# Patient Record
Sex: MALE | Race: WHITE | NOT HISPANIC OR LATINO | Employment: FULL TIME | URBAN - METROPOLITAN AREA
[De-identification: names, ages, dates, MRNs, and addresses within clinical notes are randomized per-mention and may not be internally consistent; named-entity substitution may affect disease eponyms.]

---

## 2018-04-14 ENCOUNTER — TRANSCRIBE ORDERS (OUTPATIENT)
Dept: ADMINISTRATIVE | Facility: HOSPITAL | Age: 58
End: 2018-04-14

## 2018-04-14 DIAGNOSIS — D75.89 BONE MARROW HYPERPLASIA: Primary | ICD-10-CM

## 2018-04-23 ENCOUNTER — HOSPITAL ENCOUNTER (OUTPATIENT)
Dept: RADIOLOGY | Facility: HOSPITAL | Age: 58
Discharge: HOME | End: 2018-04-23
Attending: INTERNAL MEDICINE
Payer: COMMERCIAL

## 2018-04-23 DIAGNOSIS — D75.89 BONE MARROW HYPERPLASIA: ICD-10-CM

## 2018-04-23 PROCEDURE — 76705 ECHO EXAM OF ABDOMEN: CPT

## 2018-05-07 ENCOUNTER — HOSPITAL ENCOUNTER (OUTPATIENT)
Dept: RADIOLOGY | Facility: HOSPITAL | Age: 58
Discharge: HOME | End: 2018-05-07
Attending: INTERNAL MEDICINE
Payer: COMMERCIAL

## 2018-05-07 ENCOUNTER — TRANSCRIBE ORDERS (OUTPATIENT)
Dept: ADMINISTRATIVE | Facility: HOSPITAL | Age: 58
End: 2018-05-07

## 2018-05-07 DIAGNOSIS — M25.561 RIGHT KNEE PAIN, UNSPECIFIED CHRONICITY: Primary | ICD-10-CM

## 2018-05-07 DIAGNOSIS — M25.561 RIGHT KNEE PAIN, UNSPECIFIED CHRONICITY: ICD-10-CM

## 2018-05-07 PROCEDURE — 73564 X-RAY EXAM KNEE 4 OR MORE: CPT

## 2018-05-18 ENCOUNTER — TRANSCRIBE ORDERS (OUTPATIENT)
Dept: ADMINISTRATIVE | Facility: HOSPITAL | Age: 58
End: 2018-05-18

## 2018-05-18 DIAGNOSIS — M25.561 RIGHT KNEE PAIN, UNSPECIFIED CHRONICITY: Primary | ICD-10-CM

## 2018-06-06 VITALS
BODY MASS INDEX: 22.65 KG/M2 | SYSTOLIC BLOOD PRESSURE: 117 MMHG | DIASTOLIC BLOOD PRESSURE: 74 MMHG | HEART RATE: 82 BPM | WEIGHT: 161.8 LBS | HEIGHT: 71 IN

## 2018-06-06 DIAGNOSIS — S83.241A ACUTE MEDIAL MENISCUS TEAR OF RIGHT KNEE, INITIAL ENCOUNTER: Primary | ICD-10-CM

## 2018-06-06 DIAGNOSIS — S83.241A OTHER TEAR OF MEDIAL MENISCUS, CURRENT INJURY, RIGHT KNEE, INITIAL ENCOUNTER: Primary | ICD-10-CM

## 2018-06-06 PROCEDURE — 99244 OFF/OP CNSLTJ NEW/EST MOD 40: CPT | Performed by: ORTHOPAEDIC SURGERY

## 2018-06-06 RX ORDER — LEVOTHYROXINE SODIUM 0.15 MG/1
TABLET ORAL EVERY MORNING
COMMUNITY
Start: 2018-04-09

## 2018-06-06 RX ORDER — HYDROXYCHLOROQUINE SULFATE 200 MG/1
200 TABLET, FILM COATED ORAL
COMMUNITY
Start: 2018-05-23 | End: 2021-04-02

## 2018-06-06 RX ORDER — HYDROXYUREA 500 MG/1
CAPSULE ORAL
COMMUNITY

## 2018-06-06 NOTE — PROGRESS NOTES
Assessment/Plan:  1  Other tear of medial meniscus, current injury, right knee, initial encounter       Scribe Attestation    I,:   Ilantonio Everton am acting as a scribe while in the presence of the attending physician :        I,:   Lisbet Alvarez MD personally performed the services described in this documentation    as scribed in my presence  :            Upon examination today he does demonstrate signs symptoms consistent with right knee medial meniscus tear  This is confirmed with MRI of his right knee  We discussed treatment options such as conservative measures physical therapy as well as surgical options of right knee arthroscopy with medial meniscectomy  David Bañuelos would like to consider surgical option  I discussed with Dirkrichie Sarmad risks involved including but not limited to as bleeding, blood clots, nerve injury, loss of range of motion, loss of strength, failure of surgery, and need for further surgery  David Bañuelos understands these risks and would like to move forward with the procedure  I did advise David Bañuelos to stop his rheumatoid medications 3 weeks before his surgery date, and 3 weeks after surgery  Additionally he is to follow up with his primary care physician for further consult for his high blood platelet count and receive clearance  Agapito's blood platelet count predisposes him to blood clots postsurgery  He will schedule his procedure with my office as per my schedule    Subjective:   Get Cano is a 59 y o  male who presents with right knee pain that began 10 months ago while he was doing yard work  David Bañuelos was referred to me today by Dr Renae Garcia  He states that he was in a crouched position and jump and twisted which resulted in an audible pop at the posterior medial aspect of his right knee  He was able to walk this off and was asymptomatic up until 4/14/2018  Where he states that he was dancing at his birthday party and again experienced an audible pop with associated sharp pain   He notes the pain as intermittent and mild to moderate pain about the posterior medial aspect of his right knee  He pain is exacerbated with prolonged weight-bearing and walking, as well as physical activity such as stairs and ladders  Pain is alleviated with rest and ice her  He denies any paresthesias at this time      Review of Systems   Constitutional: Negative  HENT: Negative  Eyes: Negative  Respiratory: Negative  Cardiovascular: Negative  Gastrointestinal: Negative  Endocrine: Negative  Genitourinary: Negative  Musculoskeletal: Negative  Skin: Negative  Allergic/Immunologic: Negative  Neurological: Negative  Hematological: Negative  Psychiatric/Behavioral: Negative  Past Medical History:   Diagnosis Date    Arthritis     Hypothyroid        Past Surgical History:   Procedure Laterality Date    ANKLE SURGERY      ELBOW SURGERY Bilateral     FOOT ARTHRODESIS, MODIFIED ARELLANO Left     SHOULDER SURGERY Left        Family History   Problem Relation Age of Onset    Cancer Mother        Social History     Occupational History    Not on file  Social History Main Topics    Smoking status: Former Smoker    Smokeless tobacco: Never Used    Alcohol use Yes      Comment: socially    Drug use: No    Sexual activity: Not on file         Current Outpatient Prescriptions:     hydroxychloroquine (PLAQUENIL) 200 mg tablet, , Disp: , Rfl:     hydroxyurea (HYDREA) 500 mg capsule, Take by mouth daily, Disp: , Rfl:     levothyroxine 150 mcg tablet, , Disp: , Rfl:     No Known Allergies    Objective:  Vitals:    06/06/18 0816   BP: 117/74   Pulse: 82       Right Knee Exam     Tenderness   Right knee tenderness location: Posterior medial joint line  Range of Motion   Extension: 0   Flexion: 150     Muscle Strength     The patient has normal right knee strength      Tests   Angelina:  Medial - negative Lateral - negative  Drawer:       Anterior - negative    Posterior - negative  Varus: negative  Valgus: negative    Other   Erythema: absent  Scars: absent  Sensation: normal  Pulse: present  Swelling: none  Other tests: no effusion present          Observations     Right Knee   Negative for effusion  Physical Exam   Constitutional: He is oriented to person, place, and time  He appears well-developed and well-nourished  HENT:   Head: Normocephalic and atraumatic  Eyes: Conjunctivae are normal    Neck: Normal range of motion  Cardiovascular: Normal rate and normal heart sounds  Pulmonary/Chest: Effort normal and breath sounds normal    Musculoskeletal:        Right knee: He exhibits no effusion  As noted in HPI   Neurological: He is alert and oriented to person, place, and time  Skin: Skin is warm and dry  Psychiatric: He has a normal mood and affect  His behavior is normal  Judgment and thought content normal        I have personally reviewed pertinent films in PACS and my interpretation is as follows:    MRI of the right knee demonstrates a medial meniscus tear of at the posterior horn with small subadjacent parameniscal cyst   Articular cartilage thinning noted at the anterior aspect of medial femoral condyle

## 2018-06-07 NOTE — TELEPHONE ENCOUNTER
Patient called, having sx on his knee 7/9/18  Needs to know estimate rtw date, and any restrictions when he goes back to work? He is a  ; loading reactors, packaging  He is on his feet 12 hours shift, stairs and ladders       Please fax information to patient PCP: Dr Sharif Gusman #[phone]

## 2018-06-07 NOTE — TELEPHONE ENCOUNTER
Caller: patient  Call back number: [phone]  Fax number: [phone]  Patient's doctor: Dr Андрей Lawson    Patient needs a note stating his estimated return to work date and if he will have any restrictions after surgery   Please advise

## 2018-06-29 ENCOUNTER — TRANSCRIBE ORDERS (OUTPATIENT)
Dept: ADMINISTRATIVE | Facility: HOSPITAL | Age: 58
End: 2018-06-29

## 2018-06-29 ENCOUNTER — OFFICE VISIT (OUTPATIENT)
Dept: LAB | Facility: HOSPITAL | Age: 58
End: 2018-06-29
Attending: ORTHOPAEDIC SURGERY
Payer: COMMERCIAL

## 2018-06-29 ENCOUNTER — APPOINTMENT (OUTPATIENT)
Dept: PREADMISSION TESTING | Facility: HOSPITAL | Age: 58
End: 2018-06-29
Payer: COMMERCIAL

## 2018-06-29 DIAGNOSIS — S83.241A OTHER TEAR OF MEDIAL MENISCUS, CURRENT INJURY, RIGHT KNEE, INITIAL ENCOUNTER: ICD-10-CM

## 2018-06-29 PROCEDURE — 93005 ELECTROCARDIOGRAM TRACING: CPT

## 2018-06-29 NOTE — PRE-PROCEDURE INSTRUCTIONS
Pre-Surgery Instructions:   Medication Instructions    hydroxychloroquine (PLAQUENIL) 200 mg tablet Patient was instructed by Physician and understands   Hydroxyurea (HYDREA PO) Patient was instructed by Physician and understands   hydroxyurea (HYDREA) 500 mg capsule Patient was instructed by Physician and understands   levothyroxine 150 mcg tablet Instructed patient per Anesthesia Guidelines  Pre op instructions given  Pt to take hydroxyurea  Levothyroxine the am of surgery   serg Alcantar [phone] Surgical Experience    The following information was developed to assist you to prepare for your operation  What do I need to do before coming to the hospital?   Arrange for a responsible person to drive you to and from the hospital    Arrange care for your children at home  Children are not allowed in the recovery areas of the hospital   Plan to wear clothing that is easy to put on and take off  If you are having shoulder surgery, wear a shirt that buttons or zippers in the front  Bathing  o Shower the evening before and the morning of your surgery with an antibacterial soap  Please refer to the Pre Op Showering Instructions for Surgery Patients Sheet   o Remove nail polish and all body piercing jewelry  o Do not shave any body part for at least 24 hours before surgery-this includes face, arms, legs and upper body  Food  o Nothing to eat or drink after midnight the night before your surgery   This includes candy and chewing gum  o Exception: If your surgery is after 12:00pm (noon), you may have clear liquids such as 7-Up®, ginger ale, apple or cranberry juice, Jell-O®, water, or clear broth until 8:00 am  o Do not drink milk or juice with pulp on the morning before surgery  o Do not drink alcohol 24 hours before surgery  Medicine  o Follow instructions you received from your surgeon about which medicines you may take on the day of surgery  o If instructed to take medicine on the morning of surgery, take pills with just a small sip of water  Call your prescribing doctor for specific infroamtion on what to do if you take insulin    What should I bring to the hospital?    Bring:  Shonda Drain or a walker, if you have them, for foot or knee surgery   A list of the daily medicines, vitamins, minerals, herbals and nutritional supplements you take  Include the dosages of medicines and the time you take them each day   Glasses, dentures or hearing aids   Minimal clothing; you will be wearing hospital sleepwear   Photo ID; required to verify your identity   If you have a Living Will or Power of , bring a copy of the documents   If you have an ostomy, bring an extra pouch and any supplies you use    Do not bring   Medicines or inhalers   Money, valuables or jewelry    What other information should I know about the day of surgery?  Notify your surgeons if you develop a cold, sore throat, cough, fever, rash or any other illness   Report to the Ambulatory Surgical/Same Day Surgery Unit   You will be instructed to stop at Registration only if you have not been pre-registered   Inform your  fi they do not stay that they will be asked by the staff to leave a phone number where they can be reached   Be available to be reached before surgery  In the event the operating room schedule changes, you may be asked to come in earlier or later than expected    *It is important to tell your doctor and others involved in your health care if you are taking or have been taking any non-prescription drugs, vitamins, minerals, herbals or other nutritional supplements   Any of these may interact with some food or medicines and cause a reaction

## 2018-07-03 ENCOUNTER — TELEPHONE (OUTPATIENT)
Dept: OBGYN CLINIC | Facility: CLINIC | Age: 58
End: 2018-07-03

## 2018-07-03 LAB
INR PPP: 1 (ref 0.8–1.2)
PROTHROMBIN TIME: 10.2 SEC (ref 9.1–12)

## 2018-07-05 NOTE — TELEPHONE ENCOUNTER
Patient called back and says he did have the blood work done in [address] ([facility])

## 2018-07-05 NOTE — TELEPHONE ENCOUNTER
Called [facility], no results yet  Called patient to verify he went this morning  Called Dr Alize Blackwood office, l/m advising the status

## 2018-07-06 ENCOUNTER — TELEPHONE (OUTPATIENT)
Dept: OBGYN CLINIC | Facility: HOSPITAL | Age: 58
End: 2018-07-06

## 2018-07-06 LAB
APTT PPP: 26 SEC (ref 24–33)
BASOPHILS # BLD AUTO: 0.1 X10E3/UL (ref 0–0.2)
BASOPHILS NFR BLD AUTO: 1 %
BUN SERPL-MCNC: 10 MG/DL (ref 8–27)
BUN/CREAT SERPL: 10 (ref 10–24)
CALCIUM SERPL-MCNC: 9.5 MG/DL (ref 8.6–10.2)
CHLORIDE SERPL-SCNC: 100 MMOL/L (ref 96–106)
CO2 SERPL-SCNC: 24 MMOL/L (ref 20–29)
CREAT SERPL-MCNC: 1.03 MG/DL (ref 0.76–1.27)
EOSINOPHIL # BLD AUTO: 0.3 X10E3/UL (ref 0–0.4)
EOSINOPHIL NFR BLD AUTO: 4 %
ERYTHROCYTE [DISTWIDTH] IN BLOOD BY AUTOMATED COUNT: 14.2 % (ref 12.3–15.4)
GLUCOSE SERPL-MCNC: 81 MG/DL (ref 65–99)
HCT VFR BLD AUTO: 46.9 % (ref 37.5–51)
HGB BLD-MCNC: 16.7 G/DL (ref 13–17.7)
IMM GRANULOCYTES # BLD: 0 X10E3/UL (ref 0–0.1)
IMM GRANULOCYTES NFR BLD: 1 %
LYMPHOCYTES # BLD AUTO: 1 X10E3/UL (ref 0.7–3.1)
LYMPHOCYTES NFR BLD AUTO: 16 %
MCH RBC QN AUTO: 35.6 PG (ref 26.6–33)
MCHC RBC AUTO-ENTMCNC: 35.6 G/DL (ref 31.5–35.7)
MCV RBC AUTO: 100 FL (ref 79–97)
MONOCYTES # BLD AUTO: 0.3 X10E3/UL (ref 0.1–0.9)
MONOCYTES NFR BLD AUTO: 5 %
NEUTROPHILS # BLD AUTO: 4.7 X10E3/UL (ref 1.4–7)
NEUTROPHILS NFR BLD AUTO: 73 %
PLATELET # BLD AUTO: 334 X10E3/UL (ref 150–379)
POTASSIUM SERPL-SCNC: 4.7 MMOL/L (ref 3.5–5.2)
RBC # BLD AUTO: 4.69 X10E6/UL (ref 4.14–5.8)
SL AMB EGFR AFRICAN AMERICAN: 91 ML/MIN/1.73
SL AMB EGFR NON AFRICAN AMERICAN: 79 ML/MIN/1.73
SODIUM SERPL-SCNC: 141 MMOL/L (ref 134–144)
WBC # BLD AUTO: 6.4 X10E3/UL (ref 3.4–10.8)

## 2018-07-08 ENCOUNTER — ANESTHESIA EVENT (OUTPATIENT)
Dept: PERIOP | Facility: HOSPITAL | Age: 58
End: 2018-07-08
Payer: COMMERCIAL

## 2018-07-09 ENCOUNTER — ANESTHESIA (OUTPATIENT)
Dept: PERIOP | Facility: HOSPITAL | Age: 58
End: 2018-07-09
Payer: COMMERCIAL

## 2018-07-09 ENCOUNTER — HOSPITAL ENCOUNTER (OUTPATIENT)
Facility: HOSPITAL | Age: 58
Setting detail: OUTPATIENT SURGERY
Discharge: HOME | End: 2018-07-09
Attending: ORTHOPAEDIC SURGERY | Admitting: ORTHOPAEDIC SURGERY
Payer: COMMERCIAL

## 2018-07-09 VITALS
TEMPERATURE: 98.9 F | OXYGEN SATURATION: 97 % | RESPIRATION RATE: 18 BRPM | SYSTOLIC BLOOD PRESSURE: 140 MMHG | BODY MASS INDEX: 22.32 KG/M2 | HEART RATE: 66 BPM | DIASTOLIC BLOOD PRESSURE: 82 MMHG | WEIGHT: 160 LBS

## 2018-07-09 PROCEDURE — 29881 ARTHRS KNE SRG MNISECTMY M/L: CPT | Performed by: PHYSICIAN ASSISTANT

## 2018-07-09 PROCEDURE — 29881 ARTHRS KNE SRG MNISECTMY M/L: CPT | Performed by: ORTHOPAEDIC SURGERY

## 2018-07-09 RX ORDER — LIDOCAINE HYDROCHLORIDE 10 MG/ML
INJECTION, SOLUTION INFILTRATION; PERINEURAL AS NEEDED
Status: DISCONTINUED | OUTPATIENT
Start: 2018-07-09 | End: 2018-07-09 | Stop reason: SURG

## 2018-07-09 RX ORDER — MIDAZOLAM HYDROCHLORIDE 1 MG/ML
INJECTION INTRAMUSCULAR; INTRAVENOUS AS NEEDED
Status: DISCONTINUED | OUTPATIENT
Start: 2018-07-09 | End: 2018-07-09 | Stop reason: SURG

## 2018-07-09 RX ORDER — FENTANYL CITRATE 50 UG/ML
INJECTION, SOLUTION INTRAMUSCULAR; INTRAVENOUS AS NEEDED
Status: DISCONTINUED | OUTPATIENT
Start: 2018-07-09 | End: 2018-07-09 | Stop reason: SURG

## 2018-07-09 RX ORDER — ONDANSETRON 2 MG/ML
INJECTION INTRAMUSCULAR; INTRAVENOUS AS NEEDED
Status: DISCONTINUED | OUTPATIENT
Start: 2018-07-09 | End: 2018-07-09 | Stop reason: SURG

## 2018-07-09 RX ORDER — FENTANYL CITRATE/PF 50 MCG/ML
25 SYRINGE (ML) INJECTION
Status: DISCONTINUED | OUTPATIENT
Start: 2018-07-09 | End: 2018-07-09 | Stop reason: HOSPADM

## 2018-07-09 RX ORDER — PROPOFOL 10 MG/ML
INJECTION, EMULSION INTRAVENOUS AS NEEDED
Status: DISCONTINUED | OUTPATIENT
Start: 2018-07-09 | End: 2018-07-09 | Stop reason: SURG

## 2018-07-09 RX ORDER — ONDANSETRON 2 MG/ML
4 INJECTION INTRAMUSCULAR; INTRAVENOUS ONCE AS NEEDED
Status: DISCONTINUED | OUTPATIENT
Start: 2018-07-09 | End: 2018-07-09 | Stop reason: HOSPADM

## 2018-07-09 RX ORDER — BUPIVACAINE HYDROCHLORIDE AND EPINEPHRINE 2.5; 5 MG/ML; UG/ML
INJECTION, SOLUTION INFILTRATION; PERINEURAL AS NEEDED
Status: DISCONTINUED | OUTPATIENT
Start: 2018-07-09 | End: 2018-07-09 | Stop reason: HOSPADM

## 2018-07-09 RX ORDER — SODIUM CHLORIDE 9 MG/ML
INJECTION, SOLUTION INTRAVENOUS CONTINUOUS PRN
Status: DISCONTINUED | OUTPATIENT
Start: 2018-07-09 | End: 2018-07-09 | Stop reason: SURG

## 2018-07-09 RX ADMIN — FENTANYL CITRATE 100 MCG: 50 INJECTION, SOLUTION INTRAMUSCULAR; INTRAVENOUS at 13:58

## 2018-07-09 RX ADMIN — PROPOFOL 200 MG: 10 INJECTION, EMULSION INTRAVENOUS at 14:00

## 2018-07-09 RX ADMIN — LIDOCAINE HYDROCHLORIDE 50 MG: 10 INJECTION, SOLUTION INFILTRATION; PERINEURAL at 14:00

## 2018-07-09 RX ADMIN — ONDANSETRON 4 MG: 2 INJECTION INTRAMUSCULAR; INTRAVENOUS at 14:20

## 2018-07-09 RX ADMIN — SODIUM CHLORIDE: 0.9 INJECTION, SOLUTION INTRAVENOUS at 13:50

## 2018-07-09 RX ADMIN — MIDAZOLAM HYDROCHLORIDE 2 MG: 1 INJECTION, SOLUTION INTRAMUSCULAR; INTRAVENOUS at 13:58

## 2018-07-09 RX ADMIN — DEXAMETHASONE SODIUM PHOSPHATE 4 MG: 10 INJECTION INTRAMUSCULAR; INTRAVENOUS at 14:15

## 2018-07-09 RX ADMIN — PROPOFOL 200 MG: 10 INJECTION, EMULSION INTRAVENOUS at 14:01

## 2018-07-09 RX ADMIN — CEFAZOLIN SODIUM 2000 MG: 2 SOLUTION INTRAVENOUS at 14:05

## 2018-07-09 NOTE — PERIOPERATIVE NURSING NOTE
Pt remains without complaint of pain  Discharge criteria met  Instructions given to patient and wife; all questions answered

## 2018-07-09 NOTE — ANESTHESIA PREPROCEDURE EVALUATION
Review of Systems/Medical History      No history of anesthetic complications     Cardiovascular  Exercise tolerance (METS): >4,  No angina , No ISLAS,    Pulmonary  Negative pulmonary ROS        GI/Hepatic            Endo/Other  History of thyroid disease , hypothyroidism,      GYN       Hematology   Musculoskeletal  Rheumatoid arthritis Severity: mild,   Arthritis     Neurology   Psychology           Physical Exam    Airway    Mallampati score: III  TM Distance: >3 FB  Neck ROM: full     Dental       Cardiovascular  Cardiovascular exam normal    Pulmonary  Pulmonary exam normal Breath sounds clear to auscultation,     Other Findings        Anesthesia Plan  ASA Score- 2     Anesthesia Type- general with ASA Monitors  Additional Monitors:   Airway Plan: LMA  Plan Factors- Patient instructed to abstain from smoking on day of procedure  Patient did not smoke on day of surgery  Induction- intravenous  Postoperative Plan- Plan for postoperative opioid use  Informed Consent- Anesthetic plan and risks discussed with patient  I personally reviewed this patient with the CRNA  Discussed and agreed on the Anesthesia Plan with the CRNA  Kevin Ruano

## 2018-07-09 NOTE — OP NOTE
OPERATIVE REPORT  PATIENT NAME: Renita Jang    :  [id number]  MRN: [id number]  Pt Location: [facility] OR ROOM 01    SURGERY DATE: 2018    Surgeon(s) and Role:     * Xiomara Archer MD - Primary     * Radha Broussard PA-C - Assisting necessary for the procedure for assistance with minimally invasive arthroscopic techniques for medial meniscectomy  Lizzy NEW student    Preop Diagnosis:  Other tear of medial meniscus, current injury, right knee, initial encounter [S83 241A]    Post-Op Diagnosis Codes:     * Other tear of medial meniscus, current injury, right knee, initial encounter [X97 304N]    Procedure:  Right knee arthroscopy medial meniscectomy    Specimen(s):  * No specimens in log *    Estimated Blood Loss:   Minimal    Drains:       Anesthesia Type:   General    Operative Indications: Other tear of medial meniscus, current injury, right knee, initial encounter Cristina Drew is a 20-year-old male who has been suffering long-term with right knee pain  MRI demonstrated a medial meniscus tear  He understood the risks and benefits of a right knee arthroscopy for medial meniscectomy and wished to go ahead  Risks are inclusive of but not limited to infection, stiffness, failure of the operation to provide anticipated results, worsening of symptoms, failure to regain full strength and ability, blood clots, need for further surgery  Operative Findings:  Right knee with a stable exam under anesthesia to varus and valgus stress as well as the Lachman's and posterior drawer  Intra-articular findings demonstrated grade 3 changes to the undersurface of the patella but no loose bodies in either gutter  The lateral compartment was pristine from reticular cartilage and meniscal standpoint and the ACL was intact  The medial compartment demonstrated a highly complex tear along the posterior half of the medial meniscus  This did also extends slightly into the anterior 3rd    There was also grade 3 change in an area of approximately 1 cm in length over the medial meniscus tear along the medial femoral condyle  We were able to perform the meniscectomy back to a stable rim of tissue  Complications:   None    Procedure and Technique:  Jennifer Ornelas was taken to the operating room and placed supine on the OR table  He was given preoperative IV antibiotics  General anesthesia was induced in the right lower extremity was taken through examination under anesthesia as described above  The right lower extremity was then prepped and draped in usual sterile fashion  A surgical time-out was taken  Local anesthetic was injected into both portals along the right knee and the anterolateral portal was then made with an 11 blade  Diagnostic arthroscopy was begun an anteromedial portal was made with 11 blade  We demonstrated grade 3 changes along the undersurface of the patella but no loose bodies in either gutter  The lateral compartment was pristine  The ACL was intact  The medial compartment demonstrated a highly complex tear along the posterior 2/3 of the meniscus  We did debride this back to a stable rim of tissue utilizing a series of biters and Luis F and also switched portals to get the more anterior aspect of the tear balance well with a biter and shaver  We also utilized the Wand for peripheral bleeding  We did lavaged the joint and removed the arthroscopic equipment  We closed the portals with 4-0 nylon suture  Local anesthetic was injected and dry, sterile dressings were applied  He tolerated procedure well and transferred to recovery room stable condition  He will follow up with me in 1 week for suture removal   He will be on aspirin for DVT prophylaxis  He will start physical therapy as soon as possible     I was present for the entire procedure and A qualified resident physician was not available    Patient Disposition:  PACU     SIGNATURE: Mikael Fabry, MD  DATE: July 9, 2018  TIME: 2:31 PM

## 2018-07-09 NOTE — H&P
Assessment/Plan:  Right knee medial meniscus tear    The patient would like to proceed with right knee arthroscopy with medial meniscectomy  We discussed the procedure and risks at length, including but not limited to, infection, bleeding, wound issues, nerve injury, blood clot, stiffness, failure of procedure, and need for additional surgery  We will see the patient back at the time of surgery  Subjective:   Emili Contreras is a 59 y o  male with right medial knee pain consistent with medial meniscus tear found on MRI  He has failed conservative treatment thus far and continues with knee pain  Review of Systems   Constitutional: Negative for chills, fever and unexpected weight change  HENT: Negative for hearing loss, nosebleeds and sore throat  Eyes: Negative for pain, redness and visual disturbance  Respiratory: Negative for cough, shortness of breath and wheezing  Cardiovascular: Negative for chest pain, palpitations and leg swelling  Gastrointestinal: Negative for abdominal pain, nausea and vomiting  Endocrine: Negative for polyphagia and polyuria  Genitourinary: Negative for dysuria and hematuria  Musculoskeletal:        See HPI   Skin: Negative for rash and wound  Neurological: Negative for dizziness, numbness and headaches  Psychiatric/Behavioral: Negative for decreased concentration and suicidal ideas  The patient is not nervous/anxious            Past Medical History:   Diagnosis Date    Arthritis     Graves disease 16    Hypothyroid     hypo    Platelet disorder ([address] )     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 Martinez Cage    Wears glasses        Past Surgical History:   Procedure Laterality Date    ANKLE SURGERY Right     ligament, chipped bones,dislocated    COLONOSCOPY      ELBOW SURGERY Right     tendon surgery    FOOT ARTHRODESIS, MODIFIED ARELLANO Left     FRACTURE SURGERY Left     elbow    HERNIA REPAIR Right     inguinal    ROTATOR CUFF REPAIR Left     WISDOM TOOTH EXTRACTION      x4       Family History   Problem Relation Age of Onset    Cancer Mother         lung-heavy smoker    Heart disease Father         leaky valve       Social History     Occupational History    Not on file  Social History Main Topics    Smoking status: Former Smoker     Packs/day: 1 00     Years: 20 00     Quit date: 2000    Smokeless tobacco: Never Used    Alcohol use Yes      Comment: occ beer with going out to dinner    Drug use: No    Sexual activity: Yes     Partners: Female         Current Facility-Administered Medications:     ceFAZolin (ANCEF) IVPB (premix) 2,000 mg, 2,000 mg, Intravenous, Once, Brisa Chavez PA-C    No Known Allergies    Objective:  Vitals:    07/09/18 1147   BP: 118/67   Pulse: 64   Resp: 18   Temp: 98 4 °F (36 9 °C)   SpO2: 98%       Right Knee Exam     Tenderness   The patient is experiencing tenderness in the medial joint line  Range of Motion   Extension: normal   Flexion: normal     Tests   Angelina:  Medial - positive Lateral - negative  Drawer:       Anterior - negative    Posterior - negative  Varus: negative  Valgus: negative    Other   Erythema: absent  Sensation: normal  Pulse: present            Physical Exam   Constitutional: He is oriented to person, place, and time  He appears well-developed  HENT:   Head: Normocephalic and atraumatic  Eyes: Conjunctivae are normal    Neck: Neck supple  Cardiovascular: Intact distal pulses  Pulmonary/Chest: Effort normal    Abdominal: Soft  Neurological: He is alert and oriented to person, place, and time  Skin: Skin is warm and dry  Psychiatric: He has a normal mood and affect  His behavior is normal    Vitals reviewed

## 2018-07-09 NOTE — DISCHARGE INSTRUCTIONS
INSTRUCTIONS FOLLOWING YOUR KNEE ARTHROSCOPY    FIRST FOLLOW-UP VISIT AFTER SURGERY      Call the office the day after your surgery & make an appointment for 1 week to see Dr Trav Goodson  At that appointment, your stitches will be removed  CANE OR CRUTCHES      You may put as much weight on your leg as tolerated when using the crutches/cane  When you feel that the crutches/cane is not necessary, you may discontinue its use  Use common sense, let pain be your guide for your activities  Climbing stairs, walking and sitting are all permitted as you tolerate them  EXERCISE PROGRAM      At your 1st follow-up visit you will be given a prescription for physical therapy if you have not already been given one  SHOWERING      Keep original bandage on for 48 hours after surgery & replace with band-aids  You should keep the band-aids on until you see Dr Trav Goodson  After 48 hours, it is okay to get your incision wet & you may shower  Do not take any baths or soak in a hot tub or pool until your stitches have been removed  INCISION SITE      You may notice a small amount of blood on your bandage, about the size of a quarter, this is normal and there is no need to worry  If you notice uncontrollable or excessive bleeding, oozing, or redness of the wound please call the office  SWELLING AND DISCOMFORT      You will experience some pain and discomfort after surgery  You will be given a prescription for pain medication  Take the medication as prescribed  If the discomfort is mild, you can take 1 or 2 Tylenol, every 4-6 hours as needed, instead of the prescribed pain medication  You will notice some swelling of your knee after surgery, this is normal      To help reduce the swelling, elevate your leg as much as possible the first two days  In addition, you may place ice on your knee to reduce swelling  Place a plastic bag filled with ice, wrapped in a thin towel, on your knee   Do not keep ice on for more than 15-20 minutes, repeat 4-5 TIMES A DAY, IF POSSIBLE  BLOOD CLOT PREVENTION    Unless otherwise instructed, take one 325 mg aspirin daily for the first 3 weeks following surgery  This is to help decrease the risk of blood clots  If at any time you have discomfort, swelling, or redness in the calf (behind the leg between the knee and the ankle)  or difficulty breathing or heaviness in the chest, please call the doctor immediately  TEMPERATURE      A temperature of less than 101 degrees is common for the first 1-2 days after surgery  If your temperature is elevated above 101 degrees, call the office  IF YOU HAVE ANY OTHER CONCERNS OR QUESTIONS AT ANY TIME, DO NOT HESITATE TO CALL THE OFFICE [phone]

## 2018-07-09 NOTE — PERIOPERATIVE NURSING NOTE
Received patient to \[facility]\"" from PACU, alert, VSS, tolerating po fluids  No complaint of pain  Ice and dressing on right knee, dry and intact  Pedal pulses strong  Capillary refill brisk

## 2018-07-09 NOTE — ANESTHESIA POSTPROCEDURE EVALUATION
Post-Op Assessment Note      CV Status:  Stable    Mental Status:  Alert and awake    Hydration Status:  Euvolemic    PONV Controlled:  Controlled    Airway Patency:  Patent    Post Op Vitals Reviewed: Yes          Staff: Anesthesiologist           /82 (07/09/18 1511)    Temp 98 9 °F (37 2 °C) (07/09/18 1511)    Pulse 66 (07/09/18 1511)   Resp 18 (07/09/18 1511)    SpO2 97 % (07/09/18 1511)

## 2018-07-16 ENCOUNTER — OFFICE VISIT (OUTPATIENT)
Dept: OBGYN CLINIC | Facility: CLINIC | Age: 58
End: 2018-07-16

## 2018-07-16 VITALS — BODY MASS INDEX: 21.67 KG/M2 | WEIGHT: 160 LBS | HEIGHT: 72 IN

## 2018-07-16 DIAGNOSIS — Z98.890 STATUS POST ARTHROSCOPIC PARTIAL MEDIAL MENISCECTOMY: Primary | ICD-10-CM

## 2018-07-16 DIAGNOSIS — Z98.890 STATUS POST ARTHROSCOPY OF KNEE: ICD-10-CM

## 2018-07-16 PROCEDURE — 99024 POSTOP FOLLOW-UP VISIT: CPT | Performed by: ORTHOPAEDIC SURGERY

## 2018-07-16 RX ORDER — SILDENAFIL 100 MG/1
100 TABLET, FILM COATED ORAL
Refills: 12 | COMMUNITY
Start: 2018-07-06

## 2018-07-16 RX ORDER — BIOTIN 1 MG
1000 TABLET ORAL
COMMUNITY

## 2018-07-16 NOTE — PROGRESS NOTES
Assessment/Plan:  1  Status post arthroscopic partial medial meniscectomy     2  Status post arthroscopy of knee         Scribe Attestation    I,:   Mary Kate Wiggins am acting as a scribe while in the presence of the attending physician :        I,:   Saad Breen MD personally performed the services described in this documentation    as scribed in my presence :          Jose Martin Cancino is making satisfactory progress 1 week status post right knee arthroscopy with medial meniscectomy  At today's visit, I reviewed the intraoperative images with Jose Martin Cancino  I believe the aching sensation he experiences at the posterior medial aspect of his knee will resolve as he continues with physical therapy and progresses with healing  I did explain that I observed some mild osteoarthritic degeneration of his right knee during his procedure  We discussed that Jose Martin Cancino should continue to refrain from taking his Plaquenil until he is a full 3 weeks removed from his procedure  He understood this and will comply  I would like Jose Martin Cancino to continue physical therapy for a total of 6 weeks  Should he be unhappy with his progress at that time, he may follow up as needed  Subjective:   Emili Contreras is a 59 y o  male who presents today for follow up evaluation 1 week status post right knee arthroscopy with medial meniscectomy  He reports that he is improving and feeling good  He does complain of a mild intermittent ache at the posterior medial portion of the knee that increases with activity and ambulation and decreases with rest  The pain does not radiate  He denies any mechanical symptoms  He denies any new injury or trauma  He denies any distal paresthesia  Review of Systems   Constitutional: Negative for chills, fever and unexpected weight change  HENT: Negative for hearing loss, nosebleeds and sore throat  Eyes: Negative for pain, redness and visual disturbance  Respiratory: Negative for cough, shortness of breath and wheezing  Cardiovascular: Negative for chest pain, palpitations and leg swelling  Gastrointestinal: Negative for abdominal pain, nausea and vomiting  Endocrine: Negative for polyphagia and polyuria  Genitourinary: Negative for dysuria and hematuria  Musculoskeletal: Positive for arthralgias and joint swelling  See HPI   Skin: Negative for rash and wound  Neurological: Negative for dizziness, numbness and headaches  Psychiatric/Behavioral: Negative for decreased concentration and suicidal ideas  The patient is not nervous/anxious  Past Medical History:   Diagnosis Date    Arthritis     Graves disease 18    Hypothyroid     hypo    Platelet disorder ([address] )     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 Salome Ulloa    Wears glasses        Past Surgical History:   Procedure Laterality Date    ANKLE SURGERY Right     ligament, chipped bones,dislocated    COLONOSCOPY      ELBOW SURGERY Right     tendon surgery    FOOT ARTHRODESIS, MODIFIED ARELLANO Left     FRACTURE SURGERY Left     elbow    HERNIA REPAIR Right     inguinal    [place] KNEE SCOPE,MED/LAT MENISECTOMY Right 7/9/2018    Procedure: MENISCECTOMY LATERAL /MEDIAL;  Surgeon: Philly Hobbs MD;  Location: [facility];  Service: Orthopedics    ROTATOR CUFF REPAIR Left     WISDOM TOOTH EXTRACTION      x4       Family History   Problem Relation Age of Onset    Cancer Mother         lung-heavy smoker    Heart disease Father         leaky valve       Social History     Occupational History    Not on file       Social History Main Topics    Smoking status: Former Smoker     Packs/day: 1 00     Years: 20 00     Quit date: 2000    Smokeless tobacco: Never Used    Alcohol use Yes      Comment: occ beer with going out to dinner    Drug use: No    Sexual activity: Yes     Partners: Female         Current Outpatient Prescriptions:     Cholecalciferol (VITAMIN D3) 1000 units CAPS, Take 1,000 Units by mouth, Disp: , Rfl:     hydroxychloroquine (PLAQUENIL) 200 mg tablet, 200 mg daily with breakfast  , Disp: , Rfl:     hydroxyurea (HYDREA) 500 mg capsule, Take by mouth On Tues-Thurs-Sat-Sun , Disp: , Rfl:     levothyroxine 150 mcg tablet, every morning  , Disp: , Rfl:     sildenafil (VIAGRA) 100 mg tablet, Take 100 mg by mouth As directed, Disp: , Rfl: 12    Hydroxyurea (HYDREA PO), Take 1,000 mg by mouth Mon-Wed-Fri, Disp: , Rfl:     No Known Allergies    Objective: There were no vitals filed for this visit  Right Knee Exam     Tenderness   Right knee tenderness location: Posterior medial (popliteus)    Other   Erythema: absent  Scars: present  Sensation: normal  Swelling: none            Physical Exam   Constitutional: He is oriented to person, place, and time  He appears well-developed  HENT:   Head: Normocephalic and atraumatic  Eyes: Conjunctivae are normal    Neck: Neck supple  Cardiovascular: Intact distal pulses  Pulmonary/Chest: Effort normal    Abdominal: Soft  Neurological: He is alert and oriented to person, place, and time  Skin: Skin is warm and dry  Psychiatric: He has a normal mood and affect  His behavior is normal    Vitals reviewed

## 2018-07-30 LAB
ATRIAL RATE: 55 BPM
P AXIS: 82 DEGREES
PR INTERVAL: 228 MS
QRS AXIS: -8 DEGREES
QRSD INTERVAL: 74 MS
QT INTERVAL: 374 MS
QTC INTERVAL: 357 MS
T WAVE AXIS: 68 DEGREES
VENTRICULAR RATE: 55 BPM

## 2018-07-30 PROCEDURE — 93010 ELECTROCARDIOGRAM REPORT: CPT | Performed by: INTERNAL MEDICINE

## 2021-03-11 ENCOUNTER — OFFICE VISIT (OUTPATIENT)
Dept: PODIATRY | Facility: CLINIC | Age: 61
End: 2021-03-11
Payer: COMMERCIAL

## 2021-03-11 VITALS — WEIGHT: 160 LBS | BODY MASS INDEX: 22.4 KG/M2 | HEIGHT: 71 IN | RESPIRATION RATE: 16 BRPM

## 2021-03-11 DIAGNOSIS — M79.672 PAIN IN BOTH FEET: ICD-10-CM

## 2021-03-11 DIAGNOSIS — M21.962 ACQUIRED DEFORMITY OF BOTH FEET: ICD-10-CM

## 2021-03-11 DIAGNOSIS — M20.5X2 ACQUIRED HALLUX LIMITUS OF LEFT FOOT: ICD-10-CM

## 2021-03-11 DIAGNOSIS — M79.671 PAIN IN BOTH FEET: ICD-10-CM

## 2021-03-11 DIAGNOSIS — M20.42 ACQUIRED HAMMERTOES OF BOTH FEET: ICD-10-CM

## 2021-03-11 DIAGNOSIS — M20.41 ACQUIRED HAMMERTOES OF BOTH FEET: ICD-10-CM

## 2021-03-11 DIAGNOSIS — Z86.2 HISTORY OF HIGH PLATELET COUNT: ICD-10-CM

## 2021-03-11 DIAGNOSIS — R23.0 CYANOSIS: Primary | ICD-10-CM

## 2021-03-11 DIAGNOSIS — M20.21 HALLUX RIGIDUS, RIGHT FOOT: ICD-10-CM

## 2021-03-11 DIAGNOSIS — M21.961 ACQUIRED DEFORMITY OF BOTH FEET: ICD-10-CM

## 2021-03-11 PROCEDURE — L3000 FT INSERT UCB BERKELEY SHELL: HCPCS | Performed by: PODIATRIST

## 2021-03-11 PROCEDURE — 99203 OFFICE O/P NEW LOW 30 MIN: CPT | Performed by: PODIATRIST

## 2021-03-11 RX ORDER — CAPSAICIN 0.025 %
1 CREAM (GRAM) TOPICAL 2 TIMES DAILY
Qty: 60 G | Refills: 0 | Status: ON HOLD | OUTPATIENT
Start: 2021-03-11 | End: 2021-05-14 | Stop reason: ALTCHOICE

## 2021-03-18 ENCOUNTER — OFFICE VISIT (OUTPATIENT)
Dept: PODIATRY | Facility: CLINIC | Age: 61
End: 2021-03-18
Payer: COMMERCIAL

## 2021-03-18 VITALS — BODY MASS INDEX: 22.4 KG/M2 | HEIGHT: 71 IN | RESPIRATION RATE: 16 BRPM | WEIGHT: 160 LBS

## 2021-03-18 DIAGNOSIS — M20.21 HALLUX RIGIDUS, RIGHT FOOT: ICD-10-CM

## 2021-03-18 DIAGNOSIS — M20.42 ACQUIRED HAMMERTOES OF BOTH FEET: ICD-10-CM

## 2021-03-18 DIAGNOSIS — Z86.2 HISTORY OF HIGH PLATELET COUNT: ICD-10-CM

## 2021-03-18 DIAGNOSIS — M21.962 ACQUIRED DEFORMITY OF BOTH FEET: ICD-10-CM

## 2021-03-18 DIAGNOSIS — M20.5X2 ACQUIRED HALLUX LIMITUS OF LEFT FOOT: ICD-10-CM

## 2021-03-18 DIAGNOSIS — M21.961 ACQUIRED DEFORMITY OF BOTH FEET: ICD-10-CM

## 2021-03-18 DIAGNOSIS — M79.671 PAIN IN BOTH FEET: ICD-10-CM

## 2021-03-18 DIAGNOSIS — M20.41 ACQUIRED HAMMERTOES OF BOTH FEET: ICD-10-CM

## 2021-03-18 DIAGNOSIS — M79.672 PAIN IN BOTH FEET: ICD-10-CM

## 2021-03-18 DIAGNOSIS — R23.0 CYANOSIS: Primary | ICD-10-CM

## 2021-03-18 PROCEDURE — 99213 OFFICE O/P EST LOW 20 MIN: CPT | Performed by: PODIATRIST

## 2021-03-22 NOTE — PROGRESS NOTES
Assessment/Plan:     patient evaluated, treatment options discussed with the patient, including conservative and surgical management   patient is not a candidate for steroid injection due to scheduled COVID vaccination   patient bilateral foot were digitally scanned in a simulated semi weight-bearing position to fabricate 1 pair of custom foot orthosis to accommodate for patient has hallux rigidus on the right, hallux limitus on the left, bilateral deformed 2nd digit, with Plastazote top cover, patient will use conservative management, and return to the office for possible surgical management   vascular studies ordered to rule out the cause of digit cyanotic changes, patient to follow-up with his mother hallux just for the management of thrombocytosis     Diagnoses and all orders for this visit:    Cyanosis  -     VAS lower limb arterial duplex, complete bilateral; Future    Acquired hammertoes of both feet  -     Cancel: X-ray foot right 3+ views; Future  -     capsaicin (ZOSTRIX) 0 025 % cream; Apply 1 application topically 2 (two) times a day  -     X-ray foot left 3+ views; Future  -     X-ray foot right 3+ views; Future    Acquired hallux limitus of left foot  -     Cancel: X-ray foot left 3+ views; Future  -     Cancel: X-ray foot right 3+ views; Future  -     capsaicin (ZOSTRIX) 0 025 % cream; Apply 1 application topically 2 (two) times a day  -     X-ray foot left 3+ views; Future  -     X-ray foot right 3+ views; Future    Hallux rigidus, right foot    Acquired deformity of both feet    Pain in both feet    History of high platelet count          Subjective:      Patient ID: Jerrell Romero is a 56 y o  male       78-year-old male past medical history significant of thrombocytosis,  Under taken hydroxyurea, patient has a long history of look trauma treated surgically, patient report bilateral foot surgery, in the past, patient presents to the office for pain to both feet, right more than left, patient states that he has  Deformed toes, that been treated surgically on the left in the past with moderate improvement, patient denies recent trauma to the foot, patient denies constitutional symptoms, patient states that the pain increases upon standing long hours during his work day      The following portions of the patient's history were reviewed and updated as appropriate: allergies, current medications, past family history, past medical history, past social history, past surgical history and problem list     Review of Systems   Constitutional: Negative  Respiratory: Negative  Cardiovascular: Negative  Musculoskeletal: Positive for arthralgias  Skin: Negative                  Historical Information   Past Medical History:   Diagnosis Date    Arthritis     Graves disease 18    Hypothyroid     hypo    Platelet disorder ([facility] [address]     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 Yisel Martinez    Wears glasses      Past Surgical History:   Procedure Laterality Date    ANKLE SURGERY Right     ligament, chipped bones,dislocated    COLONOSCOPY      ELBOW SURGERY Right     tendon surgery    FOOT ARTHRODESIS, MODIFIED ARELLANO Left     FRACTURE SURGERY Left     elbow    HERNIA REPAIR Right     inguinal    [place] KNEE SCOPE,MED/LAT MENISECTOMY Right 2018    Procedure: MENISCECTOMY LATERAL /MEDIAL;  Surgeon: Maxwell Burleson MD;  [facility]: [facility];  Service: Orthopedics    ROTATOR CUFF REPAIR Left     WISDOM TOOTH EXTRACTION      x4     Social History   Social History     Substance and Sexual Activity   Alcohol Use Yes    Comment: occ beer with going out to dinner     Social History     Substance and Sexual Activity   Drug Use No     Social History     Tobacco Use   Smoking Status Former Smoker    Packs/day: 1 00    Years: 20 00    Pack years: 20 00    Quit date:     Years since quittin 2   Smokeless Tobacco Never Used     Family History:   Family History   Problem Relation Age of Onset    Cancer Mother lung-heavy smoker    Heart disease Father         leaky valve       Meds/Allergies   all medications and allergies reviewed  No Known Allergies    Objective:      Resp 16   Ht 5' 11" (1 803 m)   Wt 72 6 kg (160 lb)   BMI 22 32 kg/m²          Physical Exam  Constitutional:       General: He is not in acute distress  Appearance: He is well-developed  He is not ill-appearing, toxic-appearing or diaphoretic  HENT:      Head: Normocephalic and atraumatic  Cardiovascular:      Pulses: Normal pulses  Dorsalis pedis pulses are 2+ on the right side and 2+ on the left side  Posterior tibial pulses are 2+ on the right side and 2+ on the left side  Comments: Palpable pedal pulse, CFT is less than 3 seconds, temperature gradient within normal limits, pedal hair present, no trophic skin changes  mild cyanotic changes noted to the distal tuft 02/04/2005 digit, possibly due to high platelet history,  Pulmonary:      Effort: No respiratory distress  Musculoskeletal: Normal range of motion  Comments:  Pain elicited upon attempt to range of motion of the right hallux, which is limited to about 2° dorsiflexion, hammertoe deformity 2-5, with complete subluxation of the distal phalanx on bilateral  2nd digit, with the distal phalanx shifted laterally, limited range of motion upon uploading to the left hallux with about 5° dorsiflexion   Feet:      Right foot:      Protective Sensation: 10 sites tested  10 sites sensed  Skin integrity: No ulcer, blister, skin breakdown or erythema  Left foot:      Protective Sensation: 10 sites tested  10 sites sensed  Skin integrity: No ulcer, blister, skin breakdown or erythema  Skin:     Capillary Refill: Capillary refill takes 2 to 3 seconds  Coloration: Skin is not pale  Neurological:      Mental Status: He is alert and oriented to person, place, and time        Comments:  muscle power 5/5, protective sensations intact, no focal motor deficit        Foot Exam    Right Foot/Ankle     Inspection and Palpation  Skin Exam: no blister, no maceration, no ulcer and no erythema     Neurovascular  Dorsalis pedis: 2+  Posterior tibial: 2+      Left Foot/Ankle      Inspection and Palpation  Skin Exam: no blister, no maceration, no ulcer and no erythema     Neurovascular  Dorsalis pedis: 2+  Posterior tibial: 2+

## 2021-03-24 NOTE — H&P (VIEW-ONLY)
Assessment/Plan:     patient evaluated, treatment options discussed with the patient, including conservative and surgical management  Patient opted for surgical management, will schedule Morley bunionectomy right foot, as a measure of releasing the joint 1st MPJ on the right to relief patient pain, patient refuse the management of hammertoe deformity at this time     vascular studies ordered to rule out the cause of digit cyanotic changes, patient to follow-up with his hematologist for the management of thrombocytosis     Diagnoses and all orders for this visit:    Cyanosis    Acquired hallux limitus of left foot    Acquired deformity of both feet    History of high platelet count    Acquired hammertoes of both feet    Hallux rigidus, right foot    Pain in both feet          Subjective:      Patient ID: Shaun Herring is a 56 y o  male  56-year-old male past medical history significant of thrombocytosis,  Under taken hydroxyurea, patient has a long history of look trauma treated surgically, patient report bilateral foot surgery, in the past, patient presents to the office for pain to both feet, right more than left, patient states that he has  Deformed toes, that been treated surgically on the left in the past with moderate improvement, patient denies recent trauma to the foot, patient denies constitutional symptoms, patient states that the pain increases upon standing long hours during his work day    3/18/21:  Follow-up on right hallux pain, patient has perform x-rays inquiring about his results      The following portions of the patient's history were reviewed and updated as appropriate: allergies, current medications, past family history, past medical history, past social history, past surgical history and problem list     Review of Systems   Constitutional: Negative  Respiratory: Negative  Cardiovascular: Negative  Musculoskeletal: Positive for arthralgias  Skin: Negative  Historical Information   Past Medical History:   Diagnosis Date    Arthritis     Graves disease 18    Hypothyroid     hypo    Platelet disorder ([address] )     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 Shania Leavitt    Wears glasses      Past Surgical History:   Procedure Laterality Date    ANKLE SURGERY Right     ligament, chipped bones,dislocated    COLONOSCOPY      ELBOW SURGERY Right     tendon surgery    FOOT ARTHRODESIS, MODIFIED ARELLANO Left     FRACTURE SURGERY Left     elbow    HERNIA REPAIR Right     inguinal    [place] KNEE SCOPE,MED/LAT MENISECTOMY Right 2018    Procedure: MENISCECTOMY LATERAL /MEDIAL;  Surgeon: Zackary Hutchinson MD;  Location: [facility];  Service: Orthopedics    ROTATOR CUFF REPAIR Left     WISDOM TOOTH EXTRACTION      x4     Social History   Social History     Substance and Sexual Activity   Alcohol Use Yes    Comment: occ beer with going out to dinner     Social History     Substance and Sexual Activity   Drug Use No     Social History     Tobacco Use   Smoking Status Former Smoker    Packs/day:     Years:     Pack years: 20     Quit date:     Years since quittin 2   Smokeless Tobacco Never Used     Family History:   Family History   Problem Relation Age of Onset    Cancer Mother         lung-heavy smoker    Heart disease Father         leaky valve       Meds/Allergies   all medications and allergies reviewed  No Known Allergies    Objective:      Resp 16   Ht 5' 11" (1 803 m)   Wt 72 6 kg (160 lb)   BMI 22 32 kg/m²          Physical Exam  Constitutional:       General: He is not in acute distress  Appearance: He is well-developed  He is not ill-appearing, toxic-appearing or diaphoretic  HENT:      Head: Normocephalic and atraumatic  Cardiovascular:      Pulses: Normal pulses  Dorsalis pedis pulses are 2+ on the right side and 2+ on the left side  Posterior tibial pulses are 2+ on the right side and 2+ on the left side  Comments: Palpable pedal pulse, CFT is less than 3 seconds, temperature gradient within normal limits, pedal hair present, no trophic skin changes  mild cyanotic changes noted to the distal tuft 02/04/2005 digit, possibly due to high platelet history,  Pulmonary:      Effort: No respiratory distress  Musculoskeletal: Normal range of motion  Comments:  Pain elicited upon attempt to range of motion of the right hallux, which is limited to about 2° dorsiflexion, hammertoe deformity 2-5, with complete subluxation of the distal phalanx on bilateral  2nd digit, with the distal phalanx shifted laterally, limited range of motion upon uploading to the left hallux with about 5° dorsiflexion   Feet:      Right foot:      Protective Sensation: 10 sites tested  10 sites sensed  Skin integrity: No ulcer, blister, skin breakdown or erythema  Left foot:      Protective Sensation: 10 sites tested  10 sites sensed  Skin integrity: No ulcer, blister, skin breakdown or erythema  Skin:     Capillary Refill: Capillary refill takes 2 to 3 seconds  Coloration: Skin is not pale  Neurological:      Mental Status: He is alert and oriented to person, place, and time  Comments:  muscle power 5/5, protective sensations intact, no focal motor deficit        Foot Exam    Right Foot/Ankle     Inspection and Palpation  Skin Exam: no blister, no maceration, no ulcer and no erythema     Neurovascular  Dorsalis pedis: 2+  Posterior tibial: 2+      Left Foot/Ankle      Inspection and Palpation  Skin Exam: no blister, no maceration, no ulcer and no erythema     Neurovascular  Dorsalis pedis: 2+  Posterior tibial: 2+

## 2021-03-24 NOTE — PROGRESS NOTES
Assessment/Plan:     patient evaluated, treatment options discussed with the patient, including conservative and surgical management  Patient opted for surgical management, will schedule Morley bunionectomy right foot, as a measure of releasing the joint 1st MPJ on the right to relief patient pain, patient refuse the management of hammertoe deformity at this time     vascular studies ordered to rule out the cause of digit cyanotic changes, patient to follow-up with his hematologist for the management of thrombocytosis     Diagnoses and all orders for this visit:    Cyanosis    Acquired hallux limitus of left foot    Acquired deformity of both feet    History of high platelet count    Acquired hammertoes of both feet    Hallux rigidus, right foot    Pain in both feet          Subjective:      Patient ID: Danita Apley is a 56 y o  male  41-year-old male past medical history significant of thrombocytosis,  Under taken hydroxyurea, patient has a long history of look trauma treated surgically, patient report bilateral foot surgery, in the past, patient presents to the office for pain to both feet, right more than left, patient states that he has  Deformed toes, that been treated surgically on the left in the past with moderate improvement, patient denies recent trauma to the foot, patient denies constitutional symptoms, patient states that the pain increases upon standing long hours during his work day    3/18/21:  Follow-up on right hallux pain, patient has perform x-rays inquiring about his results      The following portions of the patient's history were reviewed and updated as appropriate: allergies, current medications, past family history, past medical history, past social history, past surgical history and problem list     Review of Systems   Constitutional: Negative  Respiratory: Negative  Cardiovascular: Negative  Musculoskeletal: Positive for arthralgias  Skin: Negative  Historical Information   Past Medical History:   Diagnosis Date    Arthritis     Graves disease 18    Hypothyroid     hypo    Platelet disorder ([address] )     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 Mukesh Mancia    Wears glasses      Past Surgical History:   Procedure Laterality Date    ANKLE SURGERY Right     ligament, chipped bones,dislocated    COLONOSCOPY      ELBOW SURGERY Right     tendon surgery    FOOT ARTHRODESIS, MODIFIED ARELLANO Left     FRACTURE SURGERY Left     elbow    HERNIA REPAIR Right     inguinal    [place] KNEE SCOPE,MED/LAT MENISECTOMY Right 2018    Procedure: MENISCECTOMY LATERAL /MEDIAL;  Surgeon: Wili Shaikh MD;  Location: [facility];  Service: Orthopedics    ROTATOR CUFF REPAIR Left     WISDOM TOOTH EXTRACTION      x4     Social History   Social History     Substance and Sexual Activity   Alcohol Use Yes    Comment: occ beer with going out to dinner     Social History     Substance and Sexual Activity   Drug Use No     Social History     Tobacco Use   Smoking Status Former Smoker    Packs/day:     Years:     Pack years: 20     Quit date:     Years since quittin 2   Smokeless Tobacco Never Used     Family History:   Family History   Problem Relation Age of Onset    Cancer Mother         lung-heavy smoker    Heart disease Father         leaky valve       Meds/Allergies   all medications and allergies reviewed  No Known Allergies    Objective:      Resp 16   Ht 5' 11" (1 803 m)   Wt 72 6 kg (160 lb)   BMI 22 32 kg/m²          Physical Exam  Constitutional:       General: He is not in acute distress  Appearance: He is well-developed  He is not ill-appearing, toxic-appearing or diaphoretic  HENT:      Head: Normocephalic and atraumatic  Cardiovascular:      Pulses: Normal pulses  Dorsalis pedis pulses are 2+ on the right side and 2+ on the left side  Posterior tibial pulses are 2+ on the right side and 2+ on the left side  Comments: Palpable pedal pulse, CFT is less than 3 seconds, temperature gradient within normal limits, pedal hair present, no trophic skin changes  mild cyanotic changes noted to the distal tuft 02/04/2005 digit, possibly due to high platelet history,  Pulmonary:      Effort: No respiratory distress  Musculoskeletal: Normal range of motion  Comments:  Pain elicited upon attempt to range of motion of the right hallux, which is limited to about 2° dorsiflexion, hammertoe deformity 2-5, with complete subluxation of the distal phalanx on bilateral  2nd digit, with the distal phalanx shifted laterally, limited range of motion upon uploading to the left hallux with about 5° dorsiflexion   Feet:      Right foot:      Protective Sensation: 10 sites tested  10 sites sensed  Skin integrity: No ulcer, blister, skin breakdown or erythema  Left foot:      Protective Sensation: 10 sites tested  10 sites sensed  Skin integrity: No ulcer, blister, skin breakdown or erythema  Skin:     Capillary Refill: Capillary refill takes 2 to 3 seconds  Coloration: Skin is not pale  Neurological:      Mental Status: He is alert and oriented to person, place, and time  Comments:  muscle power 5/5, protective sensations intact, no focal motor deficit        Foot Exam    Right Foot/Ankle     Inspection and Palpation  Skin Exam: no blister, no maceration, no ulcer and no erythema     Neurovascular  Dorsalis pedis: 2+  Posterior tibial: 2+      Left Foot/Ankle      Inspection and Palpation  Skin Exam: no blister, no maceration, no ulcer and no erythema     Neurovascular  Dorsalis pedis: 2+  Posterior tibial: 2+

## 2021-03-30 ENCOUNTER — APPOINTMENT (OUTPATIENT)
Dept: LAB | Facility: HOSPITAL | Age: 61
End: 2021-03-30
Attending: PODIATRIST
Payer: COMMERCIAL

## 2021-03-30 ENCOUNTER — HOSPITAL ENCOUNTER (OUTPATIENT)
Dept: RADIOLOGY | Facility: HOSPITAL | Age: 61
Discharge: HOME | End: 2021-03-30
Attending: PODIATRIST
Payer: COMMERCIAL

## 2021-03-30 ENCOUNTER — TRANSCRIBE ORDERS (OUTPATIENT)
Dept: ADMINISTRATIVE | Facility: HOSPITAL | Age: 61
End: 2021-03-30

## 2021-03-30 ENCOUNTER — OFFICE VISIT (OUTPATIENT)
Dept: LAB | Facility: HOSPITAL | Age: 61
End: 2021-03-30
Attending: PODIATRIST
Payer: COMMERCIAL

## 2021-03-30 DIAGNOSIS — M20.21 HALLUX RIGIDUS OF RIGHT FOOT: ICD-10-CM

## 2021-03-30 LAB
ABO GROUP BLD: NORMAL
ALBUMIN SERPL BCP-MCNC: 3.5 G/DL (ref 3.5–5)
ALP SERPL-CCNC: 77 U/L (ref 46–116)
ALT SERPL W P-5'-P-CCNC: 23 U/L (ref 12–78)
ANION GAP SERPL CALCULATED.3IONS-SCNC: 7 MMOL/L (ref 4–13)
APTT PPP: 27 SECONDS (ref 23–37)
AST SERPL W P-5'-P-CCNC: 18 U/L (ref 5–45)
BASOPHILS # BLD AUTO: 0.05 THOUSANDS/ΜL (ref 0–0.1)
BASOPHILS NFR BLD AUTO: 1 % (ref 0–1)
BILIRUB SERPL-MCNC: 0.4 MG/DL (ref 0.2–1)
BLD GP AB SCN SERPL QL: NEGATIVE
BUN SERPL-MCNC: 13 MG/DL (ref 5–25)
CALCIUM SERPL-MCNC: 9.2 MG/DL (ref 8.3–10.1)
CHLORIDE SERPL-SCNC: 103 MMOL/L (ref 100–108)
CO2 SERPL-SCNC: 30 MMOL/L (ref 21–32)
CREAT SERPL-MCNC: 1.06 MG/DL (ref 0.6–1.3)
EOSINOPHIL # BLD AUTO: 0.2 THOUSAND/ΜL (ref 0–0.61)
EOSINOPHIL NFR BLD AUTO: 3 % (ref 0–6)
ERYTHROCYTE [DISTWIDTH] IN BLOOD BY AUTOMATED COUNT: 12.4 % (ref 11.6–15.1)
ERYTHROCYTE [SEDIMENTATION RATE] IN BLOOD: 3 MM/HOUR (ref 0–19)
EST. AVERAGE GLUCOSE BLD GHB EST-MCNC: 103 MG/DL
GFR SERPL CREATININE-BSD FRML MDRD: 75 ML/MIN/1.73SQ M
GLUCOSE P FAST SERPL-MCNC: 88 MG/DL (ref 65–99)
HBA1C MFR BLD: 5.2 %
HCT VFR BLD AUTO: 48.2 % (ref 36.5–49.3)
HGB BLD-MCNC: 15.9 G/DL (ref 12–17)
IMM GRANULOCYTES # BLD AUTO: 0.04 THOUSAND/UL (ref 0–0.2)
IMM GRANULOCYTES NFR BLD AUTO: 1 % (ref 0–2)
INR PPP: 0.92 (ref 0.84–1.19)
LYMPHOCYTES # BLD AUTO: 0.86 THOUSANDS/ΜL (ref 0.6–4.47)
LYMPHOCYTES NFR BLD AUTO: 14 % (ref 14–44)
MCH RBC QN AUTO: 35.3 PG (ref 26.8–34.3)
MCHC RBC AUTO-ENTMCNC: 33 G/DL (ref 31.4–37.4)
MCV RBC AUTO: 107 FL (ref 82–98)
MONOCYTES # BLD AUTO: 0.46 THOUSAND/ΜL (ref 0.17–1.22)
MONOCYTES NFR BLD AUTO: 8 % (ref 4–12)
NEUTROPHILS # BLD AUTO: 4.48 THOUSANDS/ΜL (ref 1.85–7.62)
NEUTS SEG NFR BLD AUTO: 73 % (ref 43–75)
NRBC BLD AUTO-RTO: 0 /100 WBCS
PLATELET # BLD AUTO: 359 THOUSANDS/UL (ref 149–390)
PMV BLD AUTO: 9 FL (ref 8.9–12.7)
POTASSIUM SERPL-SCNC: 3.9 MMOL/L (ref 3.5–5.3)
PROT SERPL-MCNC: 7.4 G/DL (ref 6.4–8.2)
PROTHROMBIN TIME: 12.3 SECONDS (ref 11.6–14.5)
RBC # BLD AUTO: 4.5 MILLION/UL (ref 3.88–5.62)
RH BLD: POSITIVE
SODIUM SERPL-SCNC: 140 MMOL/L (ref 136–145)
SPECIMEN EXPIRATION DATE: NORMAL
WBC # BLD AUTO: 6.09 THOUSAND/UL (ref 4.31–10.16)

## 2021-03-30 PROCEDURE — 93005 ELECTROCARDIOGRAM TRACING: CPT

## 2021-03-30 PROCEDURE — 80053 COMPREHEN METABOLIC PANEL: CPT

## 2021-03-30 PROCEDURE — 85730 THROMBOPLASTIN TIME PARTIAL: CPT

## 2021-03-30 PROCEDURE — 36415 COLL VENOUS BLD VENIPUNCTURE: CPT

## 2021-03-30 PROCEDURE — 71046 X-RAY EXAM CHEST 2 VIEWS: CPT

## 2021-03-30 PROCEDURE — 83036 HEMOGLOBIN GLYCOSYLATED A1C: CPT

## 2021-03-30 PROCEDURE — 85652 RBC SED RATE AUTOMATED: CPT

## 2021-03-30 PROCEDURE — 86900 BLOOD TYPING SEROLOGIC ABO: CPT

## 2021-03-30 PROCEDURE — 85025 COMPLETE CBC W/AUTO DIFF WBC: CPT

## 2021-03-30 PROCEDURE — 86850 RBC ANTIBODY SCREEN: CPT

## 2021-03-30 PROCEDURE — 86901 BLOOD TYPING SEROLOGIC RH(D): CPT

## 2021-03-30 PROCEDURE — 85610 PROTHROMBIN TIME: CPT

## 2021-04-01 LAB
ATRIAL RATE: 57 BPM
P AXIS: 53 DEGREES
PR INTERVAL: 228 MS
QRS AXIS: 0 DEGREES
QRSD INTERVAL: 82 MS
QT INTERVAL: 378 MS
QTC INTERVAL: 367 MS
T WAVE AXIS: 64 DEGREES
VENTRICULAR RATE: 57 BPM

## 2021-04-01 PROCEDURE — 93010 ELECTROCARDIOGRAM REPORT: CPT | Performed by: INTERNAL MEDICINE

## 2021-04-02 ENCOUNTER — OFFICE VISIT (OUTPATIENT)
Dept: PODIATRY | Facility: CLINIC | Age: 61
End: 2021-04-02
Payer: COMMERCIAL

## 2021-04-02 VITALS — RESPIRATION RATE: 17 BRPM | WEIGHT: 160 LBS | BODY MASS INDEX: 22.4 KG/M2 | HEIGHT: 71 IN

## 2021-04-02 DIAGNOSIS — M20.21 HALLUX RIGIDUS, RIGHT FOOT: Primary | ICD-10-CM

## 2021-04-02 PROCEDURE — 99213 OFFICE O/P EST LOW 20 MIN: CPT | Performed by: PODIATRIST

## 2021-04-02 NOTE — PRE-PROCEDURE INSTRUCTIONS
My Surgical Experience    The following information was developed to assist you to prepare for your operation  What do I need to do before coming to the hospital?   Arrange for a responsible person to drive you to and from the hospital    Arrange care for your children at home  Children are not allowed in the recovery areas of the hospital   Plan to wear clothing that is easy to put on and take off  If you are having shoulder surgery, wear a shirt that buttons or zippers in the front  Bathing  o Shower the evening before and the morning of your surgery with an antibacterial soap  Please refer to the Pre Op Showering Instructions for Surgery Patients Sheet   o Remove nail polish and all body piercing jewelry  o Do not shave any body part for at least 24 hours before surgery-this includes face, arms, legs and upper body  Food  o Nothing to eat or drink after midnight the night before your surgery  This includes candy and chewing gum  o Exception: If your surgery is after 12:00pm (noon), you may have clear liquids such as 7-Up®, ginger ale, apple or cranberry juice, Jell-O®, water, or clear broth until 8:00 am  o Do not drink milk or juice with pulp on the morning before surgery  o Do not drink alcohol 24 hours before surgery  Medicine  o Follow instructions you received from your surgeon about which medicines you may take on the day of surgery  o If instructed to take medicine on the morning of surgery, take pills with just a small sip of water  Call your prescribing doctor for specific infroamtion on what to do if you take insulin    What should I bring to the hospital?    Bring:  Rehan Mcdonald or a walker, if you have them, for foot or knee surgery   A list of the daily medicines, vitamins, minerals, herbals and nutritional supplements you take   Include the dosages of medicines and the time you take them each day   Glasses, dentures or hearing aids   Minimal clothing; you will be wearing hospital sleepwear   Photo ID; required to verify your identity   If you have a Living Will or Power of , bring a copy of the documents   If you have an ostomy, bring an extra pouch and any supplies you use    Do not bring   Medicines or inhalers   Money, valuables or jewelry    What other information should I know about the day of surgery?  Notify your surgeons if you develop a cold, sore throat, cough, fever, rash or any other illness   Report to the Ambulatory Surgical/Same Day Surgery Unit   You will be instructed to stop at Registration only if you have not been pre-registered   Inform your  fi they do not stay that they will be asked by the staff to leave a phone number where they can be reached   Be available to be reached before surgery  In the event the operating room schedule changes, you may be asked to come in earlier or later than expected    *It is important to tell your doctor and others involved in your health care if you are taking or have been taking any non-prescription drugs, vitamins, minerals, herbals or other nutritional supplements  Any of these may interact with some food or medicines and cause a reaction      Pre-Surgery Instructions:   Medication Instructions    Cholecalciferol (VITAMIN D3) 1000 units CAPS Instructed patient per Anesthesia Guidelines   Hydroxyurea (HYDREA PO) Instructed patient per Anesthesia Guidelines   hydroxyurea (HYDREA) 500 mg capsule Instructed patient per Anesthesia Guidelines   levothyroxine 150 mcg tablet Instructed patient per Anesthesia Guidelines   sildenafil (VIAGRA) 100 mg tablet Instructed patient per Anesthesia Guidelines  To take synthroid and hydrea a m  of surgery

## 2021-04-03 DIAGNOSIS — M20.21 HALLUX RIGIDUS OF RIGHT FOOT: ICD-10-CM

## 2021-04-03 PROCEDURE — 87635 SARS-COV-2 COVID-19 AMP PRB: CPT | Performed by: PODIATRIST

## 2021-04-04 LAB — SARS-COV-2 RNA RESP QL NAA+PROBE: NEGATIVE

## 2021-04-07 ENCOUNTER — ANESTHESIA EVENT (OUTPATIENT)
Dept: PERIOP | Facility: HOSPITAL | Age: 61
End: 2021-04-07
Payer: COMMERCIAL

## 2021-04-08 RX ORDER — AZITHROMYCIN 250 MG/1
TABLET, FILM COATED ORAL
Qty: 6 TABLET | Refills: 0 | Status: SHIPPED | OUTPATIENT
Start: 2021-04-08 | End: 2021-04-12

## 2021-04-08 RX ORDER — OXYCODONE HYDROCHLORIDE AND ACETAMINOPHEN 5; 325 MG/1; MG/1
1 TABLET ORAL EVERY 4 HOURS PRN
Qty: 24 TABLET | Refills: 0 | Status: SHIPPED | OUTPATIENT
Start: 2021-04-08 | End: 2021-04-12

## 2021-04-09 ENCOUNTER — HOSPITAL ENCOUNTER (OUTPATIENT)
Facility: HOSPITAL | Age: 61
Setting detail: OUTPATIENT SURGERY
Discharge: HOME | End: 2021-04-09
Attending: PODIATRIST | Admitting: PODIATRIST
Payer: COMMERCIAL

## 2021-04-09 ENCOUNTER — ANESTHESIA (OUTPATIENT)
Dept: PERIOP | Facility: HOSPITAL | Age: 61
End: 2021-04-09
Payer: COMMERCIAL

## 2021-04-09 ENCOUNTER — APPOINTMENT (OUTPATIENT)
Dept: RADIOLOGY | Facility: HOSPITAL | Age: 61
End: 2021-04-09
Payer: COMMERCIAL

## 2021-04-09 VITALS
OXYGEN SATURATION: 98 % | DIASTOLIC BLOOD PRESSURE: 70 MMHG | HEART RATE: 53 BPM | SYSTOLIC BLOOD PRESSURE: 115 MMHG | RESPIRATION RATE: 20 BRPM | TEMPERATURE: 96.4 F

## 2021-04-09 PROBLEM — E03.9 HYPOTHYROIDISM: Status: ACTIVE | Noted: 2021-04-09

## 2021-04-09 PROBLEM — E78.5 HYPERLIPIDEMIA: Status: ACTIVE | Noted: 2021-04-09

## 2021-04-09 LAB
ABO GROUP BLD: NORMAL
RH BLD: POSITIVE

## 2021-04-09 PROCEDURE — 73630 X-RAY EXAM OF FOOT: CPT

## 2021-04-09 PROCEDURE — 28292 COR HLX VLGS RSC PRX PHLX BS: CPT | Performed by: PODIATRIST

## 2021-04-09 RX ORDER — FENTANYL CITRATE 50 UG/ML
INJECTION, SOLUTION INTRAMUSCULAR; INTRAVENOUS AS NEEDED
Status: DISCONTINUED | OUTPATIENT
Start: 2021-04-09 | End: 2021-04-09

## 2021-04-09 RX ORDER — DEXAMETHASONE SODIUM PHOSPHATE 10 MG/ML
INJECTION, SOLUTION INTRAMUSCULAR; INTRAVENOUS AS NEEDED
Status: DISCONTINUED | OUTPATIENT
Start: 2021-04-09 | End: 2021-04-09 | Stop reason: HOSPADM

## 2021-04-09 RX ORDER — PROPOFOL 10 MG/ML
INJECTION, EMULSION INTRAVENOUS AS NEEDED
Status: DISCONTINUED | OUTPATIENT
Start: 2021-04-09 | End: 2021-04-09

## 2021-04-09 RX ORDER — CEFAZOLIN SODIUM 2 G/50ML
2000 SOLUTION INTRAVENOUS ONCE
Status: COMPLETED | OUTPATIENT
Start: 2021-04-09 | End: 2021-04-09

## 2021-04-09 RX ORDER — DEXAMETHASONE SODIUM PHOSPHATE 4 MG/ML
INJECTION, SOLUTION INTRA-ARTICULAR; INTRALESIONAL; INTRAMUSCULAR; INTRAVENOUS; SOFT TISSUE AS NEEDED
Status: DISCONTINUED | OUTPATIENT
Start: 2021-04-09 | End: 2021-04-09 | Stop reason: HOSPADM

## 2021-04-09 RX ORDER — LIDOCAINE HYDROCHLORIDE 10 MG/ML
INJECTION, SOLUTION EPIDURAL; INFILTRATION; INTRACAUDAL; PERINEURAL AS NEEDED
Status: DISCONTINUED | OUTPATIENT
Start: 2021-04-09 | End: 2021-04-09 | Stop reason: HOSPADM

## 2021-04-09 RX ORDER — PROPOFOL 10 MG/ML
INJECTION, EMULSION INTRAVENOUS CONTINUOUS PRN
Status: DISCONTINUED | OUTPATIENT
Start: 2021-04-09 | End: 2021-04-09

## 2021-04-09 RX ORDER — BUPIVACAINE HYDROCHLORIDE 5 MG/ML
INJECTION, SOLUTION EPIDURAL; INTRACAUDAL AS NEEDED
Status: DISCONTINUED | OUTPATIENT
Start: 2021-04-09 | End: 2021-04-09 | Stop reason: HOSPADM

## 2021-04-09 RX ORDER — SODIUM CHLORIDE, SODIUM LACTATE, POTASSIUM CHLORIDE, CALCIUM CHLORIDE 600; 310; 30; 20 MG/100ML; MG/100ML; MG/100ML; MG/100ML
100 INJECTION, SOLUTION INTRAVENOUS CONTINUOUS
Status: DISCONTINUED | OUTPATIENT
Start: 2021-04-09 | End: 2021-04-09 | Stop reason: HOSPADM

## 2021-04-09 RX ORDER — LIDOCAINE HYDROCHLORIDE 10 MG/ML
INJECTION, SOLUTION EPIDURAL; INFILTRATION; INTRACAUDAL; PERINEURAL AS NEEDED
Status: DISCONTINUED | OUTPATIENT
Start: 2021-04-09 | End: 2021-04-09

## 2021-04-09 RX ORDER — MAGNESIUM HYDROXIDE 1200 MG/15ML
LIQUID ORAL AS NEEDED
Status: DISCONTINUED | OUTPATIENT
Start: 2021-04-09 | End: 2021-04-09 | Stop reason: HOSPADM

## 2021-04-09 RX ORDER — MIDAZOLAM HYDROCHLORIDE 2 MG/2ML
INJECTION, SOLUTION INTRAMUSCULAR; INTRAVENOUS AS NEEDED
Status: DISCONTINUED | OUTPATIENT
Start: 2021-04-09 | End: 2021-04-09

## 2021-04-09 RX ADMIN — SODIUM CHLORIDE, SODIUM LACTATE, POTASSIUM CHLORIDE, AND CALCIUM CHLORIDE 100 ML/HR: .6; .31; .03; .02 INJECTION, SOLUTION INTRAVENOUS at 07:11

## 2021-04-09 RX ADMIN — PROPOFOL 120 MCG/KG/MIN: 10 INJECTION, EMULSION INTRAVENOUS at 08:21

## 2021-04-09 RX ADMIN — PROPOFOL 30 MG: 10 INJECTION, EMULSION INTRAVENOUS at 08:21

## 2021-04-09 RX ADMIN — MIDAZOLAM 2 MG: 1 INJECTION INTRAMUSCULAR; INTRAVENOUS at 08:16

## 2021-04-09 RX ADMIN — FENTANYL CITRATE 100 MCG: 50 INJECTION, SOLUTION INTRAMUSCULAR; INTRAVENOUS at 08:21

## 2021-04-09 RX ADMIN — LIDOCAINE HYDROCHLORIDE 50 MG: 10 INJECTION, SOLUTION EPIDURAL; INFILTRATION; INTRACAUDAL; PERINEURAL at 08:21

## 2021-04-09 RX ADMIN — CEFAZOLIN SODIUM 2000 MG: 2 SOLUTION INTRAVENOUS at 08:38

## 2021-04-09 RX ADMIN — SODIUM CHLORIDE, SODIUM LACTATE, POTASSIUM CHLORIDE, AND CALCIUM CHLORIDE: .6; .31; .03; .02 INJECTION, SOLUTION INTRAVENOUS at 08:53

## 2021-04-09 NOTE — PERIOPERATIVE NURSING NOTE
Right foot dressing dry, intact  Denies any pain, tolerating coffee  Complete discharge instructions reviewed, prescriptions sent to patients pharmacy by Dr Michelle Vernon yesterday as confirmed by Dr Michelle Vernon

## 2021-04-09 NOTE — PERIOPERATIVE NURSING NOTE
Patient received from pacu in 0/10 pain  Dressings were clean, dry, and intact  VSS  Neurovascular assessment WDL

## 2021-04-09 NOTE — DISCHARGE INSTRUCTIONS
Keep surgical dressing dry clean intact to follow-up visit at podiatry outpatient,  Partial weight-bearing right lower extremity with assistive cane or crutches  Call office or emergency room if any constitutional symptoms are noted including nausea vomiting fever chills excessive pain or excessive bleeding

## 2021-04-09 NOTE — ANESTHESIA POSTPROCEDURE EVALUATION
Post-Op Assessment Note    CV Status:  Stable  Pain Score: 0    Pain management: adequate     Mental Status:  Alert   Hydration Status:  Stable   PONV Controlled:  None   Airway Patency:  Patent   Two or more mitigation strategies used for obstructive sleep apnea   Post Op Vitals Reviewed: Yes      Staff: CRNA         No complications documented      BP   106/54   Temp 98   Pulse 75   Resp 16   SpO2 99

## 2021-04-09 NOTE — OP NOTE
OPERATIVE REPORT  PATIENT NAME: Burney Goltz    :  1960  MRN: [id number]  Pt Location: [facility] OR ROOM 04    SURGERY DATE: 2021    Surgeon(s) and Role:     * Izzy Brown DPM - Primary    Preop Diagnosis:  Hallux rigidus of right foot [M20 21]    Post-Op Diagnosis Codes:     * Hallux rigidus of right foot [M20 21]    Procedure(s) (LRB):  BUNIONECTOMY SMITH (Right)    Specimen(s):  * No specimens in log *    Estimated Blood Loss:   5 mL    Drains:  * No LDAs found *    Anesthesia Type:   IV Sedation with Anesthesia    Operative Indications:  Hallux rigidus of right foot [M20 21]  Arthritic changes to the 1st MPJ right    Operative Findings:  Arthritic changes to the 1st MPJ    Complications:   None    Procedure and Technique:  69-year-old male with chronic history of pain to the right 1st MPJ, past medical history significant of  thrombocytosis, patient brought in the operating room and placed on the operating table in the supine position, after sedation, 10 cc of one-to-one mixture of lidocaine 1% and Marcaine 0 25% were infiltrated around the 1st metatarsal base in Polanco infiltration fashion, ankle occult tourniquet was applied to the patient right ankle, the foot with then scrubbed prepped and draped in the usual aseptic manner, tourniquet was inflated, attention was directed to the right 1st MPJ for it is noted marked decrease in range of motion of the 1st MPJ with crepitus, a 5 cm linear incision made dorsal to the 1st MPJ carried down to the capsule is sharp and blunt dissection, all bleeder were cauterized or ligated, and linear incision made dorsal to the capsule, exposing the 1st MPJ, it is noted that the 1st MPJ was completely arthritic, degenerative changes noted to the cartilage, bone spurring noted medially dorsally and plantarly, and the base of the 1st proximal phalanx were subluxed laterally, at this time using sagittal sow, the base of the proximal phalanx were resected, and the medial dorsal bump of the 1st metatarsal head was resected, head was remodeled, sesamoid apparatus were freed plantarly, site flushed with copious amount of sterile saline, at this time capsule subcutaneous tissue was reapproximated using chromic, skin was reapproximated using nylon, toe splinted in a rectus position using Betadine splint, tourniquet was deflated, immediate hyperemia noted to the all digits, patient transferred to PACU with all vital signs stable, and vascular status intact to the feet, patient to be discharged home, with postop instructions discussed prior to surgery   I was present for the entire procedure    Patient Disposition:  PACU     SIGNATURE: sOiel Saenz DPM  DATE: April 9, 2021  TIME: 9:43 AM

## 2021-04-09 NOTE — INTERIM OP NOTE
[facility]  Postoperative Note  PATIENT NAME: Robinson Boo  :   MRN: [id number]  [facility] 04    Surgery Date: 2021    Preop Diagnosis:  Hallux rigidus of right foot [M20 21]    Post-Op Diagnosis Codes:     * Hallux rigidus of right foot [M20 21]    Procedure(s) (LRB):  BUNIONECTOMY SMITH (Right)    Surgeon(s) and Role:     Radha Hamlin DPM - Primary    Specimens:  * No specimens in log *    Estimated Blood Loss:   5 mL    Anesthesia Type:   IV Sedation with Anesthesia     Findings:     degenerative changes to the 1st MPJ right foot with obliterated joint space,  Complications:   None    SIGNATURE: Cooper Munson DPM   DATE: 2021   TIME: 9:38 AM

## 2021-04-09 NOTE — ANESTHESIA PREPROCEDURE EVALUATION
Procedure:  BUNIONECTOMY SMITH (Right Foot)    Relevant Problems   ANESTHESIA (within normal limits)      CARDIO   (+) Hyperlipidemia      ENDO   (+) Hypothyroidism      PULMONARY (within normal limits)        Physical Exam    Airway    Mallampati score: II  TM Distance: >3 FB  Neck ROM: full     Dental   No notable dental hx     Cardiovascular  Rhythm: regular, Rate: normal,     Pulmonary  Breath sounds clear to auscultation,     Other Findings        Anesthesia Plan  ASA Score- 2     Anesthesia Type- IV sedation with anesthesia with ASA Monitors  Additional Monitors:   Airway Plan:           Plan Factors-Exercise tolerance (METS): >4 METS  Chart reviewed  EKG reviewed  Existing labs reviewed  Patient summary reviewed  Patient is not a current smoker  Induction- intravenous  Postoperative Plan-     Informed Consent- Anesthetic plan and risks discussed with patient  I personally reviewed this patient with the CRNA  Discussed and agreed on the Anesthesia Plan with the TANIKA Mohan

## 2021-04-09 NOTE — INTERVAL H&P NOTE
H&P reviewed  After examining the patient I find no changes in the patients condition since the H&P had been written      Vitals:    04/09/21 0702   BP: 129/69   Pulse: 66   Resp: 18   Temp: (!) 96 4 °F (35 8 °C)   SpO2: 97%

## 2021-04-15 ENCOUNTER — OFFICE VISIT (OUTPATIENT)
Dept: PODIATRY | Facility: CLINIC | Age: 61
End: 2021-04-15

## 2021-04-15 VITALS
WEIGHT: 160 LBS | SYSTOLIC BLOOD PRESSURE: 115 MMHG | HEIGHT: 71 IN | DIASTOLIC BLOOD PRESSURE: 70 MMHG | BODY MASS INDEX: 22.4 KG/M2 | RESPIRATION RATE: 20 BRPM

## 2021-04-15 DIAGNOSIS — M20.21 HALLUX RIGIDUS, RIGHT FOOT: Primary | ICD-10-CM

## 2021-04-15 DIAGNOSIS — Z09 POSTOP CHECK: ICD-10-CM

## 2021-04-15 DIAGNOSIS — M79.671 RIGHT FOOT PAIN: ICD-10-CM

## 2021-04-15 PROCEDURE — 99024 POSTOP FOLLOW-UP VISIT: CPT | Performed by: PODIATRIST

## 2021-04-22 ENCOUNTER — OFFICE VISIT (OUTPATIENT)
Dept: PODIATRY | Facility: CLINIC | Age: 61
End: 2021-04-22
Payer: COMMERCIAL

## 2021-04-22 VITALS
SYSTOLIC BLOOD PRESSURE: 115 MMHG | DIASTOLIC BLOOD PRESSURE: 70 MMHG | HEIGHT: 71 IN | BODY MASS INDEX: 22.4 KG/M2 | WEIGHT: 160 LBS | RESPIRATION RATE: 17 BRPM

## 2021-04-22 DIAGNOSIS — M20.41 HAMMERTOE OF SECOND TOE OF RIGHT FOOT: ICD-10-CM

## 2021-04-22 DIAGNOSIS — M20.42 HAMMERTOE OF SECOND TOE OF LEFT FOOT: Primary | ICD-10-CM

## 2021-04-22 DIAGNOSIS — M79.672 PAIN IN BOTH FEET: ICD-10-CM

## 2021-04-22 DIAGNOSIS — M20.21 HALLUX RIGIDUS, RIGHT FOOT: ICD-10-CM

## 2021-04-22 DIAGNOSIS — M79.671 PAIN IN BOTH FEET: ICD-10-CM

## 2021-04-22 PROCEDURE — 99213 OFFICE O/P EST LOW 20 MIN: CPT | Performed by: PODIATRIST

## 2021-04-29 ENCOUNTER — OFFICE VISIT (OUTPATIENT)
Dept: PODIATRY | Facility: CLINIC | Age: 61
End: 2021-04-29
Payer: COMMERCIAL

## 2021-04-29 DIAGNOSIS — M20.41 HAMMERTOE OF SECOND TOE OF RIGHT FOOT: ICD-10-CM

## 2021-04-29 DIAGNOSIS — Z09 POSTOP CHECK: ICD-10-CM

## 2021-04-29 DIAGNOSIS — M20.42 HAMMERTOE OF SECOND TOE OF LEFT FOOT: Primary | ICD-10-CM

## 2021-04-29 DIAGNOSIS — M20.21 HALLUX RIGIDUS, RIGHT FOOT: ICD-10-CM

## 2021-04-29 PROCEDURE — 99212 OFFICE O/P EST SF 10 MIN: CPT | Performed by: PODIATRIST

## 2021-04-29 NOTE — PROGRESS NOTES
Assessment/Plan:     patient evaluated, treatment options discussed with the patient, including conservative and surgical management  Patient opted for surgical management, will schedule Morley bunionectomy right foot, as a measure of releasing the joint 1st MPJ on the right to relief patient pain, patient refuse the management of hammertoe deformity at this time      Discussed postop of course, patient to non-weight bear a surgical shoe for 2 weeks, antibiotics and Pain Management ordered, patient educated on keeping the dressing clean dry intact, all questions answered, risks discussed with the patient's     vascular studies ordered to rule out the cause of digit cyanotic changes, patient to follow-up with his hematologist for the management of thrombocytosis     Diagnoses and all orders for this visit:    Hallux rigidus, right foot  -     azithromycin (ZITHROMAX) 250 mg tablet; Take 2 tablets today then 1 tablet daily x 4 days  -     oxyCODONE-acetaminophen (PERCOCET) 5-325 mg per tablet; Take 1 tablet by mouth every 4 (four) hours as needed for moderate pain for up to 4 daysMax Daily Amount: 6 tablets          Subjective:      Patient ID: Tiffany Post is a 59 y o  male  Preop visit, patient is in the office discussed surgical management of right 1st toe joint, patient has performed his medical clearance, and x-rays, is inquiring about postop course  The following portions of the patient's history were reviewed and updated as appropriate: allergies, current medications, past family history, past medical history, past social history, past surgical history and problem list     Review of Systems   Constitutional: Negative  Respiratory: Negative  Cardiovascular: Negative  Musculoskeletal: Positive for arthralgias  Skin: Negative                  Historical Information   Past Medical History:   Diagnosis Date    Arthritis     Disease of thyroid gland     hypo    Graves disease 0    Hypothyroid     hypo    Platelet disorder (HCC)     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 Yocasta Oviedo    RA [facility]rheumatoid arthritis) ([facility] [address] )     Wears glasses      Past Surgical History:   Procedure Laterality Date    ANKLE SURGERY Right     ligament, chipped bones,dislocated    COLONOSCOPY      ELBOW SURGERY Right     tendon surgery    FOOT ARTHRODESIS, MODIFIED ARELLANO Left     FRACTURE SURGERY Left     elbow    HERNIA REPAIR Right     inguinal    ME CORRJ HALLUX VALGUS W/SESMDC W/RESCJ PROX PHAL Right 2021    Procedure: Dania Spann;  Surgeon: Angela Adams DPM;  Location: [facility];  Service: Podiatry    ME KNEE SCOPE,MED/LAT MENISECTOMY Right 2018    Procedure: Josh Medeiros;  Surgeon: Marissa Soares MD;  Location: [facility];  Service: Orthopedics    ROTATOR CUFF REPAIR Left     WISDOM TOOTH EXTRACTION      x4     Social History   Social History     Substance and Sexual Activity   Alcohol Use Yes    Comment: occ beer with going out to dinner     Social History     Substance and Sexual Activity   Drug Use No     Social History     Tobacco Use   Smoking Status Former Smoker    Packs/day:     Years: 20 00    Pack years: 20     Quit date:     Years since quittin 3   Smokeless Tobacco Never Used     Family History:   Family History   Problem Relation Age of Onset    Cancer Mother         lung-heavy smoker    Heart disease Father         leaky valve       Meds/Allergies   all medications and allergies reviewed  No Known Allergies    Objective:      Resp 17   Ht 5' 11" (1 803 m)   Wt 72 6 kg (160 lb)   BMI 22 32 kg/m²          Physical Exam  Constitutional:       General: He is not in acute distress  Appearance: He is well-developed  He is not ill-appearing, toxic-appearing or diaphoretic  HENT:      Head: Normocephalic and atraumatic  Cardiovascular:      Pulses: Normal pulses             Dorsalis pedis pulses are 2+ on the right side and 2+ on the left side         Posterior tibial pulses are 2+ on the right side and 2+ on the left side  Comments: Palpable pedal pulse, CFT is less than 3 seconds, temperature gradient within normal limits, pedal hair present, no trophic skin changes  mild cyanotic changes noted to the distal tuft 02/04/2005 digit, possibly due to high platelet history,  Pulmonary:      Effort: No respiratory distress  Musculoskeletal: Normal range of motion  Comments:  Pain elicited upon attempt to range of motion of the right hallux, which is limited to about 2° dorsiflexion, hammertoe deformity 2-5, with complete subluxation of the distal phalanx on bilateral  2nd digit, with the distal phalanx shifted laterally, limited range of motion upon uploading to the left hallux with about 5° dorsiflexion   Feet:      Right foot:      Protective Sensation: 10 sites tested  10 sites sensed  Skin integrity: No ulcer, blister, skin breakdown or erythema  Left foot:      Protective Sensation: 10 sites tested  10 sites sensed  Skin integrity: No ulcer, blister, skin breakdown or erythema  Skin:     Capillary Refill: Capillary refill takes 2 to 3 seconds  Coloration: Skin is not pale  Neurological:      Mental Status: He is alert and oriented to person, place, and time  Comments:  muscle power 5/5, protective sensations intact, no focal motor deficit        Foot Exam    Right Foot/Ankle     Inspection and Palpation  Skin Exam: no blister, no maceration, no ulcer and no erythema     Neurovascular  Dorsalis pedis: 2+  Posterior tibial: 2+      Left Foot/Ankle      Inspection and Palpation  Skin Exam: no blister, no maceration, no ulcer and no erythema     Neurovascular  Dorsalis pedis: 2+  Posterior tibial: 2+

## 2021-04-29 NOTE — PROGRESS NOTES
Assessment/Plan:     patient evaluated, dressing change performed, patient to keep dressing dry clean intact,  None weight-bearing to the right foot in surgical shoes, patient return next week for re-evaluation, possible sutures removal      discussed surgical option for correction of patient deformity, patient understand the possibility of vascular injury and amputation, patient will try for correction as a means to  alleviatethe pain   patient to be scheduled for D IP joint fusion bilateral 2nd digit   Diagnoses and all orders for this visit:    Hallux rigidus, right foot    Hammertoe of second toe of left foot    Hammertoe of second toe of right foot    Pain in both feet          Subjective:      Patient ID: Keisha Mosqueda is a 59 y o  male  Postop visit, patient report compliance with weight-bearing status, patient report compliance with dressing changes, dressings dry clean intact, patient report mild pain to the incision site no fever no constitutional symptoms no trauma to the foot     patient is adding another complaint of painful 2nd toe bilateral foot, patient has a digit  In a deformity position, patient was evaluated by  Other from podiatrist, who is recommending amputation of the  Digit as a mean of eliminating the deformity and pain, patient discussing possible surgical correction      The following portions of the patient's history were reviewed and updated as appropriate: allergies, current medications, past family history, past medical history, past social history, past surgical history and problem list     Review of Systems   Constitutional: Negative  Respiratory: Negative  Cardiovascular: Negative  Musculoskeletal: Positive for arthralgias  Skin: Negative                  Historical Information   Past Medical History:   Diagnosis Date    Arthritis     Disease of thyroid gland     hypo    Graves disease 1995    Hypothyroid     hypo    Platelet disorder (HCC)     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 Shania Leavitt    RA (rheumatoid arthritis) ([facility] [address] )     Wears glasses      Past Surgical History:   Procedure Laterality Date    ANKLE SURGERY Right     ligament, chipped bones,dislocated    COLONOSCOPY      ELBOW SURGERY Right     tendon surgery    FOOT ARTHRODESIS, MODIFIED ARELLANO Left     FRACTURE SURGERY Left     elbow    HERNIA REPAIR Right     inguinal    OK CORRJ HALLUX VALGUS W/SESMDC W/RESCJ PROX PHAL Right 2021    Procedure: Amey Sandifer;  Surgeon: Richie Alatorre DPM;  Location: [facility];  Service: Podiatry    OK KNEE SCOPE,MED/LAT MENISECTOMY Right 2018    Procedure: Kerney Nageotte;  Surgeon: Zackary Hutchinson MD;  Location: [facility];  Service: Orthopedics    ROTATOR CUFF REPAIR Left     WISDOM TOOTH EXTRACTION      x4     Social History   Social History     Substance and Sexual Activity   Alcohol Use Yes    Comment: occ beer with going out to dinner     Social History     Substance and Sexual Activity   Drug Use No     Social History     Tobacco Use   Smoking Status Former Smoker    Packs/day:     Years:     Pack years: 20     Quit date:     Years since quittin 3   Smokeless Tobacco Never Used     Family History:   Family History   Problem Relation Age of Onset    Cancer Mother         lung-heavy smoker    Heart disease Father         leaky valve       Meds/Allergies   all medications and allergies reviewed  No Known Allergies    Objective:      /70   Resp 17   Ht 5' 11" (1 803 m)   Wt 72 6 kg (160 lb)   BMI 22 32 kg/m²          Physical Exam  Constitutional:       General: He is not in acute distress  Appearance: He is well-developed  He is not ill-appearing, toxic-appearing or diaphoretic  HENT:      Head: Normocephalic and atraumatic  Cardiovascular:      Pulses: Normal pulses  Dorsalis pedis pulses are 2+ on the right side and 2+ on the left side          Posterior tibial pulses are 2+ on the right side and 2+ on the left side  Comments: Palpable pedal pulse, CFT is less than 3 seconds, temperature gradient within normal limits, pedal hair present, no trophic skin changes  mild cyanotic changes noted to the distal tuft 02/04/2005 digit, possibly due to high platelet history,  Pulmonary:      Effort: No respiratory distress  Musculoskeletal: Normal range of motion  Comments:   Improved range of motion of the 1st MPJ on the right, mild pain on palpation, much improved from preop pain     severe rigid hammertoe deformity to the DIP joint bilateral, with the distal phalanx completely subluxed laterally bilateral foot, bursa formation to the medial aspect of the  joint   Feet:      Right foot:      Protective Sensation: 10 sites tested  10 sites sensed  Skin integrity: No ulcer, blister, skin breakdown or erythema  Left foot:      Protective Sensation: 10 sites tested  10 sites sensed  Skin integrity: No ulcer, blister, skin breakdown or erythema  Skin:     Capillary Refill: Capillary refill takes 2 to 3 seconds  Coloration: Skin is not pale  Comments:  Surgical site clean, coapted, no clinical signs of infection, sutures intact, no erythema, mild edema   Neurological:      Mental Status: He is alert and oriented to person, place, and time  Comments:  muscle power 5/5, protective sensations intact, no focal motor deficit        Foot Exam    Right Foot/Ankle     Inspection and Palpation  Skin Exam: no blister, no maceration, no ulcer and no erythema     Neurovascular  Dorsalis pedis: 2+  Posterior tibial: 2+      Left Foot/Ankle      Inspection and Palpation  Skin Exam: no blister, no maceration, no ulcer and no erythema     Neurovascular  Dorsalis pedis: 2+  Posterior tibial: 2+

## 2021-04-29 NOTE — PROGRESS NOTES
Assessment/Plan:     patient evaluated, dressing change performed, patient to keep dressing dry clean intact,  None weight-bearing to the right foot in surgical shoes, patient return next week for re-evaluation, possible sutures removal      Diagnoses and all orders for this visit:    Hallux rigidus, right foot    Right foot pain    Postop check          Subjective:      Patient ID: Charo Morrow is a 59 y o  male  Postop visit, patient report compliance with weight-bearing status, patient report compliance with dressing changes, dressings dry clean intact, patient report mild pain to the incision site no fever no constitutional symptoms no trauma to the foot      The following portions of the patient's history were reviewed and updated as appropriate: allergies, current medications, past family history, past medical history, past social history, past surgical history and problem list     Review of Systems   Constitutional: Negative  Respiratory: Negative  Cardiovascular: Negative  Musculoskeletal: Positive for arthralgias  Skin: Negative                  Historical Information   Past Medical History:   Diagnosis Date    Arthritis     Disease of thyroid gland     hypo    Graves disease 1995    Hypothyroid     hypo    Platelet disorder (HCC)     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 Cornelio Dubon    [facility]oid [facility]     Wears glasses      Past Surgical History:   Procedure Laterality Date    ANKLE SURGERY Right     ligament, chipped bones,dislocated    COLONOSCOPY      ELBOW SURGERY Right     tendon surgery    FOOT ARTHRODESIS, MODIFIED ARELLANO Left     FRACTURE SURGERY Left     elbow    HERNIA REPAIR Right     inguinal    WI CORRJ HALLUX VALGUS W/SESMDC W/RESCJ PROX PHAL Right 4/9/2021    Procedure: Melia Rincon;  Surgeon: Clay Schaffer DPM;  Location: [address] [place] [address];  Service: Podiatry    WI KNEE SCOPE,MED/LAT MENISECTOMY Right 7/9/2018    Procedure: MENISCECTOMY LATERAL /MEDIAL; Surgeon: Pavel Hernandez MD;  Location: [facility];  Service: Orthopedics    ROTATOR CUFF REPAIR Left     WISDOM TOOTH EXTRACTION      x4     Social History   Social History     Substance and Sexual Activity   Alcohol Use Yes    Comment: occ beer with going out to dinner     Social History     Substance and Sexual Activity   Drug Use No     Social History     Tobacco Use   Smoking Status Former Smoker    Packs/day: 1 00    Years: 20 00    Pack years: 20 00    Quit date:     Years since quittin 3   Smokeless Tobacco Never Used     Family History:   Family History   Problem Relation Age of Onset    Cancer Mother         lung-heavy smoker    Heart disease Father         leaky valve       Meds/Allergies   all medications and allergies reviewed  No Known Allergies    Objective:      /70   Resp 20   Ht 5' 11" (1 803 m)   Wt 72 6 kg (160 lb)   BMI 22 32 kg/m²          Physical Exam  Constitutional:       General: He is not in acute distress  Appearance: He is well-developed  He is not ill-appearing, toxic-appearing or diaphoretic  HENT:      Head: Normocephalic and atraumatic  Cardiovascular:      Pulses: Normal pulses  Dorsalis pedis pulses are 2+ on the right side and 2+ on the left side  Posterior tibial pulses are 2+ on the right side and 2+ on the left side  Comments: Palpable pedal pulse, CFT is less than 3 seconds, temperature gradient within normal limits, pedal hair present, no trophic skin changes  mild cyanotic changes noted to the distal tuft 2005 digit, possibly due to high platelet history,  Pulmonary:      Effort: No respiratory distress  Musculoskeletal: Normal range of motion  Comments:   Improved range of motion of the 1st MPJ on the right, mild pain on palpation, much improved from preop pain   Feet:      Right foot:      Protective Sensation: 10 sites tested  10 sites sensed        Skin integrity: No ulcer, blister, skin breakdown or erythema  Left foot:      Protective Sensation: 10 sites tested  10 sites sensed  Skin integrity: No ulcer, blister, skin breakdown or erythema  Skin:     Capillary Refill: Capillary refill takes 2 to 3 seconds  Coloration: Skin is not pale  Comments:  Surgical site clean, coapted, no clinical signs of infection, sutures intact, no erythema, mild edema   Neurological:      Mental Status: He is alert and oriented to person, place, and time  Comments:  muscle power 5/5, protective sensations intact, no focal motor deficit        Foot Exam    Right Foot/Ankle     Inspection and Palpation  Skin Exam: no blister, no maceration, no ulcer and no erythema     Neurovascular  Dorsalis pedis: 2+  Posterior tibial: 2+      Left Foot/Ankle      Inspection and Palpation  Skin Exam: no blister, no maceration, no ulcer and no erythema     Neurovascular  Dorsalis pedis: 2+  Posterior tibial: 2+

## 2021-05-08 ENCOUNTER — HOSPITAL ENCOUNTER (OUTPATIENT)
Dept: RADIOLOGY | Facility: HOSPITAL | Age: 61
Discharge: HOME | End: 2021-05-08
Attending: INTERNAL MEDICINE
Payer: COMMERCIAL

## 2021-05-08 ENCOUNTER — TRANSCRIBE ORDERS (OUTPATIENT)
Dept: ADMINISTRATIVE | Facility: HOSPITAL | Age: 61
End: 2021-05-08

## 2021-05-08 DIAGNOSIS — M20.41 HAMMER TOE OF RIGHT FOOT: ICD-10-CM

## 2021-05-08 DIAGNOSIS — R93.89 ABNORMAL CHEST X-RAY: ICD-10-CM

## 2021-05-08 DIAGNOSIS — M20.42 HAMMER TOE OF LEFT FOOT: ICD-10-CM

## 2021-05-08 DIAGNOSIS — R93.89 ABNORMAL CHEST X-RAY: Primary | ICD-10-CM

## 2021-05-08 PROCEDURE — U0003 INFECTIOUS AGENT DETECTION BY NUCLEIC ACID (DNA OR RNA); SEVERE ACUTE RESPIRATORY SYNDROME CORONAVIRUS 2 (SARS-COV-2) (CORONAVIRUS DISEASE [COVID-19]), AMPLIFIED PROBE TECHNIQUE, MAKING USE OF HIGH THROUGHPUT TECHNOLOGIES AS DESCRIBED BY CMS-2020-01-R: HCPCS | Performed by: PODIATRIST

## 2021-05-08 PROCEDURE — U0005 INFEC AGEN DETEC AMPLI PROBE: HCPCS | Performed by: PODIATRIST

## 2021-05-08 PROCEDURE — 71045 X-RAY EXAM CHEST 1 VIEW: CPT

## 2021-05-09 LAB — SARS-COV-2 RNA RESP QL NAA+PROBE: NEGATIVE

## 2021-05-11 NOTE — PRE-PROCEDURE INSTRUCTIONS
My Surgical Experience    The following information was developed to assist you to prepare for your operation  What do I need to do before coming to the hospital?   Arrange for a responsible person to drive you to and from the hospital    Arrange care for your children at home  Children are not allowed in the recovery areas of the hospital   Plan to wear clothing that is easy to put on and take off  If you are having shoulder surgery, wear a shirt that buttons or zippers in the front  Bathing  o Shower the evening before and the morning of your surgery with an antibacterial soap  Please refer to the Pre Op Showering Instructions for Surgery Patients Sheet   o Remove nail polish and all body piercing jewelry  o Do not shave any body part for at least 24 hours before surgery-this includes face, arms, legs and upper body  Food  o Nothing to eat or drink after midnight the night before your surgery  This includes candy and chewing gum  o Exception: If your surgery is after 12:00pm (noon), you may have clear liquids such as 7-Up®, ginger ale, apple or cranberry juice, Jell-O®, water, or clear broth until 8:00 am  o Do not drink milk or juice with pulp on the morning before surgery  o Do not drink alcohol 24 hours before surgery  Medicine  o Follow instructions you received from your surgeon about which medicines you may take on the day of surgery  o If instructed to take medicine on the morning of surgery, take pills with just a small sip of water  Call your prescribing doctor for specific infroamtion on what to do if you take insulin    What should I bring to the hospital?    Bring:  Mitcheal Leana or a walker, if you have them, for foot or knee surgery   A list of the daily medicines, vitamins, minerals, herbals and nutritional supplements you take   Include the dosages of medicines and the time you take them each day   Glasses, dentures or hearing aids   Minimal clothing; you will be wearing hospital sleepwear   Photo ID; required to verify your identity   If you have a Living Will or Power of , bring a copy of the documents   If you have an ostomy, bring an extra pouch and any supplies you use    Do not bring   Medicines or inhalers   Money, valuables or jewelry    What other information should I know about the day of surgery?  Notify your surgeons if you develop a cold, sore throat, cough, fever, rash or any other illness   Report to the Ambulatory Surgical/Same Day Surgery Unit   You will be instructed to stop at Registration only if you have not been pre-registered   Inform your  fi they do not stay that they will be asked by the staff to leave a phone number where they can be reached   Be available to be reached before surgery  In the event the operating room schedule changes, you may be asked to come in earlier or later than expected    *It is important to tell your doctor and others involved in your health care if you are taking or have been taking any non-prescription drugs, vitamins, minerals, herbals or other nutritional supplements  Any of these may interact with some food or medicines and cause a reaction      Pre-Surgery Instructions:   Medication Instructions    Cholecalciferol (VITAMIN D3) 1000 units CAPS Instructed patient per Anesthesia Guidelines   Hydroxyurea (HYDREA PO) Instructed patient per Anesthesia Guidelines   hydroxyurea (HYDREA) 500 mg capsule Instructed patient per Anesthesia Guidelines   levothyroxine 150 mcg tablet Instructed patient per Anesthesia Guidelines   sildenafil (VIAGRA) 100 mg tablet Instructed patient per Anesthesia Guidelines  To take synthroid and hdrea a m  of surgery

## 2021-05-13 ENCOUNTER — ANESTHESIA EVENT (OUTPATIENT)
Dept: PERIOP | Facility: HOSPITAL | Age: 61
End: 2021-05-13
Payer: COMMERCIAL

## 2021-05-14 ENCOUNTER — ANESTHESIA (OUTPATIENT)
Dept: PERIOP | Facility: HOSPITAL | Age: 61
End: 2021-05-14
Payer: COMMERCIAL

## 2021-05-14 ENCOUNTER — APPOINTMENT (OUTPATIENT)
Dept: RADIOLOGY | Facility: HOSPITAL | Age: 61
End: 2021-05-14
Payer: COMMERCIAL

## 2021-05-14 ENCOUNTER — HOSPITAL ENCOUNTER (OUTPATIENT)
Facility: HOSPITAL | Age: 61
Setting detail: OUTPATIENT SURGERY
Discharge: HOME | End: 2021-05-14
Attending: PODIATRIST | Admitting: PODIATRIST
Payer: COMMERCIAL

## 2021-05-14 VITALS
TEMPERATURE: 97.1 F | HEIGHT: 71 IN | OXYGEN SATURATION: 97 % | HEART RATE: 60 BPM | DIASTOLIC BLOOD PRESSURE: 84 MMHG | WEIGHT: 157.8 LBS | BODY MASS INDEX: 22.09 KG/M2 | SYSTOLIC BLOOD PRESSURE: 138 MMHG | RESPIRATION RATE: 16 BRPM

## 2021-05-14 PROCEDURE — 28285 REPAIR OF HAMMERTOE: CPT | Performed by: PODIATRIST

## 2021-05-14 PROCEDURE — 73630 X-RAY EXAM OF FOOT: CPT

## 2021-05-14 PROCEDURE — C1713 ANCHOR/SCREW BN/BN,TIS/BN: HCPCS | Performed by: PODIATRIST

## 2021-05-14 DEVICE — K-WIRE TROCAR POINT BOTH ENDS .045                                    X 4: Type: IMPLANTABLE DEVICE | Status: FUNCTIONAL

## 2021-05-14 RX ORDER — SODIUM CHLORIDE, SODIUM LACTATE, POTASSIUM CHLORIDE, CALCIUM CHLORIDE 600; 310; 30; 20 MG/100ML; MG/100ML; MG/100ML; MG/100ML
125 INJECTION, SOLUTION INTRAVENOUS CONTINUOUS
Status: DISCONTINUED | OUTPATIENT
Start: 2021-05-14 | End: 2021-05-14

## 2021-05-14 RX ORDER — OXYCODONE HYDROCHLORIDE AND ACETAMINOPHEN 5; 325 MG/1; MG/1
1 TABLET ORAL EVERY 4 HOURS PRN
Qty: 18 TABLET | Refills: 0 | Status: SHIPPED | OUTPATIENT
Start: 2021-05-14 | End: 2021-05-17

## 2021-05-14 RX ORDER — BUPIVACAINE HYDROCHLORIDE 5 MG/ML
INJECTION, SOLUTION EPIDURAL; INTRACAUDAL AS NEEDED
Status: DISCONTINUED | OUTPATIENT
Start: 2021-05-14 | End: 2021-05-14 | Stop reason: HOSPADM

## 2021-05-14 RX ORDER — MIDAZOLAM HYDROCHLORIDE 2 MG/2ML
INJECTION, SOLUTION INTRAMUSCULAR; INTRAVENOUS AS NEEDED
Status: DISCONTINUED | OUTPATIENT
Start: 2021-05-14 | End: 2021-05-14

## 2021-05-14 RX ORDER — MAGNESIUM HYDROXIDE 1200 MG/15ML
LIQUID ORAL AS NEEDED
Status: DISCONTINUED | OUTPATIENT
Start: 2021-05-14 | End: 2021-05-14 | Stop reason: HOSPADM

## 2021-05-14 RX ORDER — ONDANSETRON 2 MG/ML
4 INJECTION INTRAMUSCULAR; INTRAVENOUS ONCE AS NEEDED
Status: DISCONTINUED | OUTPATIENT
Start: 2021-05-14 | End: 2021-05-14 | Stop reason: HOSPADM

## 2021-05-14 RX ORDER — CEFAZOLIN SODIUM 2 G/50ML
2000 SOLUTION INTRAVENOUS EVERY 8 HOURS
Status: DISCONTINUED | OUTPATIENT
Start: 2021-05-14 | End: 2021-05-14 | Stop reason: HOSPADM

## 2021-05-14 RX ORDER — FENTANYL CITRATE 50 UG/ML
INJECTION, SOLUTION INTRAMUSCULAR; INTRAVENOUS AS NEEDED
Status: DISCONTINUED | OUTPATIENT
Start: 2021-05-14 | End: 2021-05-14

## 2021-05-14 RX ORDER — PROPOFOL 10 MG/ML
INJECTION, EMULSION INTRAVENOUS CONTINUOUS PRN
Status: DISCONTINUED | OUTPATIENT
Start: 2021-05-14 | End: 2021-05-14

## 2021-05-14 RX ORDER — FENTANYL CITRATE/PF 50 MCG/ML
50 SYRINGE (ML) INJECTION
Status: DISCONTINUED | OUTPATIENT
Start: 2021-05-14 | End: 2021-05-14 | Stop reason: HOSPADM

## 2021-05-14 RX ORDER — DEXAMETHASONE SODIUM PHOSPHATE 4 MG/ML
INJECTION, SOLUTION INTRA-ARTICULAR; INTRALESIONAL; INTRAMUSCULAR; INTRAVENOUS; SOFT TISSUE AS NEEDED
Status: DISCONTINUED | OUTPATIENT
Start: 2021-05-14 | End: 2021-05-14 | Stop reason: HOSPADM

## 2021-05-14 RX ORDER — PROPOFOL 10 MG/ML
INJECTION, EMULSION INTRAVENOUS AS NEEDED
Status: DISCONTINUED | OUTPATIENT
Start: 2021-05-14 | End: 2021-05-14

## 2021-05-14 RX ORDER — AZITHROMYCIN 250 MG/1
TABLET, FILM COATED ORAL
Qty: 6 TABLET | Refills: 0 | Status: SHIPPED | OUTPATIENT
Start: 2021-05-14 | End: 2021-05-18

## 2021-05-14 RX ORDER — SODIUM CHLORIDE, SODIUM LACTATE, POTASSIUM CHLORIDE, CALCIUM CHLORIDE 600; 310; 30; 20 MG/100ML; MG/100ML; MG/100ML; MG/100ML
100 INJECTION, SOLUTION INTRAVENOUS CONTINUOUS
Status: DISCONTINUED | OUTPATIENT
Start: 2021-05-14 | End: 2021-05-14 | Stop reason: HOSPADM

## 2021-05-14 RX ADMIN — SODIUM CHLORIDE, SODIUM LACTATE, POTASSIUM CHLORIDE, AND CALCIUM CHLORIDE 125 ML/HR: .6; .31; .03; .02 INJECTION, SOLUTION INTRAVENOUS at 09:29

## 2021-05-14 RX ADMIN — PROPOFOL 100 MCG/KG/MIN: 10 INJECTION, EMULSION INTRAVENOUS at 10:34

## 2021-05-14 RX ADMIN — FENTANYL CITRATE 25 MCG: 50 INJECTION, SOLUTION INTRAMUSCULAR; INTRAVENOUS at 10:50

## 2021-05-14 RX ADMIN — FENTANYL CITRATE 25 MCG: 50 INJECTION, SOLUTION INTRAMUSCULAR; INTRAVENOUS at 10:38

## 2021-05-14 RX ADMIN — PROPOFOL 100 MG: 10 INJECTION, EMULSION INTRAVENOUS at 10:34

## 2021-05-14 RX ADMIN — FENTANYL CITRATE 50 MCG: 50 INJECTION, SOLUTION INTRAMUSCULAR; INTRAVENOUS at 10:34

## 2021-05-14 RX ADMIN — MIDAZOLAM 2 MG: 1 INJECTION INTRAMUSCULAR; INTRAVENOUS at 10:31

## 2021-05-14 RX ADMIN — CEFAZOLIN SODIUM 2000 MG: 2 SOLUTION INTRAVENOUS at 10:35

## 2021-05-14 RX ADMIN — PHENYLEPHRINE HYDROCHLORIDE 50 MCG: 10 INJECTION INTRAVENOUS at 11:19

## 2021-05-14 RX ADMIN — PHENYLEPHRINE HYDROCHLORIDE 50 MCG: 10 INJECTION INTRAVENOUS at 11:01

## 2021-05-14 NOTE — OP NOTE
OPERATIVE REPORT  PATIENT NAME: Charo Morrow    :    MRN: [id number]  Pt Location: [facility] OR ROOM 04    SURGERY DATE: 2021    Surgeon(s) and Role:     * Clay Schaffer DPM - Primary    Preop Diagnosis:  Hammer toe of left foot [M20 42]  Hammer toe of right foot [M20 41]   deformity of toes bilateral foot    Post-Op Diagnosis Codes:     * Hammer toe of left foot [M20 42]     * Hammer toe of right foot [M20 41]   deformity of joint of toes bilateral foot    Procedure(s) (LRB):  REPAIR HAMMERTOE / MALLET TOE / CLAW TOE 2ND toe bilateral (Bilateral)    Specimen(s):  * No specimens in log *    Estimated Blood Loss:   Minimal    Drains:  * No LDAs found *    Anesthesia Type:   IV Sedation with Anesthesia    Operative Indications:  Hammer toe of left foot [M20 42]  Hammer toe of right foot [M20 41]   deformity of toes joint bilateral foot    Operative Findings:   laterally sublux  DIP joint bilateral 2nd toe    Complications:   None    Procedure and Technique:   69-year-old male with chronic history of pain to the 2nd digit bilateral, with deformity noted with lateral subluxation at that D IP joint of the 2nd digit bilateral, with pain associated upon ambulation, presented to the OR, placed on the operating table in the supine position, after induction of sedation bilateral foot were scrubbed prepped and draped in the usual aseptic manner ankle tourniquet was inflated to the right foot, incision was made over the D IP joint of the right 2nd digit, will carried down to the joint using sharp and blunt dissection, bleeders were cauterized,  Extensor tendon were transected, and the joint was exposed,using sagittal so the head of the middle phalanx were was removed, at this time using half a K-wire the joint was splinted in a rectus position, tendon or reapproximated using   Chromic sutures, subcutaneous tissue using chromic, and skin reapproximated using nylon,  Attention was directed to the left foot, worst incision made over the PIP joint carried down to the joint using sharp and blunt dissection, bleeder work arthritis, tendo to transected exposing the joint, the head of the  Middle phalanx were resected, and removed,  The joint were splinted using a K-wire, site flushed, tendon were approximated using chromic, subcutaneous tissue approximated using chromic, skin approximated using nylon, both toes were dressed with Betadine soaked Adaptic, dry sterile dressing, and a light Ace, tourniquet was deflated, immediate hyperemia noted to all digits, patient transferred to PACU with all vital signs stable and vascular status intact to the feet   I was present for the entire procedure    Patient Disposition:  PACU     SIGNATURE: Haris Berrios DPM  DATE: May 14, 2021  TIME: 3:13 PM

## 2021-05-14 NOTE — PERIOPERATIVE NURSING NOTE
Patient received from PACU in 0/10 pain  Dressings were clean, dry, and intact  Neurovascular assessment WDL on the operative extremity  VSS  Will continue to monitor til discharge criteria is met

## 2021-05-14 NOTE — INTERIM OP NOTE
REPAIR HAMMERTOE / MALLET TOE / CLAW TOE 2ND toe bilateral  Postoperative Note  PATIENT NAME: Tyler Delarosa  : [birth year]  MRN: [id number]  [facility]    Surgery Date: 2021    Preop Diagnosis:  Hammer toe of left foot [M20 42]  Hammer toe of right foot [M20 41]    Post-Op Diagnosis Codes:     * Hammer toe of left foot [M20 42]     * Hammer toe of right foot [M20 41]    Procedure(s) (LRB):  REPAIR HAMMERTOE / MALLET TOE / CLAW TOE 2ND toe bilateral (Bilateral)    Surgeon(s) and Role:     Ladarius Murphy DPM - Primary    Specimens:  * No specimens in log *    Estimated Blood Loss:   Minimal    Anesthesia Type:   IV Sedation with Anesthesia     Findings:    Deformed 2nd PIP joint bilateral foot  Complications:   None    SIGNATURE: Alma Zaragoza DPM   DATE: May 14, 2021   TIME: 11:52 AM

## 2021-05-14 NOTE — PERIOPERATIVE NURSING NOTE
Pt d/c to home at this time w/ wife  Via wheelchair  Pt left with all belongings  Iv was D/C intact with dry sterile dressing  Encouraged to keep follow up appointments, Verbalized understanding  D/C instructions reviewed and explained  Verbalized understanding

## 2021-05-14 NOTE — ANESTHESIA PREPROCEDURE EVALUATION
Procedure:  REPAIR HAMMERTOE / MALLET TOE / CLAW TOE 2ND toe bilateral (Bilateral Foot)    Relevant Problems   ANESTHESIA (within normal limits)      CARDIO   (+) Hyperlipidemia      ENDO   (+) Hypothyroidism      PULMONARY (within normal limits)        Physical Exam    Airway    Mallampati score: II  TM Distance: >3 FB  Neck ROM: full     Dental   No notable dental hx     Cardiovascular  Rhythm: regular, Rate: normal,     Pulmonary  Breath sounds clear to auscultation,     Other Findings        Anesthesia Plan  ASA Score- 2     Anesthesia Type- IV sedation with anesthesia with ASA Monitors  Additional Monitors:   Airway Plan:           Plan Factors-Exercise tolerance (METS): >4 METS  Chart reviewed  EKG reviewed  Existing labs reviewed  Patient summary reviewed  Patient is not a current smoker  Induction- intravenous  Postoperative Plan-     Informed Consent- Anesthetic plan and risks discussed with patient  I personally reviewed this patient with the CRNA  Discussed and agreed on the Anesthesia Plan with the CRNA  Kemi Velez

## 2021-05-14 NOTE — ANESTHESIA POSTPROCEDURE EVALUATION
Post-Op Assessment Note    No complications documented      BP      Temp      Pulse     Resp      SpO2

## 2021-05-14 NOTE — ANESTHESIA POSTPROCEDURE EVALUATION
Post-Op Assessment Note    CV Status:  Stable  Pain Score: 0       Mental Status:  Alert and awake   Hydration Status:  Stable      Post Op Vitals Reviewed: Yes      Staff: Anesthesiologist, CRNA         No complications documented      BP      Temp      Pulse     Resp      SpO2

## 2021-05-17 ENCOUNTER — OFFICE VISIT (OUTPATIENT)
Dept: PODIATRY | Facility: CLINIC | Age: 61
End: 2021-05-17

## 2021-05-17 VITALS
HEIGHT: 71 IN | SYSTOLIC BLOOD PRESSURE: 138 MMHG | HEART RATE: 60 BPM | DIASTOLIC BLOOD PRESSURE: 84 MMHG | RESPIRATION RATE: 16 BRPM | BODY MASS INDEX: 21.98 KG/M2 | WEIGHT: 157 LBS

## 2021-05-17 DIAGNOSIS — M20.21 HALLUX RIGIDUS, RIGHT FOOT: ICD-10-CM

## 2021-05-17 DIAGNOSIS — M20.41 HAMMERTOE OF SECOND TOE OF RIGHT FOOT: ICD-10-CM

## 2021-05-17 DIAGNOSIS — M20.42 HAMMERTOE OF SECOND TOE OF LEFT FOOT: Primary | ICD-10-CM

## 2021-05-17 DIAGNOSIS — Z09 POSTOP CHECK: ICD-10-CM

## 2021-05-17 PROCEDURE — 99024 POSTOP FOLLOW-UP VISIT: CPT | Performed by: PODIATRIST

## 2021-05-19 NOTE — PROGRESS NOTES
Assessment/Plan:    Patient evaluated, sutures removed under sterile condition, patient to discontinue dressing changes,    Discussed surgical management of deformed 2nd digit bilateral, patient understand the chances of vascular injury, which would result in necrosis and amputation, patient is willing to take the risk as a measure of alleviating pain, patient denies constitutional symptoms, patient will obtain surgical clearance, all questions answered, patient understand the risks and benefits associated with arthroplasty bilateral 2nd digit  Diagnoses and all orders for this visit:    Hammertoe of second toe of left foot  -     azithromycin (ZITHROMAX) 250 mg tablet; Take 2 tablets today then 1 tablet daily x 4 days  -     oxyCODONE-acetaminophen (PERCOCET) 5-325 mg per tablet; Take 1 tablet by mouth every 4 (four) hours as needed for moderate pain for up to 3 daysMax Daily Amount: 6 tablets    Hammertoe of second toe of right foot    Postop check    Hallux rigidus, right foot          Subjective:      Patient ID: Juan M Tirado is a 59 y o  male  Preop visit, patient is at the office for discussion about eminence surgical management of bilateral deformed 2nd digit, patient experience pain to the digit, and interested in possibility of correction to alleviate the pain, patient also follow-up on surgical management of the hallux limitus on the right, patient report improvement in pain level at the site      The following portions of the patient's history were reviewed and updated as appropriate: allergies, current medications, past family history, past medical history, past social history, past surgical history and problem list     Review of Systems   Constitutional: Negative  Respiratory: Negative  Cardiovascular: Negative  Musculoskeletal: Positive for arthralgias  Skin: Negative                  Historical Information   Past Medical History:   Diagnosis Date    Arthritis     Disease of thyroid gland     hypo    Graves disease     Hypothyroid     hypo    Platelet disorder (HCC)     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 Pepito Lorenzo    RA [facility]he[facility]oid [facility]) ([facility] )     Wears glasses      Past Surgical History:   Procedure Laterality Date    ANKLE SURGERY Right     ligament, chipped bones,dislocated    COLONOSCOPY      ELBOW SURGERY Right     tendon surgery    FOOT ARTHRODESIS, MODIFIED ARELLANO Left     FRACTURE SURGERY Left     elbow    HERNIA REPAIR Right     inguinal    HI CORRJ HALLUX VALGUS W/SESMDC W/RESCJ PROX PHAL Right 2021    Procedure: Bimal Mulga;  Surgeon: Mari Kramer DPM;  Location: [facility];  Service: Podiatry    HI KNEE SCOPE,MED/LAT MENISECTOMY Right 2018    Procedure: Joann Pellet;  Surgeon: Abby Vila MD;  Location: [address] [place] [address];  Service: Orthopedics    HI REPAIR OF HAMMERTOE,ONE Bilateral 2021    Procedure: REPAIR HAMMERTOE / MALLET TOE / CLAW TOE 2ND toe bilateral;  Surgeon: Mari Kramer DPM;  Location: [facility];  Service: Podiatry    ROTATOR CUFF REPAIR Left     WISDOM TOOTH EXTRACTION      x4     Social History   Social History     Substance and Sexual Activity   Alcohol Use Yes    Comment: occ beer with going out to dinner     Social History     Substance and Sexual Activity   Drug Use No     Social History     Tobacco Use   Smoking Status Former Smoker    Packs/day: 1 00    Years: 20 00    Pack years: 20 00    Quit date:     Years since quittin 3   Smokeless Tobacco Never Used     Family History:   Family History   Problem Relation Age of Onset    Cancer Mother         lung-heavy smoker    Heart disease Father         leaky valve       Meds/Allergies   all medications and allergies reviewed  No Known Allergies    Objective: There were no vitals taken for this visit  Physical Exam  Constitutional:       General: He is not in acute distress  Appearance: He is well-developed   He is not ill-appearing, toxic-appearing or diaphoretic  HENT:      Head: Normocephalic and atraumatic  Cardiovascular:      Pulses: Normal pulses  Dorsalis pedis pulses are 2+ on the right side and 2+ on the left side  Posterior tibial pulses are 2+ on the right side and 2+ on the left side  Comments: Palpable pedal pulse, CFT is less than 3 seconds, temperature gradient within normal limits, pedal hair present, no trophic skin changes  mild cyanotic changes noted to the distal tuft 02/04/2005 digit, possibly due to high platelet history,  Pulmonary:      Effort: No respiratory distress  Musculoskeletal: Normal range of motion  Comments:   Improved range of motion of the 1st MPJ on the right, mild pain on palpation, much improved from preop pain     severe rigid hammertoe deformity to the DIP joint bilateral, with the distal phalanx completely subluxed laterally bilateral foot, bursa formation to the medial aspect of the  joint   Feet:      Right foot:      Protective Sensation: 10 sites tested  10 sites sensed  Skin integrity: No ulcer, blister, skin breakdown or erythema  Left foot:      Protective Sensation: 10 sites tested  10 sites sensed  Skin integrity: No ulcer, blister, skin breakdown or erythema  Skin:     Capillary Refill: Capillary refill takes 2 to 3 seconds  Coloration: Skin is not pale  Comments:  Surgical site clean, coapted, no clinical signs of infection, sutures intact, no erythema, mild edema   Neurological:      Mental Status: He is alert and oriented to person, place, and time  Comments:  muscle power 5/5, protective sensations intact, no focal motor deficit        Foot Exam    Right Foot/Ankle     Inspection and Palpation  Skin Exam: no blister, no maceration, no ulcer and no erythema     Neurovascular  Dorsalis pedis: 2+  Posterior tibial: 2+      Left Foot/Ankle      Inspection and Palpation  Skin Exam: no blister, no maceration, no ulcer and no erythema     Neurovascular  Dorsalis pedis: 2+  Posterior tibial: 2+

## 2021-05-19 NOTE — PROGRESS NOTES
Assessment/Plan:    Improved motion at the 1st MPJ on the left,  Distal phalanx in more corrected position bilateral 2nd digit  Pins are intact  Patient obtain x-rays prior next visit  Patient to maintain partial weight-bearing to the left heel and avoid trauma in surgical shoes  Keep dressing dry clean intact   Watch for signs of infection  Expected recovery time from 4-6 weeks  Diagnoses and all orders for this visit:    Hammertoe of second toe of left foot    Hammertoe of second toe of right foot    Hallux rigidus, right foot    Postop check          Subjective:      Patient ID: Titus Morris is a 59 y o  male  Postoperative, patient report compliance with antibiotic regimen, patient is accompanied by his family who noted that the patient has bumped his toe with the pin while ambulating in surgical shoe, patient currently denying pain, patient denies constitutional symptoms      The following portions of the patient's history were reviewed and updated as appropriate: allergies, current medications, past family history, past medical history, past social history, past surgical history and problem list     Review of Systems   Constitutional: Negative  Respiratory: Negative  Cardiovascular: Negative  Musculoskeletal: Positive for arthralgias  Skin: Negative                  Historical Information   Past Medical History:   Diagnosis Date    Arthritis     Disease of thyroid gland     hypo    Graves disease 1995    Hypothyroid     hypo    Platelet disorder (HCC)     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 Apoorva Robison    RA [facility]rheumatoid arthritis) ([facility] [address] )     Wears glasses      Past Surgical History:   Procedure Laterality Date    ANKLE SURGERY Right     ligament, chipped bones,dislocated    COLONOSCOPY      ELBOW SURGERY Right     tendon surgery    FOOT ARTHRODESIS, MODIFIED ARELLANO Left     FRACTURE SURGERY Left     elbow    HERNIA REPAIR Right     inguinal    [place] CORRJ HALLUX VALGUS W/SESMDC W/RESCJ PROX PHAL Right 2021    Procedure: Constance Mullins;  Surgeon: Janean Carrel, DPM;  Location: [facility];  Service: Podiatry    [place] KNEE SCOPE,MED/LAT MENISECTOMY Right 2018    Procedure: Celia Sanford;  Surgeon: Finesse Hu MD;  Location: [facility];  Service: Orthopedics    [place] REPAIR OF HAMMERTOE,ONE Bilateral 2021    Procedure: REPAIR HAMMERTOE / MALLET TOE / CLAW TOE 2ND toe bilateral;  Surgeon: Janean Carrel, DPM;  Location: [facility];  Service: [address]     WISDOM TOOTH EXTRACTION      x4     Social History   Social History     Substance and Sexual Activity   Alcohol Use Yes    Comment: occ beer with going out to dinner     Social History     Substance and Sexual Activity   Drug Use No     Social History     Tobacco Use   Smoking Status Former Smoker    Packs/day: 1 00    Years: 20 00    Pack years: 20 00    Quit date:     Years since quittin 3   Smokeless Tobacco Never Used     Family History:   Family History   Problem Relation Age of Onset    Cancer Mother         lung-heavy smoker    Heart disease Father         leaky valve       Meds/Allergies   all medications and allergies reviewed  No Known Allergies    Objective:      /84   Pulse 60   Resp 16   Ht 5' 11" (1 803 m)   Wt 71 2 kg (157 lb)   BMI 21 90 kg/m²          Physical Exam  Constitutional:       General: He is not in acute distress  Appearance: He is well-developed  He is not ill-appearing, toxic-appearing or diaphoretic  HENT:      Head: Normocephalic and atraumatic  Cardiovascular:      Pulses: Normal pulses  Dorsalis pedis pulses are 2+ on the right side and 2+ on the left side  Posterior tibial pulses are 2+ on the right side and 2+ on the left side  Comments: Palpable pedal pulse, CFT is less than 3 seconds, temperature gradient within normal limits, pedal hair present, no trophic skin changes       mild cyanotic changes noted to the distal tuft 02/04/2005 digit, possibly due to high platelet history,  Pulmonary:      Effort: No respiratory distress  Musculoskeletal: Normal range of motion  Comments:   Improved range of motion of the 1st MPJ on the right, mild pain on palpation, much improved from preop pain    Surgical changes at DIP joint bilateral 2nd toe, with bilateral toes in more corrected position   Feet:      Right foot:      Protective Sensation: 10 sites tested  10 sites sensed  Skin integrity: No ulcer, blister, skin breakdown or erythema  Left foot:      Protective Sensation: 10 sites tested  10 sites sensed  Skin integrity: No ulcer, blister, skin breakdown or erythema  Skin:     Capillary Refill: Capillary refill takes 2 to 3 seconds  Coloration: Skin is not pale  Comments:  Surgical site at the hallux limitus has healed, no erythema no edema a scar forming dorsally, no clinical signs of infection, marked improvement of range of motion at the 1st MPJ    Surgical site bilateral 2nd digit clean, edges coapted, sutures intact, scab noted dorsally at the surgical site, no cyanosis, no erythema, no edema, no drainage, clinical signs of infection, pain is intact, nondisplaced,    Pain on palpation theD IP joints bilateral   Neurological:      Mental Status: He is alert and oriented to person, place, and time  Comments:  muscle power 5/5, protective sensations intact, no focal motor deficit        Foot Exam    Right Foot/Ankle     Inspection and Palpation  Skin Exam: no blister, no maceration, no ulcer and no erythema     Neurovascular  Dorsalis pedis: 2+  Posterior tibial: 2+      Left Foot/Ankle      Inspection and Palpation  Skin Exam: no blister, no maceration, no ulcer and no erythema     Neurovascular  Dorsalis pedis: 2+  Posterior tibial: 2+

## 2021-05-27 ENCOUNTER — OFFICE VISIT (OUTPATIENT)
Dept: PODIATRY | Facility: CLINIC | Age: 61
End: 2021-05-27

## 2021-05-27 VITALS
HEIGHT: 71 IN | WEIGHT: 157 LBS | BODY MASS INDEX: 21.98 KG/M2 | HEART RATE: 60 BPM | SYSTOLIC BLOOD PRESSURE: 138 MMHG | RESPIRATION RATE: 16 BRPM | DIASTOLIC BLOOD PRESSURE: 84 MMHG

## 2021-05-27 DIAGNOSIS — M20.42 HAMMERTOE OF SECOND TOE OF LEFT FOOT: Primary | ICD-10-CM

## 2021-05-27 DIAGNOSIS — M20.41 HAMMERTOE OF SECOND TOE OF RIGHT FOOT: ICD-10-CM

## 2021-05-27 DIAGNOSIS — M20.21 HALLUX RIGIDUS, RIGHT FOOT: ICD-10-CM

## 2021-05-27 DIAGNOSIS — M79.671 RIGHT FOOT PAIN: ICD-10-CM

## 2021-05-27 DIAGNOSIS — M79.672 PAIN IN BOTH FEET: ICD-10-CM

## 2021-05-27 DIAGNOSIS — Z09 POSTOP CHECK: ICD-10-CM

## 2021-05-27 DIAGNOSIS — M79.671 PAIN IN BOTH FEET: ICD-10-CM

## 2021-05-27 PROCEDURE — 99024 POSTOP FOLLOW-UP VISIT: CPT | Performed by: PODIATRIST

## 2021-06-10 ENCOUNTER — OFFICE VISIT (OUTPATIENT)
Dept: PODIATRY | Facility: CLINIC | Age: 61
End: 2021-06-10
Payer: COMMERCIAL

## 2021-06-10 VITALS
SYSTOLIC BLOOD PRESSURE: 138 MMHG | BODY MASS INDEX: 21.98 KG/M2 | WEIGHT: 157 LBS | DIASTOLIC BLOOD PRESSURE: 84 MMHG | HEIGHT: 71 IN | HEART RATE: 60 BPM | RESPIRATION RATE: 16 BRPM

## 2021-06-10 DIAGNOSIS — M20.21 HALLUX RIGIDUS, RIGHT FOOT: ICD-10-CM

## 2021-06-10 DIAGNOSIS — T84.213A: ICD-10-CM

## 2021-06-10 DIAGNOSIS — Z09 POSTOP CHECK: Primary | ICD-10-CM

## 2021-06-10 DIAGNOSIS — M79.672 PAIN IN BOTH FEET: ICD-10-CM

## 2021-06-10 DIAGNOSIS — M20.41 HAMMERTOE OF SECOND TOE OF RIGHT FOOT: ICD-10-CM

## 2021-06-10 DIAGNOSIS — M20.42 HAMMERTOE OF SECOND TOE OF LEFT FOOT: ICD-10-CM

## 2021-06-10 DIAGNOSIS — M79.671 PAIN IN BOTH FEET: ICD-10-CM

## 2021-06-10 PROCEDURE — 20670 REMOVAL IMPLANT SUPERFICIAL: CPT | Performed by: PODIATRIST

## 2021-07-02 NOTE — PROGRESS NOTES
Assessment/Plan:    Improved motion at the 1st MPJ on the left,  Distal phalanx in more corrected position bilateral 2nd digit  Pins are intact, Applied a protective cast around the pins to prevent trauma  Patient to maintain partial weight-bearing to the left heel and avoid trauma in surgical shoes  Keep dressing dry clean intact   Watch for signs of infection   patient continue partial weight-bearing using forefoot offloading shoes  Expected recovery time from 4-6 weeks  Diagnoses and all orders for this visit:    Hammertoe of second toe of left foot    Postop check    Hammertoe of second toe of right foot    Hallux rigidus, right foot    Pain in both feet    Right foot pain          Subjective:      Patient ID: Anthony Poon is a 59 y o  male  Postoperative, patient report compliance with antibiotic regimen, patient is accompanied by his family who noted that the patient has bumped his toe with the pin while ambulating in surgical shoe, patient currently denying pain, patient denies constitutional symptoms      The following portions of the patient's history were reviewed and updated as appropriate: allergies, current medications, past family history, past medical history, past social history, past surgical history and problem list     Review of Systems   Constitutional: Negative  Respiratory: Negative  Cardiovascular: Negative  Musculoskeletal: Positive for arthralgias  Skin: Negative                  Historical Information   Past Medical History:   Diagnosis Date    Arthritis     Disease of thyroid gland     hypo    Graves disease 1995    Hypothyroid     hypo    Platelet disorder (HCC)     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 Timmy Najera    [facility]     Wears glasses      Past Surgical History:   Procedure Laterality Date    ANKLE SURGERY Right     ligament, chipped bones,dislocated    COLONOSCOPY      ELBOW SURGERY Right     tendon surgery    FOOT ARTHRODESIS, MODIFIED ARELLANO Left     FRACTURE SURGERY Left     elbow    HERNIA REPAIR Right     inguinal    [place] CORRJ HALLUX VALGUS W/SESMDC W/RESCJ PROX PHAL Right 2021    Procedure: Ya Junior;  Surgeon: Sherice Murray DPM;  Location: [address] [place] [address];  Service: Podiatry    [place] KNEE SCOPE,MED/LAT MENISECTOMY Right 2018    Procedure: Susana Gross;  Surgeon: Amanda Thomas MD;  Location: [address] [place] [address];  Service: Orthopedics    [place] REPAIR OF Natalya Shutters Bilateral 2021    Procedure: REPAIR HAMMERTOE / MALLET TOE / CLAW TOE 2ND toe bilateral;  Surgeon: Sherice Murray DPM;  Location: [address] [place] [address] Road;  Service: [address]     WISDOM TOOTH EXTRACTION      x4     Social History   Social History     Substance and Sexual Activity   Alcohol Use Yes    Comment: occ beer with going out to dinner     Social History     Substance and Sexual Activity   Drug Use No     Social History     Tobacco Use   Smoking Status Former Smoker    Packs/day:     Years:     Pack years:     Quit date:     Years since quittin 5   Smokeless Tobacco Never Used     Family History:   Family History   Problem Relation Age of Onset    Cancer Mother         lung-heavy smoker    Heart disease Father         leaky valve       Meds/Allergies   all medications and allergies reviewed  No Known Allergies    Objective:      /84   Pulse 60   Resp 16   Ht 5' 11" (1 803 m)   Wt 71 2 kg (157 lb)   BMI 21 90 kg/m²          Physical Exam  Constitutional:       General: He is not in acute distress  Appearance: He is well-developed  He is not ill-appearing, toxic-appearing or diaphoretic  HENT:      Head: Normocephalic and atraumatic  Cardiovascular:      Pulses: Normal pulses  Dorsalis pedis pulses are 2+ on the right side and 2+ on the left side  Posterior tibial pulses are 2+ on the right side and 2+ on the left side        Comments: Palpable pedal pulse, CFT is less than 3 seconds, temperature gradient within normal limits, pedal hair present, no trophic skin changes  mild cyanotic changes noted to the distal tuft 02/04/2005 digit, possibly due to high platelet history,  Pulmonary:      Effort: No respiratory distress  Musculoskeletal:         General: Normal range of motion  Comments:   Improved range of motion of the 1st MPJ on the right, mild pain on palpation, much improved from preop pain    Surgical changes at DIP joint bilateral 2nd toe, with bilateral toes in more corrected position   Feet:      Right foot:      Protective Sensation: 10 sites tested  10 sites sensed  Skin integrity: No ulcer, blister, skin breakdown or erythema  Left foot:      Protective Sensation: 10 sites tested  10 sites sensed  Skin integrity: No ulcer, blister, skin breakdown or erythema  Skin:     Capillary Refill: Capillary refill takes 2 to 3 seconds  Coloration: Skin is not pale  Comments:  Surgical site at the hallux limitus has healed, no erythema no edema a scar forming dorsally, no clinical signs of infection, marked improvement of range of motion at the 1st MPJ    Surgical site bilateral 2nd digit clean, edges coapted, sutures intact, scab noted dorsally at the surgical site, no cyanosis, no erythema, no edema, no drainage, clinical signs of infection, pain is intact, nondisplaced,    Pain on palpation theD IP joints bilateral   Neurological:      Mental Status: He is alert and oriented to person, place, and time  Comments:  muscle power 5/5, protective sensations intact, no focal motor deficit        Foot Exam    Right Foot/Ankle     Inspection and Palpation  Skin Exam: no blister, no maceration, no ulcer and no erythema     Neurovascular  Dorsalis pedis: 2+  Posterior tibial: 2+      Left Foot/Ankle      Inspection and Palpation  Skin Exam: no blister, no maceration, no ulcer and no erythema     Neurovascular  Dorsalis pedis: 2+  Posterior tibial: 2+

## 2021-07-02 NOTE — PROGRESS NOTES
Assessment/Plan:    Improved motion at the 1st MPJ on the left,  Distal phalanx in more corrected position bilateral 2nd digit  Pins  Removed bilateral 2nd digit, using sterile equipment,  Site dressed with topical antibiotic, Bactroban ordered for dressing changes  Patient to  Transition to supportive shoe gear   start physical therapy  Keep dressing dry clean intact   Watch for signs of infection   follow-up after physical therapy for evaluation prior to return to full weight-bearing status     Diagnoses and all orders for this visit:    Postop check  -     mupirocin (BACTROBAN) 2 % ointment; Apply topically daily    Hammertoe of second toe of left foot    Hallux rigidus, right foot    Hammertoe of second toe of right foot    Pain in both feet    Breakdown of int fix of bones of foot and toes, init ([facility])          Subjective:      Patient ID: Renita Jang is a 59 y o  male  Postoperative, patient report compliance with antibiotic regimen, patient is accompanied by his family who noted that the patient has bumped his toe with the pin while ambulating in surgical shoe, patient currently denying pain, patient denies constitutional symptoms, patient report pain on movement around the toes pin      The following portions of the patient's history were reviewed and updated as appropriate: allergies, current medications, past family history, past medical history, past social history, past surgical history and problem list     Review of Systems   Constitutional: Negative  Respiratory: Negative  Cardiovascular: Negative  Musculoskeletal: Positive for arthralgias  Skin: Negative                  Historical Information   Past Medical History:   Diagnosis Date    Arthritis     Disease of thyroid gland     hypo    Graves disease 1995    Hypothyroid     hypo    Platelet disorder ([facility])     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 Jaylin Cazares    RA (rheumatoid arthritis) ([facility] [address] )     Wears glasses      Past Surgical History: Procedure Laterality Date    ANKLE SURGERY Right     ligament, chipped bones,dislocated    COLONOSCOPY      ELBOW SURGERY Right     tendon surgery    FOOT ARTHRODESIS, MODIFIED ARELLANO Left     FRACTURE SURGERY Left     elbow    HERNIA REPAIR Right     inguinal    [place] CORRJ HALLUX VALGUS W/SESMDC W/RESCJ PROX PHAL Right 2021    Procedure: Carroll Moseley;  Surgeon: Calin Jett DPM;  [facility];  Service: Podiatry    [place] KNEE SCOPE,MED/LAT MENISECTOMY Right 2018    Procedure: Oscar Johnson;  Surgeon: Kirill Bernard MD;  Location: [address] [place] [address];  Service: Orthopedics    [place] REPAIR OF Tierra Irving Bilateral 2021    Procedure: REPAIR HAMMERTOE / MALLET TOE / CLAW TOE 2ND toe bilateral;  Surgeon: Calin Jett DPM;  Location: [address] [place] [address];  Service: Podiatry    ROTATOR CUFF REPAIR Left     WISDOM TOOTH EXTRACTION      x4     Social History   Social History     Substance and Sexual Activity   Alcohol Use Yes    Comment: occ beer with going out to dinner     Social History     Substance and Sexual Activity   Drug Use No     Social History     Tobacco Use   Smoking Status Former Smoker    Packs/day: 1 00    Years: 20 00    Pack years: 20 00    Quit date:     Years since quittin 5   Smokeless Tobacco Never Used     Family History:   Family History   Problem Relation Age of Onset    Cancer Mother         lung-heavy smoker    Heart disease Father         leaky valve       Meds/Allergies   all medications and allergies reviewed  No Known Allergies    Objective:      /84   Pulse 60   Resp 16   Ht 5' 11" (1 803 m)   Wt 71 2 kg (157 lb)   BMI 21 90 kg/m²          Physical Exam  Constitutional:       General: He is not in acute distress  Appearance: He is well-developed  He is not ill-appearing, toxic-appearing or diaphoretic  HENT:      Head: Normocephalic and atraumatic  Cardiovascular:      Pulses: Normal pulses             Dorsalis pedis pulses are 2+ on the right side and 2+ on the left side  Posterior tibial pulses are 2+ on the right side and 2+ on the left side  Comments: Palpable pedal pulse, CFT is less than 3 seconds, temperature gradient within normal limits, pedal hair present, no trophic skin changes  mild cyanotic changes noted to the distal tuft 02/04/2005 digit, possibly due to high platelet history,  Pulmonary:      Effort: No respiratory distress  Musculoskeletal:         General: Normal range of motion  Comments:   Improved range of motion of the 1st MPJ on the right, mild pain on palpation, much improved from preop pain    Surgical changes at DIP joint bilateral 2nd toe, with bilateral toes in more corrected position   Feet:      Right foot:      Protective Sensation: 10 sites tested  10 sites sensed  Skin integrity: No ulcer, blister, skin breakdown or erythema  Left foot:      Protective Sensation: 10 sites tested  10 sites sensed  Skin integrity: No ulcer, blister, skin breakdown or erythema  Skin:     Capillary Refill: Capillary refill takes 2 to 3 seconds  Coloration: Skin is not pale  Comments:  Surgical site at the hallux limitus has healed, no erythema no edema a scar forming dorsally, no clinical signs of infection, marked improvement of range of motion at the 1st MPJ    Surgical site bilateral 2nd digit clean, edges coapted, sutures intact, scab noted dorsally at the surgical site, no cyanosis, no erythema, no edema, no drainage, clinical signs of infection,  pistoning noted to the pins of the 2nd digit bilateral,    Pain on palpation theD IP joints bilateral   Neurological:      Mental Status: He is alert and oriented to person, place, and time  Comments:  muscle power 5/5, protective sensations intact, no focal motor deficit        Foot Exam    Right Foot/Ankle     Inspection and Palpation  Skin Exam: no blister, no maceration, no ulcer and no erythema Neurovascular  Dorsalis pedis: 2+  Posterior tibial: 2+      Left Foot/Ankle      Inspection and Palpation  Skin Exam: no blister, no maceration, no ulcer and no erythema     Neurovascular  Dorsalis pedis: 2+  Posterior tibial: 2+

## 2021-07-16 ENCOUNTER — OFFICE VISIT (OUTPATIENT)
Dept: PODIATRY | Facility: CLINIC | Age: 61
End: 2021-07-16

## 2021-07-16 VITALS
HEIGHT: 71 IN | BODY MASS INDEX: 21.98 KG/M2 | RESPIRATION RATE: 16 BRPM | DIASTOLIC BLOOD PRESSURE: 84 MMHG | WEIGHT: 157 LBS | HEART RATE: 60 BPM | SYSTOLIC BLOOD PRESSURE: 138 MMHG

## 2021-07-16 DIAGNOSIS — Z09 POSTOP CHECK: Primary | ICD-10-CM

## 2021-07-16 PROCEDURE — 99024 POSTOP FOLLOW-UP VISIT: CPT | Performed by: PODIATRIST

## 2021-07-26 ENCOUNTER — OFFICE VISIT (OUTPATIENT)
Dept: PODIATRY | Facility: CLINIC | Age: 61
End: 2021-07-26
Payer: COMMERCIAL

## 2021-07-26 VITALS
HEIGHT: 71 IN | WEIGHT: 157 LBS | DIASTOLIC BLOOD PRESSURE: 84 MMHG | SYSTOLIC BLOOD PRESSURE: 138 MMHG | BODY MASS INDEX: 21.98 KG/M2 | HEART RATE: 60 BPM | RESPIRATION RATE: 16 BRPM

## 2021-07-26 DIAGNOSIS — M77.51 CAPSULITIS OF METATARSOPHALANGEAL (MTP) JOINT OF RIGHT FOOT: Primary | ICD-10-CM

## 2021-07-26 DIAGNOSIS — M79.671 RIGHT FOOT PAIN: ICD-10-CM

## 2021-07-26 PROCEDURE — 20600 DRAIN/INJ JOINT/BURSA W/O US: CPT | Performed by: PODIATRIST

## 2021-07-30 NOTE — PROGRESS NOTES
Assessment/Plan:     capsulitis plantar 2nd MPJ, patient educated on proper shoe gear and the use of supportive orthotics, patient educated on RI CE therapy  Trigger point injection to the  right foot at the  2nd MPJ, injection site prepped using alcohol, 2 cc of equal amount of Marcaine 0 5% plain and Kenalog 10 injected using 27 gauge needle, patient tolerated procedure well with no immediate complications  patient continue physical therapy to increase range of motion at the 1st MPJ on the right and 2nd MPJ bilateral     Diagnoses and all orders for this visit:    Capsulitis of metatarsophalangeal (MTP) joint of right foot    Right foot pain          Subjective:      Patient ID: Judy Herrera is a 59 y o  male  Patient returned office for pain under the 2nd toe joint,  Patient states that he had increase in activity the past week where he had to ambulate and weightbear and the physical work for few hours, patient noted pain to the midfoot that the 2nd toe, patient state that he noted swelling, he denies trauma, denies constitutional symptoms, tries open lesion or drainage      The following portions of the patient's history were reviewed and updated as appropriate: allergies, current medications, past family history, past medical history, past social history, past surgical history and problem list     Review of Systems   Constitutional: Negative  Respiratory: Negative  Cardiovascular: Negative  Musculoskeletal: Positive for arthralgias  Skin: Negative                  Historical Information   Past Medical History:   Diagnosis Date    Arthritis     Disease of thyroid gland     hypo    Graves disease 1995    Hypothyroid     hypo    Platelet disorder (HCC)     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 Jackie Millan    RA (rheumatoid arthritis) ([facility] [address] )     Wears glasses      Past Surgical History:   Procedure Laterality Date    ANKLE SURGERY Right     ligament, chipped bones,dislocated    COLONOSCOPY      ELBOW SURGERY Right     tendon surgery    FOOT ARTHRODESIS, MODIFIED ARELLANO Left     FRACTURE SURGERY Left     elbow    HERNIA REPAIR Right     inguinal    [place] CORRJ HALLUX VALGUS W/SESMDC W/RESCJ PROX PHAL Right 2021    Procedure: Cindi Murphy;  Surgeon: Lemmie Goldberg, DPM;  Location: [address] [place] [address];  Service: Podiatry    [place] KNEE SCOPE,MED/LAT MENISECTOMY Right 2018    Procedure: Leanne Grady;  Surgeon: Jo Willams MD;  Location: [address] [place] [address];  Service: Orthopedics    [place] REPAIR OF Olman Marking Bilateral 2021    Procedure: REPAIR HAMMERTOE / MALLET TOE / CLAW TOE 2ND toe bilateral;  Surgeon: Lemmie Goldberg, DPM;  Location: [address] [place] [address];  Service: [address]     WISDOM TOOTH EXTRACTION      x4     Social History   Social History     Substance and Sexual Activity   Alcohol Use Yes    Comment: occ beer with going out to dinner     Social History     Substance and Sexual Activity   Drug Use No     Social History     Tobacco Use   Smoking Status Former Smoker    Packs/day:     Years:     Pack years: 20     Quit date:     Years since quittin 5   Smokeless Tobacco Never Used     Family History:   Family History   Problem Relation Age of Onset    Cancer Mother         lung-heavy smoker    Heart disease Father         leaky valve       Meds/Allergies   all medications and allergies reviewed  No Known Allergies    Objective:      /84   Pulse 60   Resp 16   Ht 5' 11" (1 803 m)   Wt 71 2 kg (157 lb)   BMI 21 90 kg/m²          Physical Exam  Constitutional:       General: He is not in acute distress  Appearance: He is well-developed  He is not ill-appearing, toxic-appearing or diaphoretic  HENT:      Head: Normocephalic and atraumatic  Cardiovascular:      Pulses: Normal pulses  Dorsalis pedis pulses are 2+ on the right side and 2+ on the left side          Posterior tibial pulses are 2+ on the right side and 2+ on the left side  Comments: Palpable pedal pulse, CFT is less than 3 seconds, temperature gradient within normal limits, pedal hair present, no trophic skin changes  mild cyanotic changes noted to the distal tuft 02/04/2005 digit, possibly due to high platelet history,  Pulmonary:      Effort: No respiratory distress  Musculoskeletal:         General: Normal range of motion  Comments:   Improved range of motion of the 1st MPJ on the right, mild pain on palpation, much improved from preop pain    Surgical changes at DIP joint bilateral 2nd toe, with bilateral toes in more corrected position     pain on palpation to the plantar aspect of the 2nd metatarsal head, mild localized edema, no erythema, no open lesion   Feet:      Right foot:      Protective Sensation: 10 sites tested  10 sites sensed  Skin integrity: No ulcer, blister, skin breakdown or erythema  Left foot:      Protective Sensation: 10 sites tested  10 sites sensed  Skin integrity: No ulcer, blister, skin breakdown or erythema  Skin:     Capillary Refill: Capillary refill takes 2 to 3 seconds  Coloration: Skin is not pale  Comments:  Surgical site at the hallux limitus has healed, no erythema no edema a scar forming dorsally, no clinical signs of infection, marked improvement of range of motion at the 1st MPJ    Surgical site bilateral 2nd digit clean, edges coapted,   Fully epithelized, no erythema no edema,  no open lesion      no Pain on palpation theD IP joints bilateral   Neurological:      Mental Status: He is alert and oriented to person, place, and time  Comments:  muscle power 5/5, protective sensations intact, no focal motor deficit        Foot Exam    Right Foot/Ankle     Inspection and Palpation  Skin Exam: no blister, no maceration, no ulcer and no erythema     Neurovascular  Dorsalis pedis: 2+  Posterior tibial: 2+      Left Foot/Ankle      Inspection and Palpation  Skin Exam: no blister, no maceration, no ulcer and no erythema     Neurovascular  Dorsalis pedis: 2+  Posterior tibial: 2+

## 2021-07-30 NOTE — PROGRESS NOTES
Assessment/Plan:      Pain improved, range of motion improved, deformity in a more corrected position, no clinical signs of infection, will order topical Iodosorb for the management of the superficial wound dorsal  Right 2nd toe,   patient educated on proper shoe gear and use of orthotics, patient to continue with physical therapy, patient return in 2 weeks for re-evaluation for possible return to work     There are no diagnoses linked to this encounter  Subjective:      Patient ID: Anthony Campbell is a 59 y o  male  Follow-up on bilateral 2nd toe hammertoe deformity correction and  Hallux limitus right foot, patient is currently a undergo physical therapy, patient report improvement in pain at motion to bilateral foot, patient is inquiring about small dehiscence to the top of his right 2nd toe      The following portions of the patient's history were reviewed and updated as appropriate: allergies, current medications, past family history, past medical history, past social history, past surgical history and problem list     Review of Systems   Constitutional: Negative  Respiratory: Negative  Cardiovascular: Negative  Musculoskeletal: Positive for arthralgias  Skin: Negative                  Historical Information   Past Medical History:   Diagnosis Date    Arthritis     Disease of thyroid gland     hypo    Graves disease 1995    Hypothyroid     hypo    Platelet disorder (HCC)     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 [facility]    RA (rheumatoid arthritis) ([facility] [address]     Wears glasses      Past Surgical History:   Procedure Laterality Date    ANKLE SURGERY Right     ligament, chipped bones,dislocated    COLONOSCOPY      ELBOW SURGERY Right     tendon surgery    FOOT ARTHRODESIS, MODIFIED ARELLANO Left     FRACTURE SURGERY Left     elbow    HERNIA REPAIR Right     inguinal    MA CORRJ HALLUX VALGUS W/SESMDC W/RESCJ PROX PHAL Right 4/9/2021    Procedure: Jasmine Davis;  Surgeon: Nicole Moser DPM; history,  Pulmonary:      Effort: No respiratory distress  Musculoskeletal:         General: Normal range of motion  Comments:   Improved range of motion of the 1st MPJ on the right, mild pain on palpation, much improved from preop pain    Surgical changes at DIP joint bilateral 2nd toe, with bilateral toes in more corrected position   Feet:      Right foot:      Protective Sensation: 10 sites tested  10 sites sensed  Skin integrity: No ulcer, blister, skin breakdown or erythema  Left foot:      Protective Sensation: 10 sites tested  10 sites sensed  Skin integrity: No ulcer, blister, skin breakdown or erythema  Skin:     Capillary Refill: Capillary refill takes 2 to 3 seconds  Coloration: Skin is not pale  Comments:  Surgical site at the hallux limitus has healed, no erythema no edema a scar forming dorsally, no clinical signs of infection, marked improvement of range of motion at the 1st MPJ    Surgical site bilateral 2nd digit clean, edges coapted, sutures intact,  Superficial dehiscence dorsal 2nd PIPJ on the right, area of concern about 0 1 x 0 1 x 0 1 cm no cyanosis, no erythema, no edema, no drainage, clinical signs of infection,  pistoning noted to the pins of the 2nd digit bilateral,    Pain on palpation theD IP joints bilateral   Neurological:      Mental Status: He is alert and oriented to person, place, and time  Comments:  muscle power 5/5, protective sensations intact, no focal motor deficit        Foot Exam    Right Foot/Ankle     Inspection and Palpation  Skin Exam: no blister, no maceration, no ulcer and no erythema     Neurovascular  Dorsalis pedis: 2+  Posterior tibial: 2+      Left Foot/Ankle      Inspection and Palpation  Skin Exam: no blister, no maceration, no ulcer and no erythema     Neurovascular  Dorsalis pedis: 2+  Posterior tibial: 2+

## 2021-09-03 ENCOUNTER — OFFICE VISIT (OUTPATIENT)
Dept: PODIATRY | Facility: CLINIC | Age: 61
End: 2021-09-03
Payer: COMMERCIAL

## 2021-09-03 VITALS
HEART RATE: 60 BPM | WEIGHT: 157 LBS | BODY MASS INDEX: 21.98 KG/M2 | RESPIRATION RATE: 17 BRPM | SYSTOLIC BLOOD PRESSURE: 138 MMHG | HEIGHT: 71 IN | DIASTOLIC BLOOD PRESSURE: 84 MMHG

## 2021-09-03 DIAGNOSIS — M79.672 PAIN IN BOTH FEET: Primary | ICD-10-CM

## 2021-09-03 DIAGNOSIS — M79.671 PAIN IN BOTH FEET: Primary | ICD-10-CM

## 2021-09-03 PROCEDURE — 99212 OFFICE O/P EST SF 10 MIN: CPT | Performed by: PODIATRIST

## 2022-05-02 NOTE — TELEPHONE ENCOUNTER
Arabella I am not sure what I need to do? Please advise  Discharge Summary   Admit Date: 4/25/2022   Discharge Date:  4/28/2022    Condition at dc stable  Spent over 40 minutes with patient and staff on [facility]   Final Dx: axis I: Schizoaffective disorder, bipolar type ([facility] [address]                              Autistic Spectrum DO  Axis 2: No diagnosis  Naylor 3: See Medical History    And Present on Admission:   Schizoaffective disorder, bipolar type ([address]   Autistic spectrum disorder   Suicidal ideation     Axis 4: Problems related to the social environment  Axis 5:  On Admission: 41-50 serious symptoms At Discharge: 61-70 mild symptoms   All conditions on Axis 1 and Axis 2 and active problems on Axis 3 were treated while patient was hospitalized. [facility] Problems    Diagnosis Date Noted    Schizoaffective disorder, bipolar type ([address] [F25.0] 04/26/2022     Priority: Medium    Autistic spectrum disorder [F84.0] 01/13/2016    Suicidal ideation [R45.851] 01/07/2011   )   Condition on DC  Mood and affect are stable and pt is not suicidal   VITALS:  /77   Pulse 75   Temp 97.8 °F (36.6 °C) (Infrared)   Resp 16   Ht 6' (1.829 m)   Wt 243 lb 8 oz (110.5 kg)   SpO2 98%   BMI 33.02 kg/m²   Brief Summary Present Illness   CC:  SI     HPI:   Patient seen in room on Adult Behavioral Unit. Patient is a 30 y.o. male who presents  To [facility], from [facility] due to feeling suicidal. He was at [facility] for 4 days and felt\"bullied \" there and wasn't feelingsafe. He state that someone scratched his arm. Feels more depressed and wanted to come to [facility] to stabilize mood. He had been on [place] on last admission 3/3/22. He was placed in [facility] for [address] as she had lost his medical card as well. Has been exhibiting irritability, hopelessness, and anxiety. Sleep less and appetite normal. Had a suicide attempt at age 11 by hanging. And other self harm behaviors over the years.  Mar Goncalves has difficulty managing his social interactions and can easily fixate on things.         Collateral for mother per RUSS note: 4/25/2022  The patients mother is concerned for the patient in relation to his depression, suicidal ideation, and his addiction to marijuana. Jaimee Horton reports that the patient tells her that he will kill himself if anything happened to his mother or father and that he does not want to go back to [facility] because he has increased thoughts to hurt himself and depression symptoms. Jaimee Horton reports that the patient is often tearful when speaking to her over the phone and is afraid that he will not be welcome back at the home. Jaimee Horton reports that the patients father was just brought home from [facility] related to being admitted for over 3 weeks with COVID. The patients father is currently on O2 and Jaimeebecky Horton does not feel safe with having the patient in the same home because the patient smokes and does not follow her rules related to making sure his cigarettes are out when he discards them outside--Frannie is afraid the patient will cause a fire and cause the O2 to explode. Jaimee Horton reports that the patient received a medical marijuana card a few months ago after being persuaded to get one by his PCP for the patients anxiety. Jaimee Horton reports that the patient has gained weight since starting the use of marijuana and spends all of his disability check by the 3rd day of the month on marijuana. Jaimee Horton reports she sent the patient with his sister to [facility] for a \"break\", but that the patient had depression and anxiety during the trip and returned on 4/19 these symptoms lead to the admission of the patient to [facility] on 4/20. Frannie reports that the patient felt threatened at [place] feels that the patient needs to be admitted at this time, and does not feel safe for him to return to her home at this time related to his depression and suicidal ideation.   Cortney Reyna  Patient stabilized on meds and milieu treatment.       Patient was discharged to home to continue recovery in the community. PE: (reviewed) and labs (see medical H&PE)  Labs:    Admission on 04/25/2022, Discharged on 04/28/2022   Component Date Value Ref Range Status    WBC 04/25/2022 8.3  4.0 - 11.0 K/uL Final    RBC 04/25/2022 4.68  4.20 - 5.90 M/uL Final    Hemoglobin 04/25/2022 14.5  13.5 - 17.5 g/dL Final    Hematocrit 04/25/2022 42.0  40.5 - 52.5 % Final    MCV 04/25/2022 89.8  80.0 - 100.0 fL Final    MCH 04/25/2022 30.9  26.0 - 34.0 pg Final    MCHC 04/25/2022 34.4  31.0 - 36.0 g/dL Final    RDW 04/25/2022 12.5  12.4 - 15.4 % Final    Platelets 21/30/5027 216  135 - 450 K/uL Final    MPV 04/25/2022 7.1  5.0 - 10.5 fL Final    Neutrophils % 04/25/2022 52.1  % Final    Lymphocytes % 04/25/2022 33.6  % Final    Monocytes % 04/25/2022 10.2  % Final    Eosinophils % 04/25/2022 3.3  % Final    Basophils % 04/25/2022 0.8  % Final    Neutrophils Absolute 04/25/2022 4.3  1.7 - 7.7 K/uL Final    Lymphocytes Absolute 04/25/2022 2.8  1.0 - 5.1 K/uL Final    Monocytes Absolute 04/25/2022 0.8  0.0 - 1.3 K/uL Final    Eosinophils Absolute 04/25/2022 0.3  0.0 - 0.6 K/uL Final    Basophils Absolute 04/25/2022 0.1  0.0 - 0.2 K/uL Final    Sodium 04/25/2022 139  136 - 145 mmol/L Final    Potassium reflex Magnesium 04/25/2022 4.1  3.5 - 5.1 mmol/L Final    Chloride 04/25/2022 101  99 - 110 mmol/L Final    CO2 04/25/2022 30  21 - 32 mmol/L Final    Anion Gap 04/25/2022 8  3 - 16 Final    Glucose 04/25/2022 97  70 - 99 mg/dL Final    BUN 04/25/2022 11  7 - 20 mg/dL Final    CREATININE 04/25/2022 0.9  0.9 - 1.3 mg/dL Final    GFR Non- 04/25/2022 >60  >60 Final    Comment: >60 mL/min/1.73m2 EGFR, calc. for ages 25 and older using the  MDRD formula (not corrected for weight), is valid for stable  renal function.  GFR  04/25/2022 >60  >60 Final    Comment: Chronic Kidney Disease: less than 60 ml/min/1.73 sq.m.           Kidney Failure: less than 15 ml/min/1.73 sq.m.  Results valid for patients 18 years and older.  Calcium 04/25/2022 9.5  8.3 - 10.6 mg/dL Final    Total Protein 04/25/2022 7.0  6.4 - 8.2 g/dL Final    Albumin 04/25/2022 4.4  3.4 - 5.0 g/dL Final    Albumin/Globulin Ratio 04/25/2022 1.7  1.1 - 2.2 Final    Total Bilirubin 04/25/2022 0.7  0.0 - 1.0 mg/dL Final    Alkaline Phosphatase 04/25/2022 67  40 - 129 U/L Final    ALT 04/25/2022 20  10 - 40 U/L Final    AST 04/25/2022 17  15 - 37 U/L Final    Ethanol Lvl 04/25/2022 None Detected  mg/dL Final    Comment:    None Detected  Conversion factor:  100 mg/dl = .100 g/dl  For Medical Purposes Only      Amphetamine Screen, Urine 04/25/2022 Neg  Negative <1000ng/mL Final    Barbiturate Screen, Ur 04/25/2022 Neg  Negative <200 ng/mL Final    Benzodiazepine Screen, Urine 04/25/2022 Neg  Negative <200 ng/mL Final    Cannabinoid Scrn, Ur 04/25/2022 POSITIVE* Negative <50 ng/mL Final    Cocaine Metabolite Screen, Urine 04/25/2022 Neg  Negative <300 ng/mL Final    Opiate Scrn, Ur 04/25/2022 Neg  Negative <300 ng/mL Final    Comment: \"Therapeutic levels of pain medication, especially oxycontin and synthetic  opioids, may not be detected by this Methodology. Pain management screen  panel  Drug panel-PM-Hi Res Ur, Interp (PAIN) should be considered for drug  monitoring \".  PCP Screen, Urine 04/25/2022 Neg  Negative <25 ng/mL Final    Methadone Screen, Urine 04/25/2022 Neg  Negative <300 ng/mL Final    Propoxyphene Scrn, Ur 04/25/2022 Neg  Negative <300 ng/mL Final    Oxycodone Urine 04/25/2022 Neg  Negative <100 ng/ml Final    pH, UA 04/25/2022 5.0   Final    Comment: Urine pH less than 5.0 or greater than 8.0 may indicate sample adulteration. Another sample should be collected if clinically  indicated.  Drug Screen Comment: 04/25/2022 see below   Final    Comment: This method is a screening test to detect only these drug  classes as part of a medical workup.   Confirmatory testing  by another method should be ordered if clinically indicated.  SARS-CoV-2 RNA, RT PCR 04/25/2022 NOT DETECTED  NOT DETECTED Final    Comment: Not Detected results do not preclude SARS-CoV-2 infection and  should not be used as the sole basis for patient management  decisions. Results must be combined with clinical observations,  patient history, and epidemiological information. Testing was performed using KRYSTIAN NANCY SARS-CoV-2 and Influenza A/B  nucleic acid assay. This test is a multiplex Real-Time Reverse  Transcriptase Polymerase Chain Reaction (RT-PCR)-based in vitro  diagnostic test intended for the qualitative detection of nucleic  acids from SARS-CoV-2, influenza A, and influenza B in nasopharyngeal  and nasal swab specimens for use under the FDAs Emergency Use  Authorization (EUA) only.     Patient Fact Sheet:  FindDrives.pl  Provider Fact Sheet: FindDrives.pl  EUA: FindDrives.pl  IFU: FindDrives.pl    Methodology:  RT-PCR      INFLUENZA A 04/25/2022 NOT DETECTED  NOT DETECTED Final    INFLUENZA B 04/25/2022 NOT DETECTED  NOT DETECTED Final    TSH 04/25/2022 0.97  0.27 - 4.20 uIU/mL Final    Cholesterol, Total 04/25/2022 216* 0 - 199 mg/dL Final    Triglycerides 04/25/2022 139  0 - 150 mg/dL Final    HDL 04/25/2022 56  40 - 60 mg/dL Final    LDL Calculated 04/25/2022 132* <100 mg/dL Final    VLDL Cholesterol Calculated 04/25/2022 28  Not Established mg/dL Final    Hemoglobin A1C 04/25/2022 4.9  See comment % Final    Comment: Comment:  Diagnosis of Diabetes: > or = 6.5%  Increased risk of diabetes (Prediabetes): 5.7-6.4%  Glycemic Control: Nonpregnant Adults: <7.0%                    Pregnant: <6.0%        eAG 04/25/2022 93.9  mg/dL Final        Mental Status Exam at Discharge:  Level of consciousness:  awake  Appearance:  well-appearing, in chair, good grooming and good hygiene well-developed, well-nourished  Behavior/Motor:  no abnormalities noted normal gait and station AIMS: 0  Attitude toward examiner:  cooperative, attentive and good eye contact  Speech:  spontaneous, normal rate, normal volume and well articulated  Mood:  dysthymic  Affect:  mood congruent Anxiety: mild  Hallucinations: Absent  Thought processes:  coherent Attention span, Concentration & Attention:  attention span and concentration were age appropriate  Thought content:   no evidence of delusions OCD: none    Insight: normal insight and judgment Cognition:  oriented to person, place, and time  Fund of Knowledge: average  IQ:average Memory: intact  Suicide:  No specific plan to harm self  Sleep: sleeps through the night  Appetite: ok   Reassess Medina Risk:   Pt has phone numbers to contact if suicidal thoughts recur and states pt will return to the hospital if suicidal feelings return.    Hospital Routine Meds:     Hospital PRN Meds:    Discharge Meds:    Discharge Medication List as of 4/28/2022 10:59 AM           Details   paliperidone palmitate ER (INVEGA SUSTENNA) 234 MG/1.5ML MADDI IM injection Inject 234 mg into the muscle once for 1 dose, Disp-1 each, R-0Normal      hydrOXYzine (ATARAX) 50 MG tablet Take 1 tablet by mouth 3 times daily as needed for Anxiety, Disp-30 tablet, R-0Normal               Disposition - Residence Home    Follow Up:  See Discharge Instructions

## 2022-05-03 ENCOUNTER — OFFICE VISIT (OUTPATIENT)
Dept: OBGYN CLINIC | Facility: CLINIC | Age: 62
End: 2022-05-03
Payer: COMMERCIAL

## 2022-05-03 VITALS
SYSTOLIC BLOOD PRESSURE: 134 MMHG | HEIGHT: 71 IN | WEIGHT: 155 LBS | DIASTOLIC BLOOD PRESSURE: 71 MMHG | HEART RATE: 64 BPM | BODY MASS INDEX: 21.7 KG/M2

## 2022-05-03 DIAGNOSIS — D17.1 LIPOMA OF TORSO: ICD-10-CM

## 2022-05-03 DIAGNOSIS — M67.432 GANGLION OF LEFT WRIST: Primary | ICD-10-CM

## 2022-05-03 PROCEDURE — 99203 OFFICE O/P NEW LOW 30 MIN: CPT | Performed by: ORTHOPAEDIC SURGERY

## 2022-05-03 NOTE — PROGRESS NOTES
[id number] Zoran Schofield MD  [address] [place] [address]  [phone]    HISTORY OF PRESENT ILLNESS:    Patient is a 58-year-old male who presents for initial evaluation of two cysts  Patient has a cyst on the left wrist and on the left side of the low back that have been present for the past 10 years  Patient states they have not changed in size or appearance  Patient wishes to have them removed due to the cosmetic appearance  Patient denies any pain associated with the cyst on the back  Patient mentions some pain in the cyst with certain wrist movements       ALLERGIES: No Known Allergies    MEDICATIONS:    Current Outpatient Medications:     Cholecalciferol (VITAMIN D3) 1000 units CAPS, Take 1,000 Units by mouth, Disp: , Rfl:     Hydroxyurea (HYDREA PO), Take 1,000 mg by mouth Mon-Wed-Fri, Disp: , Rfl:     hydroxyurea (HYDREA) 500 mg capsule, Take by mouth On Tues-Thurs-Sat-Sun , Disp: , Rfl:     levothyroxine 150 mcg tablet, every morning  , Disp: , Rfl:     sildenafil (VIAGRA) 100 mg tablet, Take 100 mg by mouth As directed, Disp: , Rfl: 12    mupirocin (BACTROBAN) 2 % ointment, Apply topically daily, Disp: 22 g, Rfl: 0     PAST MEDICAL HISTORY:   Past Medical History:   Diagnosis Date    Arthritis     Disease of thyroid gland     hypo    Graves disease 1995    Hypothyroid     hypo    Platelet disorder (HCC)     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 Mercedes Lui    RA (rheumatoid arthritis) ([facility] [address] [facility]     Wears glasses        PAST SURGICAL HISTORY:  Past Surgical History:   Procedure Laterality Date    ANKLE SURGERY Right     ligament, chipped bones,dislocated    COLONOSCOPY      ELBOW SURGERY Right     tendon surgery    FOOT ARTHRODESIS, MODIFIED ARELLANO Left     FRACTURE SURGERY Left     elbow    HERNIA REPAIR Right     inguinal    [place] CORRJ HALLUX VALGUS W/SESMDC W/RESCJ PROX PHAL Right 4/9/2021    Procedure: Indy Lambert;  Surgeon: Judy Herrera DPM;  Location: [facility];  Service: Podiatry    IA KNEE SCOPE,MED/LAT MENISECTOMY Right 2018    Procedure: Leena Mascorro;  Surgeon: Wili Shaikh MD;  Location: [address] [place] [address];  Service: Orthopedics    IA REPAIR OF HAMMERTOE,ONE Bilateral 2021    Procedure: REPAIR HAMMERTOE / MALLET TOE / CLAW TOE 2ND toe bilateral;  Surgeon: Lio Moreno DPM;  Location: [address] [place] [address];  Service: Podiatry    ROTATOR CUFF REPAIR Left     WISDOM TOOTH EXTRACTION      x4       SOCIAL HISTORY:  Social History     Tobacco Use    Smoking status: Former Smoker     Packs/day: 1 00     Years: 20 00     Pack years: 20 00     Quit date:      Years since quittin 3    Smokeless tobacco: Never Used   Substance Use Topics    Alcohol use: Yes     Comment: occ beer with going out to dinner    Drug use: No       ROS:  Review of Systems   Constitutional: Negative for chills, fatigue and fever  HENT: Negative for drooling, ear discharge, facial swelling, hearing loss, nosebleeds, rhinorrhea, sneezing and trouble swallowing  Eyes: Negative for pain, discharge, redness and itching  Respiratory: Negative for cough, choking, shortness of breath and wheezing  Cardiovascular: Negative for chest pain and palpitations  Gastrointestinal: Negative for abdominal pain, constipation and diarrhea  Endocrine: Negative for cold intolerance and heat intolerance  Genitourinary: Negative for dysuria and flank pain  Musculoskeletal:        See HPI and PE  Skin: Negative for rash  See HPI and PE  Neurological: Negative for dizziness, light-headedness and headaches  Psychiatric/Behavioral: Negative for agitation, self-injury and suicidal ideas  PHYSICAL EXAM:  Patient is a 77-year-old male sitting comfortably on exam table in no acute distress     ~2cm ganglion cyst present on the radial, volar aspect of left wrist  ~6cm freely, moveable mass located on the left side of thoracic back     RADIOGRAPHIC STUDIES:  None      ASSESSMENT:  Problem List Items Addressed This Visit     None      Visit Diagnoses     Ganglion of left wrist    -  Primary    Relevant Orders    Ambulatory Referral to Plastic Surgery    Lipoma of torso        Relevant Orders    Ambulatory Referral to Plastic Surgery            PLAN:  Patient is a 58-year-old male with a ganglion cyst of left wrist and lipoma on the back  Patient wishes to have surgical removal of cyst and lipoma  Patient was offered referral to plastic surgery for consultation and removal of cyst and lipoma  Referral placed   Discussed that patient may need to be sent to hand surgery for ganglion cyst removal       Scribe Attestation    I,:  Prashanth Huffman PA-C am acting as a scribe while in the presence of the attending physician :       I,:  Jayce Malik MD personally performed the services described in this documentation    as scribed in my presence :

## 2022-05-23 NOTE — DISCHARGE INSTRUCTIONS
Keep dressing dry clean intact until follow-up with outpatient podiatry,  Practice bilateral lower extremity elevation  Partial weight-bearing to the heel bilateral foot  Call office if excessive pain, or constitutional symptoms noted patient called to reschedule for another day of the week because she has 1 appointment on 11-28-22 already    Offered her 11-29-22 at 8 am with Nicolle Powell and she accepted

## 2022-08-03 ENCOUNTER — OFFICE VISIT (OUTPATIENT)
Dept: DERMATOLOGY | Facility: CLINIC | Age: 62
End: 2022-08-03
Payer: COMMERCIAL

## 2022-08-03 VITALS — TEMPERATURE: 97.9 F | BODY MASS INDEX: 21.56 KG/M2 | WEIGHT: 154 LBS | HEIGHT: 71 IN

## 2022-08-03 DIAGNOSIS — D17.1 LIPOMA OF TORSO: Primary | ICD-10-CM

## 2022-08-03 DIAGNOSIS — D48.9 NEOPLASM OF UNCERTAIN BEHAVIOR: ICD-10-CM

## 2022-08-03 PROCEDURE — 99202 OFFICE O/P NEW SF 15 MIN: CPT | Performed by: DERMATOLOGY

## 2022-08-03 NOTE — PROGRESS NOTES
[facility] Note     Patient Name: Layla Sender  Encounter Date: 08/03/2022     Have you been cared for by a [facility] Dermatologist in the last 3 years and, if so, which one? No    · Have you traveled outside of the [address] [place] [address] in the past 3 months or outside of the [facility] area in the last 2 weeks? No     May we call your Preferred Phone number to discuss your specific medical information? Yes     May we leave a detailed message that includes your specific medical information? Yes      Today's Chief Concerns:   Concern #1:  Lipoma    Concern #2:      Past Medical History:  Have you personally ever had or currently have any of the following? · Skin cancer (such as Melanoma, Basal Cell Carcinoma, Squamous Cell Carcinoma? (If Yes, please provide more detail)- No  · Eczema: No  · Psoriasis: No  · HIV/AIDS: No  · Hepatitis B or C: No  · Tuberculosis: No  · Systemic Immunosuppression such as Diabetes, Biologic or Immunotherapy, Chemotherapy, Organ Transplantation, Bone Marrow Transplantation (If YES, please provide more detail): No  · Radiation Treatment (If YES, please provide more detail): No  · Any other major medical conditions/concerns? (If Yes, which types)- No         Family History:  Have any of your "first degree relatives" (parent, brother, sister, or child) had any of the following       · Skin cancer such as Melanoma or Merkel Cell Carcinoma or Pancreatic Cancer? YES, father   · Eczema, Asthma, Hay Fever or Seasonal Allergies: No  · Psoriasis or Psoriatic Arthritis: No  · Do any other medical conditions seem to run in your family? If Yes, what condition and which relatives?   No    Current Medications:   (please update all dermatological medications before printing patient's AVS!)      Current Outpatient Medications:     Cholecalciferol (VITAMIN D3) 1000 units CAPS, Take 1,000 Units by mouth, Disp: , Rfl:     Hydroxyurea (HYDREA PO), Take 1,000 mg by mouth Mon-Wed-Fri, Disp: , Rfl:     hydroxyurea (HYDREA) 500 mg capsule, Take by mouth On Tues-Thurs-Sat-Sun , Disp: , Rfl:     levothyroxine 150 mcg tablet, every morning  , Disp: , Rfl:     sildenafil (VIAGRA) 100 mg tablet, Take 100 mg by mouth As directed, Disp: , Rfl: 12    mupirocin (BACTROBAN) 2 % ointment, Apply topically daily (Patient not taking: Reported on 8/3/2022), Disp: 22 g, Rfl: 0      Review of Systems:  Have you recently had or currently have any of the following? If YES, what are you doing for the problem? · Fever, chills or unintended weight loss: No  · Sudden loss or change in your vision: No  · Nausea, vomiting or blood in your stool: No  · Painful or swollen joints: No  · Wheezing or cough: No  · Changing mole or non-healing wound: No  · Nosebleeds: No  · Excessive sweating: No  · Easy or prolonged bleeding? No  · Over the last 2 weeks, how often have you been bothered by the following problems? · Taking little interest or pleasure in doing things: 1 - Not at All  · Feeling down, depressed, or hopeless: 2 - Several days  · Rapid heartbeat with epinephrine:  No    · FEMALES ONLY:    · Are you pregnant or planning to become pregnant? N/A  · Are you currently or planning to be nursing or breast feeding? N/A    · Any known allergies? · No Known Allergies      Physical Exam:     Was a chaperone (Derm Clinical Assistant) present throughout the entire Physical Exam? Yes     Did the Dermatology Team specifically  the patient on the importance of a Full Skin Exam to be sure that nothing is missed clinically?  NO}  o Did the patient ultimately request or accept a Full Skin Exam?  NO  o Did the patient specifically refuse to have the areas "under-the-bra" examined by the Dermatologist? Brenden sahni Did the patient specifically refuse to have the areas "under-the-underwear" examined by the Dermatologist? YES    CONSTITUTIONAL:   Vitals:    08/03/22 0920   Temp: 97 9 °F (36 6 °C)   Weight: 69 9 kg (154 lb)   Height: 5' 11" (1 803 m)           PSYCH: Normal mood and affect  EYES: Normal conjunctiva  ENT: Normal lips and oral mucosa  CARDIOVASCULAR: No edema  RESPIRATORY: Normal respirations  HEME/LYMPH/IMMUNO:  No regional lymphadenopathy except as noted below in "ASSESSMENT AND PLAN BY DIAGNOSIS"    SKIN:  FULL ORGAN SYSTEM EXAM   Hair, Scalp, Ears, Face    Neck, Cervical Chain Nodes    Right Arm/Hand/Fingers    Left Arm/Hand/Fingers    Chest/Breasts/Axillae    Abdomen, Umbilicus    Back/Spine Normal except as noted below in Assessment   Groin/Genitalia/Buttocks    Right Leg, Foot, Toes    Left Leg, Foot, Toes       LIPOMA    Physical Exam:   Anatomic Location Affected:  Left back    Morphological Description:  5 5 cm Soft subcutaneous nodule    Pertinent Positives:   Pertinent Negatives: Additional History of Present Condition:  Patient here for lipoma he would like removed,     Assessment and Plan:  Based on a thorough discussion of this condition and the management approach to it (including a comprehensive discussion of the known risks, side effects and potential benefits of treatment), the patient (family) agrees to implement the following specific plan:   Excision discussed   Patient will call his insurance to make sure excision is covered  o ICD 10 code D17 1, D48 5   o CPT code 56527, 92674    Lipoma  A lipoma is a non-cancerous tumor that is made up of fat cells  It slowly grows under the skin in the subcutaneous tissue  A person may have a single lipoma or may have many lipomas  They are very common  Lipomas can occur in people of all ages, however, they tend to develop in adulthood and are most noticeable during middle age  They affect both sexes equally, although solitary lipomas are more common in women whilst multiple lipomas occur more frequently in men  The cause of lipomas is unknown   It is possible there may be genetic involvement as many patients with lipomas come from a family with a history of these tumors  Sometimes an injury such as a blunt blow to part of the body may trigger growth of a lipoma  People are often unaware of lipomas until they have grown large enough to become visible and palpable  This growth occurs slowly over several years  Some features of lipomas include:   A dome-shaped or egg-shaped lump about 2-10 cm in diameter (some may grow even larger)    It feels soft and smooth and is easily moved under the skin with the fingers    Some have a rubbery or doughy consistency    They are most common on the shoulders, neck, trunk and arms, but they can occur anywhere on the body where fat tissue is present  Most lipomas are symptomless, but some are painful on applying pressure  Lipomas that are tender or painful are usually angiolipomas  This means the lipoma has an increased number of small blood vessels  Painful lipomas are also a feature of adiposis dolorosa or Dercum disease  Diagnosis of lipoma is usually made clinically by finding a soft lump under the skin  However, if there is any doubt, a deep skin biopsy can be performed which will show typical histopathological features of lipoma and its variants  Most lipomas require no treatment  Most lipomas eventually stop growing and remain indefinitely without causing any problems   Occasionally, lipomas that interfere with the movement of adjacent muscles may require surgical removal  Several methods are available:   Simple surgical excision    Squeeze technique (a small incision is made over the lipoma and the fatty tissue is squeezed through the hole)    Liposuction      Scribe Attestation    I,:  Viral Eubanks MA am acting as a scribe while in the presence of the attending physician :       I,:  Elliot Guzman MD personally performed the services described in this documentation    as scribed in my presence :

## 2022-08-03 NOTE — PATIENT INSTRUCTIONS
Assessment and Plan:  Based on a thorough discussion of this condition and the management approach to it (including a comprehensive discussion of the known risks, side effects and potential benefits of treatment), the patient (family) agrees to implement the following specific plan:  Excision discussed  Patient will call his insurance to make sure excision is covered  ICD 10 code D17 1, D48 5   CPT code 28470, 03800    Lipoma  A lipoma is a non-cancerous tumor that is made up of fat cells  It slowly grows under the skin in the subcutaneous tissue  A person may have a single lipoma or may have many lipomas  They are very common  Lipomas can occur in people of all ages, however, they tend to develop in adulthood and are most noticeable during middle age  They affect both sexes equally, although solitary lipomas are more common in women whilst multiple lipomas occur more frequently in men  The cause of lipomas is unknown  It is possible there may be genetic involvement as many patients with lipomas come from a family with a history of these tumors  Sometimes an injury such as a blunt blow to part of the body may trigger growth of a lipoma  People are often unaware of lipomas until they have grown large enough to become visible and palpable  This growth occurs slowly over several years  Some features of lipomas include:  A dome-shaped or egg-shaped lump about 2-10 cm in diameter (some may grow even larger)   It feels soft and smooth and is easily moved under the skin with the fingers   Some have a rubbery or doughy consistency   They are most common on the shoulders, neck, trunk and arms, but they can occur anywhere on the body where fat tissue is present  Most lipomas are symptomless, but some are painful on applying pressure  Lipomas that are tender or painful are usually angiolipomas  This means the lipoma has an increased number of small blood vessels   Painful lipomas are also a feature of adiposis dolorosa or Dercum disease  Diagnosis of lipoma is usually made clinically by finding a soft lump under the skin  However, if there is any doubt, a deep skin biopsy can be performed which will show typical histopathological features of lipoma and its variants  Most lipomas require no treatment  Most lipomas eventually stop growing and remain indefinitely without causing any problems   Occasionally, lipomas that interfere with the movement of adjacent muscles may require surgical removal  Several methods are available:  Simple surgical excision   Squeeze technique (a small incision is made over the lipoma and the fatty tissue is squeezed through the hole)   Liposuction

## 2022-08-08 ENCOUNTER — TELEPHONE (OUTPATIENT)
Dept: DERMATOLOGY | Facility: CLINIC | Age: 62
End: 2022-08-08

## 2022-08-08 NOTE — TELEPHONE ENCOUNTER
Patient left a message asking for Dr Nuha Farias to call him back to answer a few questions he had  Phone call to patient who was seen 8/3/2022 and was diagnosed with a Lipoma on his left back  Patient asking if he would need to have blood work done prior to surgery  Advised patient that no, we do not ordered blood work prior to an excision  Patient asked how badly the numbing medicine would hurt  Explained to patient that everyone has a different level of pain but generally it is tolerated pretty well by most patient's and that after the first one or two sticks you start to feel numb and the burning is not as intense  Patient asking about the 1 month recovery and what he can/ cannot due  I advised him that bending and lifting over 10 pounds  Patient is going to speak to his boss about the possibility of being able to do light duty for that recovery period  If so, patient will require some paperwork to be filled out to continue to get paid from his employer  Lastly, patient asking about being fasting prior to excision  Informed patient that we do not recommend or require fasting  We recommend having a light breakfast/ lunch depending on the time of the excision  Patient was satisfied with my responses and states he hopes to call back in the next 7-10 days to schedule excision  Patient states he will most likely be looking to schedule at the end of September  Informed patient that when he calls to schedule we will see what is available at that time

## 2022-09-07 ENCOUNTER — TELEPHONE (OUTPATIENT)
Dept: DERMATOLOGY | Facility: CLINIC | Age: 62
End: 2022-09-07

## 2022-09-07 NOTE — TELEPHONE ENCOUNTER
Pt called in regards of paper work sent from [facility] Reporting that needs to be filled out  Paper must be faxed back to [facility] when finished, please also call pt or lvm when it has been faxed and sent out, thankyou

## 2022-09-08 NOTE — TELEPHONE ENCOUNTER
Paperwork was faxed to [facility] office today  Forms scanned into chart  Please complete and fax back to [facility] [employer] @ [phone]  Pt requesting a call to advise when forms have been completed

## 2022-09-08 NOTE — TELEPHONE ENCOUNTER
Pt called to advise us that [employer] had faxed Matthew Sepulveda the paperwork earlier today  I advised per Vicki's note, it was rec'd and scanned and forwarded to Dr Carlita Quinteros to complete

## 2022-09-19 ENCOUNTER — OFFICE VISIT (OUTPATIENT)
Dept: FAMILY MEDICINE CLINIC | Facility: CLINIC | Age: 62
End: 2022-09-19
Payer: COMMERCIAL

## 2022-09-19 VITALS
OXYGEN SATURATION: 97 % | DIASTOLIC BLOOD PRESSURE: 76 MMHG | WEIGHT: 152 LBS | HEIGHT: 71 IN | SYSTOLIC BLOOD PRESSURE: 126 MMHG | BODY MASS INDEX: 21.28 KG/M2 | HEART RATE: 76 BPM

## 2022-09-19 DIAGNOSIS — E03.9 ACQUIRED HYPOTHYROIDISM: Primary | ICD-10-CM

## 2022-09-19 DIAGNOSIS — M05.79 RHEUMATOID ARTHRITIS INVOLVING MULTIPLE SITES WITH POSITIVE RHEUMATOID FACTOR (HCC): ICD-10-CM

## 2022-09-19 DIAGNOSIS — D75.839 THROMBOCYTOSIS: ICD-10-CM

## 2022-09-19 PROBLEM — D17.1 LIPOMA OF TORSO: Status: ACTIVE | Noted: 2022-09-19

## 2022-09-19 PROBLEM — M06.9 RHEUMATOID ARTHRITIS INVOLVING MULTIPLE SITES (HCC): Status: ACTIVE | Noted: 2022-09-19

## 2022-09-19 PROCEDURE — 99214 OFFICE O/P EST MOD 30 MIN: CPT | Performed by: INTERNAL MEDICINE

## 2022-09-19 NOTE — ASSESSMENT & PLAN NOTE
Patient with a lipoma on the back about 2 5 cm in diameter    Seen by the dermatologist and going to have the excision done on September 27th

## 2022-09-19 NOTE — ASSESSMENT & PLAN NOTE
Patient with a history of hypothyroidism currently taking levothyroxine 150 mcg  His lab levels came at 3 51 TSH free T4 is 1 5 normal   Will continue with the current dose of medication follow up with repeat diabetic later date

## 2022-09-19 NOTE — ASSESSMENT & PLAN NOTE
Patient with a history of thrombocytosis last level was 380  Currently taking hydroxyurea 1000 mg alternating with 500    He is being followed by the hematologist   Will continue with the same dose

## 2022-09-19 NOTE — PROGRESS NOTES
Office Visit Note  22     Cindi Wan 57 y o  male MRN: [id number]  : 1960    Assessment:     1  Acquired hypothyroidism  Assessment & Plan:  Patient with a history of hypothyroidism currently taking levothyroxine 150 mcg  His lab levels came at 3 51 TSH free T4 is 1 5 normal   Will continue with the current dose of medication follow up with repeat diabetic later date  2  Thrombocytosis  Assessment & Plan:  Patient with a history of thrombocytosis last level was 380  Currently taking hydroxyurea 1000 mg alternating with 500  He is being followed by the hematologist   Will continue with the same dose      3  Rheumatoid arthritis involving multiple sites with positive rheumatoid factor (HCC)  Assessment & Plan:  History of rheumatoid arthritis was on hydroxychloroquine in the past   He is asymptomatic not on any medication his C-reactive protein is at 1 6 normal and his rheumatoid factor is mildly elevated at 15 up to 14 is normal   Will continue to monitor for any symptoms  Discussion Summary and Plan: Today's care plan and medications were reviewed with patient in detail and all their questions answered to their satisfaction  Chief Complaint   Patient presents with    Follow-up      Subjective:  Patient has come in here for follow-up evaluation regarding hypothyroidism thrombocytosis and also has a history of rheumatoid arthritis continue not on any medication he has no symptoms of joint pains at present time tolerating levothyroxine very well along with hydroxyurea  The following portions of the patient's history were reviewed and updated as appropriate: allergies, current medications, past family history, past medical history, past social history, past surgical history and problem list     Review of Systems   Constitutional: Negative for chills and fever  HENT: Negative for ear pain and sore throat  Eyes: Negative for pain and visual disturbance     Respiratory: Negative for cough and shortness of breath  Cardiovascular: Negative for chest pain and palpitations  Gastrointestinal: Negative for abdominal pain and vomiting  Genitourinary: Negative for dysuria and hematuria  Musculoskeletal: Negative for arthralgias and back pain  Skin: Negative for color change and rash  Neurological: Negative for seizures and syncope           Historical Information   Patient Active Problem List   Diagnosis    Other tear of medial meniscus, current injury, right knee, initial encounter    Hyperlipidemia    Hypothyroidism    Hallux limitus, acquired, right    Hammertoe of second toe of right foot    Hammertoe of second toe of left foot    Thrombocytosis    Rheumatoid arthritis involving multiple sites [facility])     Past Medical History:   Diagnosis Date    Arthritis     Disease of thyroid gland     hypo    Graves disease 1995    Hypothyroid     hypo    Platelet disorder ([facility] [address] )     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 Fredy Pederson    RA (rheumatoid arthritis) ([facility] [address] )     Wears glasses      Past Surgical History:   Procedure Laterality Date    ANKLE SURGERY Right     ligament, chipped bones,dislocated    BUNIONECTOMY  03/2014    COLONOSCOPY      ELBOW SURGERY Right     tendon surgery    FOOT ARTHRODESIS, MODIFIED ARELLANO Left     FRACTURE SURGERY Left     elbow    HERNIA REPAIR Right     inguinal    MD CORRJ HALLUX VALGUS W/SESMDC W/RESCJ PROX PHAL Right 04/09/2021    Procedure: Constance Mullins;  Surgeon: Janean Carrel, DPM;  Location: [address] [place] [address];  Service: Podiatry    MD KNEE SCOPE,MED/LAT MENISECTOMY Right 07/09/2018    Procedure: Celia Sanford;  Surgeon: Finesse Hu MD;  Location: [address] [place] [address];  Service: Orthopedics    MD REPAIR OF Lissett Hamm Bilateral 05/14/2021    Procedure: REPAIR HAMMERTOE / MALLET TOE / CLAW TOE 2ND toe bilateral;  Surgeon: Janean Carrel, DPM;  Location: [address] [place] [address];  Service: Podiatry    ROTATOR CUFF REPAIR Left     WISDOM TOOTH EXTRACTION      x4    WRIST SURGERY       Social History     Substance and Sexual Activity   Alcohol Use Yes    Comment: occ beer with going out to dinner     Social History     Substance and Sexual Activity   Drug Use No     Social History     Tobacco Use   Smoking Status Former Smoker    Packs/day:  00    Years: 20     Pack years: 20 00    Quit date:     Years since quittin 7   Smokeless Tobacco Never Used     Family History   Problem Relation Age of Onset    Cancer Mother         lung-heavy smoker    Hypertension Father     Heart disease Father         leaky valve    Skin cancer Father      Health Maintenance Due   Topic    Hepatitis C Screening     Depression Screening     HIV Screening     Annual Physical     DTaP,Tdap,and Td Vaccines (1 - Tdap)    Colorectal Cancer Screening     COVID-19 Vaccine (2 - Pfizer series)    Influenza Vaccine (1)      Meds/Allergies       Current Outpatient Medications:     Cholecalciferol (VITAMIN D3) 1000 units CAPS, Take 1,000 Units by mouth, Disp: , Rfl:     Hydroxyurea (HYDREA PO), Take 1,000 mg by mouth Mon-Wed-Fri, Disp: , Rfl:     hydroxyurea (HYDREA) 500 mg capsule, Take by mouth On Tues-Thurs-Sat-Sun , Disp: , Rfl:     levothyroxine 150 mcg tablet, every morning  , Disp: , Rfl:     sildenafil (VIAGRA) 100 mg tablet, Take 100 mg by mouth As directed, Disp: , Rfl: 12    mupirocin (BACTROBAN) 2 % ointment, Apply topically daily (Patient not taking: Reported on 8/3/2022), Disp: 22 g, Rfl: 0      Objective:    Vitals:   Pulse 76   Ht 5' 11" (1 803 m)   Wt 68 9 kg (152 lb)   SpO2 97%   BMI 21 20 kg/m²   Body mass index is 21 2 kg/m²  Vitals:    22 1014   Weight: 68 9 kg (152 lb)       Physical Exam  Vitals and nursing note reviewed  Constitutional:       Appearance: Normal appearance  Cardiovascular:      Rate and Rhythm: Normal rate and regular rhythm  Heart sounds: Normal heart sounds     Pulmonary:      Effort: Pulmonary effort is normal       Breath sounds: Normal breath sounds  Abdominal:      General: Abdomen is flat  Palpations: Abdomen is soft  Musculoskeletal:      Right lower leg: No edema  Left lower leg: No edema  Skin:     Comments: Patient has a lipoma about 2 5 cm on the back   Neurological:      Mental Status: He is alert and oriented to person, place, and time  Psychiatric:         Mood and Affect: Mood normal          Lab Review   No visits with results within 2 Month(s) from this visit  Latest known visit with results is:   Orders Only on 05/08/2021   Component Date Value Ref Range Status    SARS-CoV-2 05/08/2021 Negative  Negative Final         Pringle Corby Coronel MD        "This note has been constructed using a voice recognition system  Therefore there may be syntax, spelling, and/or grammatical errors   Please call if you have any questions  "

## 2022-09-19 NOTE — ASSESSMENT & PLAN NOTE
History of rheumatoid arthritis was on hydroxychloroquine in the past   He is asymptomatic not on any medication his C-reactive protein is at 1 6 normal and his rheumatoid factor is mildly elevated at 15 up to 14 is normal   Will continue to monitor for any symptoms

## 2022-09-27 ENCOUNTER — PROCEDURE VISIT (OUTPATIENT)
Dept: DERMATOLOGY | Facility: CLINIC | Age: 62
End: 2022-09-27
Payer: COMMERCIAL

## 2022-09-27 VITALS — BODY MASS INDEX: 21.28 KG/M2 | WEIGHT: 152 LBS | HEIGHT: 71 IN

## 2022-09-27 DIAGNOSIS — D17.1 LIPOMA OF TORSO: Primary | ICD-10-CM

## 2022-09-27 PROCEDURE — 11406 EXC TR-EXT B9+MARG >4.0 CM: CPT | Performed by: DERMATOLOGY

## 2022-09-27 PROCEDURE — 12032 INTMD RPR S/A/T/EXT 2.6-7.5: CPT | Performed by: DERMATOLOGY

## 2022-09-27 PROCEDURE — 88304 TISSUE EXAM BY PATHOLOGIST: CPT | Performed by: STUDENT IN AN ORGANIZED HEALTH CARE EDUCATION/TRAINING PROGRAM

## 2022-09-27 NOTE — PROGRESS NOTES
PROCEDURE:  EXCISION WITH INTERMEDIATE LAYERED CLOSURE     Attending: Joaquin Robins   Assistant: Mauricio Crook    Pre-Op Diagnosis: lipoma   Post-Op Diagnosis: Same   Benign versus Malignant benign       Lesion Anatomic Location: left back   Pre-op size: 6 0 cm  Size of defect:  6 0 cm  Final repaired wound length:  5 5 cm    Written and verbal, witnessed informed consent was obtained  I discussed that excision is a method of removing lesions both benign and malignant lesions  A portion of normal skin is often taken to ensure completeness of removal   I reviewed that procedure will include numbing the area,  cutting around and under defect, undermining tissue, and closing the wound with sutures both inside and out  These sutures are usually removed in 7 to 14 days  Risks (bleeding, pain, infection, scarring, recurrence) and benefits discussed  It was discussed with patient that every effort is made to minimize scar, but scarring is influenced also by extrinsic factor such as location, age and genetics  Time Out: performed:  yes  Correct patient: yes  Correct site per Clinic Report: yes  Correct site per Patient Report: yes    LOCAL ANESTHESIA: 1% Lidocaine HCL    DESCRIPTION OF PROCEDURE: The patient was brought back into the procedure room, anesthetized locally, prepped and draped in the usual fashion  Using a #15 blade with a scalpel, the lesion was excised in elliptical fashion  The wound was  undermined in the  fascial plane  Hemostasis was achieved with light electrocoagulation  Purpose of undermining was to decrease wound tension and facilitate closure  The wound was closed with subcutaneous sutures as follows:    Deep suture:4-0 Vicryl      Epidermal edge closure was accomplished with superficial sutures as follows:    Superficial suture: 4-0 Ethilon  Superficial suture type: running interlocking     Estimated blood loss less than 3ml      The patient tolerated the procedure well without any complications  The wound was cleaned with sterile saline, dried off, surgical vaseline ointment was applied, and the wound was covered  A pressure dressing was applied for stabilization and light pressure  The patient was given detailed oral and written instructions on postoperative care as detailed in consent  The patient left in good medical condition  POSTOP DISCUSSION DISCUSSION AND INSTRUCTIONS FOR PATIENT      Rationale for Procedure  A skin excision allows the dermatologist to remove a lesion  The procedure involves a local numbing medication and removing the entire lesion  Typically, the lesion is being removed because it is atypical, traumatized, or for significant appearance reasons  The area will be open like a brush burn and allowed to heal    There will be no sutures  Tissue is sent to pathologist who will reconfirm diagnosis and verify completeness of lesion removal     Description of Procedure  We would like to perform a skin excision today  A local anesthetic, similar to the kind that a dentist uses when filling a cavity, will be injected with a very small needle into the skin area to be sampled  The injected skin and tissue underneath should go to sleep and become numbed so that no further pain should be felt  A scalpel will be used to cut around the lesion and tissue will be submitted to pathology for examination  Depending on the diagnosis the lesion will be excised with a certain amount of normal skin to help assure completeness of lesion removal   The physician will discuss in advance the anticipated size and extent of removal    Bleeding will occur, but it will stopped with direct pressure, sutures, and electrocautery  Surgical Vaseline-type ointment will also applied after the procedure to help create a barrier between the wound and the outside world  Risks and Potential Complications  The advantage of a skin excision is that it allows us to remove a problem lesion quickly  Although this usually permits the lesion to heal as soon as possible with the least scarring, there are some risks and potential complications that include but are not limited to the following:  - Some bleeding is normal at time of procedure and some bleeding on gauze is normal  the first few days after surgery  Profuse bleeding and bleeding with swelling and pain should be reported as detailed  below  - Infection is uncommon in skin surgery  Infection should be reported and is indicated by pain, redness, and discharge of purulent material   - Some dull to at time sharp pain could occur initially the day after surgery  Persistent pain or increasing pain days after surgery is not expected and should be reported  - Every effort is made to minimize scar, but location, size, and genetics do play a role in scar appearance  A surgical wound does not achieve its optimal appearance until 6 months  There are several treatments available if scarring would be problematic including scar creams, silicone pad, laser and scar revision   - Skin discoloration can occur especially in people of color  Its important to avoid sun on wound in first 6 months after surgery  Treatment is available if pigment is problematic   - Incomplete removal of the lesion or recurrence of lesion can occur and this would then require further treatment and more surgery   - Nerve Damage/Numbness/Loss of Function is very rare, but is most likely to occur if lesion is large or if it is in a high risk location  - Allergic Reaction to lidocaine is rare  More commonly,  epinephrine is used  with the lidocaine  Occasionally, epinephrine (aka adrenalin) may cause a brief  feeling of anxiety or jitteriness  - The person at the microscope  (pathologist) may provide additional information that was unexpected  This unexpected finding could provoke the need for additional treatment or evaluation  What You Will Need to Do After the Procedure  1   Keep the area clean and dry the first day  Try NOT to remove the bandage for the first day  2  Gently clean the area with soap and water and apply Vaseline ointment (this is over the counter and not a prescription) to the excision  site for up to 2 weeks  3  Apply a clean appropriately sized bandage to area  Gauze and paper tape are recommended for sensitive skin  4  Return for suture removal as instructed (generally 1 week for the face, 2 weeks for the body)  5  Take Acetaminophen (Tylenol) for discomfort, if no contraindications  Do NOT take Ibuprofen or aspirin unless specifically told to do so by your Dermatologist because these medications can make bleeding worse  6  Call our office immediately for signs of infection: fever, chills, increased redness, warmth, tenderness, discomfort/pain, or pus or foul smell coming from the wound  If bleeding is noticed, place a clean cloth over the area and apply firm pressure for thirty minutes  Check the wound ONLY after 30 minutes of direct pressure; do not cheat and sneak a peak, as that does not count  If bleeding persists after 30 minutes of legitimate direct pressure, then try one more round of direct pressure for an additional 10 minutes to the area  Should the bleeding become heavier or not stop after the second attempt, call [facility] directly at [phone] (SKIN) or, if after hours, go to your local Emergency Room/Emergency Department        Scribe Attestation    I,:  Mariana Paz MA am acting as a scribe while in the presence of the attending physician :       I,:  Drew Carvajal MD personally performed the services described in this documentation    as scribed in my presence :

## 2022-09-27 NOTE — PATIENT INSTRUCTIONS
POSTOP DISCUSSION DISCUSSION AND INSTRUCTIONS FOR PATIENT      Rationale for Procedure  A skin excision allows the dermatologist to remove a lesion  The procedure involves a local numbing medication and removing the entire lesion  Typically, the lesion is being removed because it is atypical, traumatized, or for significant appearance reasons  The area will be open like a brush burn and allowed to heal    There will be no sutures  Tissue is sent to pathologist who will reconfirm diagnosis and verify completeness of lesion removal     Description of Procedure  We would like to perform a skin excision today  A local anesthetic, similar to the kind that a dentist uses when filling a cavity, will be injected with a very small needle into the skin area to be sampled  The injected skin and tissue underneath should go to sleep and become numbed so that no further pain should be felt  A scalpel will be used to cut around the lesion and tissue will be submitted to pathology for examination  Depending on the diagnosis the lesion will be excised with a certain amount of normal skin to help assure completeness of lesion removal   The physician will discuss in advance the anticipated size and extent of removal    Bleeding will occur, but it will stopped with direct pressure, sutures, and electrocautery  Surgical Vaseline-type ointment will also applied after the procedure to help create a barrier between the wound and the outside world  Risks and Potential Complications  The advantage of a skin excision is that it allows us to remove a problem lesion quickly  Although this usually permits the lesion to heal as soon as possible with the least scarring, there are some risks and potential complications that include but are not limited to the following:  Some bleeding is normal at time of procedure and some bleeding on gauze is normal  the first few days after surgery    Profuse bleeding and bleeding with swelling and pain should be reported as detailed  below  Infection is uncommon in skin surgery  Infection should be reported and is indicated by pain, redness, and discharge of purulent material   Some dull to at time sharp pain could occur initially the day after surgery  Persistent pain or increasing pain days after surgery is not expected and should be reported  Every effort is made to minimize scar, but location, size, and genetics do play a role in scar appearance  A surgical wound does not achieve its optimal appearance until 6 months  There are several treatments available if scarring would be problematic including scar creams, silicone pad, laser and scar revision  Skin discoloration can occur especially in people of color  Its important to avoid sun on wound in first 6 months after surgery  Treatment is available if pigment is problematic  Incomplete removal of the lesion or recurrence of lesion can occur and this would then require further treatment and more surgery  Nerve Damage/Numbness/Loss of Function is very rare, but is most likely to occur if lesion is large or if it is in a high risk location  Allergic Reaction to lidocaine is rare  More commonly,  epinephrine is used  with the lidocaine  Occasionally, epinephrine (aka adrenalin) may cause a brief  feeling of anxiety or jitteriness  The person at the microscope  (pathologist) may provide additional information that was unexpected  This unexpected finding could provoke the need for additional treatment or evaluation  What You Will Need to Do After the Procedure  Keep the area clean and dry the first day  Try NOT to remove the bandage for the first day  Gently clean the area with soap and water and apply Vaseline ointment (this is over the counter and not a prescription) to the excision  site for up to 2 weeks  Apply a clean appropriately sized bandage to area  Gauze and paper tape are recommended for sensitive skin    Return for suture removal as instructed (generally 1 week for the face, 2 weeks for the body)  Take Acetaminophen (Tylenol) for discomfort, if no contraindications  Do NOT take Ibuprofen or aspirin unless specifically told to do so by your Dermatologist because these medications can make bleeding worse  Call our office immediately for signs of infection: fever, chills, increased redness, warmth, tenderness, discomfort/pain, or pus or foul smell coming from the wound  If bleeding is noticed, place a clean cloth over the area and apply firm pressure for thirty minutes  Check the wound ONLY after 30 minutes of direct pressure; do not cheat and sneak a peak, as that does not count  If bleeding persists after 30 minutes of legitimate direct pressure, then try one more round of direct pressure for an additional 10 minutes to the area  Should the bleeding become heavier or not stop after the second attempt, call [facility] directly at [phone] (SKIN) or, if after hours, go to your local Emergency Room/Emergency Department

## 2022-10-06 NOTE — RESULT ENCOUNTER NOTE
DERMATOPATHOLOGY RESULT NOTE    Results reviewed by ordering physician  Reviewed  [employer]        Instructions for Clinical Derm Team:   (remember to route Result Note to appropriate staff):    None    Result & Plan by Specimen:    Specimen A: benign  Plan: monitor and reassured, benign

## 2022-10-11 ENCOUNTER — OFFICE VISIT (OUTPATIENT)
Dept: DERMATOLOGY | Facility: CLINIC | Age: 62
End: 2022-10-11

## 2022-10-11 DIAGNOSIS — Z48.02 ENCOUNTER FOR REMOVAL OF SUTURES: Primary | ICD-10-CM

## 2022-10-11 PROCEDURE — RECHECK: Performed by: DERMATOLOGY

## 2022-10-11 NOTE — PROGRESS NOTES
Suture removal    Date/Time: 10/11/2022 8:07 AM  Performed by: Parish Rhoades  Authorized by: Tushar Osorio MD   Universal Protocol:  Consent: Verbal consent obtained  Written consent not obtained  Risks and benefits: risks, benefits and alternatives were discussed  Consent given by: patient  Time out: Immediately prior to procedure a "time out" was called to verify the correct patient, procedure, equipment, support staff and site/side marked as required  Timeout called at: 10/11/2022 8:08 AM   Patient understanding: patient states understanding of the procedure being performed  Patient consent: the patient's understanding of the procedure matches consent given  Procedure consent: procedure consent matches procedure scheduled  Relevant documents: relevant documents present and verified  Test results: test results not available  Site marked: the operative site was not marked  Radiology Images displayed and confirmed  If images not available, report reviewed: imaging studies not available  Patient identity confirmed: verbally with patient        Patient location:  Clinic  Location:     Laterality:  Left    Location:  Trunk    Trunk location:  Back  Procedure details: Tools used:  Suture removal kit and scalpel  Post-procedure details:     Patient tolerance of procedure:   Tolerated well, no immediate complications

## 2022-10-14 NOTE — PROGRESS NOTES
Suture removal    Date/Time: 10/11/2022 8:00 AM  Performed by: Celestina Yee MA  Authorized by: Fletcher Aguirre MD   Universal Protocol:  Consent: Verbal consent obtained  Written consent obtained  Risks and benefits: risks, benefits and alternatives were discussed  Time out: Immediately prior to procedure a "time out" was called to verify the correct patient, procedure, equipment, support staff and site/side marked as required  Timeout called at: 10/11/2022 8:10 AM   Patient understanding: patient states understanding of the procedure being performed  Patient consent: the patient's understanding of the procedure matches consent given  Procedure consent: procedure consent matches procedure scheduled  Relevant documents: relevant documents present and verified  Test results: test results not available  Site marked: the operative site was not marked  Radiology Images displayed and confirmed  If images not available, report reviewed: imaging studies not available  Patient identity confirmed: verbally with patient        Patient location:  Clinic  Location:     Laterality:  Left    Location:  Trunk    Trunk location:  Back  Procedure details: Tools used:  Suture removal kit    Wound appearance:  No sign(s) of infection, good wound healing and clean    Sutures removed: running   Post-procedure details:     Post-removal:  No dressing applied    Patient tolerance of procedure:   Tolerated well, no immediate complications

## 2022-10-28 DIAGNOSIS — N52.9 ERECTILE DYSFUNCTION, UNSPECIFIED ERECTILE DYSFUNCTION TYPE: Primary | ICD-10-CM

## 2022-10-28 RX ORDER — SILDENAFIL 100 MG/1
100 TABLET, FILM COATED ORAL AS NEEDED
Qty: 10 TABLET | Refills: 12 | Status: SHIPPED | OUTPATIENT
Start: 2022-10-28 | End: 2022-11-01 | Stop reason: SDUPTHER

## 2022-10-31 DIAGNOSIS — N52.9 ERECTILE DYSFUNCTION, UNSPECIFIED ERECTILE DYSFUNCTION TYPE: ICD-10-CM

## 2022-10-31 RX ORDER — SILDENAFIL 100 MG/1
100 TABLET, FILM COATED ORAL AS NEEDED
Qty: 10 TABLET | Refills: 12 | Status: CANCELLED | OUTPATIENT
Start: 2022-10-31

## 2022-11-01 DIAGNOSIS — N52.9 ERECTILE DYSFUNCTION, UNSPECIFIED ERECTILE DYSFUNCTION TYPE: ICD-10-CM

## 2022-11-01 RX ORDER — SILDENAFIL 100 MG/1
100 TABLET, FILM COATED ORAL AS NEEDED
Qty: 6 TABLET | Refills: 12 | Status: SHIPPED | OUTPATIENT
Start: 2022-11-01

## 2022-12-05 ENCOUNTER — RA CDI HCC (OUTPATIENT)
Dept: OTHER | Facility: HOSPITAL | Age: 62
End: 2022-12-05

## 2022-12-05 NOTE — PROGRESS NOTES
[facility]  coding opportunities       Chart reviewed, no opportunity found: CHART REVIEWED, NO OPPORTUNITY FOUND        Patients Insurance        Commercial Insurance: Rahul Shannon

## 2022-12-12 ENCOUNTER — OFFICE VISIT (OUTPATIENT)
Dept: FAMILY MEDICINE CLINIC | Facility: CLINIC | Age: 62
End: 2022-12-12

## 2022-12-12 VITALS
WEIGHT: 159 LBS | HEIGHT: 71 IN | BODY MASS INDEX: 22.26 KG/M2 | HEART RATE: 72 BPM | SYSTOLIC BLOOD PRESSURE: 110 MMHG | OXYGEN SATURATION: 97 % | DIASTOLIC BLOOD PRESSURE: 70 MMHG

## 2022-12-12 DIAGNOSIS — D75.839 THROMBOCYTOSIS: Primary | ICD-10-CM

## 2022-12-12 DIAGNOSIS — J06.9 VIRAL UPPER RESPIRATORY TRACT INFECTION: ICD-10-CM

## 2022-12-12 DIAGNOSIS — E03.9 ACQUIRED HYPOTHYROIDISM: ICD-10-CM

## 2022-12-12 DIAGNOSIS — M05.79 RHEUMATOID ARTHRITIS INVOLVING MULTIPLE SITES WITH POSITIVE RHEUMATOID FACTOR (HCC): ICD-10-CM

## 2022-12-12 NOTE — ASSESSMENT & PLAN NOTE
Patient with thrombocytosis last levels at 389 we will continue to monitor the same with periodic CBC checkups    Patient is currently taking hydroxyurea

## 2022-12-12 NOTE — ASSESSMENT & PLAN NOTE
Patient with hypothyroidism currently taking levothyroxine 150 mcg last TSH level is in the normal range we will continue the same dose

## 2022-12-12 NOTE — PROGRESS NOTES
Office Visit Note  22     Otilia Hernandez 57 y o  male MRN: [id number]  : 1960    Assessment:     1  Thrombocytosis  Assessment & Plan:  Patient with thrombocytosis last levels at 389 we will continue to monitor the same with periodic CBC checkups  Patient is currently taking hydroxyurea      2  Acquired hypothyroidism  Assessment & Plan:  Patient with hypothyroidism currently taking levothyroxine 150 mcg last TSH level is in the normal range we will continue the same dose  3  Rheumatoid arthritis involving multiple sites with positive rheumatoid factor ([facility] [address] )  Assessment & Plan:  Patient with a history of rheumatoid arthritis and currently not on any medications  He has a follow-up appointment within the rheumatologist       4  Viral upper respiratory tract infection  Assessment & Plan:  As mentioned patient with URI symptoms appears more like a virus patient had 5 shots of the COVID and he also had received flu vaccine recently  We will monitor for now no need for any other medications at this time symptomatic treatment          Discussion Summary and Plan: Today's care plan and medications were reviewed with patient in detail and all their questions answered to their satisfaction  Chief Complaint   Patient presents with   • Follow-up   • Physical Exam     Annual Physical exam  Charo Anguiano        Subjective:  Patient is coming in for the follow-up evaluation history of hypothyroidism, thrombocytosis and also history of rheumatoid arthritis currently not on any medication  He has been experiencing some upper respiratory tract infection symptoms for the last 3 weeks time runny nose postnasal drip pain no fever COVID test has been negative  No chest pain palpitation shortness of breath  Labs reviewed according to the patient platelet count is 111,360 rest of the labs reviewed and remarkable findings        The following portions of the patient's history were reviewed and updated as appropriate: allergies, current medications, past family history, past medical history, past social history, past surgical history and problem list     Review of Systems   Constitutional: Negative for chills and fever  HENT: Negative for ear pain and sore throat  Eyes: Negative for pain and visual disturbance  Respiratory: Negative for cough and shortness of breath  Cardiovascular: Negative for chest pain and palpitations  Gastrointestinal: Negative for abdominal pain and vomiting  Genitourinary: Negative for dysuria and hematuria  Musculoskeletal: Negative for arthralgias and back pain  Skin: Negative for color change and rash  Neurological: Negative for seizures and syncope  All other systems reviewed and are negative          Historical Information   Patient Active Problem List   Diagnosis   • Other tear of medial meniscus, current injury, right knee, initial encounter   • Hyperlipidemia   • Hypothyroidism   • Hallux limitus, acquired, right   • Hammertoe of second toe of right foot   • Hammertoe of second toe of left foot   • Thrombocytosis   • Rheumatoid arthritis involving multiple sites ([facility] [address] )   • Lipoma of torso   • Viral upper respiratory tract infection     Past Medical History:   Diagnosis Date   • Arthritis    • Disease of thyroid gland     hypo   • Graves disease 1995   • Hypothyroid     hypo   • Platelet disorder ([facility] [address] )     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 Cristel Cruz   • RA (rheumatoid arthritis) ([facility] [address] )    • Wears glasses      Past Surgical History:   Procedure Laterality Date   • ANKLE SURGERY Right     ligament, chipped bones,dislocated   • BUNIONECTOMY  03/2014   • COLONOSCOPY     • ELBOW SURGERY Right     tendon surgery   • FOOT ARTHRODESIS, MODIFIED ARELLANO Left    • FRACTURE SURGERY Left     elbow   • HERNIA REPAIR Right     inguinal   • [place] ARTHRS KNE SURG W/MENISCECTOMY MED/LAT W/SHVG Right 07/09/2018    Procedure: MENISCECTOMY LATERAL /MEDIAL;  Surgeon: Tracee Gomez MD; Location: [facility];  Service: Orthopedics   • [place] CORRECTION HAMMERTOE Bilateral 2021    Procedure: REPAIR HAMMERTOE / MALLET TOE / CLAW TOE 2ND toe bilateral;  Surgeon: Claudette De Jesus DPM;  Location: [address] [place] [address];  Service: Podiatry   • Piroska U  97  W/SESMDC W/RESCJ PROX PHAL Right 2021    Procedure: Natalya Snare;  Surgeon: Claudette De Jesus DPM;  Location: [address] [place] [address];  Service: Podiatry   • ROTATOR CUFF REPAIR Left    • WISDOM TOOTH EXTRACTION      x4   • WRIST SURGERY       Social History     Substance and Sexual Activity   Alcohol Use Yes    Comment: occ beer with going out to dinner     Social History     Substance and Sexual Activity   Drug Use No     Social History     Tobacco Use   Smoking Status Former   • Packs/day:    • Years:    • Pack years:    • Types: Cigarettes   • Quit date:    • Years since quittin 9   Smokeless Tobacco Never     Family History   Problem Relation Age of Onset   • Cancer Mother         lung-heavy smoker   • Hypertension Father    • Heart disease Father         leaky valve   • Skin cancer Father      Health Maintenance Due   Topic   • Hepatitis C Screening    • Hepatitis B Vaccine (1 of 3 - 3-dose series)   • HIV Screening    • Annual Physical    • DTaP,Tdap,and Td Vaccines (1 - Tdap)   • Colorectal Cancer Screening    • COVID-19 Vaccine (2 - Pfizer series)   • Influenza Vaccine (1)      Meds/Allergies       Current Outpatient Medications:   •  Cholecalciferol (VITAMIN D3) 1000 units CAPS, Take 1,000 Units by mouth, Disp: , Rfl:   •  Hydroxyurea (HYDREA PO), Take 1,000 mg by mouth Mon-Wed-Fri, Disp: , Rfl:   •  hydroxyurea (HYDREA) 500 mg capsule, Take by mouth On -Sat-Sun , Disp: , Rfl:   •  levothyroxine 150 mcg tablet, every morning  , Disp: , Rfl:   •  sildenafil (VIAGRA) 100 mg tablet, Take 1 tablet (100 mg total) by mouth if needed for erectile dysfunction As directed, Disp: 6 tablet, Rfl: 12      Objective:    Vitals:   BP 110/70 (BP Location: Right arm, Patient Position: Sitting, Cuff Size: Standard)   Pulse 72   Ht 5' 11" (1 803 m)   Wt 72 1 kg (159 lb)   SpO2 97%   BMI 22 18 kg/m²   Body mass index is 22 18 kg/m²  Vitals:    12/12/22 0908   Weight: 72 1 kg (159 lb)       Physical Exam  Vitals and nursing note reviewed  Constitutional:       Appearance: Normal appearance  Cardiovascular:      Rate and Rhythm: Normal rate and regular rhythm  Heart sounds: Normal heart sounds  Pulmonary:      Effort: Pulmonary effort is normal       Breath sounds: Normal breath sounds  Abdominal:      General: Bowel sounds are normal       Palpations: Abdomen is soft  Musculoskeletal:      Right lower leg: No edema  Left lower leg: No edema  Neurological:      General: No focal deficit present  Mental Status: He is alert and oriented to person, place, and time  Lab Review   No visits with results within 2 Month(s) from this visit  Latest known visit with results is:   Procedure visit on 09/27/2022   Component Date Value Ref Range Status   • Case Report 09/27/2022    Final                    Value:Surgical Pathology Report                         Case: [id number]                                   Authorizing Provider:  Cayden Cole MD            Collected:           09/27/2022 1311              Ordering Location:     [facility] Received:            09/27/2022 1311                                     Gap                                                                          Pathologist:           Santiago Pineda MD                                                           Specimen:    Soft Tissue, Lipoma, left back                                                            • Final Diagnosis 09/27/2022    Final                    Value: This result contains rich text formatting which cannot be displayed here  • Additional Information 09/27/2022    Final                    Value: This result contains rich text formatting which cannot be displayed here  • Gross Description 09/27/2022    Final                    Value: This result contains rich text formatting which cannot be displayed here  • Clinical Information 09/27/2022    Final                    Value:Left back; skin; excision; 57year old male with a 6 0 cm lipoma    Attention; derm path         Sammie Singh MD        "This note has been constructed using a voice recognition system  Therefore there may be syntax, spelling, and/or grammatical errors   Please call if you have any questions  "

## 2022-12-12 NOTE — ASSESSMENT & PLAN NOTE
As mentioned patient with URI symptoms appears more like a virus patient had 5 shots of the COVID and he also had received flu vaccine recently    We will monitor for now no need for any other medications at this time symptomatic treatment

## 2022-12-12 NOTE — ASSESSMENT & PLAN NOTE
Patient with a history of rheumatoid arthritis and currently not on any medications    He has a follow-up appointment within the rheumatologist

## 2023-02-01 DIAGNOSIS — N52.9 ERECTILE DYSFUNCTION, UNSPECIFIED ERECTILE DYSFUNCTION TYPE: ICD-10-CM

## 2023-02-01 RX ORDER — SILDENAFIL 100 MG/1
100 TABLET, FILM COATED ORAL AS NEEDED
Qty: 6 TABLET | Refills: 12 | Status: SHIPPED | OUTPATIENT
Start: 2023-02-01

## 2023-02-10 PROBLEM — J06.9 VIRAL UPPER RESPIRATORY TRACT INFECTION: Status: RESOLVED | Noted: 2022-12-12 | Resolved: 2023-02-10

## 2023-02-28 ENCOUNTER — RA CDI HCC (OUTPATIENT)
Dept: OTHER | Facility: HOSPITAL | Age: 63
End: 2023-02-28

## 2023-03-07 ENCOUNTER — OFFICE VISIT (OUTPATIENT)
Dept: FAMILY MEDICINE CLINIC | Facility: CLINIC | Age: 63
End: 2023-03-07

## 2023-03-07 VITALS
SYSTOLIC BLOOD PRESSURE: 124 MMHG | HEART RATE: 59 BPM | DIASTOLIC BLOOD PRESSURE: 74 MMHG | OXYGEN SATURATION: 99 % | BODY MASS INDEX: 22.62 KG/M2 | WEIGHT: 161.6 LBS | HEIGHT: 71 IN

## 2023-03-07 DIAGNOSIS — D75.839 THROMBOCYTOSIS: ICD-10-CM

## 2023-03-07 DIAGNOSIS — M05.79 RHEUMATOID ARTHRITIS INVOLVING MULTIPLE SITES WITH POSITIVE RHEUMATOID FACTOR (HCC): ICD-10-CM

## 2023-03-07 DIAGNOSIS — Z12.5 SCREENING FOR PROSTATE CANCER: ICD-10-CM

## 2023-03-07 DIAGNOSIS — E03.9 ACQUIRED HYPOTHYROIDISM: Primary | ICD-10-CM

## 2023-03-07 DIAGNOSIS — N52.8 OTHER MALE ERECTILE DYSFUNCTION: ICD-10-CM

## 2023-03-07 DIAGNOSIS — R73.03 PRE-DIABETES: ICD-10-CM

## 2023-03-07 DIAGNOSIS — E78.5 DYSLIPIDEMIA: ICD-10-CM

## 2023-03-07 DIAGNOSIS — Z11.59 ENCOUNTER FOR HEPATITIS C SCREENING TEST FOR LOW RISK PATIENT: ICD-10-CM

## 2023-03-07 PROBLEM — M06.9 RHEUMATOID ARTHRITIS INVOLVING MULTIPLE SITES (HCC): Status: RESOLVED | Noted: 2022-09-19 | Resolved: 2023-03-07

## 2023-03-07 PROBLEM — S83.241A OTHER TEAR OF MEDIAL MENISCUS, CURRENT INJURY, RIGHT KNEE, INITIAL ENCOUNTER: Status: RESOLVED | Noted: 2018-06-06 | Resolved: 2023-03-07

## 2023-03-07 NOTE — ASSESSMENT & PLAN NOTE
Patient with hypothyroidism currently on levothyroxine 150 mcg daily we will continue the same follow-up with a TSH level

## 2023-03-07 NOTE — ASSESSMENT & PLAN NOTE
Patient with thrombocytosis on hydroxyurea we will continue with the same follow-up with repeat platelet count

## 2023-03-07 NOTE — ASSESSMENT & PLAN NOTE
Patient with a history of rheumatoid arthritis currently not on any medication Labs shows his sed rate and C-reactive proteins are normal   Patient was on Plaquenil in the past   He was seen by the rheumatologist no intervention at present time it appears he is in complete remission now

## 2023-03-07 NOTE — PROGRESS NOTES
Office Visit Note  23     Caridad Menendez 57 y o  male MRN: [id number]  : 1960    Assessment:     1  Acquired hypothyroidism  Assessment & Plan:  Patient with hypothyroidism currently on levothyroxine 150 mcg daily we will continue the same follow-up with a TSH level    Orders:  -     TSH, 3rd generation with Free T4 reflex; Future; Expected date: 2023    2  Rheumatoid arthritis involving multiple sites with positive rheumatoid factor ([facility] [address] )  Assessment & Plan:  Patient with a history of rheumatoid arthritis currently not on any medication Labs shows his sed rate and C-reactive proteins are normal   Patient was on Plaquenil in the past   He was seen by the rheumatologist no intervention at present time it appears he is in complete remission now      3  Thrombocytosis  Assessment & Plan:  Patient with thrombocytosis on hydroxyurea we will continue with the same follow-up with repeat platelet count    Orders:  -     CBC and differential; Future  -     TSH, 3rd generation with Free T4 reflex; Future; Expected date: 2023    4  Pre-diabetes  -     Comprehensive metabolic panel; Future  -     Hemoglobin A1C; Future; Expected date: 2023  -     UA w Reflex to Microscopic w Reflex to Culture; Future; Expected date: 2023    5  Screening for prostate cancer  -     PSA, Total Screen; Future    6  Encounter for hepatitis C screening test for low risk patient  -     Hepatitis C antibody; Future    7  Dyslipidemia  -     Lipid panel; Future    8  Other male erectile dysfunction  Assessment & Plan:  Currently patient is on sildenafil 100 mg as needed we will continue the same  Discussion Summary and Plan: Today's care plan and medications were reviewed with patient in detail and all their questions answered to their satisfaction      Chief Complaint   Patient presents with   • Follow-up      Subjective:  Patient is coming for evaluation regarding symptoms of thrombocytosis and also history of hypothyroidism  He is being followed by the hematologist regarding thrombocytosis and currently taking hydroxyurea  Platelet levels are coming up in the acceptable range  Patient also on levothyroxine for the hypothyroidism follow-up with repeat lab work for the same along with the rest of the annual lab  No symptoms of chest pain palpitation shortness of breath or any other associated symptoms  Lab work ordered      The following portions of the patient's history were reviewed and updated as appropriate: allergies, current medications, past family history, past medical history, past social history, past surgical history and problem list     Review of Systems   Constitutional: Negative for chills and fever  HENT: Negative for ear pain and sore throat  Eyes: Negative for pain and visual disturbance  Respiratory: Negative for cough and shortness of breath  Cardiovascular: Negative for chest pain and palpitations  Gastrointestinal: Negative for abdominal pain and vomiting  Genitourinary: Negative for dysuria and hematuria  Musculoskeletal: Negative for arthralgias and back pain  Skin: Negative for color change and rash  Neurological: Negative for seizures and syncope  All other systems reviewed and are negative          Historical Information   Patient Active Problem List   Diagnosis   • Hyperlipidemia   • Hypothyroidism   • Thrombocytosis   • Rheumatoid arthritis involving multiple sites with positive rheumatoid factor (HCC)   • Lipoma of torso   • Other male erectile dysfunction     Past Medical History:   Diagnosis Date   • Arthritis    • Disease of thyroid gland     hypo   • Graves disease 1995   • Hallux limitus, acquired, right    • Hypothyroid     hypo   • Other tear of medial meniscus, current injury, right knee, initial encounter 6/6/2018    Added automatically from request for surgery [id number]   • Platelet disorder ([facility] [address] )     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 Yovani Dowling   • RA (rheumatoid arthritis) ([facility])    • Rheumatoid arthritis involving multiple sites ([facility] [address] ) 2022   • Wears glasses      Past Surgical History:   Procedure Laterality Date   • ANKLE SURGERY Right     ligament, chipped bones,dislocated   • BUNIONECTOMY  2014   • COLONOSCOPY     • ELBOW SURGERY Right     tendon surgery   • FOOT ARTHRODESIS, MODIFIED ARELLANO Left    • FRACTURE SURGERY Left     elbow   • HERNIA REPAIR Right     inguinal   • [place] ARTHRS KNE SURG W/MENISCECTOMY MED/LAT W/SHVG Right 2018    Procedure: MENISCECTOMY LATERAL /MEDIAL;  Surgeon: Gil Kelsey MD;  Location: [address] [place] [address];  Service: Orthopedics   • [place] CORRECTION HAMMERTOE Bilateral 2021    Procedure: REPAIR HAMMERTOE / MALLET TOE / CLAW TOE 2ND toe bilateral;  Surgeon: Kishan Chavez DPM;  Location: [address] [place] [address];  Service: Podiatry   • [place] CORRJ HALLUX VALGUS W/SESMDC W/RESCJ PROX PHAL Right 2021    Procedure: Durel Grounds;  Surgeon: Kishan Chavez DPM;  Location: [address] [place] [address];  Service: Podiatry   • ROTATOR CUFF REPAIR Left    • WISDOM TOOTH EXTRACTION      x4   • WRIST SURGERY       Social History     Substance and Sexual Activity   Alcohol Use Yes    Comment: occ beer with going out to dinner     Social History     Substance and Sexual Activity   Drug Use No     Social History     Tobacco Use   Smoking Status Former   • Packs/day: 1 00   • Years: 20 00   • Pack years: 20 00   • Types: Cigarettes   • Quit date:    • Years since quittin 1   • Passive exposure: Past   Smokeless Tobacco Never     Family History   Problem Relation Age of Onset   • Cancer Mother         lung-heavy smoker   • Hypertension Father    • Heart disease Father         leaky valve   • Skin cancer Father      Health Maintenance Due   Topic   • Hepatitis C Screening    • HIV Screening    • Annual Physical    • DTaP,Tdap,and Td Vaccines (1 - Tdap)   • Colorectal Cancer Screening    • COVID-19 Vaccine (2 - Pfizer series)   • Influenza Vaccine (1) Meds/Allergies       Current Outpatient Medications:   •  Cholecalciferol (VITAMIN D3) 1000 units CAPS, Take 1,000 Units by mouth, Disp: , Rfl:   •  Hydroxyurea (HYDREA PO), Take 1,000 mg by mouth Mon-Wed-Fri, Disp: , Rfl:   •  hydroxyurea (HYDREA) 500 mg capsule, Take by mouth On Tues-Thurs-Sat-Sun , Disp: , Rfl:   •  levothyroxine 150 mcg tablet, every morning  , Disp: , Rfl:   •  sildenafil (VIAGRA) 100 mg tablet, Take 1 tablet (100 mg total) by mouth if needed for erectile dysfunction As directed, Disp: 6 tablet, Rfl: 12      Objective:    Vitals:   /74 (BP Location: Right arm, Patient Position: Sitting, Cuff Size: Standard)   Pulse 59   Ht 5' 11" (1 803 m)   Wt 73 3 kg (161 lb 9 6 oz)   SpO2 99%   BMI 22 54 kg/m²   Body mass index is 22 54 kg/m²  Vitals:    03/07/23 1001   Weight: 73 3 kg (161 lb 9 6 oz)       Physical Exam  Vitals and nursing note reviewed  Constitutional:       Appearance: Normal appearance  Cardiovascular:      Rate and Rhythm: Normal rate and regular rhythm  Heart sounds: Normal heart sounds  Pulmonary:      Effort: Pulmonary effort is normal       Breath sounds: Normal breath sounds  Abdominal:      Palpations: Abdomen is soft  Musculoskeletal:      Right lower leg: No edema  Left lower leg: No edema  Skin:     General: Skin is warm and dry  Neurological:      Mental Status: He is alert and oriented to person, place, and time  Lab Review   No visits with results within 2 Month(s) from this visit     Latest known visit with results is:   Procedure visit on 09/27/2022   Component Date Value Ref Range Status   • Case Report 09/27/2022    Final                    Value:Surgical Pathology Report                         Case: [id number]                                   Authorizing Provider:  Cristofer Holland MD            Collected:           09/27/2022 1311              Ordering Location:     [facility] Received:            09/27/2022 [address]:           Hector Land MD                                                           Specimen:    Soft Tissue, Lipoma, left back                                                            • Final Diagnosis 09/27/2022    Final                    Value: This result contains rich text formatting which cannot be displayed here  • Additional Information 09/27/2022    Final                    Value: This result contains rich text formatting which cannot be displayed here  • Gross Description 09/27/2022    Final                    Value: This result contains rich text formatting which cannot be displayed here  • Clinical Information 09/27/2022    Final                    Value:Left back; skin; excision; 57year old male with a 6 0 cm lipoma    Attention; derm path         Tito Peralta MD        "This note has been constructed using a voice recognition system  Therefore there may be syntax, spelling, and/or grammatical errors   Please call if you have any questions  "

## 2023-04-27 ENCOUNTER — RA CDI HCC (OUTPATIENT)
Dept: OTHER | Facility: HOSPITAL | Age: 63
End: 2023-04-27

## 2023-04-27 NOTE — PROGRESS NOTES
[facility]  coding opportunities       Chart reviewed, no opportunity found: CHART REVIEWED, NO OPPORTUNITY FOUND        Patients Insurance        Commercial Insurance: Rahul Shannon

## 2023-04-29 LAB
ALBUMIN SERPL-MCNC: 4.1 G/DL (ref 3.6–5.1)
ALBUMIN/GLOB SERPL: 1.6 (CALC) (ref 1–2.5)
ALP SERPL-CCNC: 68 U/L (ref 35–144)
ALT SERPL-CCNC: 13 U/L (ref 9–46)
APPEARANCE UR: CLEAR
AST SERPL-CCNC: 15 U/L (ref 10–35)
BACTERIA UR QL AUTO: ABNORMAL /HPF
BASOPHILS # BLD AUTO: 73 CELLS/UL (ref 0–200)
BASOPHILS NFR BLD AUTO: 1.1 %
BILIRUB SERPL-MCNC: 0.4 MG/DL (ref 0.2–1.2)
BILIRUB UR QL STRIP: NEGATIVE
BUN SERPL-MCNC: 21 MG/DL (ref 7–25)
BUN/CREAT SERPL: NORMAL (CALC) (ref 6–22)
CALCIUM SERPL-MCNC: 9.3 MG/DL (ref 8.6–10.3)
CHLORIDE SERPL-SCNC: 104 MMOL/L (ref 98–110)
CHOLEST SERPL-MCNC: 190 MG/DL
CHOLEST/HDLC SERPL: 2.8 (CALC)
CO2 SERPL-SCNC: 29 MMOL/L (ref 20–32)
COLOR UR: YELLOW
CREAT SERPL-MCNC: 1.08 MG/DL (ref 0.7–1.35)
EOSINOPHIL # BLD AUTO: 356 CELLS/UL (ref 15–500)
EOSINOPHIL NFR BLD AUTO: 5.4 %
ERYTHROCYTE [DISTWIDTH] IN BLOOD BY AUTOMATED COUNT: 13 % (ref 11–15)
GFR/BSA.PRED SERPLBLD CYS-BASED-ARV: 76 ML/MIN/1.73M2
GLOBULIN SER CALC-MCNC: 2.6 G/DL (CALC) (ref 1.9–3.7)
GLUCOSE SERPL-MCNC: 88 MG/DL (ref 65–99)
GLUCOSE UR QL STRIP: NEGATIVE
HBA1C MFR BLD: 5.1 % OF TOTAL HGB
HCT VFR BLD AUTO: 48.8 % (ref 38.5–50)
HCV AB S/CO SERPL IA: 0.08
HCV AB SERPL QL IA: NORMAL
HDLC SERPL-MCNC: 67 MG/DL
HGB BLD-MCNC: 16.8 G/DL (ref 13.2–17.1)
HGB UR QL STRIP: NEGATIVE
HYALINE CASTS #/AREA URNS LPF: ABNORMAL /LPF
KETONES UR QL STRIP: ABNORMAL
LDLC SERPL CALC-MCNC: 109 MG/DL (CALC)
LEUKOCYTE ESTERASE UR QL STRIP: NEGATIVE
LYMPHOCYTES # BLD AUTO: 937 CELLS/UL (ref 850–3900)
LYMPHOCYTES NFR BLD AUTO: 14.2 %
MCH RBC QN AUTO: 35.2 PG (ref 27–33)
MCHC RBC AUTO-ENTMCNC: 34.4 G/DL (ref 32–36)
MCV RBC AUTO: 102.3 FL (ref 80–100)
MONOCYTES # BLD AUTO: 521 CELLS/UL (ref 200–950)
MONOCYTES NFR BLD AUTO: 7.9 %
NEUTROPHILS # BLD AUTO: 4712 CELLS/UL (ref 1500–7800)
NEUTROPHILS NFR BLD AUTO: 71.4 %
NITRITE UR QL STRIP: NEGATIVE
NONHDLC SERPL-MCNC: 123 MG/DL (CALC)
PH UR STRIP: ABNORMAL [PH] (ref 5–8)
PLATELET # BLD AUTO: 487 THOUSAND/UL (ref 140–400)
PMV BLD REES-ECKER: 9.2 FL (ref 7.5–12.5)
POTASSIUM SERPL-SCNC: 4.8 MMOL/L (ref 3.5–5.3)
PROT SERPL-MCNC: 6.7 G/DL (ref 6.1–8.1)
PROT UR QL STRIP: NEGATIVE
PSA SERPL-MCNC: 2.08 NG/ML
RBC # BLD AUTO: 4.77 MILLION/UL (ref 4.2–5.8)
RBC #/AREA URNS HPF: ABNORMAL /HPF
SODIUM SERPL-SCNC: 140 MMOL/L (ref 135–146)
SP GR UR STRIP: 1.02 (ref 1–1.03)
SQUAMOUS #/AREA URNS HPF: ABNORMAL /HPF
TRIGL SERPL-MCNC: 54 MG/DL
TSH SERPL-ACNC: 1.55 MIU/L (ref 0.4–4.5)
WBC # BLD AUTO: 6.6 THOUSAND/UL (ref 3.8–10.8)
WBC #/AREA URNS HPF: ABNORMAL /HPF

## 2023-06-15 ENCOUNTER — OFFICE VISIT (OUTPATIENT)
Dept: FAMILY MEDICINE CLINIC | Facility: CLINIC | Age: 63
End: 2023-06-15
Payer: COMMERCIAL

## 2023-06-15 VITALS
WEIGHT: 153.8 LBS | BODY MASS INDEX: 21.53 KG/M2 | SYSTOLIC BLOOD PRESSURE: 124 MMHG | OXYGEN SATURATION: 100 % | HEART RATE: 81 BPM | DIASTOLIC BLOOD PRESSURE: 74 MMHG | HEIGHT: 71 IN

## 2023-06-15 DIAGNOSIS — E03.9 ACQUIRED HYPOTHYROIDISM: ICD-10-CM

## 2023-06-15 DIAGNOSIS — M05.79 RHEUMATOID ARTHRITIS INVOLVING MULTIPLE SITES WITH POSITIVE RHEUMATOID FACTOR (HCC): ICD-10-CM

## 2023-06-15 DIAGNOSIS — N52.9 ERECTILE DYSFUNCTION, UNSPECIFIED ERECTILE DYSFUNCTION TYPE: ICD-10-CM

## 2023-06-15 DIAGNOSIS — D75.839 THROMBOCYTOSIS: ICD-10-CM

## 2023-06-15 DIAGNOSIS — M54.50 ACUTE MIDLINE LOW BACK PAIN WITHOUT SCIATICA: Primary | ICD-10-CM

## 2023-06-15 PROCEDURE — 99213 OFFICE O/P EST LOW 20 MIN: CPT | Performed by: INTERNAL MEDICINE

## 2023-06-15 RX ORDER — HYDROCODONE BITARTRATE AND ACETAMINOPHEN 5; 325 MG/1; MG/1
1 TABLET ORAL 2 TIMES DAILY PRN
Qty: 10 TABLET | Refills: 0 | Status: SHIPPED | OUTPATIENT
Start: 2023-06-15

## 2023-06-15 RX ORDER — CYCLOBENZAPRINE HCL 10 MG
10 TABLET ORAL 3 TIMES DAILY PRN
Qty: 30 TABLET | Refills: 0 | Status: SHIPPED | OUTPATIENT
Start: 2023-06-15

## 2023-06-15 RX ORDER — SILDENAFIL 100 MG/1
100 TABLET, FILM COATED ORAL AS NEEDED
Qty: 6 TABLET | Refills: 12 | Status: SHIPPED | OUTPATIENT
Start: 2023-06-15

## 2023-06-15 RX ORDER — NAPROXEN 500 MG/1
500 TABLET ORAL 2 TIMES DAILY PRN
Qty: 20 TABLET | Refills: 1 | Status: SHIPPED | OUTPATIENT
Start: 2023-06-15

## 2023-06-15 NOTE — ASSESSMENT & PLAN NOTE
Patient with acute midline low back pain without sciatica exam shows some tenderness in the lumbosacral spine area  We will place the patient on muscle relaxers Flexeril 10 mg 3 times daily as needed  Side effects explained we will also prescribe naproxen 500 mg twice a day with food  I will write few tablets of pain medication Norco if the pain is severe  If the symptoms persist we will get an x-ray of the lumbosacral spine

## 2023-06-15 NOTE — PROGRESS NOTES
Office Visit Note  06/15/23     Rosa Diehl 70 y o  male MRN: [id number]  : 1960    Assessment:     1  Acute midline low back pain without sciatica  Assessment & Plan:  Patient with acute midline low back pain without sciatica exam shows some tenderness in the lumbosacral spine area  We will place the patient on muscle relaxers Flexeril 10 mg 3 times daily as needed  Side effects explained we will also prescribe naproxen 500 mg twice a day with food  I will write few tablets of pain medication Norco if the pain is severe  If the symptoms persist we will get an x-ray of the lumbosacral spine  Orders:  -     naproxen (Naprosyn) 500 mg tablet; Take 1 tablet (500 mg total) by mouth 2 (two) times a day as needed for mild pain  -     cyclobenzaprine (FLEXERIL) 10 mg tablet; Take 1 tablet (10 mg total) by mouth 3 (three) times a day as needed for muscle spasms  -     HYDROcodone-acetaminophen (Norco) 5-325 mg per tablet; Take 1 tablet by mouth 2 (two) times a day as needed for pain Max Daily Amount: 2 tablets  -     XR spine lumbar minimum 4 views non injury; Future; Expected date: 06/15/2023    2  Acquired hypothyroidism  Assessment & Plan:  Continue levothyroxine 100 mcg daily      3  Thrombocytosis  Assessment & Plan: On hydroxyurea we will continue      4  Rheumatoid arthritis involving multiple sites with positive rheumatoid factor ([facility] [address] )    5  Erectile dysfunction, unspecified erectile dysfunction type  -     sildenafil (VIAGRA) 100 mg tablet; Take 1 tablet (100 mg total) by mouth if needed for erectile dysfunction As directed             Discussion Summary and Plan: Today's care plan and medications were reviewed with patient in detail and all their questions answered to their satisfaction  Chief Complaint   Patient presents with   • Follow-up     Issue with back  Re-do robert, five days later lower back pain    Doyle Carpenter        Subjective:  Patient is coming in for evaluation regarding pain in the low back area started 5 days back  Patient was doing a lot of yard work subsequently he developed this pain mostly midline nonradiating to the lower extremities no tingling numbness sensation  When he lays on his right side he feels better  He has been taking Advil's to some extent it is relief but continues to experience it now  No history of low back pain in the past       The following portions of the patient's history were reviewed and updated as appropriate: allergies, current medications, past family history, past medical history, past social history, past surgical history and problem list     Review of Systems   Constitutional: Negative for chills and fever  HENT: Negative for ear pain and sore throat  Eyes: Negative for pain and visual disturbance  Respiratory: Negative for cough and shortness of breath  Cardiovascular: Negative for chest pain and palpitations  Gastrointestinal: Negative for abdominal pain and vomiting  Genitourinary: Negative for dysuria and hematuria  Musculoskeletal: Positive for back pain  Negative for arthralgias  Skin: Negative for color change and rash  Neurological: Negative for seizures and syncope  All other systems reviewed and are negative          Historical Information   Patient Active Problem List   Diagnosis   • Hyperlipidemia   • Hypothyroidism   • Thrombocytosis   • Rheumatoid arthritis involving multiple sites with positive rheumatoid factor (HCC)   • Lipoma of torso   • Other male erectile dysfunction   • Acute midline low back pain without sciatica     Past Medical History:   Diagnosis Date   • Arthritis    • Disease of thyroid gland     hypo   • Graves disease 1995   • Hallux limitus, acquired, right    • Hypothyroid     hypo   • Other tear of medial meniscus, current injury, right knee, initial encounter 6/6/2018    Added automatically from request for surgery [id number]   • [facility] )     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 David Solomon • RA (rheumatoid arthritis) ([facility])    • Rheumatoid arthritis involving multiple sites ([facility] [address] ) 2022   • Wears glasses      Past Surgical History:   Procedure Laterality Date   • ANKLE SURGERY Right     ligament, chipped bones,dislocated   • BUNIONECTOMY  2014   • COLONOSCOPY     • ELBOW SURGERY Right     tendon surgery   • FOOT ARTHRODESIS, MODIFIED ARELLANO Left    • FRACTURE SURGERY Left     elbow   • HERNIA REPAIR Right     inguinal   • W[place] ARTHRS KNE SURG W/MENISCECTOMY MED/LAT W/SHVG Right 2018    Procedure: MENISCECTOMY LATERAL /MEDIAL;  Surgeon: Albina Lara MD;  Location: [address] [place] [address];  Service: Orthopedics   • W[place] CORRECTION HAMMERTOE Bilateral 2021    Procedure: REPAIR HAMMERTOE / MALLET TOE / CLAW TOE 2ND toe bilateral;  Surgeon: Marin Salazar DPM;  Location: [address] [place] [address];  Service: Podiatry   • [place] CORRJ HALLUX VALGUS W/SESMDC W/RESCJ PROX PHAL Right 2021    Procedure: Raul Radford;  Surgeon: Marin Salazar DPM;  Location: [address] [place] [address];  Service: Podiatry   • ROTATOR CUFF REPAIR Left    • WISDOM TOOTH EXTRACTION      x4   • WRIST SURGERY       Social History     Substance and Sexual Activity   Alcohol Use Yes    Comment: occ beer with going out to dinner     Social History     Substance and Sexual Activity   Drug Use No     Social History     Tobacco Use   Smoking Status Former   • Packs/day: 1 00   • Years: 20 00   • Total pack years: 20 00   • Types: Cigarettes   • Quit date:    • Years since quittin 4   • Passive exposure: Past   Smokeless Tobacco Never     Family History   Problem Relation Age of Onset   • Cancer Mother         lung-heavy smoker   • Hypertension Father    • Heart disease Father         leaky valve   • Skin cancer Father      Health Maintenance Due   Topic   • HIV Screening    • Annual Physical    • DTaP,Tdap,and Td Vaccines (1 - Tdap)   • Pneumococcal Vaccine: 65+ Years (1 - PCV)   • Influenza Vaccine (Season Ended)      Meds/Allergies       Current "Outpatient Medications:   •  Cholecalciferol (VITAMIN D3) 1000 units CAPS, Take 1,000 Units by mouth, Disp: , Rfl:   •  cyclobenzaprine (FLEXERIL) 10 mg tablet, Take 1 tablet (10 mg total) by mouth 3 (three) times a day as needed for muscle spasms, Disp: 30 tablet, Rfl: 0  •  HYDROcodone-acetaminophen (Norco) 5-325 mg per tablet, Take 1 tablet by mouth 2 (two) times a day as needed for pain Max Daily Amount: 2 tablets, Disp: 10 tablet, Rfl: 0  •  Hydroxyurea (HYDREA PO), Take 1,000 mg by mouth Mon-Wed-Fri, Disp: , Rfl:   •  hydroxyurea (HYDREA) 500 mg capsule, Take by mouth On Tues-Thurs-Sat-Sun , Disp: , Rfl:   •  levothyroxine 150 mcg tablet, every morning  , Disp: , Rfl:   •  naproxen (Naprosyn) 500 mg tablet, Take 1 tablet (500 mg total) by mouth 2 (two) times a day as needed for mild pain, Disp: 20 tablet, Rfl: 1  •  sildenafil (VIAGRA) 100 mg tablet, Take 1 tablet (100 mg total) by mouth if needed for erectile dysfunction As directed, Disp: 6 tablet, Rfl: 12      Objective:    Vitals:   /74 (BP Location: Right arm, Patient Position: Sitting, Cuff Size: Standard)   Pulse 81   Ht 5' 11\" (1 803 m)   Wt 69 8 kg (153 lb 12 8 oz)   SpO2 100%   BMI 21 45 kg/m²   Body mass index is 21 45 kg/m²  Vitals:    06/15/23 1451   Weight: 69 8 kg (153 lb 12 8 oz)       Physical Exam  Vitals and nursing note reviewed  Constitutional:       Appearance: Normal appearance  Cardiovascular:      Rate and Rhythm: Normal rate and regular rhythm  Heart sounds: Normal heart sounds  Pulmonary:      Effort: Pulmonary effort is normal       Breath sounds: Normal breath sounds  Musculoskeletal:      Right lower leg: No edema  Left lower leg: No edema  Comments: Mild tenderness in the low back area  SLR about 40 to 50 degrees on the right side 30 on the left side no focal deficits   Neurological:      Mental Status: He is alert           Lab Review   Orders Only on 04/28/2023   Component Date Value Ref Range " Status   • Total Cholesterol 04/28/2023 190  <200 mg/dL Final   • HDL 04/28/2023 67  > OR = 40 mg/dL Final   • Triglycerides 04/28/2023 54  <150 mg/dL Final   • LDL Calculated 04/28/2023 109 (H)  mg/dL (calc) Final    Comment: Reference range: <100     Desirable range <100 mg/dL for primary prevention;    <70 mg/dL for patients with CHD or diabetic patients   with > or = 2 CHD risk factors  LDL-C is now calculated using the Jan-Cline   calculation, which is a validated novel method providing   better accuracy than the Friedewald equation in the   estimation of LDL-C  Yumiko Bledsoe et al  [facility]  7483;355(38): 5570-1413   (http://[employer]/faq/OSX272)     • Chol HDLC Ratio 04/28/2023 2 8  <5 0 (calc) Final   • Non-HDL Cholesterol 04/28/2023 123  <130 mg/dL (calc) Final    Comment: For patients with diabetes plus 1 major ASCVD risk   factor, treating to a non-HDL-C goal of <100 mg/dL   (LDL-C of <70 mg/dL) is considered a therapeutic   option  • Glucose, Random 04/28/2023 88  65 - 99 mg/dL Final    Comment:               Fasting reference interval        • BUN 04/28/2023 21  7 - 25 mg/dL Final   • Creatinine 04/28/2023 1 08  0 70 - 1 35 mg/dL Final   • eGFR 04/28/2023 76  > OR = 60 mL/min/1 73m2 Final    Comment: The eGFR is based on the CKD-EPI 2021 equation  To calculate   the new eGFR from a previous Creatinine or Cystatin C  result, go to [employer] at  org/professionals/  kdoqi/[email or web address]     • SL AMB BUN/CREATININE RATIO 46/45/3259 NOT APPLICABLE  6 - 22 (calc) Final   • Sodium 04/28/2023 140  135 - 146 mmol/L Final   • Potassium 04/28/2023 4 8  3 5 - 5 3 mmol/L Final   • Chloride 04/28/2023 104  98 - 110 mmol/L Final   • CO2 04/28/2023 29  20 - 32 mmol/L Final   • Calcium 04/28/2023 9 3  8 6 - 10 3 mg/dL Final   • Protein, Total 04/28/2023 6 7  6 1 - 8 1 g/dL Final   • Albumin 04/28/2023 4 1  3 6 - 5 1 g/dL Final   • Globulin 04/28/2023 2 6  1 9 - 3 7 g/dL (calc) Final • Albumin/Globulin Ratio 04/28/2023 1 6  1 0 - 2 5 (calc) Final   • TOTAL BILIRUBIN 04/28/2023 0 4  0 2 - 1 2 mg/dL Final   • Alkaline Phosphatase 04/28/2023 68  35 - 144 U/L Final   • AST 04/28/2023 15  10 - 35 U/L Final   • ALT 04/28/2023 13  9 - 46 U/L Final   • Color UA 04/28/2023 YELLOW  YELLOW Final   • Urine Appearance 04/28/2023 CLEAR  CLEAR Final   • Specific Gravity 04/28/2023 1 024  1 001 - 1 035 Final   • Ph 04/28/2023 < OR = 5 0  5 0 - 8 0 Final   • Glucose, Urine 04/28/2023 NEGATIVE  NEGATIVE Final   • Bilirubin, Urine 04/28/2023 NEGATIVE  NEGATIVE Final   • Ketone, Urine 04/28/2023 TRACE (A)  NEGATIVE Final   • Blood, Urine 04/28/2023 NEGATIVE  NEGATIVE Final   • Protein, Urine 04/28/2023 NEGATIVE  NEGATIVE Final   • SL AMB NITRITES URINE, QUAL  04/28/2023 NEGATIVE  NEGATIVE Final   • Leukocyte Esterase 04/28/2023 NEGATIVE  NEGATIVE Final   • SL AMB WBC, URINE 04/28/2023 0-5  < OR = 5 /HPF Final   • RBC, Urine 04/28/2023 NONE SEEN  < OR = 2 /HPF Final   • Squamous Epithelial Cells 04/28/2023 NONE SEEN  < OR = 5 /HPF Final   • Bacteria, UA 04/28/2023 NONE SEEN  NONE SEEN /HPF Final   • Hyaline Casts 04/28/2023 NONE SEEN  NONE SEEN /LPF Final   • Comment(s) 04/28/2023    Final    Comment: This urine was analyzed for the presence of WBC,   RBC, bacteria, casts, and other formed elements  Only those elements seen were reported             • White Blood Cell Count 04/28/2023 6 6  3 8 - 10 8 Thousand/uL Final   • Red Blood Cell Count 04/28/2023 4 77  4 20 - 5 80 Million/uL Final   • Hemoglobin 04/28/2023 16 8  13 2 - 17 1 g/dL Final   • HCT 04/28/2023 48 8  38 5 - 50 0 % Final   • MCV 04/28/2023 102 3 (H)  80 0 - 100 0 fL Final   • MCH 04/28/2023 35 2 (H)  27 0 - 33 0 pg Final   • MCHC 04/28/2023 34 4  32 0 - 36 0 g/dL Final   • RDW 04/28/2023 13 0  11 0 - 15 0 % Final   • Platelet Count 14/75/4023 487 (H)  140 - 400 Thousand/uL Final   • SL AMB MPV 04/28/2023 9 2  7 5 - 12 5 fL Final   • Neutrophils (Absolute) 04/28/2023 4,712  1,500 - 7,800 cells/uL Final   • Lymphocytes (Absolute) 04/28/2023 937  850 - 3,900 cells/uL Final   • Monocytes (Absolute) 04/28/2023 521  200 - 950 cells/uL Final   • Eosinophils (Absolute) 04/28/2023 356  15 - 500 cells/uL Final   • Basophils ABS 04/28/2023 73  0 - 200 cells/uL Final   • Neutrophils 04/28/2023 71 4  % Final   • Lymphocytes 04/28/2023 14 2  % Final   • Monocytes 04/28/2023 7 9  % Final   • Eosinophils 04/28/2023 5 4  % Final   • Basophils PCT 04/28/2023 1 1  % Final   • HEP C AB 04/28/2023 NON-REACTIVE  NON-REACTIVE Final   • Signal to Cut-Off 04/28/2023 0 08  <1 00 Final    Comment:    HCV antibody was non-reactive  There is no laboratory   evidence of HCV infection  In most cases, no further action is required  However,  if recent HCV exposure is suspected, a test for HCV RNA  (test code 32429) is suggested  For additional information please refer to  http://[employer]/faq/TJZ92j0  (This link is being provided for informational/  educational purposes only )        • Prostate Specific Antigen Total 04/28/2023 2 08  < OR = 4 00 ng/mL Final    Comment: The total PSA value from this assay system is   standardized against the WHO standard  The test   result will be approximately 20% lower when compared   to the equimolar-standardized total PSA (Naveed   [place])  Comparison of serial PSA results should be   interpreted with this fact in mind  This test was performed using the Siemens   chemiluminescent method  Values obtained from   different assay methods cannot be used  interchangeably  PSA levels, regardless of  value, should not be interpreted as absolute  evidence of the presence or absence of disease  • TSH W/RFX TO FREE T4 04/28/2023 1 55  0 40 - 4 50 mIU/L Final   • Hemoglobin A1C 04/28/2023 5 1  <5 7 % of total Hgb Final    Comment:  For the purpose of screening for the presence of  diabetes:     <5 7%       Consistent with the "absence of diabetes  5 7-6 4%    Consistent with increased risk for diabetes              (prediabetes)  > or =6 5%  Consistent with diabetes     This assay result is consistent with a decreased risk  of diabetes  Currently, no consensus exists regarding use of  hemoglobin A1c for diagnosis of diabetes in children  According to American Diabetes Association (ADA)  guidelines, hemoglobin A1c <7 0% represents optimal  control in non-pregnant diabetic patients  Different  metrics may apply to specific patient populations  Standards of Medical Care in Diabetes(ADA)  • Urine Culture, Comprehensive 04/28/2023    Final    NO CULTURE INDICATED         Flip Carter MD        \"This note has been constructed using a voice recognition system  Therefore there may be syntax, spelling, and/or grammatical errors  Please call if you have any questions   \"  "

## 2023-06-22 ENCOUNTER — OFFICE VISIT (OUTPATIENT)
Dept: FAMILY MEDICINE CLINIC | Facility: CLINIC | Age: 63
End: 2023-06-22
Payer: COMMERCIAL

## 2023-06-22 VITALS — HEART RATE: 88 BPM | BODY MASS INDEX: 29.83 KG/M2 | OXYGEN SATURATION: 97 % | WEIGHT: 158 LBS | HEIGHT: 61 IN

## 2023-06-22 DIAGNOSIS — M54.50 ACUTE MIDLINE LOW BACK PAIN WITHOUT SCIATICA: Primary | ICD-10-CM

## 2023-06-22 DIAGNOSIS — N52.8 OTHER MALE ERECTILE DYSFUNCTION: ICD-10-CM

## 2023-06-22 DIAGNOSIS — E03.9 ACQUIRED HYPOTHYROIDISM: ICD-10-CM

## 2023-06-22 DIAGNOSIS — E78.5 HYPERLIPIDEMIA, UNSPECIFIED HYPERLIPIDEMIA TYPE: ICD-10-CM

## 2023-06-22 DIAGNOSIS — D75.839 THROMBOCYTOSIS: ICD-10-CM

## 2023-06-22 DIAGNOSIS — M05.79 RHEUMATOID ARTHRITIS INVOLVING MULTIPLE SITES WITH POSITIVE RHEUMATOID FACTOR (HCC): ICD-10-CM

## 2023-06-22 PROCEDURE — 99213 OFFICE O/P EST LOW 20 MIN: CPT | Performed by: INTERNAL MEDICINE

## 2023-06-22 NOTE — PROGRESS NOTES
Office Visit Note  23     Esau Marcano 70 y o  male MRN: [id number]  : 1960    Assessment:     1  Acute midline low back pain without sciatica  Assessment & Plan:  Patient pain is much better examination at present time is unremarkable no tenderness noted in the lumbosacral spine SLR is about 40 to 50 degrees on both sides no focal deficits noted at this time  Patient is at present time should be able to go back to work tomorrow  2  Thrombocytosis    3  Rheumatoid arthritis involving multiple sites with positive rheumatoid factor ([facility] [address] )    4  Other male erectile dysfunction    5  Acquired hypothyroidism  Assessment & Plan:  Continue levothyroxine 100 mcg daily      6  Hyperlipidemia, unspecified hyperlipidemia type  Assessment & Plan:  Last visit LDL on the blood test was 109 we will repeat it after the 3-4 months and follow-up        BMI Counseling: Body mass index is 29 85 kg/m²  The BMI is above normal  Nutrition recommendations include decreasing portion sizes, decreasing fast food intake, consuming healthier snacks, moderation in carbohydrate intake and reducing intake of cholesterol  Exercise recommendations include moderate physical activity 150 minutes/week  No pharmacotherapy was ordered  Rationale for BMI follow-up plan is due to patient being overweight or obese  Discussion Summary and Plan: Today's care plan and medications were reviewed with patient in detail and all their questions answered to their satisfaction  Chief Complaint   Patient presents with   • Follow-up      Subjective:  Patient is coming here for a follow-up evaluation regarding low back pain for which she was seen few days back and was prescribed anti-inflammatories, muscle relaxers and pain medications patient has responded well to this  Currently ambulatory without much of any discomfort or pain    Paperwork with related to workplace has been completed patient will be able to go back to work tomorrow June 23  The following portions of the patient's history were reviewed and updated as appropriate: allergies, current medications, past family history, past medical history, past social history, past surgical history and problem list     Review of Systems   Constitutional: Negative for chills and fever  HENT: Negative for ear pain and sore throat  Eyes: Negative for pain and visual disturbance  Respiratory: Negative for cough and shortness of breath  Cardiovascular: Negative for chest pain and palpitations  Gastrointestinal: Negative for abdominal pain and vomiting  Genitourinary: Negative for dysuria and hematuria  Musculoskeletal: Negative for arthralgias and back pain  Skin: Negative for color change and rash  Neurological: Negative for seizures and syncope  All other systems reviewed and are negative          Historical Information   Patient Active Problem List   Diagnosis   • Hyperlipidemia   • Hypothyroidism   • Thrombocytosis   • Rheumatoid arthritis involving multiple sites with positive rheumatoid factor (HCC)   • Lipoma of torso   • Other male erectile dysfunction   • Acute midline low back pain without sciatica     Past Medical History:   Diagnosis Date   • Arthritis    • Disease of thyroid gland     hypo   • Graves disease 1995   • Hallux limitus, acquired, right    • Hypothyroid     hypo   • Other tear of medial meniscus, current injury, right knee, initial encounter 6/6/2018    Added automatically from request for surgery [id number]   • Platelet disorder ([facility] [address] )     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 Stacey Gonzalez   • RA (rheumatoid arthritis) ([facility] [address] )    • Rheumatoid arthritis involving multiple sites ([facility] [address] ) 9/19/2022   • Wears glasses      Past Surgical History:   Procedure Laterality Date   • ANKLE SURGERY Right     ligament, chipped bones,dislocated   • BUNIONECTOMY  03/2014   • COLONOSCOPY     • ELBOW SURGERY Right     tendon surgery   • FOOT ARTHRODESIS, MODIFIED ARELLANO Left    • FRACTURE SURGERY Left     elbow   • HERNIA REPAIR Right     inguinal   • SC ARTHRS KNE SURG W/MENISCECTOMY MED/LAT W/SHVG Right 2018    Procedure: [facility] [place] /MEDIAL;  Surgeon: Frank Diaz MD;  Location: [facility];  Service: Orthopedics   • SC CORRECTION HAMMERTOE Bilateral 2021    Procedure: REPAIR HAMMERTOE / MALLET TOE / CLAW TOE 2ND toe bilateral;  Surgeon: Jill Roman DPM;  Location: [address] [place] [address];  Service: Podiatry   • SC CORRJ HALLUX VALGUS W/SESMDC W/RESCJ PROX PHAL Right 2021    Procedure: Clay Query;  Surgeon: Jill Roman DPM;  Location: [address] [place] [address];  Service: Podiatry   • ROTATOR CUFF REPAIR Left    • WISDOM TOOTH EXTRACTION      x4   • WRIST SURGERY       Social History     Substance and Sexual Activity   Alcohol Use Yes    Comment: occ beer with going out to dinner     Social History     Substance and Sexual Activity   Drug Use No     Social History     Tobacco Use   Smoking Status Former   • Packs/day:    • Years: 20    • Total pack years: 20 00   • Types: Cigarettes   • Quit date:    • Years since quittin 4   • Passive exposure: Past   Smokeless Tobacco Never     Family History   Problem Relation Age of Onset   • Cancer Mother         lung-heavy smoker   • Hypertension Father    • Heart disease Father         leaky valve   • Skin cancer Father      Health Maintenance Due   Topic   • HIV Screening    • BMI: Followup Plan    • Annual Physical    • DTaP,Tdap,and Td Vaccines (1 - Tdap)   • Pneumococcal Vaccine: 65+ Years (1 - PCV)   • Influenza Vaccine (Season Ended)      Meds/Allergies       Current Outpatient Medications:   •  Cholecalciferol (VITAMIN D3) 1000 units CAPS, Take 1,000 Units by mouth, Disp: , Rfl:   •  cyclobenzaprine (FLEXERIL) 10 mg tablet, Take 1 tablet (10 mg total) by mouth 3 (three) times a day as needed for muscle spasms, Disp: 30 tablet, Rfl: 0  •  HYDROcodone-acetaminophen (Norco) 5-325 mg per tablet, Take 1 tablet by mouth 2 (two) "times a day as needed for pain Max Daily Amount: 2 tablets, Disp: 10 tablet, Rfl: 0  •  Hydroxyurea (HYDREA PO), Take 1,000 mg by mouth Mon-Wed-Fri, Disp: , Rfl:   •  hydroxyurea (HYDREA) 500 mg capsule, Take by mouth On Tues-Thurs-Sat-Sun , Disp: , Rfl:   •  levothyroxine 150 mcg tablet, every morning  , Disp: , Rfl:   •  naproxen (Naprosyn) 500 mg tablet, Take 1 tablet (500 mg total) by mouth 2 (two) times a day as needed for mild pain, Disp: 20 tablet, Rfl: 1  •  sildenafil (VIAGRA) 100 mg tablet, Take 1 tablet (100 mg total) by mouth if needed for erectile dysfunction As directed, Disp: 6 tablet, Rfl: 12      Objective:    Vitals:   Pulse 88   Ht 5' 1\" (1 549 m)   Wt 71 7 kg (158 lb)   SpO2 97%   BMI 29 85 kg/m²   Body mass index is 29 85 kg/m²  Vitals:    06/22/23 1408   Weight: 71 7 kg (158 lb)       Physical Exam  Vitals and nursing note reviewed  Constitutional:       Appearance: Normal appearance  Cardiovascular:      Rate and Rhythm: Normal rate and regular rhythm  Heart sounds: Normal heart sounds  Pulmonary:      Effort: Pulmonary effort is normal       Breath sounds: Normal breath sounds  Abdominal:      Palpations: Abdomen is soft  Musculoskeletal:      Right lower leg: No edema  Left lower leg: No edema  Comments: SLR about 40 to 50 degrees both sides  Skin:     General: Skin is warm and dry  Neurological:      Mental Status: He is alert and oriented to person, place, and time  Lab Review   Orders Only on 04/28/2023   Component Date Value Ref Range Status   • Total Cholesterol 04/28/2023 190  <200 mg/dL Final   • HDL 04/28/2023 67  > OR = 40 mg/dL Final   • Triglycerides 04/28/2023 54  <150 mg/dL Final   • LDL Calculated 04/28/2023 109 (H)  mg/dL (calc) Final    Comment: Reference range: <100     Desirable range <100 mg/dL for primary prevention;    <70 mg/dL for patients with CHD or diabetic patients   with > or = 2 CHD risk factors       LDL-C is now " calculated using the Jan-Cline   calculation, which is a validated novel method providing   better accuracy than the Friedewald equation in the   estimation of LDL-C  Ofelia Acosta  Jyoti Robledo  [facility]3;532(43): 3230-7522   (http://[employer]/faq/EGN777)     • Chol HDLC Ratio 04/28/2023 2 8  <5 0 (calc) Final   • Non-HDL Cholesterol 04/28/2023 123  <130 mg/dL (calc) Final    Comment: For patients with diabetes plus 1 major ASCVD risk   factor, treating to a non-HDL-C goal of <100 mg/dL   (LDL-C of <70 mg/dL) is considered a therapeutic   option  • Glucose, Random 04/28/2023 88  65 - 99 mg/dL Final    Comment:               Fasting reference interval        • BUN 04/28/2023 21  7 - 25 mg/dL Final   • Creatinine 04/28/2023 1 08  0 70 - 1 35 mg/dL Final   • eGFR 04/28/2023 76  > OR = 60 mL/min/1 73m2 Final    Comment: The eGFR is based on the CKD-EPI 2021 equation  To calculate   the new eGFR from a previous Creatinine or Cystatin C  result, go to [employer] at  org/professionals/  kdoqi/[email or web address]     • SL AMB BUN/CREATININE RATIO 95/33/8826 NOT APPLICABLE  6 - 22 (calc) Final   • Sodium 04/28/2023 140  135 - 146 mmol/L Final   • Potassium 04/28/2023 4 8  3 5 - 5 3 mmol/L Final   • Chloride 04/28/2023 104  98 - 110 mmol/L Final   • CO2 04/28/2023 29  20 - 32 mmol/L Final   • Calcium 04/28/2023 9 3  8 6 - 10 3 mg/dL Final   • Protein, Total 04/28/2023 6 7  6 1 - 8 1 g/dL Final   • Albumin 04/28/2023 4 1  3 6 - 5 1 g/dL Final   • Globulin 04/28/2023 2 6  1 9 - 3 7 g/dL (calc) Final   • Albumin/Globulin Ratio 04/28/2023 1 6  1 0 - 2 5 (calc) Final   • TOTAL BILIRUBIN 04/28/2023 0 4  0 2 - 1 2 mg/dL Final   • Alkaline Phosphatase 04/28/2023 68  35 - 144 U/L Final   • AST 04/28/2023 15  10 - 35 U/L Final   • ALT 04/28/2023 13  9 - 46 U/L Final   • Color UA 04/28/2023 YELLOW  YELLOW Final   • Urine Appearance 04/28/2023 CLEAR  CLEAR Final   • Specific Gravity 04/28/2023 1 024  1 001 - 1 035 Final   • Ph 04/28/2023 < OR = 5 0  5 0 - 8 0 Final   • Glucose, Urine 04/28/2023 NEGATIVE  NEGATIVE Final   • Bilirubin, Urine 04/28/2023 NEGATIVE  NEGATIVE Final   • Ketone, Urine 04/28/2023 TRACE (A)  NEGATIVE Final   • Blood, Urine 04/28/2023 NEGATIVE  NEGATIVE Final   • Protein, Urine 04/28/2023 NEGATIVE  NEGATIVE Final   • SL AMB NITRITES URINE, QUAL  04/28/2023 NEGATIVE  NEGATIVE Final   • Leukocyte Esterase 04/28/2023 NEGATIVE  NEGATIVE Final   • SL AMB WBC, URINE 04/28/2023 0-5  < OR = 5 /HPF Final   • RBC, Urine 04/28/2023 NONE SEEN  < OR = 2 /HPF Final   • Squamous Epithelial Cells 04/28/2023 NONE SEEN  < OR = 5 /HPF Final   • Bacteria, UA 04/28/2023 NONE SEEN  NONE SEEN /HPF Final   • Hyaline Casts 04/28/2023 NONE SEEN  NONE SEEN /LPF Final   • Comment(s) 04/28/2023    Final    Comment: This urine was analyzed for the presence of WBC,   RBC, bacteria, casts, and other formed elements  Only those elements seen were reported             • White Blood Cell Count 04/28/2023 6 6  3 8 - 10 8 Thousand/uL Final   • Red Blood Cell Count 04/28/2023 4 77  4 20 - 5 80 Million/uL Final   • Hemoglobin 04/28/2023 16 8  13 2 - 17 1 g/dL Final   • HCT 04/28/2023 48 8  38 5 - 50 0 % Final   • MCV 04/28/2023 102 3 (H)  80 0 - 100 0 fL Final   • MCH 04/28/2023 35 2 (H)  27 0 - 33 0 pg Final   • MCHC 04/28/2023 34 4  32 0 - 36 0 g/dL Final   • RDW 04/28/2023 13 0  11 0 - 15 0 % Final   • Platelet Count 36/41/8216 487 (H)  140 - 400 Thousand/uL Final   • SL AMB MPV 04/28/2023 9 2  7 5 - 12 5 fL Final   • Neutrophils (Absolute) 04/28/2023 4,712  1,500 - 7,800 cells/uL Final   • Lymphocytes (Absolute) 04/28/2023 937  850 - 3,900 cells/uL Final   • Monocytes (Absolute) 04/28/2023 521  200 - 950 cells/uL Final   • Eosinophils (Absolute) 04/28/2023 356  15 - 500 cells/uL Final   • Basophils ABS 04/28/2023 73  0 - 200 cells/uL Final   • Neutrophils 04/28/2023 71 4  % Final   • Lymphocytes 04/28/2023 14 2  % Final   • Monocytes 04/28/2023 7 9  % Final   • Eosinophils 04/28/2023 5 4  % Final   • Basophils PCT 04/28/2023 1 1  % Final   • HEP C AB 04/28/2023 NON-REACTIVE  NON-REACTIVE Final   • Signal to Cut-Off 04/28/2023 0 08  <1 00 Final    Comment:    HCV antibody was non-reactive  There is no laboratory   evidence of HCV infection  In most cases, no further action is required  However,  if recent HCV exposure is suspected, a test for HCV RNA  (test code 50017) is suggested  For additional information please refer to  http://[employer]/faq/FUR90z9  (This link is being provided for informational/  educational purposes only )        • Prostate Specific Antigen Total 04/28/2023 2 08  < OR = 4 00 ng/mL Final    Comment: The total PSA value from this assay system is   standardized against the WHO standard  The test   result will be approximately 20% lower when compared   to the equimolar-standardized total PSA (Naveed   [place])  Comparison of serial PSA results should be   interpreted with this fact in mind  This test was performed using the Siemens   chemiluminescent method  Values obtained from   different assay methods cannot be used  interchangeably  PSA levels, regardless of  value, should not be interpreted as absolute  evidence of the presence or absence of disease  • TSH W/RFX TO FREE T4 04/28/2023 1 55  0 40 - 4 50 mIU/L Final   • Hemoglobin A1C 04/28/2023 5 1  <5 7 % of total Hgb Final    Comment: For the purpose of screening for the presence of  diabetes:     <5 7%       Consistent with the absence of diabetes  5 7-6 4%    Consistent with increased risk for diabetes              (prediabetes)  > or =6 5%  Consistent with diabetes     This assay result is consistent with a decreased risk  of diabetes  Currently, no consensus exists regarding use of  hemoglobin A1c for diagnosis of diabetes in children       According to American Diabetes Association (ADA)  guidelines, hemoglobin A1c <7 0% "represents optimal  control in non-pregnant diabetic patients  Different  metrics may apply to specific patient populations  Standards of Medical Care in Diabetes(ADA)  • Urine Culture, Comprehensive 04/28/2023    Final    NO CULTURE INDICATED         Yolie Aparicio MD        \"This note has been constructed using a voice recognition system  Therefore there may be syntax, spelling, and/or grammatical errors  Please call if you have any questions   \"  "

## 2023-06-22 NOTE — ASSESSMENT & PLAN NOTE
Patient pain is much better examination at present time is unremarkable no tenderness noted in the lumbosacral spine SLR is about 40 to 50 degrees on both sides no focal deficits noted at this time  Patient is at present time should be able to go back to work tomorrow

## 2023-07-21 ENCOUNTER — OFFICE VISIT (OUTPATIENT)
Dept: FAMILY MEDICINE CLINIC | Facility: CLINIC | Age: 63
End: 2023-07-21
Payer: COMMERCIAL

## 2023-07-21 VITALS
HEART RATE: 78 BPM | OXYGEN SATURATION: 98 % | HEIGHT: 71 IN | DIASTOLIC BLOOD PRESSURE: 70 MMHG | WEIGHT: 157.4 LBS | SYSTOLIC BLOOD PRESSURE: 110 MMHG | BODY MASS INDEX: 22.04 KG/M2

## 2023-07-21 DIAGNOSIS — M54.50 ACUTE MIDLINE LOW BACK PAIN WITHOUT SCIATICA: ICD-10-CM

## 2023-07-21 DIAGNOSIS — N52.8 OTHER MALE ERECTILE DYSFUNCTION: ICD-10-CM

## 2023-07-21 DIAGNOSIS — E03.9 ACQUIRED HYPOTHYROIDISM: Primary | ICD-10-CM

## 2023-07-21 DIAGNOSIS — D75.839 THROMBOCYTOSIS: ICD-10-CM

## 2023-07-21 DIAGNOSIS — M05.79 RHEUMATOID ARTHRITIS INVOLVING MULTIPLE SITES WITH POSITIVE RHEUMATOID FACTOR (HCC): ICD-10-CM

## 2023-07-21 DIAGNOSIS — E78.5 HYPERLIPIDEMIA, UNSPECIFIED HYPERLIPIDEMIA TYPE: ICD-10-CM

## 2023-07-21 PROCEDURE — 99213 OFFICE O/P EST LOW 20 MIN: CPT | Performed by: INTERNAL MEDICINE

## 2023-07-21 NOTE — ASSESSMENT & PLAN NOTE
For rheumatoid arthritis patient currently not on any medications and recent lab works done by the rheumatologist regarding C-reactive protein and sed rates have come back normal he was on Plaquenil in the past

## 2023-07-21 NOTE — PROGRESS NOTES
Office Visit Note  23     Kathy Cooper 70 y.o. male MRN: [id number]  : 1960    Assessment:     1. Acquired hypothyroidism  Assessment & Plan:  Last TSH level is 1.55 currently taking levothyroxine 100 mcg daily continue the same      2. Acute midline low back pain without sciatica  Assessment & Plan:  Low back pain resolved currently not on any medications      3. Hyperlipidemia, unspecified hyperlipidemia type  Assessment & Plan:  Last lab report shows cholesterol 190 HDL 67 triglycerides 54 LDL at 109 we will continue to monitor      4. Other male erectile dysfunction  Assessment & Plan:  Patient on sildenafil 100 mg as needed      5. Rheumatoid arthritis involving multiple sites with positive rheumatoid factor ([address])  Assessment & Plan: For rheumatoid arthritis patient currently not on any medications and recent lab works done by the rheumatologist regarding C-reactive protein and sed rates have come back normal he was on Plaquenil in the past      6. Thrombocytosis  Assessment & Plan:  Continue hydroxyurea 1000 mg alternating with 500 mg. Last platelet count is 516. Discussion Summary and Plan: Today's care plan and medications were reviewed with patient in detail and all their questions answered to their satisfaction. Chief Complaint   Patient presents with   • Follow-up      Subjective:  Patient is coming here for a follow-up evaluation with regards to his symptoms of low back pain which got better after he started taking the muscle relaxers. Currently not taking any medications including hydrocodone naproxen and Flexeril. History of thrombocytosis and hypothyroidism. Medications reviewed labs reviewed.       The following portions of the patient's history were reviewed and updated as appropriate: allergies, current medications, past family history, past medical history, past social history, past surgical history and problem list.    Review of Systems   Constitutional: Negative for chills and fever. HENT: Negative for ear pain and sore throat. Eyes: Negative for pain and visual disturbance. Respiratory: Negative for cough and shortness of breath. Cardiovascular: Negative for chest pain and palpitations. Gastrointestinal: Negative for abdominal pain and vomiting. Genitourinary: Negative for dysuria and hematuria. Musculoskeletal: Negative for arthralgias and back pain. Skin: Negative for color change and rash. Neurological: Negative for seizures and syncope. All other systems reviewed and are negative.         Historical Information   Patient Active Problem List   Diagnosis   • Hyperlipidemia   • Hypothyroidism   • Thrombocytosis   • Rheumatoid arthritis involving multiple sites with positive rheumatoid factor (HCC)   • Lipoma of torso   • Other male erectile dysfunction   • Acute midline low back pain without sciatica     Past Medical History:   Diagnosis Date   • Arthritis    • Disease of thyroid gland     hypo   • Graves disease 1995   • Hallux limitus, acquired, right    • Hypothyroid     hypo   • Other tear of medial meniscus, current injury, right knee, initial encounter 6/6/2018    Added automatically from request for surgery [id number]   • 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 Alvarado Bloodgovalerie   • RA (rheumatoid arthritis) ([address])    • Rheumatoid arthritis involving multiple sites ([address]) 9/19/2022   • Wears glasses      Past Surgical History:   Procedure Laterality Date   • ANKLE SURGERY Right     ligament, chipped bones,dislocated   • BUNIONECTOMY  03/2014   • COLONOSCOPY     • ELBOW SURGERY Right     tendon surgery   • FOOT ARTHRODESIS, MODIFIED ARELLANO Left    • FRACTURE SURGERY Left     elbow   • HERNIA REPAIR Right     inguinal   • VT ARTHRS KNE SURG W/MENISCECTOMY MED/LAT W/SHVG Right 07/09/2018    Procedure: MENISCECTOMY LATERAL /MEDIAL;  Surgeon: Brad Thomason MD;  Location: [facility];  Service: Orthopedics   • VT CORRECTION NEHAL Bilateral 2021    Procedure: REPAIR HAMMERTOE / MALLET TOE / CLAW TOE 2ND toe bilateral;  Surgeon: Bella Calix DPM;  Location: [address] [place] [address] MAIN OR;  Service: Podiatry   • [address] W/SESMDC W/RESCJ PROX PHAL Right 2021    Procedure: Hinds Chimera;  Surgeon: Bella Calix DPM;  Location: [facility];  Service: Podiatry   • ROTATOR CUFF REPAIR Left    • WISDOM TOOTH EXTRACTION      x4   • WRIST SURGERY       Social History     Substance and Sexual Activity   Alcohol Use Yes    Comment: occ beer with going out to dinner     Social History     Substance and Sexual Activity   Drug Use No     Social History     Tobacco Use   Smoking Status Former   • Packs/day: 1.00   • Years: 20.00   • Total pack years: 20.00   • Types: Cigarettes   • Quit date:    • Years since quittin.5   • Passive exposure: Past   Smokeless Tobacco Never     Family History   Problem Relation Age of Onset   • Cancer Mother         lung-heavy smoker   • Hypertension Father    • Heart disease Father         leaky valve   • Skin cancer Father      Health Maintenance Due   Topic   • HIV Screening    • Annual Physical    • DTaP,Tdap,and Td Vaccines (1 - Tdap)   • Pneumococcal Vaccine: 65+ Years (1 - PCV)   • Influenza Vaccine (1)      Meds/Allergies       Current Outpatient Medications:   •  Cholecalciferol (VITAMIN D3) 1000 units CAPS, Take 1,000 Units by mouth, Disp: , Rfl:   •  Hydroxyurea (HYDREA PO), Take 1,000 mg by mouth Mon-Wed-Fri, Disp: , Rfl:   •  hydroxyurea (HYDREA) 500 mg capsule, Take by mouth On -Sat-Sun , Disp: , Rfl:   •  levothyroxine 150 mcg tablet, every morning  , Disp: , Rfl:   •  sildenafil (VIAGRA) 100 mg tablet, Take 1 tablet (100 mg total) by mouth if needed for erectile dysfunction As directed, Disp: 6 tablet, Rfl: 12      Objective:    Vitals:   /70 (BP Location: Right arm, Patient Position: Sitting, Cuff Size: Standard)   Pulse 78   Ht 5' 11" (1.803 m)   Wt 71.4 kg (157 lb 6.4 oz)   SpO2 98%   BMI 21.95 kg/m²   Body mass index is 21.95 kg/m². Vitals:    07/21/23 0932   Weight: 71.4 kg (157 lb 6.4 oz)       Physical Exam  Vitals and nursing note reviewed. Constitutional:       Appearance: Normal appearance. Cardiovascular:      Rate and Rhythm: Normal rate and regular rhythm. Heart sounds: Normal heart sounds. Pulmonary:      Effort: Pulmonary effort is normal.      Breath sounds: Normal breath sounds. Abdominal:      Palpations: Abdomen is soft. Musculoskeletal:      Right lower leg: No edema. Left lower leg: No edema. Skin:     General: Skin is warm and dry. Neurological:      Mental Status: He is alert and oriented to person, place, and time. Lab Review   No visits with results within 2 Month(s) from this visit. Latest known visit with results is:   Orders Only on 04/28/2023   Component Date Value Ref Range Status   • Total Cholesterol 04/28/2023 190  <200 mg/dL Final   • HDL 04/28/2023 67  > OR = 40 mg/dL Final   • Triglycerides 04/28/2023 54  <150 mg/dL Final   • LDL Calculated 04/28/2023 109 (H)  mg/dL (calc) Final    Comment: Reference range: <100     Desirable range <100 mg/dL for primary prevention;    <70 mg/dL for patients with CHD or diabetic patients   with > or = 2 CHD risk factors. LDL-C is now calculated using the Jan-Cline   calculation, which is a validated novel method providing   better accuracy than the Friedewald equation in the   estimation of LDL-C. Will Bernstein al. Roseann Ricks. 0635;828(73): 6076-6464   (http://education. [employer]. com/faq/BPZ190)     • Chol HDLC Ratio 04/28/2023 2.8  <5.0 (calc) Final   • Non-HDL Cholesterol 04/28/2023 123  <130 mg/dL (calc) Final    Comment: For patients with diabetes plus 1 major ASCVD risk   factor, treating to a non-HDL-C goal of <100 mg/dL   (LDL-C of <70 mg/dL) is considered a therapeutic   option.      • Glucose, Random 04/28/2023 88  65 - 99 mg/dL Final    Comment: Fasting reference interval        • BUN 04/28/2023 21  7 - 25 mg/dL Final   • Creatinine 04/28/2023 1.08  0.70 - 1.35 mg/dL Final   • eGFR 04/28/2023 76  > OR = 60 mL/min/1.73m2 Final    Comment: The eGFR is based on the CKD-EPI 2021 equation. To calculate   the new eGFR from a previous Creatinine or Cystatin C  result, go to [employer]. org/professionals/  kdoqi/[email or web address]     • SL AMB BUN/CREATININE RATIO 35/33/4887 NOT APPLICABLE  6 - 22 (calc) Final   • Sodium 04/28/2023 140  135 - 146 mmol/L Final   • Potassium 04/28/2023 4.8  3.5 - 5.3 mmol/L Final   • Chloride 04/28/2023 104  98 - 110 mmol/L Final   • CO2 04/28/2023 29  20 - 32 mmol/L Final   • Calcium 04/28/2023 9.3  8.6 - 10.3 mg/dL Final   • Protein, Total 04/28/2023 6.7  6.1 - 8.1 g/dL Final   • Albumin 04/28/2023 4.1  3.6 - 5.1 g/dL Final   • Globulin 04/28/2023 2.6  1.9 - 3.7 g/dL (calc) Final   • Albumin/Globulin Ratio 04/28/2023 1.6  1.0 - 2.5 (calc) Final   • TOTAL BILIRUBIN 04/28/2023 0.4  0.2 - 1.2 mg/dL Final   • Alkaline Phosphatase 04/28/2023 68  35 - 144 U/L Final   • AST 04/28/2023 15  10 - 35 U/L Final   • ALT 04/28/2023 13  9 - 46 U/L Final   • Color UA 04/28/2023 YELLOW  YELLOW Final   • Urine Appearance 04/28/2023 CLEAR  CLEAR Final   • Specific Gravity 04/28/2023 1.024  1.001 - 1.035 Final   • Ph 04/28/2023 < OR = 5.0  5.0 - 8.0 Final   • Glucose, Urine 04/28/2023 NEGATIVE  NEGATIVE Final   • Bilirubin, Urine 04/28/2023 NEGATIVE  NEGATIVE Final   • Ketone, Urine 04/28/2023 TRACE (A)  NEGATIVE Final   • Blood, Urine 04/28/2023 NEGATIVE  NEGATIVE Final   • Protein, Urine 04/28/2023 NEGATIVE  NEGATIVE Final   • SL AMB NITRITES URINE, QUAL. 04/28/2023 NEGATIVE  NEGATIVE Final   • Leukocyte Esterase 04/28/2023 NEGATIVE  NEGATIVE Final   • SL AMB WBC, URINE 04/28/2023 0-5  < OR = 5 /HPF Final   • RBC, Urine 04/28/2023 NONE SEEN  < OR = 2 /HPF Final   • Squamous Epithelial Cells 04/28/2023 NONE SEEN  < OR = 5 /HPF Final   • Bacteria, UA 04/28/2023 NONE SEEN  NONE SEEN /HPF Final   • Hyaline Casts 04/28/2023 NONE SEEN  NONE SEEN /LPF Final   • Comment(s) 04/28/2023    Final    Comment: This urine was analyzed for the presence of WBC,   RBC, bacteria, casts, and other formed elements. Only those elements seen were reported. • White Blood Cell Count 04/28/2023 6.6  3.8 - 10.8 Thousand/uL Final   • Red Blood Cell Count 04/28/2023 4.77  4.20 - 5.80 Million/uL Final   • Hemoglobin 04/28/2023 16.8  13.2 - 17.1 g/dL Final   • HCT 04/28/2023 48.8  38.5 - 50.0 % Final   • MCV 04/28/2023 102.3 (H)  80.0 - 100.0 fL Final   • MCH 04/28/2023 35.2 (H)  27.0 - 33.0 pg Final   • MCHC 04/28/2023 34.4  32.0 - 36.0 g/dL Final   • RDW 04/28/2023 13.0  11.0 - 15.0 % Final   • Platelet Count 06/58/1030 487 (H)  140 - 400 Thousand/uL Final   • SL AMB MPV 04/28/2023 9.2  7.5 - 12.5 fL Final   • Neutrophils (Absolute) 04/28/2023 4,712  1,500 - 7,800 cells/uL Final   • Lymphocytes (Absolute) 04/28/2023 937  850 - 3,900 cells/uL Final   • Monocytes (Absolute) 04/28/2023 521  200 - 950 cells/uL Final   • Eosinophils (Absolute) 04/28/2023 356  15 - 500 cells/uL Final   • Basophils ABS 04/28/2023 73  0 - 200 cells/uL Final   • Neutrophils 04/28/2023 71.4  % Final   • Lymphocytes 04/28/2023 14.2  % Final   • Monocytes 04/28/2023 7.9  % Final   • Eosinophils 04/28/2023 5.4  % Final   • Basophils PCT 04/28/2023 1.1  % Final   • HEP C AB 04/28/2023 NON-REACTIVE  NON-REACTIVE Final   • Signal to Cut-Off 04/28/2023 0.08  <1.00 Final    Comment:    HCV antibody was non-reactive. There is no laboratory   evidence of HCV infection. In most cases, no further action is required. However,  if recent HCV exposure is suspected, a test for HCV RNA  (test code 23281) is suggested. For additional information please refer to  http://education. [employer]. [employer] Sleep/faq/ZEL89z6  (This link is being provided for informational/  educational purposes only.)        • Prostate Specific Antigen Total 04/28/2023 2.08  < OR = 4.00 ng/mL Final    Comment: The total PSA value from this assay system is   standardized against the WHO standard. The test   result will be approximately 20% lower when compared   to the equimolar-standardized total PSA (Naveed   Amalia). Comparison of serial PSA results should be   interpreted with this fact in mind. This test was performed using the Siemens   chemiluminescent method. Values obtained from   different assay methods cannot be used  interchangeably. PSA levels, regardless of  value, should not be interpreted as absolute  evidence of the presence or absence of disease. • TSH W/RFX TO FREE T4 04/28/2023 1.55  0.40 - 4.50 mIU/L Final   • Hemoglobin A1C 04/28/2023 5.1  <5.7 % of total Hgb Final    Comment: For the purpose of screening for the presence of  diabetes:     <5.7%       Consistent with the absence of diabetes  5.7-6.4%    Consistent with increased risk for diabetes              (prediabetes)  > or =6.5%  Consistent with diabetes     This assay result is consistent with a decreased risk  of diabetes. Currently, no consensus exists regarding use of  hemoglobin A1c for diagnosis of diabetes in children. According to American Diabetes Association (ADA)  guidelines, hemoglobin A1c <7.0% represents optimal  control in non-pregnant diabetic patients. Different  metrics may apply to specific patient populations. Standards of Medical Care in Diabetes(ADA). • Urine Culture, Comprehensive 04/28/2023    Final    NO CULTURE INDICATED         Gerard Malik MD        "This note has been constructed using a voice recognition system. Therefore there may be syntax, spelling, and/or grammatical errors.  Please call if you have any questions. "

## 2023-07-21 NOTE — ASSESSMENT & PLAN NOTE
Last lab report shows cholesterol 190 HDL 67 triglycerides 54 LDL at 109 we will continue to monitor

## 2023-07-25 ENCOUNTER — HOSPITAL ENCOUNTER (EMERGENCY)
Facility: HOSPITAL | Age: 63
Discharge: HOME | End: 2023-07-25
Attending: EMERGENCY MEDICINE
Payer: COMMERCIAL

## 2023-07-25 VITALS
RESPIRATION RATE: 20 BRPM | WEIGHT: 157 LBS | OXYGEN SATURATION: 98 % | HEART RATE: 88 BPM | DIASTOLIC BLOOD PRESSURE: 94 MMHG | BODY MASS INDEX: 21.98 KG/M2 | SYSTOLIC BLOOD PRESSURE: 148 MMHG | TEMPERATURE: 98.9 F | HEIGHT: 71 IN

## 2023-07-25 DIAGNOSIS — M79.605 LEFT LEG PAIN: Primary | ICD-10-CM

## 2023-07-25 DIAGNOSIS — M54.9 LEFT-SIDED BACK PAIN: ICD-10-CM

## 2023-07-25 RX ORDER — LIDOCAINE 50 MG/G
1 PATCH TOPICAL DAILY
Qty: 5 PATCH | Refills: 0 | Status: SHIPPED | OUTPATIENT
Start: 2023-07-25

## 2023-07-25 RX ORDER — ACETAMINOPHEN 325 MG/1
975 TABLET ORAL ONCE
Status: COMPLETED | OUTPATIENT
Start: 2023-07-25 | End: 2023-07-25

## 2023-07-25 RX ORDER — KETOROLAC TROMETHAMINE 30 MG/ML
15 INJECTION, SOLUTION INTRAMUSCULAR; INTRAVENOUS ONCE
Status: COMPLETED | OUTPATIENT
Start: 2023-07-25 | End: 2023-07-25

## 2023-07-25 RX ORDER — METHOCARBAMOL 500 MG/1
500 TABLET, FILM COATED ORAL 2 TIMES DAILY
Qty: 20 TABLET | Refills: 0 | Status: SHIPPED | OUTPATIENT
Start: 2023-07-25 | End: 2023-07-27 | Stop reason: ALTCHOICE

## 2023-07-25 RX ORDER — LIDOCAINE 50 MG/G
1 PATCH TOPICAL ONCE
Status: DISCONTINUED | OUTPATIENT
Start: 2023-07-25 | End: 2023-07-25 | Stop reason: HOSPADM

## 2023-07-25 RX ORDER — METHOCARBAMOL 500 MG/1
500 TABLET, FILM COATED ORAL ONCE
Status: COMPLETED | OUTPATIENT
Start: 2023-07-25 | End: 2023-07-25

## 2023-07-25 RX ADMIN — ACETAMINOPHEN 975 MG: 325 TABLET ORAL at 11:29

## 2023-07-25 RX ADMIN — LIDOCAINE 1 PATCH: 50 PATCH CUTANEOUS at 11:30

## 2023-07-25 RX ADMIN — METHOCARBAMOL 500 MG: 500 TABLET ORAL at 11:29

## 2023-07-25 RX ADMIN — KETOROLAC TROMETHAMINE 15 MG: 30 INJECTION, SOLUTION INTRAMUSCULAR at 11:31

## 2023-07-25 NOTE — DISCHARGE INSTRUCTIONS
Follow-up with PCP and comprehensive spine for further care. Contact info provided below if needed. Use over the counter medications as stated on the bottle as needed for pain control.  - Tylenol every 6 hours  - Aleve every 12 hours  Take your new medications as prescribed. You may use ice and heat if helpful. Return to the ED with new or worsening symptoms.

## 2023-07-25 NOTE — ED PROVIDER NOTES
History  Chief Complaint   Patient presents with   • Leg Pain     Patient comes today with severe left leg pain, behind the knee and also to the back thigh area     Pt is a 68yo M who presents for L leg pain. Pt reports that the pain has been present for approximately 6 weeks but over the past 2 days it has gotten significantly worse. He describes it as a sharp pain that runs along the posterior-lateral aspect of his L leg to just past his knee. He states that sometimes with rest and massaging the area, it improves, but that did no help today. He states that he is able to ambulate but it is painful to do so. He states that approximately 2 weeks before the leg pain started he was having L lower back pain. He was prescribed muscle relaxants by his PCP and the back pain improved. He states mild back pain currently L sided. He also reports that in the days leading up to the onset of the leg pain, he was moving heavy rocks while redoing his driveway. Pt denies previous injuries or surgeries to this leg. He denies any numbness, paresthesias, motor deficits. He denies any saddle anesthesia or loss of bladder and bowel. Pt has no history of back surgeries or IVDA. Prior to Admission Medications   Prescriptions Last Dose Informant Patient Reported? Taking?    Cholecalciferol (VITAMIN D3) 1000 units CAPS   Yes Yes   Sig: Take 1,000 Units by mouth   Hydroxyurea (HYDREA PO)   Yes Yes   Sig: Take 1,000 mg by mouth Mon-Wed-Fri   hydroxyurea (HYDREA) 500 mg capsule  Self Yes Yes   Sig: Take by mouth On Tues-Thurs-Sat-Sun    levothyroxine 150 mcg tablet   Yes Yes   Sig: every morning     sildenafil (VIAGRA) 100 mg tablet   No Yes   Sig: Take 1 tablet (100 mg total) by mouth if needed for erectile dysfunction As directed      Facility-Administered Medications: None       Past Medical History:   Diagnosis Date   • Arthritis    • Disease of thyroid gland     hypo   • Graves disease 1995   • Hallux limitus, acquired, right • Hypothyroid     hypo   • Other tear of medial meniscus, current injury, right knee, initial encounter 2018    Added automatically from request for surgery [id number]   • 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 Sean Jeff   • RA (rheumatoid arthritis) ([address])    • Rheumatoid arthritis involving multiple sites ([address]) 2022   • Wears glasses        Past Surgical History:   Procedure Laterality Date   • ANKLE SURGERY Right     ligament, chipped bones,dislocated   • BUNIONECTOMY  2014   • COLONOSCOPY     • ELBOW SURGERY Right     tendon surgery   • FOOT ARTHRODESIS, MODIFIED ARELLANO Left    • FRACTURE SURGERY Left     elbow   • HERNIA REPAIR Right     inguinal   • [place] ARTHRS KNE SURG W/MENISCECTOMY MED/LAT W/SHVG Right 2018    Procedure: MENISCECTOMY LATERAL /MEDIAL;  Surgeon: Lacie Archer MD;  Location: [facility];  Service: Orthopedics   • [place] CORRECTION HAMMERTOE Bilateral 2021    Procedure: REPAIR HAMMERTOE / MALLET TOE / CLAW TOE 2ND toe bilateral;  Surgeon: Jarrod Mace DPM;  Location: [facility];  Service: Podiatry   • [place] CORRJ HALLUX VALGUS W/SESMDC W/RESCJ PROX PHAL Right 2021    Procedure: Chester Kruse;  Surgeon: Jarrod Mace DPM;  Location: [facility];  Service: Podiatry   • ROTATOR CUFF REPAIR Left    • WISDOM TOOTH EXTRACTION      x4   • WRIST SURGERY         Family History   Problem Relation Age of Onset   • Cancer Mother         lung-heavy smoker   • Hypertension Father    • Heart disease Father         leaky valve   • Skin cancer Father      I have reviewed and agree with the history as documented. E-Cigarette/Vaping     E-Cigarette/Vaping Substances     Social History     Tobacco Use   • Smoking status: Former     Packs/day: 1.00     Years: 20.00     Total pack years: 20.00     Types: Cigarettes     Quit date: 2000     Years since quittin.5     Passive exposure: Past   • Smokeless tobacco: Never   Substance Use Topics   • Alcohol use:  Yes Comment: occ beer with going out to dinner   • Drug use: No       Review of Systems   Musculoskeletal: Positive for back pain (L sided). L leg pain   All other systems reviewed and are negative. Physical Exam  Physical Exam  Vitals reviewed. Constitutional:       General: He is not in acute distress. Appearance: He is well-developed. He is not toxic-appearing or diaphoretic. HENT:      Head: Normocephalic and atraumatic. Right Ear: External ear normal.      Left Ear: External ear normal.      Nose: Nose normal.      Mouth/Throat:      Pharynx: Oropharynx is clear. Eyes:      Extraocular Movements: Extraocular movements intact. Pupils: Pupils are equal, round, and reactive to light. Cardiovascular:      Rate and Rhythm: Normal rate and regular rhythm. Pulses:           Dorsalis pedis pulses are 2+ on the right side and 2+ on the left side. Posterior tibial pulses are 2+ on the right side and 2+ on the left side. Heart sounds: Normal heart sounds. Pulmonary:      Effort: Pulmonary effort is normal. No respiratory distress. Breath sounds: Normal breath sounds. Abdominal:      General: There is no distension. Palpations: Abdomen is soft. Tenderness: There is no abdominal tenderness. Musculoskeletal:         General: No tenderness or deformity. Normal range of motion. Cervical back: Neck supple. No tenderness or bony tenderness. Thoracic back: No tenderness or bony tenderness. Lumbar back: No tenderness or bony tenderness. Normal range of motion. Negative right straight leg raise test and negative left straight leg raise test.      Comments: Pain in the L leg with hip flexion but strength intact  Muscular tenderness over Lateral aspect of L hamstring, no IT band tenderness, no bony tenderness  No swelling or deformities   Skin:     General: Skin is warm and dry. Capillary Refill: Capillary refill takes less than 2 seconds. Coloration: Skin is not pale. Findings: No erythema or rash. Neurological:      Mental Status: He is alert and oriented to person, place, and time. Cranial Nerves: No cranial nerve deficit. Sensory: No sensory deficit. Motor: No weakness, atrophy or abnormal muscle tone. Coordination: Coordination normal.      Comments: No saddle anesthesia   Psychiatric:         Speech: Speech normal.         Behavior: Behavior is cooperative. Vital Signs  ED Triage Vitals   Temperature Pulse Respirations Blood Pressure SpO2   07/25/23 1105 07/25/23 1105 07/25/23 1105 07/25/23 1105 07/25/23 1105   98.9 °F (37.2 °C) 88 20 148/94 98 %      Temp Source Heart Rate Source Patient Position - Orthostatic VS BP Location FiO2 (%)   07/25/23 1105 07/25/23 1105 07/25/23 1105 07/25/23 1105 --   Oral Monitor Sitting Right arm       Pain Score       07/25/23 1129       10 - Worst Possible Pain           Vitals:    07/25/23 1105   BP: 148/94   Pulse: 88   Patient Position - Orthostatic VS: Sitting         Visual Acuity      ED Medications  Medications   acetaminophen (TYLENOL) tablet 975 mg (975 mg Oral Given 7/25/23 1129)   ketorolac (TORADOL) injection 15 mg (15 mg Intramuscular Given 7/25/23 1131)   methocarbamol (ROBAXIN) tablet 500 mg (500 mg Oral Given 7/25/23 1129)       Diagnostic Studies  Results Reviewed     None                 No orders to display              Procedures  Procedures         ED Course                               SBIRT 20yo+    Flowsheet Row Most Recent Value   Initial Alcohol Screen: US AUDIT-C     1. How often do you have a drink containing alcohol? 0 Filed at: 07/25/2023 1106   2. How many drinks containing alcohol do you have on a typical day you are drinking? 0 Filed at: 07/25/2023 1106   3a. Male UNDER 65: How often do you have five or more drinks on one occasion? 0 Filed at: 07/25/2023 1106   3b. FEMALE Any Age, or MALE 65+:  How often do you have 4 or more drinks on one occassion? 0 Filed at: 07/25/2023 1106   Audit-C Score 0 Filed at: 07/25/2023 1106   MANOLO: How many times in the past year have you. .. Used an illegal drug or used a prescription medication for non-medical reasons? Never Filed at: 07/25/2023 1106                    Medical Decision Making  Pt is a 70yo M who presents with L leg pain. Exam pertinent for muscular tenderness over L hamstring. Differential diagnosis to include but not limited to muscular strain, IT band syndrome, radiculopathy. No bony tenderness of the back or other red flag symptoms including saddle anesthesia, loss of bladder or bowel, IVDA, recent instrumentation, fever. Muscular tenderness present along the lateral aspect of the leg to the proximal tibia likely correlating with insertion. Will treat as muscular pain and DC with outpt follow-up. Pt agreeable to plan. Plan to discharge pt with f/u to PCP and comprehensive spine. Discussed returning the ED with new or worsening of symptoms. Discussed use of over the counter medications as stated on the bottle as needed for pain. Discussed taking new medication as prescribed. Pt expressed understanding of discharge instructions, return precautions, and medication instructions and is stable for discharge at this time. All questions were answered and pt was discharged without incident. Risk  OTC drugs. Prescription drug management. Disposition  Final diagnoses:   Left leg pain   Left-sided back pain     Time reflects when diagnosis was documented in both MDM as applicable and the Disposition within this note     Time User Action Codes Description Comment    7/25/2023 11:28 AM Vimal Emmanuel [M79.605] Left leg pain     7/25/2023 11:30 AM Vimal Emmanuel [M54.9] Left-sided back pain       ED Disposition     ED Disposition   Discharge    Condition   Stable    Date/Time   Tue Jul 25, 2023 11:29 AM    Comment   Ney Dean discharge to home/self care. Follow-up Information     Follow up With Specialties Details Why Contact Info Additional Information    Remi Aiken MD Internal Medicine Call on 7/26/2023  [address]  [phone]       [facility] Physical Therapy Call on 7/25/2023  [phone]          Discharge Medication List as of 7/25/2023 12:10 PM      START taking these medications    Details   lidocaine (Lidoderm) 5 % Apply 1 patch topically over 12 hours daily Remove & Discard patch within 12 hours or as directed by MD, Starting Tue 7/25/2023, Normal      methocarbamol (ROBAXIN) 500 mg tablet Take 1 tablet (500 mg total) by mouth 2 (two) times a day, Starting Tue 7/25/2023, Normal         CONTINUE these medications which have NOT CHANGED    Details   Cholecalciferol (VITAMIN D3) 1000 units CAPS Take 1,000 Units by mouth, Historical Med      !! Hydroxyurea (HYDREA PO) Take 1,000 mg by mouth Mon-Wed-Fri, Historical Med      !! hydroxyurea (HYDREA) 500 mg capsule Take by mouth On Tues-Thurs-Sat-Sun , Historical Med      levothyroxine 150 mcg tablet every morning  , Starting Mon 4/9/2018, Historical Med      sildenafil (VIAGRA) 100 mg tablet Take 1 tablet (100 mg total) by mouth if needed for erectile dysfunction As directed, Starting Thu 6/15/2023, Normal       !! - Potential duplicate medications found. Please discuss with provider. No discharge procedures on file.     PDMP Review     None          ED Provider  Electronically Signed by           Kiana Nash MD  07/26/23 6032

## 2023-07-25 NOTE — Clinical Note
Eduar Reza was seen and treated in our emergency department on 7/25/2023. Diagnosis:     Kaci Sparks  may return to work on return date. He may return on this date: 08/01/2023         If you have any questions or concerns, please don't hesitate to call.       Deneen Galeana MD    ______________________________           _______________          _______________  Hospital Representative                              Date                                Time

## 2023-07-27 ENCOUNTER — OFFICE VISIT (OUTPATIENT)
Dept: FAMILY MEDICINE CLINIC | Facility: CLINIC | Age: 63
End: 2023-07-27
Payer: COMMERCIAL

## 2023-07-27 VITALS
OXYGEN SATURATION: 98 % | HEART RATE: 73 BPM | DIASTOLIC BLOOD PRESSURE: 76 MMHG | BODY MASS INDEX: 21.98 KG/M2 | HEIGHT: 71 IN | WEIGHT: 157 LBS | SYSTOLIC BLOOD PRESSURE: 124 MMHG

## 2023-07-27 DIAGNOSIS — M05.79 RHEUMATOID ARTHRITIS INVOLVING MULTIPLE SITES WITH POSITIVE RHEUMATOID FACTOR (HCC): ICD-10-CM

## 2023-07-27 DIAGNOSIS — M54.42 ACUTE LEFT-SIDED LOW BACK PAIN WITH LEFT-SIDED SCIATICA: Primary | ICD-10-CM

## 2023-07-27 DIAGNOSIS — E78.5 HYPERLIPIDEMIA, UNSPECIFIED HYPERLIPIDEMIA TYPE: ICD-10-CM

## 2023-07-27 DIAGNOSIS — D75.839 THROMBOCYTOSIS: ICD-10-CM

## 2023-07-27 DIAGNOSIS — M54.16 LUMBAR RADICULOPATHY: ICD-10-CM

## 2023-07-27 DIAGNOSIS — N52.8 OTHER MALE ERECTILE DYSFUNCTION: ICD-10-CM

## 2023-07-27 DIAGNOSIS — E03.9 ACQUIRED HYPOTHYROIDISM: ICD-10-CM

## 2023-07-27 PROCEDURE — 99214 OFFICE O/P EST MOD 30 MIN: CPT | Performed by: INTERNAL MEDICINE

## 2023-07-27 RX ORDER — CYCLOBENZAPRINE HCL 10 MG
10 TABLET ORAL 3 TIMES DAILY PRN
Qty: 30 TABLET | Refills: 0 | Status: SHIPPED | OUTPATIENT
Start: 2023-07-27

## 2023-07-27 RX ORDER — METHYLPREDNISOLONE 4 MG/1
TABLET ORAL
Qty: 21 EACH | Refills: 0 | Status: SHIPPED | OUTPATIENT
Start: 2023-07-27

## 2023-07-27 RX ORDER — HYDROCODONE BITARTRATE AND ACETAMINOPHEN 5; 325 MG/1; MG/1
1 TABLET ORAL EVERY 6 HOURS PRN
Qty: 20 TABLET | Refills: 0 | Status: SHIPPED | OUTPATIENT
Start: 2023-07-27

## 2023-07-27 NOTE — PROGRESS NOTES
Office Visit Note  23     Wilmot Burkitt 70 y.o. male MRN: [id number]  : 1960    Assessment:     1. Acute left-sided low back pain with left-sided sciatica  Assessment & Plan:  As mentioned in the history of present illness patient with acute pain low back area radiating down the left lower extremity from the buttock area he does not have numbness tingling sensation but the pain is severe and he has to use the crutches to move around muscle relaxers not helping at this time he is taking Motrin also not helping much with the pain. We will start the patient on Medrol pack x-ray of the lumbosacral spine MRI and naproxen and hydrocodone for severe pain. We will also recommend patient evaluation with the orthopedic and follow-up. Orders:  -     XR spine lumbar minimum 4 views non injury; Future; Expected date: 2023  -     methylPREDNISolone 4 MG tablet therapy pack; Use as directed on package  -     HYDROcodone-acetaminophen (Norco) 5-325 mg per tablet; Take 1 tablet by mouth every 6 (six) hours as needed for pain Max Daily Amount: 4 tablets  -     MRI lumbar spine wo contrast; Future; Expected date: 2023  -     Ambulatory referral to Orthopedic Surgery; Future  -     cyclobenzaprine (FLEXERIL) 10 mg tablet; Take 1 tablet (10 mg total) by mouth 3 (three) times a day as needed for muscle spasms    2. Hyperlipidemia, unspecified hyperlipidemia type    3. Acquired hypothyroidism  Assessment & Plan:  Continue levothyroxine 100 mcg daily      4. Other male erectile dysfunction    5. Rheumatoid arthritis involving multiple sites with positive rheumatoid factor (HCC)  Assessment & Plan:  Currently not on any medications stable he was on hydroxychloroquine in the past      6. Thrombocytosis  Assessment & Plan:  Patient on hydroxyurea 1000 mg alternating with 500 mg      7.  Lumbar radiculopathy  -     MRI lumbar spine wo contrast; Future; Expected date: 2023             Discussion Summary and Plan:  Today's care plan and medications were reviewed with patient in detail and all their questions answered to their satisfaction. Chief Complaint   Patient presents with   • Follow-up      Subjective:  Patient has come in for evaluation regarding pain discomfort in the left lower extremity in the back of the thigh in the buttock area radiating down into the leg area. History of having back pain last month for which we have prescribed muscle relaxers and the anti-inflammatories naproxen to which she has responded very well however after recovering from this episode he again developed this pain but this time it is more severe and he has to use crutches at this time to move around. He was seen in the emergency room when last Tuesday and was prescribed muscle relaxer Robaxin and sent home but the Robaxin is not helping the pain is persisting. Reports from the emergency room reviewed      The following portions of the patient's history were reviewed and updated as appropriate: allergies, current medications, past family history, past medical history, past social history, past surgical history and problem list.    Review of Systems   Constitutional: Negative for chills and fever. HENT: Negative for ear pain and sore throat. Eyes: Negative for pain and visual disturbance. Respiratory: Negative for cough and shortness of breath. Cardiovascular: Negative for chest pain and palpitations. Gastrointestinal: Negative for abdominal pain and vomiting. Genitourinary: Negative for dysuria and hematuria. Musculoskeletal: Positive for arthralgias and back pain. Skin: Negative for color change and rash. Neurological: Negative for seizures and syncope. All other systems reviewed and are negative.         Historical Information   Patient Active Problem List   Diagnosis   • Hyperlipidemia   • Hypothyroidism   • Thrombocytosis   • Rheumatoid arthritis involving multiple sites with positive rheumatoid factor [facility])   • Lipoma of torso   • Other male erectile dysfunction   • Acute midline low back pain without sciatica   • Acute left-sided low back pain with left-sided sciatica     Past Medical History:   Diagnosis Date   • Arthritis    • Disease of thyroid gland     hypo   • Graves disease 1995   • Hallux limitus, acquired, right    • Hypothyroid     hypo   • Other tear of medial meniscus, current injury, right knee, initial encounter 6/6/2018    Added automatically from request for surgery [id number]   • 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 Oliva Morley   • RA (rheumatoid arthritis) ([address])    • Rheumatoid arthritis involving multiple sites ([address]) 9/19/2022   • Wears glasses      Past Surgical History:   Procedure Laterality Date   • ANKLE SURGERY Right     ligament, chipped bones,dislocated   • BUNIONECTOMY  03/2014   • COLONOSCOPY     • ELBOW SURGERY Right     tendon surgery   • FOOT ARTHRODESIS, MODIFIED ARELLANO Left    • FRACTURE SURGERY Left     elbow   • HERNIA REPAIR Right     inguinal   • MS ARTHRS KNE SURG W/MENISCECTOMY MED/LAT W/SHVG Right 07/09/2018    Procedure: MENISCECTOMY LATERAL /MEDIAL;  Surgeon: Jennifer Storey MD;  Location: [facility];  Service: Orthopedics   • MS CORRECTION HAMMERTOE Bilateral 05/14/2021    Procedure: REPAIR HAMMERTOE / MALLET TOE / CLAW TOE 2ND toe bilateral;  Surgeon: Yariel Tripathi DPM;  Location: [facility];  Service: Podiatry   • MS CORRJ HALLUX VALGUS W/SESMDC W/RESCJ PROX PHAL Right 04/09/2021    Procedure: Mariola Lips;  Surgeon: Yariel Tripathi DPM;  Location: [facility];  Service: Podiatry   • ROTATOR CUFF REPAIR Left    • WISDOM TOOTH EXTRACTION      x4   • WRIST SURGERY       Social History     Substance and Sexual Activity   Alcohol Use Yes    Comment: occ beer with going out to dinner     Social History     Substance and Sexual Activity   Drug Use No     Social History     Tobacco Use   Smoking Status Former   • Packs/day: 1.00   • Years: 20.00   • Total pack years: 20.00   • Types: Cigarettes   • Quit date:    • Years since quittin.5   • Passive exposure: Past   Smokeless Tobacco Never     Family History   Problem Relation Age of Onset   • Cancer Mother         lung-heavy smoker   • Hypertension Father    • Heart disease Father         leaky valve   • Skin cancer Father      Health Maintenance Due   Topic   • HIV Screening    • Annual Physical    • DTaP,Tdap,and Td Vaccines (1 - Tdap)   • Pneumococcal Vaccine: 65+ Years (1 - PCV)   • Influenza Vaccine (1)      Meds/Allergies       Current Outpatient Medications:   •  cyclobenzaprine (FLEXERIL) 10 mg tablet, Take 1 tablet (10 mg total) by mouth 3 (three) times a day as needed for muscle spasms, Disp: 30 tablet, Rfl: 0  •  HYDROcodone-acetaminophen (Norco) 5-325 mg per tablet, Take 1 tablet by mouth every 6 (six) hours as needed for pain Max Daily Amount: 4 tablets, Disp: 20 tablet, Rfl: 0  •  methylPREDNISolone 4 MG tablet therapy pack, Use as directed on package, Disp: 21 each, Rfl: 0  •  Cholecalciferol (VITAMIN D3) 1000 units CAPS, Take 1,000 Units by mouth, Disp: , Rfl:   •  Hydroxyurea (HYDREA PO), Take 1,000 mg by mouth Mon-Wed-Fri, Disp: , Rfl:   •  hydroxyurea (HYDREA) 500 mg capsule, Take by mouth On Tues-Thurs-Sat-Sun , Disp: , Rfl:   •  levothyroxine 150 mcg tablet, every morning  , Disp: , Rfl:   •  lidocaine (Lidoderm) 5 %, Apply 1 patch topically over 12 hours daily Remove & Discard patch within 12 hours or as directed by MD, Disp: 5 patch, Rfl: 0  •  sildenafil (VIAGRA) 100 mg tablet, Take 1 tablet (100 mg total) by mouth if needed for erectile dysfunction As directed, Disp: 6 tablet, Rfl: 12      Objective:    Vitals:   /76 (BP Location: Right arm, Patient Position: Sitting, Cuff Size: Standard)   Pulse 73   Ht 5' 11" (1.803 m)   Wt 71.2 kg (157 lb) Comment: per patient  SpO2 98%   BMI 21.90 kg/m²   Body mass index is 21.9 kg/m².   Vitals:    23 1140   Weight: 71.2 kg (157 lb)       Physical Exam  Vitals and nursing note reviewed. Constitutional:       Appearance: Normal appearance. Cardiovascular:      Rate and Rhythm: Normal rate and regular rhythm. Heart sounds: Normal heart sounds. Pulmonary:      Effort: Pulmonary effort is normal.      Breath sounds: Normal breath sounds. Abdominal:      Palpations: Abdomen is soft. Musculoskeletal:      Right lower leg: No edema. Left lower leg: No edema. Comments: Patient with low back pain having difficulty with raising his left lower extremity up   Skin:     General: Skin is warm and dry. Neurological:      Mental Status: He is alert and oriented to person, place, and time. Lab Review   No visits with results within 2 Month(s) from this visit. Latest known visit with results is:   Orders Only on 04/28/2023   Component Date Value Ref Range Status   • Total Cholesterol 04/28/2023 190  <200 mg/dL Final   • HDL 04/28/2023 67  > OR = 40 mg/dL Final   • Triglycerides 04/28/2023 54  <150 mg/dL Final   • LDL Calculated 04/28/2023 109 (H)  mg/dL (calc) Final    Comment: Reference range: <100     Desirable range <100 mg/dL for primary prevention;    <70 mg/dL for patients with CHD or diabetic patients   with > or = 2 CHD risk factors. LDL-C is now calculated using the Jan-Cline   calculation, which is a validated novel method providing   better accuracy than the Friedewald equation in the   estimation of LDL-C. Will Bernstein al. Roseann Ricks. 4758;806(83): 4377-9000   (http://education. [employer]. com/faq/VBO567)     • Chol HDLC Ratio 04/28/2023 2.8  <5.0 (calc) Final   • Non-HDL Cholesterol 04/28/2023 123  <130 mg/dL (calc) Final    Comment: For patients with diabetes plus 1 major ASCVD risk   factor, treating to a non-HDL-C goal of <100 mg/dL   (LDL-C of <70 mg/dL) is considered a therapeutic   option.      • Glucose, Random 04/28/2023 88  65 - 99 mg/dL Final    Comment:               Fasting reference interval        • BUN 04/28/2023 21  7 - 25 mg/dL Final   • Creatinine 04/28/2023 1.08  0.70 - 1.35 mg/dL Final   • eGFR 04/28/2023 76  > OR = 60 mL/min/1.73m2 Final    Comment: The eGFR is based on the CKD-EPI 2021 equation. To calculate   the new eGFR from a previous Creatinine or Cystatin C  result, go to [employer]. org/professionals/  kdoqi/[email or web address]     • SL AMB BUN/CREATININE RATIO 55/19/5068 NOT APPLICABLE  6 - 22 (calc) Final   • Sodium 04/28/2023 140  135 - 146 mmol/L Final   • Potassium 04/28/2023 4.8  3.5 - 5.3 mmol/L Final   • Chloride 04/28/2023 104  98 - 110 mmol/L Final   • CO2 04/28/2023 29  20 - 32 mmol/L Final   • Calcium 04/28/2023 9.3  8.6 - 10.3 mg/dL Final   • Protein, Total 04/28/2023 6.7  6.1 - 8.1 g/dL Final   • Albumin 04/28/2023 4.1  3.6 - 5.1 g/dL Final   • Globulin 04/28/2023 2.6  1.9 - 3.7 g/dL (calc) Final   • Albumin/Globulin Ratio 04/28/2023 1.6  1.0 - 2.5 (calc) Final   • TOTAL BILIRUBIN 04/28/2023 0.4  0.2 - 1.2 mg/dL Final   • Alkaline Phosphatase 04/28/2023 68  35 - 144 U/L Final   • AST 04/28/2023 15  10 - 35 U/L Final   • ALT 04/28/2023 13  9 - 46 U/L Final   • Color UA 04/28/2023 YELLOW  YELLOW Final   • Urine Appearance 04/28/2023 CLEAR  CLEAR Final   • Specific Gravity 04/28/2023 1.024  1.001 - 1.035 Final   • Ph 04/28/2023 < OR = 5.0  5.0 - 8.0 Final   • Glucose, Urine 04/28/2023 NEGATIVE  NEGATIVE Final   • Bilirubin, Urine 04/28/2023 NEGATIVE  NEGATIVE Final   • Ketone, Urine 04/28/2023 TRACE (A)  NEGATIVE Final   • Blood, Urine 04/28/2023 NEGATIVE  NEGATIVE Final   • Protein, Urine 04/28/2023 NEGATIVE  NEGATIVE Final   • SL AMB NITRITES URINE, QUAL. 04/28/2023 NEGATIVE  NEGATIVE Final   • Leukocyte Esterase 04/28/2023 NEGATIVE  NEGATIVE Final   • SL AMB WBC, URINE 04/28/2023 0-5  < OR = 5 /HPF Final   • RBC, Urine 04/28/2023 NONE SEEN  < OR = 2 /HPF Final   • Squamous Epithelial Cells 04/28/2023 NONE SEEN  < OR = 5 /HPF Final   • Bacteria, UA 04/28/2023 NONE SEEN  NONE SEEN /HPF Final   • Hyaline Casts 04/28/2023 NONE SEEN  NONE SEEN /LPF Final   • Comment(s) 04/28/2023    Final    Comment: This urine was analyzed for the presence of WBC,   RBC, bacteria, casts, and other formed elements. Only those elements seen were reported. • White Blood Cell Count 04/28/2023 6.6  3.8 - 10.8 Thousand/uL Final   • Red Blood Cell Count 04/28/2023 4.77  4.20 - 5.80 Million/uL Final   • Hemoglobin 04/28/2023 16.8  13.2 - 17.1 g/dL Final   • HCT 04/28/2023 48.8  38.5 - 50.0 % Final   • MCV 04/28/2023 102.3 (H)  80.0 - 100.0 fL Final   • MCH 04/28/2023 35.2 (H)  27.0 - 33.0 pg Final   • MCHC 04/28/2023 34.4  32.0 - 36.0 g/dL Final   • RDW 04/28/2023 13.0  11.0 - 15.0 % Final   • Platelet Count 77/29/0946 487 (H)  140 - 400 Thousand/uL Final   • SL AMB MPV 04/28/2023 9.2  7.5 - 12.5 fL Final   • Neutrophils (Absolute) 04/28/2023 4,712  1,500 - 7,800 cells/uL Final   • Lymphocytes (Absolute) 04/28/2023 937  850 - 3,900 cells/uL Final   • Monocytes (Absolute) 04/28/2023 521  200 - 950 cells/uL Final   • Eosinophils (Absolute) 04/28/2023 356  15 - 500 cells/uL Final   • Basophils ABS 04/28/2023 73  0 - 200 cells/uL Final   • Neutrophils 04/28/2023 71.4  % Final   • Lymphocytes 04/28/2023 14.2  % Final   • Monocytes 04/28/2023 7.9  % Final   • Eosinophils 04/28/2023 5.4  % Final   • Basophils PCT 04/28/2023 1.1  % Final   • HEP C AB 04/28/2023 NON-REACTIVE  NON-REACTIVE Final   • Signal to Cut-Off 04/28/2023 0.08  <1.00 Final    Comment:    HCV antibody was non-reactive. There is no laboratory   evidence of HCV infection. In most cases, no further action is required. However,  if recent HCV exposure is suspected, a test for HCV RNA  (test code 49712) is suggested. For additional information please refer to  http://education. [employer]. [employer]/faq/EMJ68n9  (This link is being provided for informational/  educational purposes only.)        • Prostate Specific Antigen Total 04/28/2023 2.08  < OR = 4.00 ng/mL Final    Comment: The total PSA value from this assay system is   standardized against the WHO standard. The test   result will be approximately 20% lower when compared   to the equimolar-standardized total PSA ([facility]   [facility]). Comparison of serial PSA results should be   interpreted with this fact in mind. This test was performed using the Siemens   chemiluminescent method. Values obtained from   different assay methods cannot be used  interchangeably. PSA levels, regardless of  value, should not be interpreted as absolute  evidence of the presence or absence of disease. • TSH W/RFX TO FREE T4 04/28/2023 1.55  0.40 - 4.50 mIU/L Final   • Hemoglobin A1C 04/28/2023 5.1  <5.7 % of total Hgb Final    Comment: For the purpose of screening for the presence of  diabetes:     <5.7%       Consistent with the absence of diabetes  5.7-6.4%    Consistent with increased risk for diabetes              (prediabetes)  > or =6.5%  Consistent with diabetes     This assay result is consistent with a decreased risk  of diabetes. Currently, no consensus exists regarding use of  hemoglobin A1c for diagnosis of diabetes in children. According to American Diabetes Association (ADA)  guidelines, hemoglobin A1c <7.0% represents optimal  control in non-pregnant diabetic patients. Different  metrics may apply to specific patient populations. Standards of Medical Care in Diabetes(ADA). • Urine Culture, Comprehensive 04/28/2023    Final    NO CULTURE INDICATED         Melinda Chaidez MD        "This note has been constructed using a voice recognition system. Therefore there may be syntax, spelling, and/or grammatical errors.  Please call if you have any questions. "

## 2023-07-27 NOTE — ASSESSMENT & PLAN NOTE
As mentioned in the history of present illness patient with acute pain low back area radiating down the left lower extremity from the buttock area he does not have numbness tingling sensation but the pain is severe and he has to use the crutches to move around muscle relaxers not helping at this time he is taking Motrin also not helping much with the pain. We will start the patient on Medrol pack x-ray of the lumbosacral spine MRI and naproxen and hydrocodone for severe pain. We will also recommend patient evaluation with the orthopedic and follow-up.

## 2023-08-01 ENCOUNTER — OFFICE VISIT (OUTPATIENT)
Dept: OBGYN CLINIC | Facility: HOSPITAL | Age: 63
End: 2023-08-01
Payer: COMMERCIAL

## 2023-08-01 ENCOUNTER — HOSPITAL ENCOUNTER (OUTPATIENT)
Dept: RADIOLOGY | Facility: HOSPITAL | Age: 63
Discharge: HOME | End: 2023-08-01
Attending: ORTHOPAEDIC SURGERY
Payer: COMMERCIAL

## 2023-08-01 VITALS
DIASTOLIC BLOOD PRESSURE: 71 MMHG | HEART RATE: 91 BPM | HEIGHT: 71 IN | SYSTOLIC BLOOD PRESSURE: 141 MMHG | BODY MASS INDEX: 21.98 KG/M2 | WEIGHT: 156.97 LBS

## 2023-08-01 DIAGNOSIS — M54.42 ACUTE LEFT-SIDED LOW BACK PAIN WITH LEFT-SIDED SCIATICA: ICD-10-CM

## 2023-08-01 DIAGNOSIS — R52 PAIN: ICD-10-CM

## 2023-08-01 DIAGNOSIS — R52 PAIN: Primary | ICD-10-CM

## 2023-08-01 PROCEDURE — 99214 OFFICE O/P EST MOD 30 MIN: CPT | Performed by: ORTHOPAEDIC SURGERY

## 2023-08-01 PROCEDURE — 72100 X-RAY EXAM L-S SPINE 2/3 VWS: CPT

## 2023-08-01 RX ORDER — GABAPENTIN 100 MG/1
CAPSULE ORAL
Qty: 50 CAPSULE | Refills: 0 | Status: SHIPPED | OUTPATIENT
Start: 2023-08-01

## 2023-08-01 NOTE — PROGRESS NOTES
NAME: Kenny Bishop  : 1960  PCP: Carie Kincaid MD      Chief Complaint: back and left lower extremity pain    HPI:  Kenny Bishop is a 70 y.o. male presenting for initial visit with chief complaint of back and left lower extremity pain. Notes onset of back pain where he couldn't stand up straight about 8 weeks ago after laying stone on either side of his driveway. Back pain improved after he was prescribed muscle relaxer by PCP, however pain was still present. Denies any back or leg pain prior to 8 weeks ago. About 1 week ago, he was finishing up yard work without heavy lifting and mowing lawn and upon waking up in the morning notes significant posterior left lower extremity pain. He has been hunched forward to help alleviate the pain and using crutches due to increased pain when trying to straighten or use left lower extremity. Also with left lower extremity muscle cramping. Pain worse with prolonged sitting, standing and walking. He only gets relief with his left leg bent and lying on his right side. He was seen in the ED on  due to pain and symptoms. Denies kenyatta lower extremity weakness. Denies right sided symptoms. Denies any kenyatta trauma. Denies fever or chills. Denies any bladder or bowel changes. Denies heart or lung disease. Denies diabetes or kidney disease. Denies smoking. Conservative therapy includes the following:   Medrol dose zohra without much relief, Norco, flexeril with some relief. Denies recent injections. Denies recent formal physical therapy. These therapeutic modalities were ineffective. The patient has noticed significant impairment in performing the following ADLs: Alyse Strickland has had to miss work due to increased pain and symptoms.       FAMILY HISTORY   Family History   Problem Relation Age of Onset   • Cancer Mother         lung-heavy smoker   • Hypertension Father    • Heart disease Father         leaky valve   • Skin cancer Father        PAST MEDICAL HISTORY: Past Medical History:   Diagnosis Date   • Arthritis    • Disease of thyroid gland     hypo   • Graves disease 1995   • Hallux limitus, acquired, right    • Hypothyroid     hypo   • Other tear of medial meniscus, current injury, right knee, initial encounter 6/6/2018    Added automatically from request for surgery [id number]   • 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 Warrick Krabbe   • RA (rheumatoid arthritis) ([address])    • Rheumatoid arthritis involving multiple sites ([address]) 9/19/2022   • Wears glasses        MEDICATIONS:  Current Outpatient Medications   Medication Sig Dispense Refill   • Cholecalciferol (VITAMIN D3) 1000 units CAPS Take 1,000 Units by mouth     • cyclobenzaprine (FLEXERIL) 10 mg tablet Take 1 tablet (10 mg total) by mouth 3 (three) times a day as needed for muscle spasms 30 tablet 0   • gabapentin (Neurontin) 100 mg capsule Start with 1 pill at bedtime for 5 days (100mg total), then 2 pills at bedtime next 5 days (200 mg total), then 3 pills at bedtime after 10 days (300mg total). Gabapentin may cause vision changes, clumsiness, unsteadiness, dizziness, drowsiness, sleepiness, or trouble with thinking.  Make sure you know how you react to this medicine before you drive, use machines, or do anything else that could be dangerous if you are not alert, well-coordinated, or able to think or see well., 50 capsule 0   • HYDROcodone-acetaminophen (Norco) 5-325 mg per tablet Take 1 tablet by mouth every 6 (six) hours as needed for pain Max Daily Amount: 4 tablets 20 tablet 0   • Hydroxyurea (HYDREA PO) Take 1,000 mg by mouth Mon-Wed-Fri     • hydroxyurea (HYDREA) 500 mg capsule Take by mouth On Tues-Thurs-Sat-Sun      • levothyroxine 150 mcg tablet every morning       • lidocaine (Lidoderm) 5 % Apply 1 patch topically over 12 hours daily Remove & Discard patch within 12 hours or as directed by MD 5 patch 0   • methylPREDNISolone 4 MG tablet therapy pack Use as directed on package 21 each 0   • sildenafil (VIAGRA) 100 mg tablet Take 1 tablet (100 mg total) by mouth if needed for erectile dysfunction As directed 6 tablet 12     No current facility-administered medications for this visit.        PAST SURGICAL HISTORY:  Past Surgical History:   Procedure Laterality Date   • ANKLE SURGERY Right     ligament, chipped bones,dislocated   • BUNIONECTOMY  2014   • COLONOSCOPY     • ELBOW SURGERY Right     tendon surgery   • FOOT ARTHRODESIS, MODIFIED ARELLANO Left    • FRACTURE SURGERY Left     elbow   • HERNIA REPAIR Right     inguinal   • UT ARTHRS KNE SURG W/MENISCECTOMY MED/LAT W/SHVG Right 2018    Procedure: MENISCECTOMY LATERAL /MEDIAL;  Surgeon: Tylor Magana MD;  Location: [facility];  Service: Orthopedics   • UT CORRECTION HAMMERTOE Bilateral 2021    Procedure: REPAIR HAMMERTOE / MALLET TOE / CLAW TOE 2ND toe bilateral;  Surgeon: Mejia Alvarez DPM;  Location: [facility];  Service: Podiatry   • UT CORRJ HALLUX VALGUS W/SESMDC W/RESCJ PROX PHAL Right 2021    Procedure: Sasha Taveras;  Surgeon: Mejia Alvarez DPM;  Location: [facility];  Service: Podiatry   • ROTATOR CUFF REPAIR Left    • WISDOM TOOTH EXTRACTION      x4   • WRIST SURGERY         SOCIAL HISTORY:  Social History     Socioeconomic History   • Marital status: /Civil Union     Spouse name: Not on file   • Number of children: Not on file   • Years of education: Not on file   • Highest education level: Not on file   Occupational History   • Not on file   Tobacco Use   • Smoking status: Former     Packs/day: 1.00     Years: 20.00     Total pack years: 20.00     Types: Cigarettes     Quit date:      Years since quittin.5     Passive exposure: Past   • Smokeless tobacco: Never   Substance and Sexual Activity   • Alcohol use: Yes     Comment: occ beer with going out to dinner   • Drug use: No   • Sexual activity: Yes     Partners: Female   Other Topics Concern   • Not on file   Social History Narrative   • Not on file     Social Determinants of Health     Financial Resource Strain: Low Risk  (5/4/2023)    Overall Financial Resource Strain (CARDIA)    • Difficulty of Paying Living Expenses: Not hard at all   Food Insecurity: Not on file   Transportation Needs: No Transportation Needs (5/4/2023)    PRAPARE - Transportation    • Lack of Transportation (Medical): No    • Lack of Transportation (Non-Medical): No   Physical Activity: Not on file   Stress: Not on file   Social Connections: Not on file   Intimate Partner Violence: Not on file   Housing Stability: Not on file       ALLERGIES:  No Known Allergies    ROS:  Constitutional:  No fever, chills, night sweats, loss of appetite   HEENT No no sore throat, difficulty swallowing   Cardiovascular:  No chest pain, palpitations, BLE edema, ISLAS   Respiratory:  No SOB, coughing, chest congestion   Gastrointestinal:  No nausea, vomiting, abdominal pain   Genitourinary:  No dysuria, hematuria, urinary urgency/frequency   Musculoskeletal:  See HPI   Skin:  No rash, erythema, edema   Neurologic:  See HPI   Psychiatric Illness:  No depression, anxiety, mood disorder, substance abuse disorder     PHYSICAL EXAM:  /71   Pulse 91   Ht 5' 11" (1.803 m)   Wt 71.2 kg (156 lb 15.5 oz)   BMI 21.89 kg/m²           General:  Well-developed,appears well, no acute distress   Respiratory:  No SOB, no abnormal effort (use of accessory muscles). GI / Abdominal:  Soft. No abdominal masses or tenderness. Nondistended. Gait & balance No evidence of myelopathic gait. Lumbar spine range of motion:  -Forward flexion to 90  -Extension to neutral  -Lateral bend 45 right, 45 left  -Rotation 45 right, 45 left  There is no point tenderness with palpation along the posterior cervical, thoracic, lumbar spine.      Neurologic:    Lower Extremity Motor Function   Right  Left    Iliopsoas  5/5  5/5    Quadriceps 5/5 5/5   Tibialis anterior  5/5  5/5    EHL  5/5  5/5    Gastroc. muscle  5/5  5/5 Heel rise  5/5  5/5    Toe rise  5/5  5/5      Sensory: light touch is intact to bilateral upper and lower extremities     Reflexes:    Right Left   Patellar 1+ 1+   Achilles 1+ 1+   Babinski neg neg     Other tests:  Straight Leg Raise: positive left  Wills's: not tested  Clonus: negative  Rhona SI: negative  DRISS SI: not tested  Greater troch: no tenderness   Internal/external hip ROM: intact, no pain   Flexion/extension knee ROM: intact, no pain     IMAGING: I have personally reviewed the images and these are my findings:  Lumbar spine xray from 8/1/2023: Degenerative changes in lumbar spine with loss of disc space height most notably at L5-S1; end plate sclerosis; facet arthrosis notably L5, S1; osteophyte formation; no apparent spondylolisthesis; no obvious dynamic instability on flexion/extension radiographs      MRI lumbar spine from 7/31/2023: Multilevel lumbar spondylosis most noted at L5-S1 with left-sided paracentral disc herniation and displacement of the left S1 nerve      ASSESSMENT / Medical Decision Making (MDM):  70 y.o. male with history of back and left lower extremity pain. No incontinence of bowel/bladder, no fever, no chills, no night sweats. Physical exam showing no focal neurologic deficits    Imaging reviewed as above. Findings most notable for L5-S1 left-sided paracentral disc herniation    Impression of lumbar degenerative disease, lumbar disc herniation    Plan: The above clinical, physical and imaging findings were reviewed with the patient. Aneta Maldonado  has a constellation of findings consistent with lumbar radiculopathy in setting lumbar degenerative disc disease, lumbar disc herniation. Aneta Maldonado describes onset of back pain about 8 weeks ago after lifting stones for driveway that initially improved after muscle relaxer. He then reports onset of worsening back and posterior left lower extremity pain over the past week or so.  Unable to straighten out his left leg or ambulate due to increased pain. Fortunately patient remains neurologically intact, but has had difficulty with functioning due to increased pain and symptoms. We discussed the treatment options including physical therapy, at home exercises, activity modifications, chiropractic medicine, oral medications, interventional spine procedures. Patient has not any any conservative treatment aside from oral medications. At this time recommend continued conservative treatments. Referral placed for physical therapy to work on core strengthening, lumbar ROM, strengthening, and stretching exercises. Gabapentin rx sent to patient's pharmacy. Discussed reasoning for prescribing medication, proper usage, and potential side effects. Referral to pain management for evaluation and treatment. Discussed potential role of steroid injection at or near the source of pain to provide targeted relief. Continue with medications as previously prescribed if providing pain/symptom relief. Follow up in 1 month for repeat evaluation. Patient instructed to return to office/ER sooner if symptoms are not improving, getting worse, or new worrisome/neurologic symptoms arise.

## 2023-08-03 ENCOUNTER — RA CDI HCC (OUTPATIENT)
Dept: OTHER | Facility: HOSPITAL | Age: 63
End: 2023-08-03

## 2023-08-03 NOTE — PROGRESS NOTES
[address] [facility] coding opportunities       Chart reviewed, no opportunity found: CHART REVIEWED, NO OPPORTUNITY FOUND        Patients Insurance        Commercial Insurance: [address]

## 2023-08-08 ENCOUNTER — EVALUATION (OUTPATIENT)
Dept: PHYSICAL THERAPY | Facility: CLINIC | Age: 63
End: 2023-08-08
Payer: COMMERCIAL

## 2023-08-08 DIAGNOSIS — M54.42 ACUTE LEFT-SIDED LOW BACK PAIN WITH LEFT-SIDED SCIATICA: ICD-10-CM

## 2023-08-08 PROCEDURE — 97163 PT EVAL HIGH COMPLEX 45 MIN: CPT

## 2023-08-08 RX ORDER — HYDROCODONE BITARTRATE AND ACETAMINOPHEN 5; 325 MG/1; MG/1
1 TABLET ORAL EVERY 6 HOURS PRN
Qty: 20 TABLET | Refills: 0 | OUTPATIENT
Start: 2023-08-08

## 2023-08-08 NOTE — PROGRESS NOTES
PT Evaluation     Today's date: 2023  Patient name: Carlyn Matos  : [birth year]  MRN: [id number]  Referring provider: Shai Parry  Dx:   Encounter Diagnosis     ICD-10-CM    1. Acute left-sided low back pain with left-sided sciatica  M54.42 Ambulatory referral to Physical Therapy                     Assessment  Assessment details: Carlyn Matos is a 70 y.o. male who presents with lumbar derangement with pain, decreased strength, decreased ROM, ambulatory dysfunction and postural dysfunction. Due to these impairments, patient has difficulty performing ADL's, recreational activities, work-related activities, engaging in social activities, ambulation, stair negotiation, lifting/carrying, transfers. Patient's clinical presentation is consistent with their referring diagnosis of Acute left-sided low back pain with left-sided sciatica. Patient has been educated in gait training and plan of care, with home exercises to be introduced as appropriate. Patient would benefit from skilled physical therapy services to address their aforementioned functional limitations and progress towards prior level of function and independence with home exercise program and self symptom management/resolution. Impairments: abnormal gait, abnormal or restricted ROM, activity intolerance, impaired physical strength, lacks appropriate home exercise program, pain with function, weight-bearing intolerance, poor posture  and poor body mechanics  Other impairment: poor tolerance to any position  Functional limitations: limited tolerance to sitting/driving/bendingUnderstanding of Dx/Px/POC: good   Prognosis: good    Goals  Short Term Goals: Target Date 23  1. Initiate and advance HEP toward self symptom reduction/resolution. 2. Improve postural awareness and demonstrate self postural correction. 3. Improve AROM lumbar spine to min todd or better t/o.  4. Undisturbed sleep.   5. Pt to tolerate prolonged sitting/standing x 1 hour or more w/o c/o. Long Term Goals: Target Date 10/31/23  1. Indep with HEP and self symptom prevention. 2. Achieve lumbar AROM to WNL w/o c/o.  3. Improve LE/core strength to good to allow pt to tolerate bending and lifting/carrying 20# x 10/1' w/o c/o. Plan  Patient would benefit from: skilled PT  Planned modality interventions: thermotherapy: hydrocollator packs, unattended electrical stimulation and cryotherapy  Planned therapy interventions: joint mobilization, patient education, postural training, abdominal trunk stabilization, functional ROM exercises, home exercise program, neuromuscular re-education, strengthening, stretching, therapeutic activities, therapeutic exercise, manual therapy, balance, body mechanics training and gait training  Other planned therapy interventions: mechanical assessment  Frequency: 2x week  Duration in weeks: 12  Plan of Care beginning date: 8/8/2023  Plan of Care expiration date: 10/31/2023  Treatment plan discussed with: patient        Subjective Evaluation    History of Present Illness  Date of onset: 7/25/2023  Mechanism of injury: Patient had lower back pain starting in early June when was digging a trench for his driveway to adjust the placement of the stones that were put down. Pain resolved at that time, with pt returning to work at [employer] with patient reporting return of pain on July 25th when he had to pour some more stone for his driveway, with pain starting the morning after this activity. Worsening pain continued, with pt visiting the ER and getting gabapentin prescribed for his leg pain. Per notes review, pt was prescribed a medrol dose pack on 7/27/23, with pt noting no benefit from this. Pt referred to physical therapy, with discussion of potential need for injection also initiated by MD (per pt report).           Recurrent probem    Patient Goals  Patient goals for therapy: decreased pain, increased motion, increased strength, independence with ADLs/IADLs and return to work  Patient goal: To be able to take my trip to [place] in September  Pain  Current pain ratin  At best pain ratin  At worst pain rating: 10  Location: Lower back, L buttock,   Quality: sharp, cramping, dull ache and tight (strong achy feeling)  Relieving factors: change in position  Aggravating factors: standing, walking, sitting and stair climbing  Progression: no change (slight improvement from last week)    Social Support  Lives with: spouse    Employment status: not working (out of work due to back pain)  Exercise history: patient works a physically active job. Diagnostic Tests  MRI studies: abnormal (Spondylosis as described above including a left paracentral disc extrusion at L5-S1 which compresses the traversing left S1 nerve root. )  Treatments  Previous treatment: medication  Current treatment: physical therapy        Objective  *=painful  Lumbar ROM:   Flexion: full in sitting, pain with return to upright. Extension: not able to extend to neutral*  Sideglide: Left: min limitation Right: min-mod limitation*     Mechanical Assessment:   Pt initially positioned in R sidelying (preferential position) with difficulty finding any position of comfort. Prone over 2 pillows: able to gradually achieve full prone position, with L rotated pelvis noted initially. MHP applied with gradual ability to shift position. Mild decrease in distal pain noted, with variable nature throughout. Prone over 1 pillow: able to tolerate progression, with slow transition, with some centralization of symptoms also noted. Attempted to go to full prone, with onset of L LE cramping and increased pain demonstrated. Palpation/observation:   23: significant pain with palpation over L medial HS, L ITB, and L piriformis. Patient tolerating most movements poorly overall.      Gait:   23: Pt arrives ambulating by leaning onto bilateral crutches, with 2 point gait pattern utilized in flexed position. Crutches raised 1 level, with mild improvement in ability to stand more upright noted by end of session, but with continued significant pain. Transfers:   8/8/23: Pt able to complete bed mobility and transfers with UE assistance with increased time, caution and effort required. Painful movements noted throughout session. Precautions: lumbar  Daily Exercise Log:  Start of Care: 8/8/23  POC expires 10/31/23  FOTO: completed  Date 8/8/23        Visit # 1                 Manual         Stick rolling ITB, HS L                                   Neuro Re-Ed         Mechanical assessment  Trial L sidelying, ? Flx/rotation       Prone over pillows                                                      Ther Ex         Calf stretch         Hamstring stretch         ITB stretch                                                      Ther Activity                           Gait Training                           Modalities         Estim + HP HP during prone progression                   HEP:   To be issued as appropriate.

## 2023-08-09 ENCOUNTER — OFFICE VISIT (OUTPATIENT)
Dept: PHYSICAL THERAPY | Facility: CLINIC | Age: 63
End: 2023-08-09
Payer: COMMERCIAL

## 2023-08-09 DIAGNOSIS — M54.42 ACUTE LEFT-SIDED LOW BACK PAIN WITH LEFT-SIDED SCIATICA: Primary | ICD-10-CM

## 2023-08-09 PROCEDURE — 97112 NEUROMUSCULAR REEDUCATION: CPT

## 2023-08-09 PROCEDURE — 97140 MANUAL THERAPY 1/> REGIONS: CPT

## 2023-08-09 NOTE — PROGRESS NOTES
ANNUAL    Name: Elly Ponce      :       MRN: [id number]  Encounter Provider: Arielle Suh MD  Encounter Date: 2023   Encounter department: [facility].     1. Acute left-sided low back pain with left-sided sciatica  Assessment & Plan:  Patient was seen by the orthopedic because of the persistent pain in the low back area and also had an MRI done which has revealed spondylosis including a left paracentral disc extrusion at L5 and S1 which compresses the traversing left S1 nerve root. Patient did not respond well to the Medrol pack and the muscle relaxers helped a little bit. Patient is currently receiving physical therapy ordered by the orthopedics appears to be helping to some extent. We will continue with the same follow-up with orthopedic patient is still off from work      2. Acquired hypothyroidism  Assessment & Plan:  Patient to continue levothyroxine 100 mcg daily follow-up with repeat lab at a later date      3. Other male erectile dysfunction  Assessment & Plan:  Sildenafil 100 mg as needed      4. Rheumatoid arthritis involving multiple sites with positive rheumatoid factor ([address])  Assessment & Plan:  Patient was on hydroxychloroquine in the past but currently patient is not on any medication we will continue to monitor follow-up with the rheumatologist also. 5. Thrombocytosis  Assessment & Plan:  Continue hydroxyurea 1000 mg alternating with 500 and is being followed by the oncologist hematologist.  Last platelet count in April was 487. Follow-up with repeat CBC at a later date at hematologist's office           Subjective     Patient is coming here for a follow-up evaluation regarding pain in the low back area radiating down the left lower extremity for which she was seen by the orthopedic surgeon who has recommended physical therapy and started him on gabapentin increasing the dose gradually.   Patient does see some difference after he started the physical therapy but still has the pain especially when he is standing and walking around is about 9 or 10 in the scale of 1-10 while sitting it is around 4. Medications reviewed labs reviewed. Patient is currently using crutches to ambulate. Review of Systems   Constitutional: Negative for chills and fever. HENT: Negative for ear pain and sore throat. Eyes: Negative for pain and visual disturbance. Respiratory: Negative for cough and shortness of breath. Cardiovascular: Negative for chest pain and palpitations. Gastrointestinal: Negative for abdominal pain and vomiting. Genitourinary: Negative for dysuria and hematuria. Musculoskeletal: Positive for arthralgias and back pain. Skin: Negative for color change and rash. Neurological: Negative for seizures and syncope. All other systems reviewed and are negative.       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 Fortino Gurrola   • RA (rheumatoid arthritis) ([address])    • Rheumatoid arthritis involving multiple sites ([address]) 9/19/2022   • Wears glasses      Past Surgical History:   Procedure Laterality Date   • ANKLE SURGERY Right     ligament, chipped bones,dislocated   • BUNIONECTOMY  03/2014   • COLONOSCOPY     • ELBOW SURGERY Right     tendon surgery   • FOOT ARTHRODESIS, MODIFIED ARELLANO Left    • FRACTURE SURGERY Left     elbow   • HERNIA REPAIR Right     inguinal   • SC ARTHRS KNE SURG W/MENISCECTOMY MED/LAT W/SHVG Right 07/09/2018    Procedure: Jigar Linares;  Surgeon: Raul Tellez MD;  Location: [facility];  Service: Orthopedics   • SC CORRECTION HAMMERTOE Bilateral 05/14/2021 Procedure: REPAIR HAMMERTOE / MALLET TOE / CLAW TOE 2ND toe bilateral;  Surgeon: Jarrod Mace DPM;  Location: [facility];  Service: Podiatry   • [address] W/SESMDC W/RESCJ PROX PHAL Right 2021    Procedure: Chester Kruse;  Surgeon: Jarrod Mace DPM;  Location: [facility];  Service: Podiatry   • ROTATOR CUFF REPAIR Left    • WISDOM TOOTH EXTRACTION      x4   • WRIST SURGERY       Family History   Problem Relation Age of Onset   • Cancer Mother         lung-heavy smoker   • Hypertension Father    • Heart disease Father         leaky valve   • Skin cancer Father      Social History     Socioeconomic History   • Marital status: /Civil Union     Spouse name: None   • Number of children: None   • Years of education: None   • Highest education level: None   Occupational History   • None   Tobacco Use   • Smoking status: Former     Packs/day: 1.00     Years: 20.00     Total pack years: 20.00     Types: Cigarettes     Quit date: 2000     Years since quittin.6     Passive exposure: Past   • Smokeless tobacco: Never   Substance and Sexual Activity   • Alcohol use: Yes     Comment: occ beer with going out to dinner   • Drug use: No   • Sexual activity: Yes     Partners: Female   Other Topics Concern   • None   Social History Narrative   • None     Social Determinants of Health     Financial Resource Strain: Low Risk  (2023)    Overall Financial Resource Strain (CARDIA)    • Difficulty of Paying Living Expenses: Not hard at all   Food Insecurity: Not on file   Transportation Needs: No Transportation Needs (2023)    PRAPARE - Transportation    • Lack of Transportation (Medical): No    • Lack of Transportation (Non-Medical):  No   Physical Activity: Not on file   Stress: Not on file   Social Connections: Not on file   Intimate Partner Violence: Not on file   Housing Stability: Not on file     Current Outpatient Medications on File Prior to Visit   Medication Sig   • Cholecalciferol (VITAMIN D3) 1000 units CAPS Take 1,000 Units by mouth   • gabapentin (Neurontin) 100 mg capsule Start with 1 pill at bedtime for 5 days (100mg total), then 2 pills at bedtime next 5 days (200 mg total), then 3 pills at bedtime after 10 days (300mg total). Gabapentin may cause vision changes, clumsiness, unsteadiness, dizziness, drowsiness, sleepiness, or trouble with thinking. Make sure you know how you react to this medicine before you drive, use machines, or do anything else that could be dangerous if you are not alert, well-coordinated, or able to think or see well.,   • Hydroxyurea (HYDREA PO) Take 1,000 mg by mouth Mon-Wed-Fri   • hydroxyurea (HYDREA) 500 mg capsule Take by mouth On Tues-Thurs-Sat-Sun    • levothyroxine 150 mcg tablet every morning     • sildenafil (VIAGRA) 100 mg tablet Take 1 tablet (100 mg total) by mouth if needed for erectile dysfunction As directed     No Known Allergies  Immunization History   Administered Date(s) Administered   • COVID-19 Moderna Vac BIVALENT 12 Yr+ IM (BOOSTER ONLY) 0.5 ML 05/02/2023   • COVID-19 PFIZER VACCINE 0.3 ML IM 02/19/2021   • INFLUENZA 01/01/2013, 11/03/2014, 10/24/2017, 10/11/2018   • Zoster 12/01/2014   • Zoster Vaccine Recombinant 12/14/2019, 03/06/2020       Objective     /70 (BP Location: Right arm, Patient Position: Sitting, Cuff Size: Standard)   Pulse 70   Ht 6' (1.829 m)   Wt 71.2 kg (157 lb)   SpO2 98%   BMI 21.29 kg/m²     Physical Exam  Vitals and nursing note reviewed. Constitutional:       Appearance: Normal appearance. Cardiovascular:      Rate and Rhythm: Normal rate and regular rhythm. Heart sounds: Normal heart sounds. Pulmonary:      Effort: Pulmonary effort is normal.      Breath sounds: Normal breath sounds. Abdominal:      Palpations: Abdomen is soft. Musculoskeletal:      Right lower leg: No edema. Left lower leg: No edema.       Comments: SLR about 30 degrees on the left side movements of the hip are not restricted . Skin:     General: Skin is warm and dry. Neurological:      Mental Status: He is alert and oriented to person, place, and time.        Javi Carmen MD

## 2023-08-09 NOTE — PROGRESS NOTES
Daily Note     Today's date: 2023  Patient name: Sofia Muñiz  :   MRN: [id number]  Referring provider: Ansley Reason  Dx:   Encounter Diagnosis     ICD-10-CM    1. Acute left-sided low back pain with left-sided sciatica  M54.42           Start Time: 08  Stop Time: 930  Total time in clinic (min): 45 minutes    Subjective: Patient arrives standing slightly more upright today. Noted he did raise the height of his crutches again this morning (at therapist recommendation yesterday). Mild improvement in ease of movement demonstrated. Objective: See treatment diary below      Assessment: Tolerated treatment fairly well with mild improved upright posture noted by end of session. Increased tolerance for prone and movement noted overall, with continued significant pain, spasms, and difficulty with mobility. Patient will benefit from continued PT with pt aware of therapist's encouragement to pursue further medical management at this time in conjunction with PT at this time. Plan: Continue per plan of care. Progress treatment as tolerated. Precautions: lumbar  Daily Exercise Log:  Start of Care: 23  POC expires 10/31/23  FOTO: completed  Date 23       Visit # 1 2                Manual  10'       Stick rolling ITB, HS L        STM  TPR/manual release techniques to post thigh, glutes, ITB L side to pt tolerance       Gentle PA mobs  LS-prone over pillows                Neuro Re-Ed  30'       Mechanical assessment  L sidelying w/ flexion rotation 1x15 w/o pain initially. After standing, attempted to trial        Prone over pillows  3 pillows x5',  two pillows x 5', trialed with L hip fig 4 x 1', w/ decrease to 1 pillow, decreased tolerance, inc leg pain. retunred to        Pt education  Re: progression of exercises, instructed in L sidelying flexion rotation activity to see if this helps his symptoms. Reviewed not to perform in R sidelying. Ther Ex         Calf stretch         Hamstring stretch         ITB stretch                                                      Ther Activity                           Gait Training                           Modalities         Estim + MHP MHP during prone progression MHP during prone exercises                  HEP:   Verbally instructed in L sidelying with flexion rotation of spine. Understanding expressed and demonstrated.

## 2023-08-11 ENCOUNTER — OFFICE VISIT (OUTPATIENT)
Dept: FAMILY MEDICINE CLINIC | Facility: CLINIC | Age: 63
End: 2023-08-11
Payer: COMMERCIAL

## 2023-08-11 VITALS
SYSTOLIC BLOOD PRESSURE: 110 MMHG | OXYGEN SATURATION: 98 % | BODY MASS INDEX: 21.26 KG/M2 | HEIGHT: 72 IN | DIASTOLIC BLOOD PRESSURE: 70 MMHG | WEIGHT: 157 LBS | HEART RATE: 70 BPM

## 2023-08-11 DIAGNOSIS — M54.42 ACUTE LEFT-SIDED LOW BACK PAIN WITH LEFT-SIDED SCIATICA: Primary | ICD-10-CM

## 2023-08-11 DIAGNOSIS — E03.9 ACQUIRED HYPOTHYROIDISM: ICD-10-CM

## 2023-08-11 DIAGNOSIS — N52.8 OTHER MALE ERECTILE DYSFUNCTION: ICD-10-CM

## 2023-08-11 DIAGNOSIS — M05.79 RHEUMATOID ARTHRITIS INVOLVING MULTIPLE SITES WITH POSITIVE RHEUMATOID FACTOR (HCC): ICD-10-CM

## 2023-08-11 DIAGNOSIS — D75.839 THROMBOCYTOSIS: ICD-10-CM

## 2023-08-11 PROCEDURE — 99213 OFFICE O/P EST LOW 20 MIN: CPT | Performed by: INTERNAL MEDICINE

## 2023-08-13 NOTE — ASSESSMENT & PLAN NOTE
Patient was on hydroxychloroquine in the past but currently patient is not on any medication we will continue to monitor follow-up with the rheumatologist also.

## 2023-08-13 NOTE — ASSESSMENT & PLAN NOTE
Continue hydroxyurea 1000 mg alternating with 500 and is being followed by the oncologist hematologist.  Last platelet count in April was 487.   Follow-up with repeat CBC at a later date at hematologist's office

## 2023-08-13 NOTE — ASSESSMENT & PLAN NOTE
Patient was seen by the orthopedic because of the persistent pain in the low back area and also had an MRI done which has revealed spondylosis including a left paracentral disc extrusion at L5 and S1 which compresses the traversing left S1 nerve root. Patient did not respond well to the Medrol pack and the muscle relaxers helped a little bit. Patient is currently receiving physical therapy ordered by the orthopedics appears to be helping to some extent.   We will continue with the same follow-up with orthopedic patient is still off from work

## 2023-08-14 ENCOUNTER — OFFICE VISIT (OUTPATIENT)
Dept: PHYSICAL THERAPY | Facility: CLINIC | Age: 63
End: 2023-08-14
Payer: COMMERCIAL

## 2023-08-14 DIAGNOSIS — M54.42 ACUTE LEFT-SIDED LOW BACK PAIN WITH LEFT-SIDED SCIATICA: Primary | ICD-10-CM

## 2023-08-14 PROCEDURE — 97112 NEUROMUSCULAR REEDUCATION: CPT | Performed by: PHYSICAL THERAPIST

## 2023-08-14 NOTE — PROGRESS NOTES
Daily Note     Today's date: 2023  Patient name: Yolette Crawley  : [birth year]  MRN: [id number]  Referring provider: Travis Chan  Dx:   Encounter Diagnosis     ICD-10-CM    1. Acute left-sided low back pain with left-sided sciatica  M54.42                      Subjective: Pt enters w/ 7/10 L glut, post thigh and prox calf pain. Objective: See treatment diary below; pt continues to ambulate w/ B/L axillary crutches. Rep SGIS in doorway 3x10 = dec/B 5/10 glut 2/10 calf    Assessment: Tolerated treatment fair and appears to demonstrate directional preference of L side gliding (hips to R). Pt and wife were educated re: periph vs centraliz of symtpoms and importance of freq of HEP and avoidance of positions which make pain periph/worse. . Patient did report MH/estim in sidelying provided relief. Pt/wife were educated they may use TENs unit they have at home. Plan: Continue per plan of care. Precautions: lumbar  Daily Exercise Log:  Start of Care: 23  POC expires 10/31/23  FOTO: completed  Date 23      Visit # 1 2 3               Manual  10'       Stick rolling ITB, HS L        STM  TPR/manual release techniques to post thigh, glutes, ITB L side to pt tolerance       Gentle PA mobs  LS-prone over pillows                Neuro Re-Ed  30' 30'      Mechanical assessment  L sidelying w/ flexion rotation 1x15 w/o pain initially. After standing, attempted to trial  Rep SGIS in doorway 2x10; 2"x10 w/ tactile cues for form      Prone over pillows  3 pillows x5',  two pillows x 5', trialed with L hip fig 4 x 1', w/ decrease to 1 pillow, decreased tolerance, inc leg pain. retunred to        Pt education  Re: progression of exercises, instructed in L sidelying flexion rotation activity to see if this helps his symptoms. Reviewed not to perform in R sidelying.  HEP update & review (SGIS in doorway 2x10 every 2 hours)                                          Ther Ex         Calf stretch         Hamstring stretch         ITB stretch                                                      Ther Activity                           Gait Training                           Modalities         Estim + MHP MHP during prone progression MHP during prone exercises MH/premod estim in R SL to L glut 10'                 HEP:   Verbally instructed in L sidelying with flexion rotation of spine. Understanding expressed and demonstrated.

## 2023-08-15 ENCOUNTER — OFFICE VISIT (OUTPATIENT)
Dept: PHYSICAL THERAPY | Facility: CLINIC | Age: 63
End: 2023-08-15
Payer: COMMERCIAL

## 2023-08-15 ENCOUNTER — CONSULT (OUTPATIENT)
Dept: PAIN MEDICINE | Facility: CLINIC | Age: 63
End: 2023-08-15
Payer: COMMERCIAL

## 2023-08-15 ENCOUNTER — TELEPHONE (OUTPATIENT)
Dept: PAIN MEDICINE | Facility: CLINIC | Age: 63
End: 2023-08-15

## 2023-08-15 VITALS — SYSTOLIC BLOOD PRESSURE: 132 MMHG | HEART RATE: 85 BPM | TEMPERATURE: 98.2 F | DIASTOLIC BLOOD PRESSURE: 59 MMHG

## 2023-08-15 DIAGNOSIS — M54.42 ACUTE LEFT-SIDED LOW BACK PAIN WITH LEFT-SIDED SCIATICA: Primary | ICD-10-CM

## 2023-08-15 DIAGNOSIS — M51.17 INTERVERTEBRAL DISC DISORDER WITH RADICULOPATHY OF LUMBOSACRAL REGION: Primary | ICD-10-CM

## 2023-08-15 DIAGNOSIS — M54.42 ACUTE LEFT-SIDED LOW BACK PAIN WITH LEFT-SIDED SCIATICA: ICD-10-CM

## 2023-08-15 PROCEDURE — 97110 THERAPEUTIC EXERCISES: CPT

## 2023-08-15 PROCEDURE — 99204 OFFICE O/P NEW MOD 45 MIN: CPT | Performed by: PHYSICAL MEDICINE & REHABILITATION

## 2023-08-15 NOTE — H&P (VIEW-ONLY)
Assessment:  1. Acute left-sided low back pain with left-sided sciatica        Plan:  Mr. Leo uDmont is a pleasant 40-year-old male presents to [facility] for initial evaluation regarding acute onset left-sided low back pain with radicular symptoms into the left leg. He has already been seen by Dr. Bernard Chaidez regarding lumbar radiculopathy in setting of lumbar degenerative disc disease and a notable L5-S1 left-sided paracentral disc herniation impacting the left S1 nerve root. During today's evaluation he is demonstrating both clinical and diagnostic evidence of lumbar radiculopathy likely relation to the imaging findings as discussed above. I would agree with Dr. Bernard Chaidez and the potential role for steroid injections for targeted relief and at this time interventional approaches will be beneficial and warranted. He will be traveling to [place] in September and ideally we will expedite this injection prior to his long flight. As such we will  1. Plan for left-sided L5-S1 and S1 TFESI under fluoroscopy guidance  Complete risks and benefits including bleeding, infection, tissue reaction, nerve injury and allergic reaction were discussed. The approach was demonstrated using models and literature was provided. Verbal and written consent was obtained. History of Present Illness:    Aisha Johnson is a 70 y.o. male who presents to [address] [facility] for initial evaluation of the above stated pain complaints. The patient has a past medical and chronic pain history as outlined in the assessment section. 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 Today reports severe pain rated 9 out of 10 and interfere with activities.   Pain is constant 100% of the time that is present throughout the day and night. Describes symptoms of burning, cramping, shooting, sharp, throbbing, dull aching pain. Denies any significant lower extremity weakness or falls. Requires crutches for ambulation at times. Symptoms are worse with lying down, standing, bending, sitting, walking, exercise, coughing, sneezing. Reports moderate relief with heat and TENS unit. Denies smoking or marijuana use. Admits to alcohol no more than 3 beers socially per week. Reports tried Vicodin and Flexeril in the past with mild relief. Presents today for initial evaluation. Review of Systems:    Review of Systems   Constitutional: Negative for unexpected weight change. HENT: Negative for ear pain. Eyes: Negative for visual disturbance. Respiratory: Negative for shortness of breath and wheezing. Gastrointestinal: Negative for abdominal pain. Musculoskeletal: Positive for back pain and gait problem. Buttocks lower to back to down the leg   Neurological: Negative for weakness and numbness. Psychiatric/Behavioral: Negative for decreased concentration.            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 Amanda Olvera   • RA (rheumatoid arthritis) ([address])    • Rheumatoid arthritis involving multiple sites ([address]) 9/19/2022   • Wears glasses        Past Surgical History:   Procedure Laterality Date   • ANKLE SURGERY Right     ligament, chipped bones,dislocated   • BUNIONECTOMY  03/2014   • COLONOSCOPY     • ELBOW SURGERY Right     tendon surgery   • FOOT ARTHRODESIS, MODIFIED ARELLANO Left    • FRACTURE SURGERY Left     elbow   • HERNIA REPAIR Right     inguinal • MT ARTHRS KNE SURG W/MENISCECTOMY MED/LAT W/SHVG Right 2018    Procedure: MENISCECTOMY LATERAL /MEDIAL;  Surgeon: Kandi Walsh MD;  Location: [facility]  Service: Orthopedics   • MT CORRECTION HAMMERTOE Bilateral 2021    Procedure: REPAIR HAMMERTOE / MALLET TOE / CLAW TOE 2ND toe bilateral;  Surgeon: Yakov Puckett DPM;  Location: [facility]  Service: Podiatry   • [address] W/SESMDC W/RESCJ PROX PHAL Right 2021    Procedure: [place] Clock;  Surgeon: Yakov Puckett DPM;  Location: [facility]  Service: Podiatry   • ROTATOR CUFF REPAIR Left    • WISDOM TOOTH EXTRACTION      x4   • WRIST SURGERY         Family History   Problem Relation Age of Onset   • Cancer Mother         lung-heavy smoker   • Hypertension Father    • Heart disease Father         leaky valve   • Skin cancer Father        Social History     Occupational History   • Not on file   Tobacco Use   • Smoking status: Former     Packs/day: 1.00     Years: 20.00     Total pack years: 20.00     Types: Cigarettes     Quit date:      Years since quittin.6     Passive exposure: Past   • Smokeless tobacco: Never   Substance and Sexual Activity   • Alcohol use: Yes     Comment: occ beer with going out to dinner   • Drug use: No   • Sexual activity: Yes     Partners: Female         Current Outpatient Medications:   •  Cholecalciferol (VITAMIN D3) 1000 units CAPS, Take 1,000 Units by mouth, Disp: , Rfl:   •  gabapentin (Neurontin) 100 mg capsule, Start with 1 pill at bedtime for 5 days (100mg total), then 2 pills at bedtime next 5 days (200 mg total), then 3 pills at bedtime after 10 days (300mg total). Gabapentin may cause vision changes, clumsiness, unsteadiness, dizziness, drowsiness, sleepiness, or trouble with thinking.  Make sure you know how you react to this medicine before you drive, use machines, or do anything else that could be dangerous if you are not alert, well-coordinated, or able to think or see well.,, Disp: 50 capsule, Rfl: 0  •  Hydroxyurea (HYDREA PO), Take 1,000 mg by mouth Mon-Wed-Fri, Disp: , Rfl:   •  hydroxyurea (HYDREA) 500 mg capsule, Take by mouth On Tues-Thurs-Sat-Sun , Disp: , Rfl:   •  levothyroxine 150 mcg tablet, every morning  , Disp: , Rfl:   •  sildenafil (VIAGRA) 100 mg tablet, Take 1 tablet (100 mg total) by mouth if needed for erectile dysfunction As directed, Disp: 6 tablet, Rfl: 12    No Known Allergies    Physical Exam:    /59   Pulse 85   Temp 98.2 °F (36.8 °C)     Constitutional: normal, well developed, well nourished, alert, in no distress and non-toxic and no overt pain behavior. Eyes: anicteric  HEENT: grossly intact  Neck: supple, symmetric, trachea midline and no masses   Pulmonary:even and unlabored  Cardiovascular:No edema or pitting edema present  Skin:Normal without rashes or lesions and well hydrated  Psychiatric:Mood and affect appropriate  Neurologic:Cranial Nerves II-XII grossly intact  Musculoskeletal:antalgic, ambulates with crutches, tenderness to palpation bilateral lumbar paraspinals, decreased active and passive range of motion with lumbar flexion and extension limited by pain, MMT 5 out of 5 bilateral lower extremities, DTRs hyporeflexic bilateral patellar and Achilles reflexes, positive straight leg raise in the supine position radicular pain into the left leg    [address]  Outside Information  Results  MRI lumbar spine wo contrast (Order [id number])     MRI lumbar spine wo contrast  Order: [id number]  Impression    Impression: Spondylosis as described above including a left paracentral disc   extrusion at L5-S1 which compresses the traversing left S1 nerve root.              Workstation:[id number]  Narrative    History: Acute left-sided low back pain with left-sided sciatica     Procedure: MRI of the lumbar spine was obtained with the following sequences:   Sagittal T1, sagittal T2, sagittal STIR and axial T2 weighted images. No   intravenous contrast.     Comparison: Lumbar spine radiographs dated 7/28/2023     Findings: For the purposes of this dictation, the lumbar vertebrae are labeled   from a caudal to cranial direction, the first vertebra with lumbar morphology is   labeled as L5. Conus and lower thoracic cord: The conus terminates at the L1 level and is   unremarkable. The distal thoracic cord is normal in caliber and signal.     Marrow and Alignment: There is normal lumbar lordosis with mild right convex   curvature at the thoracolumbar junction and mild left convex curvature of the   lower lumbar spine. The vertebral bodies are otherwise normal in height and   alignment. No focal suspicious marrow signal abnormality. Disc desiccation and mild disc space narrowing at L1-L2, L4-L5, and L5-S1. Multilevel anterior endplate osteophyte formation. L1-L2: No disc herniation. No spinal canal or foraminal stenosis. L2-L3: No disc herniation. No spinal canal or foraminal stenosis. L3-L4: No disc herniation. No spinal canal or foraminal stenosis. L4-L5: Mild disc bulge and osteophytic ridging. Bilateral facet hypertrophy and   ligamentum flavum infolding. Mild spinal canal stenosis. Mild bilateral   foraminal stenosis. L5-S1: Mild disc bulge and osteophytic ridging with a superimposed left   paracentral disc extrusion with mild superior migration. The extrusion measures   10 x 14 x 12 mm (AP x transverse x craniocaudal) and compresses the traversing   left S1 nerve root in the subarticular zone (series 5001, image 7; series 8001,   image 23). Left facet hypertrophy. Mild central zone stenosis. No foraminal   stenosis.      The posterior ligamentous structures and paraspinal musculature are normal in   signal.  Exam End: 07/31/23  9:03 AM    Specimen Collected: 07/31/23  9:06 AM Last Resulted: 07/31/23  9:36 AM   Received From: [facility] [place]  Result Received: 07/31/23 12:33 PM     View Encounter      Received Information              No orders to display       No orders of the defined types were placed in this encounter. [id number]

## 2023-08-15 NOTE — PROGRESS NOTES
Assessment:  1. Acute left-sided low back pain with left-sided sciatica        Plan:  Mr. Andrea Wells is a pleasant 77-year-old male presents to [facility] for initial evaluation regarding acute onset left-sided low back pain with radicular symptoms into the left leg. He has already been seen by Dr. Juanis Peterson regarding lumbar radiculopathy in setting of lumbar degenerative disc disease and a notable L5-S1 left-sided paracentral disc herniation impacting the left S1 nerve root. During today's evaluation he is demonstrating both clinical and diagnostic evidence of lumbar radiculopathy likely relation to the imaging findings as discussed above. I would agree with Dr. Juanis Peterson and the potential role for steroid injections for targeted relief and at this time interventional approaches will be beneficial and warranted. He will be traveling to [place] in September and ideally we will expedite this injection prior to his long flight. As such we will  1. Plan for left-sided L5-S1 and S1 TFESI under fluoroscopy guidance  Complete risks and benefits including bleeding, infection, tissue reaction, nerve injury and allergic reaction were discussed. The approach was demonstrated using models and literature was provided. Verbal and written consent was obtained. History of Present Illness:    Diana Yang is a 70 y.o. male who presents to [facility] for initial evaluation of the above stated pain complaints. The patient has a past medical and chronic pain history as outlined in the assessment section. 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 Today reports severe pain rated 9 out of 10 and interfere with activities.   Pain is constant 100% of the time that is present throughout the day and night. Describes symptoms of burning, cramping, shooting, sharp, throbbing, dull aching pain. Denies any significant lower extremity weakness or falls. Requires crutches for ambulation at times. Symptoms are worse with lying down, standing, bending, sitting, walking, exercise, coughing, sneezing. Reports moderate relief with heat and TENS unit. Denies smoking or marijuana use. Admits to alcohol no more than 3 beers socially per week. Reports tried Vicodin and Flexeril in the past with mild relief. Presents today for initial evaluation. Review of Systems:    Review of Systems   Constitutional: Negative for unexpected weight change. HENT: Negative for ear pain. Eyes: Negative for visual disturbance. Respiratory: Negative for shortness of breath and wheezing. Gastrointestinal: Negative for abdominal pain. Musculoskeletal: Positive for back pain and gait problem. Buttocks lower to back to down the leg   Neurological: Negative for weakness and numbness. Psychiatric/Behavioral: Negative for decreased concentration.            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 Arnel Pereyra   • RA (rheumatoid arthritis) ([address])    • Rheumatoid arthritis involving multiple sites ([address]) 9/19/2022   • Wears glasses        Past Surgical History:   Procedure Laterality Date   • ANKLE SURGERY Right     ligament, chipped bones,dislocated   • BUNIONECTOMY  03/2014   • COLONOSCOPY     • ELBOW SURGERY Right     tendon surgery   • FOOT ARTHRODESIS, MODIFIED ARELLANO Left    • FRACTURE SURGERY Left     elbow   • HERNIA REPAIR Right     inguinal • CA ARTHRS KNE SURG W/MENISCECTOMY MED/LAT W/SHVG Right 2018    Procedure: MENISCECTOMY LATERAL /MEDIAL;  Surgeon: Beatris Cogan, MD;  Location: [facility];  Service: Orthopedics   • CA CORRECTION HAMMERTOE Bilateral 2021    Procedure: REPAIR HAMMERTOE / MALLET TOE / CLAW TOE 2ND toe bilateral;  Surgeon: Cristobal Zepeda DPM;  Location: [facility];  Service: Podiatry   • [address] W/SESMDC W/RESCJ PROX PHAL Right 2021    Procedure: Evern Pitcher;  Surgeon: Cristobal Zepeda DPM;  Location: [facility];  Service: Podiatry   • ROTATOR CUFF REPAIR Left    • WISDOM TOOTH EXTRACTION      x4   • WRIST SURGERY         Family History   Problem Relation Age of Onset   • Cancer Mother         lung-heavy smoker   • Hypertension Father    • Heart disease Father         leaky valve   • Skin cancer Father        Social History     Occupational History   • Not on file   Tobacco Use   • Smoking status: Former     Packs/day: 1.00     Years: 20.00     Total pack years: 20.00     Types: Cigarettes     Quit date:      Years since quittin.6     Passive exposure: Past   • Smokeless tobacco: Never   Substance and Sexual Activity   • Alcohol use: Yes     Comment: occ beer with going out to dinner   • Drug use: No   • Sexual activity: Yes     Partners: Female         Current Outpatient Medications:   •  Cholecalciferol (VITAMIN D3) 1000 units CAPS, Take 1,000 Units by mouth, Disp: , Rfl:   •  gabapentin (Neurontin) 100 mg capsule, Start with 1 pill at bedtime for 5 days (100mg total), then 2 pills at bedtime next 5 days (200 mg total), then 3 pills at bedtime after 10 days (300mg total). Gabapentin may cause vision changes, clumsiness, unsteadiness, dizziness, drowsiness, sleepiness, or trouble with thinking.  Make sure you know how you react to this medicine before you drive, use machines, or do anything else that could be dangerous if you are not alert, well-coordinated, or able to think or see well.,, Disp: 50 capsule, Rfl: 0  •  Hydroxyurea (HYDREA PO), Take 1,000 mg by mouth Mon-Wed-Fri, Disp: , Rfl:   •  hydroxyurea (HYDREA) 500 mg capsule, Take by mouth On Tues-Thurs-Sat-Sun , Disp: , Rfl:   •  levothyroxine 150 mcg tablet, every morning  , Disp: , Rfl:   •  sildenafil (VIAGRA) 100 mg tablet, Take 1 tablet (100 mg total) by mouth if needed for erectile dysfunction As directed, Disp: 6 tablet, Rfl: 12    No Known Allergies    Physical Exam:    /59   Pulse 85   Temp 98.2 °F (36.8 °C)     Constitutional: normal, well developed, well nourished, alert, in no distress and non-toxic and no overt pain behavior. Eyes: anicteric  HEENT: grossly intact  Neck: supple, symmetric, trachea midline and no masses   Pulmonary:even and unlabored  Cardiovascular:No edema or pitting edema present  Skin:Normal without rashes or lesions and well hydrated  Psychiatric:Mood and affect appropriate  Neurologic:Cranial Nerves II-XII grossly intact  Musculoskeletal:antalgic, ambulates with crutches, tenderness to palpation bilateral lumbar paraspinals, decreased active and passive range of motion with lumbar flexion and extension limited by pain, MMT 5 out of 5 bilateral lower extremities, DTRs hyporeflexic bilateral patellar and Achilles reflexes, positive straight leg raise in the supine position radicular pain into the left leg    [address]  Outside Information  Results  MRI lumbar spine wo contrast (Order [id number])     MRI lumbar spine wo contrast  Order: [id number]  Impression    Impression: Spondylosis as described above including a left paracentral disc   extrusion at L5-S1 which compresses the traversing left S1 nerve root.              Workstation:[id number]  Narrative    History: Acute left-sided low back pain with left-sided sciatica     Procedure: MRI of the lumbar spine was obtained with the following sequences:   Sagittal T1, sagittal T2, sagittal STIR and axial T2 weighted images. No   intravenous contrast.     Comparison: Lumbar spine radiographs dated 7/28/2023     Findings: For the purposes of this dictation, the lumbar vertebrae are labeled   from a caudal to cranial direction, the first vertebra with lumbar morphology is   labeled as L5. Conus and lower thoracic cord: The conus terminates at the L1 level and is   unremarkable. The distal thoracic cord is normal in caliber and signal.     Marrow and Alignment: There is normal lumbar lordosis with mild right convex   curvature at the thoracolumbar junction and mild left convex curvature of the   lower lumbar spine. The vertebral bodies are otherwise normal in height and   alignment. No focal suspicious marrow signal abnormality. Disc desiccation and mild disc space narrowing at L1-L2, L4-L5, and L5-S1. Multilevel anterior endplate osteophyte formation. L1-L2: No disc herniation. No spinal canal or foraminal stenosis. L2-L3: No disc herniation. No spinal canal or foraminal stenosis. L3-L4: No disc herniation. No spinal canal or foraminal stenosis. L4-L5: Mild disc bulge and osteophytic ridging. Bilateral facet hypertrophy and   ligamentum flavum infolding. Mild spinal canal stenosis. Mild bilateral   foraminal stenosis. L5-S1: Mild disc bulge and osteophytic ridging with a superimposed left   paracentral disc extrusion with mild superior migration. The extrusion measures   10 x 14 x 12 mm (AP x transverse x craniocaudal) and compresses the traversing   left S1 nerve root in the subarticular zone (series 5001, image 7; series 8001,   image 23). Left facet hypertrophy. Mild central zone stenosis. No foraminal   stenosis.      The posterior ligamentous structures and paraspinal musculature are normal in   signal.  Exam End: 07/31/23  9:03 AM    Specimen Collected: 07/31/23  9:06 AM Last Resulted: 07/31/23  9:36 AM   Received From: [facility]  Result Received: 07/31/23 12:33 PM     View Encounter      Received Information              No orders to display       No orders of the defined types were placed in this encounter.

## 2023-08-15 NOTE — PROGRESS NOTES
Daily Note     Today's date: 8/15/2023  Patient name: John Breen  :   MRN: [id number]  Referring provider: Idalia Aviles  Dx:   Encounter Diagnosis     ICD-10-CM    1. Acute left-sided low back pain with left-sided sciatica  M54.42           Start Time: 1445  Stop Time: 1520  Total time in clinic (min): 35 minutes    Subjective: Patient arrives from pain management appt reporting that they are planning to get him in for an epidural tomorrow if they can get his insurance approval in time. Patient notes his leg is aggravated due to sitting in appointment earlier this afternoon. Objective: See treatment diary below      Assessment: Tolerated treatment fairly, with limited tolerance to standing side-glide in doorway due to high irritiability of L LE especially in post HS and glutes. Trialed propping up on R elbow in sidelying for similar positioning, with improved tolerance noted in this position. Continues to be highly limited in tolerance for movement and PT interventions at this time. Recommending PT resuming after he receives his injection, which is tentatively scheduled for tomorrow pending insurance approval. Patient and wife made aware of recommendation for rescheduling appt if the injection gets pushed to next week. Next appt is not until Tuesday, so time is available to receive injection depending on MD schedule and insurance authorization. Patient would benefit from injection to decrease irritability of nerve, with patient and wife expressing understanding of need to have some further medical intervention to allow pt's increased participation in therapy. Plan: Continue per plan of care. Progress treatment as tolerated.        Precautions: lumbar  Daily Exercise Log:  Start of Care: 23  POC expires 10/31/23  FOTO: completed  Date 8/8/23 8/9/23 2023 8/15/23     Visit # 1 2 3 4              Manual  10'       Stick rolling ITB, HS L        STM  TPR/manual release techniques to post thigh, glutes, ITB L side to pt tolerance       Gentle PA mobs  LS-prone over pillows                Neuro Re-Ed  30' 30' 25'     Mechanical assessment  L sidelying w/ flexion rotation 1x15 w/o pain initially. After standing, attempted to trial  Rep SGIS in doorway 2x10; 2"x10 w/ tactile cues for form Rep SGIS in doorway 1x10; limited tolerance today. Prone over pillows  3 pillows x5',  two pillows x 5', trialed with L hip fig 4 x 1', w/ decrease to 1 pillow, decreased tolerance, inc leg pain. retunred to   Side prop on R side and down to sidelying. 3' up, 3 min down. Pt education  Re: progression of exercises, instructed in L sidelying flexion rotation activity to see if this helps his symptoms. Reviewed not to perform in R sidelying. HEP update & review (SGIS in doorway 2x10 every 2 hours) Review of mechanical disc nature, importance of intervention to relieve pressure on nerve. Reviewed rec to have injection prior to further therapy. Ther Ex         Calf stretch         Hamstring stretch         ITB stretch                                                      Ther Activity                           Gait Training                           Modalities         Estim + MHP MHP during prone progression MHP during prone exercises MH/premod estim in R SL to L glut 10' MHP + high-volt estim in R sidelying to L glute 10'                HEP:   L sideglide in doorway 1-2x10 every 2 hours.  Initiated  8/14/23 by Pam Rankin, PT, MDT

## 2023-08-15 NOTE — TELEPHONE ENCOUNTER
Scheduled patient for TFESI 8/16/23  Patient denies RX blood thinners/ NSAIDS  Nothing to eat or drink 1 hour prior to procedure  Needs to arrange transportation  Proper clothing for procedure  No vaccines 2 weeks prior or after procedure  If ill or place on antibiotics, please call to reschedule

## 2023-08-16 ENCOUNTER — HOSPITAL ENCOUNTER (OUTPATIENT)
Dept: RADIOLOGY | Facility: HOSPITAL | Age: 63
Setting detail: OUTPATIENT SURGERY
Discharge: HOME | End: 2023-08-16
Payer: COMMERCIAL

## 2023-08-16 ENCOUNTER — HOSPITAL ENCOUNTER (OUTPATIENT)
Facility: AMBULARY SURGERY CENTER | Age: 63
Setting detail: OUTPATIENT SURGERY
Discharge: HOME | End: 2023-08-16
Attending: PHYSICAL MEDICINE & REHABILITATION | Admitting: PHYSICAL MEDICINE & REHABILITATION
Payer: COMMERCIAL

## 2023-08-16 VITALS
OXYGEN SATURATION: 99 % | DIASTOLIC BLOOD PRESSURE: 85 MMHG | HEART RATE: 86 BPM | SYSTOLIC BLOOD PRESSURE: 140 MMHG | RESPIRATION RATE: 16 BRPM

## 2023-08-16 DIAGNOSIS — Z92.241 S/P EPIDURAL STEROID INJECTION: ICD-10-CM

## 2023-08-16 DIAGNOSIS — E03.9 ACQUIRED HYPOTHYROIDISM: Primary | ICD-10-CM

## 2023-08-16 PROCEDURE — 64483 NJX AA&/STRD TFRM EPI L/S 1: CPT | Performed by: PHYSICAL MEDICINE & REHABILITATION

## 2023-08-16 PROCEDURE — 64484 NJX AA&/STRD TFRM EPI L/S EA: CPT | Performed by: PHYSICAL MEDICINE & REHABILITATION

## 2023-08-16 RX ORDER — LEVOTHYROXINE SODIUM 0.15 MG/1
150 TABLET ORAL DAILY
Qty: 90 TABLET | Refills: 3 | Status: SHIPPED | OUTPATIENT
Start: 2023-08-16

## 2023-08-16 RX ORDER — BUPIVACAINE HYDROCHLORIDE 2.5 MG/ML
INJECTION, SOLUTION EPIDURAL; INFILTRATION; INTRACAUDAL AS NEEDED
Status: DISCONTINUED | OUTPATIENT
Start: 2023-08-16 | End: 2023-08-16 | Stop reason: HOSPADM

## 2023-08-16 RX ORDER — METHYLPREDNISOLONE ACETATE 40 MG/ML
INJECTION, SUSPENSION INTRA-ARTICULAR; INTRALESIONAL; INTRAMUSCULAR; SOFT TISSUE AS NEEDED
Status: DISCONTINUED | OUTPATIENT
Start: 2023-08-16 | End: 2023-08-16 | Stop reason: HOSPADM

## 2023-08-16 RX ORDER — LIDOCAINE HYDROCHLORIDE 10 MG/ML
INJECTION, SOLUTION EPIDURAL; INFILTRATION; INTRACAUDAL; PERINEURAL AS NEEDED
Status: DISCONTINUED | OUTPATIENT
Start: 2023-08-16 | End: 2023-08-16 | Stop reason: HOSPADM

## 2023-08-16 NOTE — OP NOTE
OPERATIVE REPORT  PATIENT NAME: Carlyn Matos    :    MRN: [id number]  Pt Location: [facility] MINOR/PAIN ROOM 01    SURGERY DATE: 2023    Surgeon(s) and Role:     * Stephanie Conley, DO - Primary    Preop Diagnosis:  Intervertebral disc disorder with radiculopathy of lumbosacral region [M51.17]  Acute left-sided low back pain with left-sided sciatica [M54.42]    Post-Op Diagnosis Codes:     * Intervertebral disc disorder with radiculopathy of lumbosacral region [M51.17]     * Acute left-sided low back pain with left-sided sciatica [M54.42]    Procedure(s):  Left - left L5-S1. S1 TRANSFORAMINAL epidural steroid injection (12500. [id number])    Indication: Leg pain  Preoperative diagnosis: Lumbar radiculitis  Postoperative diagnosis: Lumbar radiculitis  Procedure: Fluoroscopically-guided left-sided L5-S1 and S1 transforaminal epidural steroid injection under fluoroscopy  EBL: none  Specimens: not applicable  After discussing the risks, benefits, and alternatives to the procedure, the patient expressed understanding and wished to proceed. The patient was brought to the fluoroscopy suite and placed in the prone position. A procedural pause was conducted to verify: correct patient identity, procedure to be performed and as applicable, correct side and site, correct patient position, and availability of implants, special equipment and special requirements. After identifying the left L5 pedicle fluoroscopically with an oblique view, the skin was sterilely prepped and draped in the usual fashion using Chloraprep skin prep. The skin and subcutaneous tissues were anesthetized with 1% lidocaine. A 3.5 in 22 gauge spinal needle was then advanced under fluoroscopic guidance to the neural foramen. Appropriate foraminal depth was determined with a lateral fluoroscopic view, and AP visualization confirmed needle positioning at approximately the 6 o'clock position relative to the pedicle.  After negative aspiration, Omnipaque 300 contrast was injected using live fluoroscopy confirming appropriate transforaminal spread without evidence of intravascular or intrathecal uptake. Next, a 1.5 ml solution consisting of 20 mg Depo-Medrol with 0.25% bupivacaine was injected slowly and incrementally into the epidural space. Following the injection the needle was withdrawn. The procedure was then repeated at the left S1 foramen. The skin and subcutaneous tissues were anesthetized with 1% lidocaine. A 3.5 in 22 gauge spinal needle was then advanced under fluoroscopic guidance to the foramen. Appropriate foraminal depth was determined with a lateral fluoroscopic view. AP visualization confirmed needle positioning. After negative aspiration Omnipaque 240 contrast was injected using live fluoroscopy confirming appropriate transforaminal spread without evidence of intravascular or intrathecal uptake. Next, a 1.5 mL solution consisting of 20 mg Depo-Medrol with 0.25% bupivacaine was injected slowly and incrementally into the epidural space. Following the injection the needle was withdrawn. The patient tolerated the procedure well and there were no apparent complications. The patient did not develop any new neurologic deficits. After appropriate observation, the patient was dismissed from the clinic in good condition under their own power.         SIGNATURE: Silvia Colon,   DATE: August 16, 2023  TIME: 3:18 PM

## 2023-08-21 ENCOUNTER — TELEPHONE (OUTPATIENT)
Age: 63
End: 2023-08-21

## 2023-08-21 ENCOUNTER — TELEPHONE (OUTPATIENT)
Dept: OBGYN CLINIC | Facility: HOSPITAL | Age: 63
End: 2023-08-21

## 2023-08-21 DIAGNOSIS — M54.42 ACUTE LEFT-SIDED LOW BACK PAIN WITH LEFT-SIDED SCIATICA: ICD-10-CM

## 2023-08-21 RX ORDER — GABAPENTIN 100 MG/1
CAPSULE ORAL
Qty: 50 CAPSULE | Refills: 0 | Status: SHIPPED | OUTPATIENT
Start: 2023-08-21 | End: 2023-08-22 | Stop reason: DRUGHIGH

## 2023-08-21 NOTE — TELEPHONE ENCOUNTER
Spoke with patient over the phone. He has been taking 200 mg gabapentin at night, unsure if providing significant relief. Will try to increase to 300mg gabapentin at night to see if it provides any pain/symptom relief. Refill of gabapentin provided to listed pharmacy as patient only has a few days left of previous prescription. Did briefly discuss weaning off of gabapentin if patient does not have significant relief over next few weeks after increase in gabapentin dosage. Ana Donnelly recently underwent left L5-S1 TFESI on 8/16/2023 with only some relief. Continues with back pain and lower extremity pain with ambulation. He has physical therapy appointment tomorrow. Will plan to see patient in office at follow up appointment on 8/29/23.     Thanks,  Triston Armenta PA-C

## 2023-08-22 ENCOUNTER — OFFICE VISIT (OUTPATIENT)
Dept: PHYSICAL THERAPY | Facility: CLINIC | Age: 63
End: 2023-08-22
Payer: COMMERCIAL

## 2023-08-22 ENCOUNTER — TELEPHONE (OUTPATIENT)
Age: 63
End: 2023-08-22

## 2023-08-22 DIAGNOSIS — M54.42 ACUTE LEFT-SIDED LOW BACK PAIN WITH LEFT-SIDED SCIATICA: Primary | ICD-10-CM

## 2023-08-22 DIAGNOSIS — R52 PAIN: ICD-10-CM

## 2023-08-22 PROCEDURE — 97140 MANUAL THERAPY 1/> REGIONS: CPT

## 2023-08-22 PROCEDURE — 97112 NEUROMUSCULAR REEDUCATION: CPT

## 2023-08-22 RX ORDER — GABAPENTIN 300 MG/1
300 CAPSULE ORAL
Qty: 30 CAPSULE | Refills: 0 | Status: SHIPPED | OUTPATIENT
Start: 2023-08-22 | End: 2023-09-13

## 2023-08-22 NOTE — TELEPHONE ENCOUNTER
Caller: Patient    Doctor: Dr. Benoit Peng    Reason for call: Patient calling stating that he received a call from the insurance approving 300MG and he also received a call from the office that they would send in 100MG prescription in order to get him to 300MG. Patient wanted to let office know that the 300MG was approved so another prescription is not sent to pharmacy. Patient asking for call back to clarify situation.      Call back#: [phone]

## 2023-08-22 NOTE — PROGRESS NOTES
Daily Note     Today's date: 2023  Patient name: Donell Hamman  : [id number]  MRN: [id number]  Referring provider: Ariana Chapin  Dx:   Encounter Diagnosis     ICD-10-CM    1. Acute left-sided low back pain with left-sided sciatica  M54.42           Start Time: 1445  Stop Time: 1530  Total time in clinic (min): 45 minutes    Subjective: Patient arrives after spinal injection last week still using crutches but now able to stand fully upright, walk short distances without crutches, and demonstrating significantly improved ease of bed mobility and transfers. Continued pain/irritation along sciatic nerve route. Objective: See treatment diary below      Assessment: Tolerated treatment well and without increased pain. Significant TTP over L piriformis muscle with fair response to TPR and manual release. Discussed potential use of neighbor's pool for increased activity level. Reviewed gait with single crutch for carryover at home. Continued emphasis on use of sideglide, avoiding sitting, compensatory patterns to relieve overall pain and spasms. Patient demonstrated fatigue post treatment, exhibited good technique with therapeutic exercises and would benefit from continued PT. Plan: Continue per plan of care. Progress treatment as tolerated. Precautions: lumbar  Daily Exercise Log:  Start of Care: 23  POC expires 10/31/23  FOTO: completed  Date 8/8/23 8/9/23 2023 8/15/23 8/22/23    Visit # 1 2 3 4 5             Manual  10'   6'    Stick rolling ITB, HS L        STM  TPR/manual release techniques to post thigh, glutes, ITB L side to pt tolerance   TPR and manual release to piriformis, HS insertion    Gentle PA mobs  LS-prone over pillows                Neuro Re-Ed  30' 30' 25' 28'    Mechanical assessment  L sidelying w/ flexion rotation 1x15 w/o pain initially.  After standing, attempted to trial  Rep SGIS in doorway 2x10; 2"x10 w/ tactile cues for form Rep SGIS in doorway 1x10; limited tolerance today. Rep SGIS L (hips right) 3x10 with improved tolerance, improved upright posture noted    Prone over pillows  3 pillows x5',  two pillows x 5', trialed with L hip fig 4 x 1', w/ decrease to 1 pillow, decreased tolerance, inc leg pain. retunred to   Side prop on R side and down to sidelying. 3' up, 3 min down. Prone lying x4', inc HS cramp/pain noted toward end of time. Pt education  Re: progression of exercises, instructed in L sidelying flexion rotation activity to see if this helps his symptoms. Reviewed not to perform in R sidelying. HEP update & review (SGIS in doorway 2x10 every 2 hours) Review of mechanical disc nature, importance of intervention to relieve pressure on nerve. Reviewed rec to have injection prior to further therapy. Continued education on avoiding flexion, utilize upright walking, emphasis on normal walking pattern, dec to 1 crutch, avoiding flexed postures and habitual movements. Ther Ex     5'    Calf stretch     Standing 3x30" R/L    Hamstring stretch         ITB stretch         Pt education                                             Ther Activity                           Gait Training                           Modalities     10'    Estim + MHP MHP during prone progression MHP during prone exercises MH/premod estim in R SL to L glut 10' MHP + high-volt estim in R sidelying to L glute 10' MHP + high volt estim in R sidelying to L piriformis/HS               HEP:   L sideglide in doorway 1-2x10 every 2 hours.  Initiated  8/14/23 by Belen Staton, PT, MDT

## 2023-08-23 ENCOUNTER — TELEPHONE (OUTPATIENT)
Dept: PAIN MEDICINE | Facility: CLINIC | Age: 63
End: 2023-08-23

## 2023-08-24 ENCOUNTER — OFFICE VISIT (OUTPATIENT)
Dept: PHYSICAL THERAPY | Facility: CLINIC | Age: 63
End: 2023-08-24
Payer: COMMERCIAL

## 2023-08-24 DIAGNOSIS — M54.42 ACUTE LEFT-SIDED LOW BACK PAIN WITH LEFT-SIDED SCIATICA: Primary | ICD-10-CM

## 2023-08-24 PROCEDURE — 97110 THERAPEUTIC EXERCISES: CPT | Performed by: PHYSICAL THERAPIST

## 2023-08-24 PROCEDURE — 97112 NEUROMUSCULAR REEDUCATION: CPT | Performed by: PHYSICAL THERAPIST

## 2023-08-24 NOTE — PROGRESS NOTES
Daily Note     Today's date: 2023  Patient name: Prakash Cavazos  :   MRN: [id number]  Referring provider: Lili George  Dx:   Encounter Diagnosis     ICD-10-CM    1. Acute left-sided low back pain with left-sided sciatica  M54.42                      Subjective: Pt reports 5/10 L glut pain and 1/10 hams pain to the knee upon arrival. He's been doing all of his HEP 5-6x/day. Objective: See treatment diary below  SGIS in doorway 1x10 = NE  SGIS against wall 1x10 = dec/    Discussed w/ pt his HEP is not to be done all of it multiple times/day. HEP was updated and reviewed w/ him to ensure he is only doing mech correction every 2 hours. Changed to SGIS against wall due to better response to this version. Some relief noted after fig 4 stretch of his glut pain. Assessment: Tolerated treatment well and is demonstrating imrpoving posture and WB tolerance. . Patient would benefit from continued PT      Plan: Continue per plan of care. Precautions: lumbar  Daily Exercise Log:  Start of Care: 23  POC expires 10/31/23  FOTO: completed  Date 8/8/23 8/9/23 2023 8/15/23 8/22/23 2023   Visit # 1 2 3 4 5 6            Manual  10'   6'    Stick rolling ITB, HS L        STM  TPR/manual release techniques to post thigh, glutes, ITB L side to pt tolerance   TPR and manual release to piriformis, HS insertion    Gentle PA mobs  LS-prone over pillows                Neuro Re-Ed  30' 30' 25' 28' 30'   Mechanical assessment  L sidelying w/ flexion rotation 1x15 w/o pain initially. After standing, attempted to trial  Rep SGIS in doorway 2x10; 2"x10 w/ tactile cues for form Rep SGIS in doorway 1x10; limited tolerance today.   Rep SGIS L (hips right) 3x10 with improved tolerance, improved upright posture noted Rep SGIS against wall - feet away from wall to ensure end range    2x10  1x10    Prone over pillows  3 pillows x5',  two pillows x 5', trialed with L hip fig 4 x 1', w/ decrease to 1 pillow, decreased tolerance, inc leg pain. retunred to   Side prop on R side and down to sidelying. 3' up, 3 min down. Prone lying x4', inc HS cramp/pain noted toward end of time. Pt education  Re: progression of exercises, instructed in L sidelying flexion rotation activity to see if this helps his symptoms. Reviewed not to perform in R sidelying. HEP update & review (SGIS in doorway 2x10 every 2 hours) Review of mechanical disc nature, importance of intervention to relieve pressure on nerve. Reviewed rec to have injection prior to further therapy. Continued education on avoiding flexion, utilize upright walking, emphasis on normal walking pattern, dec to 1 crutch, avoiding flexed postures and habitual movements. Supine sciatic NG knee ext w/ PD      2x10 R/L w/ SOS behind thigh   Supine fig 4 stretch      20"x3 L; 2x                     Ther Ex     5' 10'   Calf stretch     Standing 3x30" R/L    Hamstring stretch         ITB stretch         Pt education      HEP freq TT                              HEP      Update & review   Ther Activity                           Gait Training                           Modalities     10' 10'   Estim + MHP MHP during prone progression MHP during prone exercises MH/premod estim in R SL to L glut 10' MHP + high-volt estim in R sidelying to L glute 10' MHP + high volt estim in R sidelying to L piriformis/HS MHP + premod EStim R SL L pirif/SI joint              HEP:   Access Code: [email or web address]  URL: [email or web address]  Date: 08/24/2023  Prepared by:  Ubaldo Sifuentes    Exercises  - Lateral Shift Correction at [facility] (Mirrored)  - 6 x daily - 1 sets - 10 reps  - Supine Hip External Rotation Stretch  - 2 x daily - 3 reps - 20 hold  - Supine Sciatic Nerve Glide  - 2 x daily - 2 sets - 10 reps  - Heel Raises with Counter Support  - 1 x daily - 2 sets - 10 reps  - Standing Hip Abduction with Counter Support  - 1 x daily - 2 sets - 10 reps  - Standing Hip Extension  - 1 x daily - 2 sets - 10 reps

## 2023-08-28 ENCOUNTER — OFFICE VISIT (OUTPATIENT)
Dept: PHYSICAL THERAPY | Facility: CLINIC | Age: 63
End: 2023-08-28
Payer: COMMERCIAL

## 2023-08-28 DIAGNOSIS — M54.42 ACUTE LEFT-SIDED LOW BACK PAIN WITH LEFT-SIDED SCIATICA: Primary | ICD-10-CM

## 2023-08-28 PROCEDURE — 97112 NEUROMUSCULAR REEDUCATION: CPT | Performed by: PHYSICAL THERAPIST

## 2023-08-28 NOTE — PROGRESS NOTES
Daily Note     Today's date: 2023  Patient name: Merle Mandel  :   MRN: [id number]  Referring provider: Sangeeta Munson  Dx:   Encounter Diagnosis     ICD-10-CM    1. Acute left-sided low back pain with left-sided sciatica  M54.42                      Subjective: Pt reports 7/10 L glut pain upon arrival w/ mild post thigh "tightness". He's been doing SGIS as advised. Sometimes he has inc pain afterward, sometimes no change, but no longer seems to be better afterward. Objective: See treatment diary below  ALICE 30"x4 2x = centralizing to LB&glut/B 4-5/10. Pt arrives w/ one crutch, but walks during session w/ no AD. Assessment: Tolerated treatment fair and reports it difficult to monitor symptoms due to brief/fleeting/random symptoms in leg occurring sometimes. Pt was asked to focus on general/lasting trends. . Patient achieves fair amount of lumbar extension w/ ALICE and trial of PENNIE. In prone, his lumbar spine intially remains flexed, and by end of session is approaching neutral. Pt was advised if flexion does indeed seem to lead to improvement (centralizing > reducing pain), that he should be mindful of limiting sitting. He was encouraged to try his home TENS unit in prone instead of sitting. Plan: Continue per plan of care. Updated/altered HEP today - omitted SGIS, added ALICE. Precautions: lumbar  Daily Exercise Log:  Start of Care: 23  POC expires 10/31/23  FOTO: completed  Date 2023  2023 8/15/23 8/22/23 2023   Visit # 7  3 4 5 6            Manual     6'    Stick rolling         STM     TPR and manual release to piriformis, HS insertion    Gentle PA mobs                  Neuro Re-Ed 30'  30' 25' 28' 30'   Mechanical assessment   Rep SGIS in doorway 2x10; 2"x10 w/ tactile cues for form Rep SGIS in doorway 1x10; limited tolerance today.   Rep SGIS L (hips right) 3x10 with improved tolerance, improved upright posture noted Rep SGIS against wall - feet away from wall to ensure end range    2x10  1x10    ALICE Prone lying 1-2 min followed by ALICE 30 secx4; 3x   Side prop on R side and down to sidelying. 3' up, 3 min down. Prone lying x4', inc HS cramp/pain noted toward end of time. REIL 1x5 partial ROM        Pt education TT flexion vs extension  HEP update & review (SGIS in doorway 2x10 every 2 hours) Review of mechanical disc nature, importance of intervention to relieve pressure on nerve. Reviewed rec to have injection prior to further therapy. Continued education on avoiding flexion, utilize upright walking, emphasis on normal walking pattern, dec to 1 crutch, avoiding flexed postures and habitual movements. Supine sciatic NG knee ext w/ PD 1x10 R/L     2x10 R/L w/ SOS behind thigh   Supine fig 4 stretch 30"x3 L     20"x3 L; 2x                     Ther Ex 5'    5' 10'   Calf stretch     Standing 3x30" R/L    Hamstring stretch         ITB stretch         Pt education      HEP freq TT                              HEP Update & review     Update & review   Ther Activity                           Gait Training                           Modalities     10' 10'   Estim + MHP At home  MH/premod estim in R SL to L glut 10' MHP + high-volt estim in R sidelying to L glute 10' MHP + high volt estim in R sidelying to L piriformis/HS MHP + premod EStim R SL L pirif/SI joint              HEP:   Access Code: [email or web address]  URL: [email or web address]  Date: 08/28/2023  Prepared by:  Mariann Conner    Exercises  - Supine Hip External Rotation Stretch  - 2 x daily - 3 reps - 20 hold  - Supine Sciatic Nerve Glide  - 2 x daily - 2 sets - 10 reps  - Heel Raises with Counter Support  - 1 x daily - 2 sets - 10 reps  - Standing Hip Abduction with Counter Support  - 1 x daily - 2 sets - 10 reps  - Standing Hip Extension  - 1 x daily - 2 sets - 10 reps  - Static Prone on Elbows  - 5 x daily - 5 reps - 45-60 sec hold

## 2023-08-29 ENCOUNTER — OFFICE VISIT (OUTPATIENT)
Dept: OBGYN CLINIC | Facility: HOSPITAL | Age: 63
End: 2023-08-29
Payer: COMMERCIAL

## 2023-08-29 VITALS
HEIGHT: 72 IN | HEART RATE: 77 BPM | BODY MASS INDEX: 21.26 KG/M2 | DIASTOLIC BLOOD PRESSURE: 86 MMHG | SYSTOLIC BLOOD PRESSURE: 152 MMHG | WEIGHT: 156.97 LBS

## 2023-08-29 DIAGNOSIS — M54.42 ACUTE LEFT-SIDED LOW BACK PAIN WITH LEFT-SIDED SCIATICA: Primary | ICD-10-CM

## 2023-08-29 PROCEDURE — 99214 OFFICE O/P EST MOD 30 MIN: CPT | Performed by: ORTHOPAEDIC SURGERY

## 2023-08-29 RX ORDER — MELOXICAM 15 MG/1
15 TABLET ORAL DAILY
Qty: 14 TABLET | Refills: 0 | Status: SHIPPED | OUTPATIENT
Start: 2023-08-29

## 2023-08-29 NOTE — PROGRESS NOTES
NAME: Yisel Tan  : 1960  PCP: Alfonso Boyle MD      Chief Complaint: back and left lower extremity pain    HPI:  Yisel Tan is a 70 y.o. male presenting for initial visit with chief complaint of back and left lower extremity pain. Notes onset of back pain where he couldn't stand up straight about 8 weeks ago after laying stone on either side of his driveway. Back pain improved after he was prescribed muscle relaxer by PCP, however pain was still present. Denies any back or leg pain prior to 8 weeks ago. About 1 week ago, he was finishing up yard work without heavy lifting and mowing lawn and upon waking up in the morning notes significant posterior left lower extremity pain. He has been hunched forward to help alleviate the pain and using crutches due to increased pain when trying to straighten or use left lower extremity. Also with left lower extremity muscle cramping. Pain worse with prolonged sitting, standing and walking. He only gets relief with his left leg bent and lying on his right side. He was seen in the ED on  due to pain and symptoms. Denies kenyatta lower extremity weakness. Denies right sided symptoms. Denies any kenyatta trauma. Denies fever or chills. Denies any bladder or bowel changes. Denies heart or lung disease. Denies diabetes or kidney disease. Denies smoking. Conservative therapy includes the following:   Medrol dose zohra without much relief, Norco, flexeril with some relief. Denies recent injections. Denies recent formal physical therapy. These therapeutic modalities were ineffective. The patient has noticed significant impairment in performing the following ADLs: Ashely Marx has had to miss work due to increased pain and symptoms. 23 Update  Patient is here for follow up. Has been steadily improving with injection and physical therapy however had recent set back with increased pain in his left leg. Patient is quite frustrated at this time.   He denies any new symptoms. 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 Spencer Carpenter   • RA (rheumatoid arthritis) ([address])    • Rheumatoid arthritis involving multiple sites ([address] ) 9/19/2022   • Wears glasses        MEDICATIONS:  Current Outpatient Medications   Medication Sig Dispense Refill   • Cholecalciferol (VITAMIN D3) 1000 units CAPS Take 1,000 Units by mouth     • gabapentin (Neurontin) 300 mg capsule Take 1 capsule (300 mg total) by mouth daily at bedtime Gabapentin may cause vision changes, clumsiness, unsteadiness, dizziness, drowsiness, sleepiness, or trouble with thinking. Make sure you know how you react to this medicine before you drive, use machines, or do anything else that could be dangerous if you are not alert, well-coordinated, or able to think or see well. 30 capsule 0   • Hydroxyurea (HYDREA PO) Take 1,000 mg by mouth Mon-Wed-Fri     • hydroxyurea (HYDREA) 500 mg capsule Take by mouth On Tues-Thurs-Sat-Sun      • levothyroxine 150 mcg tablet TAKE 1 TABLET DAILY 90 tablet 3   • sildenafil (VIAGRA) 100 mg tablet Take 1 tablet (100 mg total) by mouth if needed for erectile dysfunction As directed 6 tablet 12     No current facility-administered medications for this visit.        PAST SURGICAL HISTORY:  Past Surgical History:   Procedure Laterality Date   • ANKLE SURGERY Right     ligament, chipped bones,dislocated   • BUNIONECTOMY  03/2014   • COLONOSCOPY     • ELBOW SURGERY Right     tendon surgery   • EPIDURAL BLOCK INJECTION Left 2023    Procedure: left L5-S1, S1 TRANSFORAMINAL epidural steroid injection (79860, 89988);   Surgeon: Jamil Ireland DO;  Location: [facility] MAIN OR;  Service: Pain Management    • FOOT ARTHRODESIS, MODIFIED ARELLANO Left    • FRACTURE SURGERY Left     elbow   • HERNIA REPAIR Right     inguinal   • FL ARTHRS KNE SURG W/MENISCECTOMY MED/LAT W/SHVG Right 2018    Procedure: MENISCECTOMY LATERAL /MEDIAL;  Surgeon: Arnaldo Mae MD;  Location: [facility];  Service: Orthopedics   • FL CORRECTION HAMMERTOE Bilateral 2021    Procedure: REPAIR HAMMERTOE / MALLET TOE / CLAW TOE 2ND toe bilateral;  Surgeon: Adriana Hernandez DPM;  Location: [facility];  Service: Podiatry   • [address] W/SESMDC W/RESCJ PROX PHAL Right 2021    Procedure: Alex Rising;  Surgeon: Adriana Hernandez DPM;  Location: [facility];  Service: Podiatry   • ROTATOR CUFF REPAIR Left    • WISDOM TOOTH EXTRACTION      x4   • WRIST SURGERY         SOCIAL HISTORY:  Social History     Socioeconomic History   • Marital status: /Civil Union     Spouse name: Not on file   • Number of children: Not on file   • Years of education: Not on file   • Highest education level: Not on file   Occupational History   • Not on file   Tobacco Use   • Smoking status: Former     Packs/day: 1.00     Years: 20.00     Total pack years: 20.00     Types: Cigarettes     Quit date: 2000     Years since quittin.6     Passive exposure: Past   • Smokeless tobacco: Never   Substance and Sexual Activity   • Alcohol use: Yes     Comment: occ beer with going out to dinner   • Drug use: No   • Sexual activity: Yes     Partners: Female   Other Topics Concern   • Not on file   Social History Narrative   • Not on file     Social Determinants of Health     Financial Resource Strain: Low Risk  (2023)    Overall Financial Resource Strain (CARDIA)    • Difficulty of Paying Living Expenses: Not hard at all   Food Insecurity: Not on file   Transportation Needs: No Transportation Needs (5/4/2023)    PRAPARE - Transportation    • Lack of Transportation (Medical): No    • Lack of Transportation (Non-Medical): No   Physical Activity: Not on file   Stress: Not on file   Social Connections: Not on file   Intimate Partner Violence: Not on file   Housing Stability: Not on file       ALLERGIES:  No Known Allergies    ROS:  Constitutional:  No fever, chills, night sweats, loss of appetite   HEENT No no sore throat, difficulty swallowing   Cardiovascular:  No chest pain, palpitations, BLE edema, ISLAS   Respiratory:  No SOB, coughing, chest congestion   Gastrointestinal:  No nausea, vomiting, abdominal pain   Genitourinary:  No dysuria, hematuria, urinary urgency/frequency   Musculoskeletal:  See HPI   Skin:  No rash, erythema, edema   Neurologic:  See HPI   Psychiatric Illness:  No depression, anxiety, mood disorder, substance abuse disorder     PHYSICAL EXAM:  /86   Pulse 77   Ht 6' (1.829 m)   Wt 71.2 kg (156 lb 15.5 oz)   BMI 21.29 kg/m²           General:  Well-developed,appears well, no acute distress   Respiratory:  No SOB, no abnormal effort (use of accessory muscles). GI / Abdominal:  Soft. No abdominal masses or tenderness. Nondistended. Gait & balance No evidence of myelopathic gait. Lumbar spine range of motion:  -Forward flexion to 90  -Extension to neutral  -Lateral bend 45 right, 45 left  -Rotation 45 right, 45 left  There is no point tenderness with palpation along the posterior cervical, thoracic, lumbar spine.      Neurologic:    Lower Extremity Motor Function   Right  Left    Iliopsoas  5/5  5/5    Quadriceps 5/5 5/5   Tibialis anterior  5/5  5/5    EHL  5/5  5/5    Gastroc. muscle  5/5  5/5    Heel rise  5/5  5/5    Toe rise  5/5  5/5      Sensory: light touch is intact to bilateral upper and lower extremities     Reflexes:    Right Left Patellar 1+ 1+   Achilles 1+ 1+   Babinski neg neg     Other tests:  Straight Leg Raise: positive left  Rhona SI: negative  DRISS SI: not tested  Greater troch: no tenderness   Internal/external hip ROM: intact, no pain   Flexion/extension knee ROM: intact, no pain     IMAGING: I have personally reviewed the images and these are my findings:  Lumbar spine xray from 8/1/2023: Degenerative changes in lumbar spine with loss of disc space height most notably at L5-S1; end plate sclerosis; facet arthrosis notably L5, S1; osteophyte formation; no apparent spondylolisthesis; no obvious dynamic instability on flexion/extension radiographs      MRI lumbar spine from 7/31/2023: Multilevel lumbar spondylosis most noted at L5-S1 with left-sided paracentral disc herniation and displacement of the left S1 nerve      ASSESSMENT / Medical Decision Making (MDM):  70 y.o. male with history of back and left lower extremity pain. Here for follow up. No incontinence of bowel/bladder, no fever, no chills, no night sweats. Physical exam showing no focal neurologic deficits    Imaging reviewed as above. Findings most notable for L5-S1 left-sided paracentral disc herniation    Impression of lumbar degenerative disease, lumbar disc herniation    Plan: The above clinical, physical and imaging findings were reviewed with the patient. Vinicio Wolf  has a constellation of findings consistent with lumbar radiculopathy in setting lumbar degenerative disc disease, lumbar disc herniation. Vinicio Wolf had been improving with inejction & PT but did have recent set back with increased leg pain recently. We discussed treatment options. We discussed the risks/benefits of surgery as well as continued PT. At this point, recommend continued PT and medications for pain. Meloxicam rx sent to patient's pharmacy. Discussed reasoning for prescribing medication, proper usage, and potential side effects.   Recommend patient follow up closely, in 1 month for repeat evaluation. Patient instructed to return to office/ER sooner if symptoms are not improving, getting worse, or new worrisome/neurologic symptoms arise.

## 2023-08-30 NOTE — TELEPHONE ENCOUNTER
Okay to schedule an office follow-up and reevaluation with CRNP or myself to discuss and explore all options  Thank you

## 2023-08-30 NOTE — TELEPHONE ENCOUNTER
Patient Reports         %     improvement post injection    Pain Level     /10   Patient had a set back with PT and a lot pain with an exercise . He is no longer going to do that exercise. Patient would like to know if it would benefit him to have another round of injections. He is exploring his options. He would like a call back.           Patient  also Dr. May Irene

## 2023-08-31 ENCOUNTER — OFFICE VISIT (OUTPATIENT)
Dept: PHYSICAL THERAPY | Facility: CLINIC | Age: 63
End: 2023-08-31
Payer: COMMERCIAL

## 2023-08-31 DIAGNOSIS — M54.42 ACUTE LEFT-SIDED LOW BACK PAIN WITH LEFT-SIDED SCIATICA: Primary | ICD-10-CM

## 2023-08-31 PROCEDURE — 97110 THERAPEUTIC EXERCISES: CPT | Performed by: PHYSICAL THERAPIST

## 2023-08-31 PROCEDURE — 97112 NEUROMUSCULAR REEDUCATION: CPT | Performed by: PHYSICAL THERAPIST

## 2023-09-05 ENCOUNTER — OFFICE VISIT (OUTPATIENT)
Dept: PHYSICAL THERAPY | Facility: CLINIC | Age: 63
End: 2023-09-05
Payer: COMMERCIAL

## 2023-09-05 DIAGNOSIS — M54.42 ACUTE LEFT-SIDED LOW BACK PAIN WITH LEFT-SIDED SCIATICA: Primary | ICD-10-CM

## 2023-09-05 PROCEDURE — 97112 NEUROMUSCULAR REEDUCATION: CPT | Performed by: PHYSICAL THERAPIST

## 2023-09-05 PROCEDURE — 97110 THERAPEUTIC EXERCISES: CPT | Performed by: PHYSICAL THERAPIST

## 2023-09-05 NOTE — PROGRESS NOTES
Daily Note     Today's date: 2023  Patient name: Kimberli Farias  :   MRN: [id number]  Referring provider: Sosa Rudolph  Dx:   Encounter Diagnosis     ICD-10-CM    1. Acute left-sided low back pain with left-sided sciatica  M54.42                      Subjective: Pt states his pain has been more localized to his buttock, but is still up to 8/10. Objective: See treatment diary below; needs some correction for SL rotat technique to keep bottom leg straight. Assessment: Tolerated treatment well and is reporting centralizing and reducing symptoms w/ SL rotat and rev clamshells (stretches hip rotators) and DRISS stretch. . Patient would benefit from continued PT      Plan: Continue per plan of care.       Precautions: lumbar  Daily Exercise Log:  Start of Care: 23  POC expires 10/31/23  FOTO: completed  Date 2023   Visit # 7 8 9   6            Manual         Stick rolling         STM                  Neuro Re-Ed 30' 25' 25'   30'   Mechanical assessment  Rep R rotat in L SL 5" 2x10 = dec/B    Additional 5"x10 to end    (Rep SGIS to start NE) Rep R rotat in L SL 5" 2x10 start;     1x10 end   Rep SGIS against wall - feet away from wall to ensure end range    2x10  1x10    ALICE Prone lying 1-2 min followed by ALICE 30 secx4; 3x        REIL 1x5 partial ROM        Pt education TT flexion vs extension        Supine sciatic NG knee ext w/ PD 1x10 R/L  2x10 R/L   2x10 R/L w/ SOS behind thigh   Supine fig 4 stretch 30"x3 L 30"x3 L 30"x3 L   20"x3 L; 2x                     Ther Ex 5' 20' 15'   10'   Calf stretch  10"x5 L LE at wall 10"x5 L LE at wall      L LE clamshells in SL  2x10 2x10      L LE rev clamshells w/ towel roll @ knees  2x10 2x10      Supine clamshell vs TB   grn 3"x10      Pt education      HEP freq TT            HEP Update & review Update & review    Update & review   Ther Activity                           Gait Training Modalities      10'   Estim + MHP At home  To End 5'   MHP + premod EStim R SL L pirif/SI joint              HEP:  Access Code: [email or web address]  URL: [email or web address]  Date: 08/31/2023  Prepared by:  Lincoln Quintero    Exercises  - Sidelying Open Book Thoracic Rotation with Knee on Foam Roll  - 5 x daily - 2 sets - 10 reps - 5 hold  - Supine Hip External Rotation Stretch (Mirrored)  - 2 x daily - 3 reps - 30 hold  - Supine Sciatic Nerve Glide  - 2 x daily - 2 sets - 10 reps  - Sidelying Reverse Clamshell  - 2 x daily - 2 sets - 10 reps  - Clamshell (Mirrored)  - 2 x daily - 2 sets - 10 reps  - Gastroc Stretch on Wall  - 2 x daily - 5 reps - 10 hold

## 2023-09-07 ENCOUNTER — EVALUATION (OUTPATIENT)
Dept: PHYSICAL THERAPY | Facility: CLINIC | Age: 63
End: 2023-09-07
Payer: COMMERCIAL

## 2023-09-07 DIAGNOSIS — M54.42 ACUTE LEFT-SIDED LOW BACK PAIN WITH LEFT-SIDED SCIATICA: Primary | ICD-10-CM

## 2023-09-07 PROCEDURE — 97110 THERAPEUTIC EXERCISES: CPT | Performed by: PHYSICAL THERAPIST

## 2023-09-07 PROCEDURE — 97140 MANUAL THERAPY 1/> REGIONS: CPT | Performed by: PHYSICAL THERAPIST

## 2023-09-07 PROCEDURE — 97112 NEUROMUSCULAR REEDUCATION: CPT | Performed by: PHYSICAL THERAPIST

## 2023-09-07 NOTE — PROGRESS NOTES
PT Re-Evaluation     Today's date: 2023  Patient name: Patricia Garrido  :   MRN: [id number]  Referring provider: Ilana Zamudio  Dx:   Encounter Diagnosis     ICD-10-CM    1. Acute left-sided low back pain with left-sided sciatica  M54.42                      Assessment  Assessment details: 2023 RE: Pt has attended 9 skilled PT visits and has had one pain mgt injection. With this combination, pt is demonstrating and reporting improvement. His pain has improved from constant to intermittent, he demonstrates improved postures, lumbar AROM and functional mobility. LE MMT is quite good. He remains highly irritable in extension biased positions. Directional preference has been lateral; initially side glide, not rotation in side lying and has been able to progress to sciatic nerve glides and pirif stretching. Pt will benefit from continued PT BIW x4 weeks w/ re-assessment. 2023 IE: Patricia aGrrido is a 70 y.o. male who presents with lumbar derangement with pain, decreased strength, decreased ROM, ambulatory dysfunction and postural dysfunction. Due to these impairments, patient has difficulty performing ADL's, recreational activities, work-related activities, engaging in social activities, ambulation, stair negotiation, lifting/carrying, transfers. Patient's clinical presentation is consistent with their referring diagnosis of Acute left-sided low back pain with left-sided sciatica. Patient has been educated in gait training and plan of care, with home exercises to be introduced as appropriate. Patient would benefit from skilled physical therapy services to address their aforementioned functional limitations and progress towards prior level of function and independence with home exercise program and self symptom management/resolution.      Impairments: abnormal gait, abnormal or restricted ROM, activity intolerance, impaired physical strength, lacks appropriate home exercise program, pain with function, weight-bearing intolerance, poor posture  and poor body mechanics  Other impairment: poor tolerance to any position  Functional limitations: limited tolerance to sitting/driving/bendingUnderstanding of Dx/Px/POC: good   Prognosis: good    Goals  Short Term Goals: Target Date 9/5/23  1. Initiate and advance HEP toward self symptom reduction/resolution. - met  2. Improve postural awareness and demonstrate self postural correction. - met  3. Improve AROM lumbar spine to min todd or better t/o. - met except extension  4. Undisturbed sleep. = met  5. Pt to tolerate prolonged sitting/standing x 1 hour or more w/o c/o. - met stand      Long Term Goals: Target Date 10/31/23 - LTG's ongoing  1. Indep with HEP and self symptom prevention. 2. Achieve lumbar AROM to WNL w/o c/o.  3. Improve LE/core strength to good to allow pt to tolerate bending and lifting/carrying 20# x 10/1' w/o c/o. NEW GOAL 4. Reduce c/o pain to 0-2/10 in L buttock w/ normal functional activities. Plan  Patient would benefit from: skilled PT  Planned modality interventions: thermotherapy: hydrocollator packs, unattended electrical stimulation and cryotherapy  Planned therapy interventions: joint mobilization, patient education, postural training, abdominal trunk stabilization, functional ROM exercises, home exercise program, neuromuscular re-education, strengthening, stretching, therapeutic activities, therapeutic exercise, manual therapy, balance, body mechanics training and gait training  Other planned therapy interventions: mechanical assessment  Frequency: 2x week  Duration in weeks: 12  Plan of Care beginning date: 8/8/2023  Plan of Care expiration date: 10/31/2023  Treatment plan discussed with: patient        Subjective Evaluation    History of Present Illness  Date of onset: 7/25/2023  Mechanism of injury: 9/7/2023 RE: Pt reports pain is now intermittent. He has no pain in sidelying and sleeps well.  He ambulates w/ one or no crutch. His pain is mostly in L buttock>post/lat knee. He has remaining difficulty tolerating lumbar extension or extension biased positions ie: supine/prone lying. 2023 IE: Patient had lower back pain starting in early  when was digging a trench for his driveway to adjust the placement of the stones that were put down. Pain resolved at that time, with pt returning to work at [employer] with patient reporting return of pain on  when he had to pour some more stone for his driveway, with pain starting the morning after this activity. Worsening pain continued, with pt visiting the ER and getting gabapentin prescribed for his leg pain. Per notes review, pt was prescribed a medrol dose pack on 23, with pt noting no benefit from this. Pt referred to physical therapy, with discussion of potential need for injection also initiated by MD (per pt report). Recurrent probem    Patient Goals  Patient goals for therapy: decreased pain, increased motion, increased strength, independence with ADLs/IADLs and return to work  Patient goal: To be able to take my trip to [employer] in September  Pain  Current pain rating: 3  At best pain ratin  At worst pain ratin  Location: L buttock>L post thigh/lat knee  Quality: cramping, dull ache and tight  Relieving factors: change in position  Aggravating factors: sitting and stair climbing (supine/prone)  Progression: improved    Social Support  Lives with: spouse    Employment status: not working (out of work due to back pain)  Exercise history: patient works a physically active job.        Diagnostic Tests  MRI studies: abnormal (Spondylosis as described above including a left paracentral disc extrusion at L5-S1 which compresses the traversing left S1 nerve root. )  Treatments  Previous treatment: medication  Current treatment: physical therapy        Objective  *=painful  Lumbar AROM todd:  2023  Flexion:   Full stand  full in sitting, pain with return to upright. Extension:   dillan to neutral  not able to extend to neutral*  Sideglide Left:   Nil*knee  min limitation   Right:    Nil*knee  min-mod limitation*     Mechanical Assessment:   9/7/2023:  ALICE = periph/w (8/31/2023)  Rep SGIS against wall 5"x10 = inc/NW (8/31/2023)  Rep SGIS in doorway 5" 2x10 = NE (8/31/2023)  Rep R rotat in L SL 5"x10 = dec/B 4/10 glut (8/31/2023)  2nd set R rotat in L SL 5"x10= remains 4/10 (8/31/2023)    8/8/2023  Pt initially positioned in R sidelying (preferential position) with difficulty finding any position of comfort. Prone over 2 pillows: able to gradually achieve full prone position, with L rotated pelvis noted initially. MHP applied with gradual ability to shift position. Mild decrease in distal pain noted, with variable nature throughout. Prone over 1 pillow: able to tolerate progression, with slow transition, with some centralization of symptoms also noted. Attempted to go to full prone, with onset of L LE cramping and increased pain demonstrated. Palpation/observation:   9/7/2023: remains TTP L pirif only in sciatic notich. Standing posture w/ absent lumbar lordosis/slightly flexed. No lateral shift. 8/8/23: significant pain with palpation over L medial HS, L ITB, and L piriformis. Patient tolerating most movements poorly overall. Flexed standing posure - leans onto crutches. Gait:   9/7/2023: Ambulates w/ single crutch intermittently, gait no longer antalgic w/o AD and pt fully upright w/o crutches  8/8/23: Pt arrives ambulating by leaning onto bilateral crutches, with 2 point gait pattern utilized in flexed position. Crutches raised 1 level, with mild improvement in ability to stand more upright noted by end of session, but with continued significant pain. Transfers:   9/7/2023: Pt demonstrates ease w/ bed mobility and transfers. He has limited nikko to prone and supine w/ legs straight.   8/8/23: Pt able to complete bed mobility and transfers with UE assistance with increased time, caution and effort required. Painful movements noted throughout session. Strength: 9/7/2023 9/7/2023    Right  left  Hip flex  5/5  5/5  Knee ext 5/5  4+/5  Knee flex 5/5  5/5  Ankle DF 5/5  5/5  Hip ER  5/5  5/5  Hip IR  5/5  5/5  Hip abd 5/5  5/5     Special tests 9/7/2023  SLR  +L 45 deg  Crossed SLR  +R Repro L sx    Function:  9/7/2023 - increased pain w/ stairs, supine/prone lying and prolonged sitting (pressure on L glut)       Precautions: lumbar  Daily Exercise Log:  Start of Care: 8/8/23  POC expires 10/31/23  FOTO: 8/24/2023  Date    9/5/2023 9/7/2023  RE   Visit #    9 10           Manual     10'   ROM/MMT     TT   Neuro Re-Ed    25' 20'   Mechanical assessment    Rep R rotat in L SL 5" 2x10 start;     1x10 end Rep R rotat in L SL 2x10 = cenralizing/B       Supine sciatic NG knee ext w/ PD    2x10 R/L 1x10 R/L re-instru not to flex hip >90 deg   Supine fig 4 stretch    30"x3 L 30"x3 L                   Ther Ex    15' 15'   B/L HR     1x10   Calf stretch    10"x5 L LE at wall 10"x5 L LE   L LE clamshells in SL    2x10 2x10; 2x   L LE rev clamshells w/ towel roll @ knees    2x10 2x10; 2x   Supine clamshell vs TB    grn 3"x10    Pt education                HEP        Ther Activity                        Gait Training                        Modalities        Estim + MHP    To End 5'              HEP:  Access Code: [email or web address]  URL: [email or web address]  Date: 08/31/2023  Prepared by:  Max Jumper    Exercises  - Sidelying Open Book Thoracic Rotation with Knee on Foam Roll  - 5 x daily - 2 sets - 10 reps - 5 hold  - Supine Hip External Rotation Stretch (Mirrored)  - 2 x daily - 3 reps - 30 hold  - Supine Sciatic Nerve Glide  - 2 x daily - 2 sets - 10 reps  - Sidelying Reverse Clamshell  - 2 x daily - 2 sets - 10 reps  - Clamshell (Mirrored)  - 2 x daily - 2 sets - 10 reps  - Gastroc Stretch on Wall  - 2 x daily - 5 reps - 10 hold

## 2023-09-11 ENCOUNTER — TELEPHONE (OUTPATIENT)
Dept: PAIN MEDICINE | Facility: CLINIC | Age: 63
End: 2023-09-11

## 2023-09-11 ENCOUNTER — OFFICE VISIT (OUTPATIENT)
Dept: FAMILY MEDICINE CLINIC | Facility: CLINIC | Age: 63
End: 2023-09-11
Payer: COMMERCIAL

## 2023-09-11 VITALS
BODY MASS INDEX: 20.62 KG/M2 | WEIGHT: 152.2 LBS | SYSTOLIC BLOOD PRESSURE: 128 MMHG | OXYGEN SATURATION: 97 % | DIASTOLIC BLOOD PRESSURE: 74 MMHG | HEART RATE: 74 BPM | HEIGHT: 72 IN

## 2023-09-11 DIAGNOSIS — E78.5 HYPERLIPIDEMIA, UNSPECIFIED HYPERLIPIDEMIA TYPE: ICD-10-CM

## 2023-09-11 DIAGNOSIS — M54.42 ACUTE LEFT-SIDED LOW BACK PAIN WITH LEFT-SIDED SCIATICA: Primary | ICD-10-CM

## 2023-09-11 DIAGNOSIS — D75.839 THROMBOCYTOSIS: ICD-10-CM

## 2023-09-11 DIAGNOSIS — N52.8 OTHER MALE ERECTILE DYSFUNCTION: ICD-10-CM

## 2023-09-11 DIAGNOSIS — E03.9 ACQUIRED HYPOTHYROIDISM: ICD-10-CM

## 2023-09-11 DIAGNOSIS — M05.79 RHEUMATOID ARTHRITIS INVOLVING MULTIPLE SITES WITH POSITIVE RHEUMATOID FACTOR (HCC): ICD-10-CM

## 2023-09-11 PROCEDURE — 99214 OFFICE O/P EST MOD 30 MIN: CPT | Performed by: INTERNAL MEDICINE

## 2023-09-11 NOTE — ASSESSMENT & PLAN NOTE
Patient is on hydroxyurea 1000 mg alternating with 500 we will continue with the same follow-up with the oncologist hematologist along with repeat CBC

## 2023-09-11 NOTE — PROGRESS NOTES
Office Visit Note  23     Prakash Cavazos 70 y.o. male MRN: [id number]  : 1960    Assessment:     1. Acute left-sided low back pain with left-sided sciatica  Assessment & Plan:  Patient continues to experience pain on and off even after receiving the epidural injection. He is also receiving physical therapy recommend follow-up with orthopedic he may be getting his second injection also pain went patient is on meloxicam 50 mg daily we will continue until seen by orthopedics. In the past Medrol pack did not help much      2. Hyperlipidemia, unspecified hyperlipidemia type  Assessment & Plan:  Currently patient not on any medication last cholesterol level is 190 we will continue to monitor with diet alone. 3. Acquired hypothyroidism  Assessment & Plan:  Continue levothyroxine 100 mcg daily follow-up with repeat lab      4. Other male erectile dysfunction  Assessment & Plan:  Continue sildenafil 100 mg as needed      5. Rheumatoid arthritis involving multiple sites with positive rheumatoid factor ([address])  Assessment & Plan:  Currently patient is not on any medications for the rheumatoid arthritis doing good      6. Thrombocytosis  Assessment & Plan:  Patient is on hydroxyurea 1000 mg alternating with 500 we will continue with the same follow-up with the oncologist hematologist along with repeat CBC               Discussion Summary and Plan: Today's care plan and medications were reviewed with patient in detail and all their questions answered to their satisfaction. Chief Complaint   Patient presents with   • Follow-up      Subjective:  Patient is coming here for a follow-up evaluation with regards to low back pain radiating down the left lower extremity lumbar radiculopathy. He was seen by the pain management physician and had received a steroid injection epidural in the back in the month of . Patient also has been getting some physical therapy.   Pain is on and off some days he is feeling better sometimes he is not. Pain can be on the lateral aspect of the thigh area or around the thigh area going down into the left lower extremity. He occasionally noticing some tingling numbness sensation also. Medications reviewed labs reviewed consultant reports reviewed. Physical therapy notes reviewed      The following portions of the patient's history were reviewed and updated as appropriate: allergies, current medications, past family history, past medical history, past social history, past surgical history and problem list.    Review of Systems   Constitutional: Negative for chills and fever. HENT: Negative for ear pain and sore throat. Eyes: Negative for pain and visual disturbance. Respiratory: Negative for cough and shortness of breath. Cardiovascular: Negative for chest pain and palpitations. Gastrointestinal: Negative for abdominal pain and vomiting. Genitourinary: Negative for dysuria and hematuria. Musculoskeletal: Positive for arthralgias and back pain. Skin: Negative for color change and rash. Neurological: Negative for seizures and syncope. All other systems reviewed and are negative.         Historical Information   Patient Active Problem List   Diagnosis   • Hyperlipidemia   • Hypothyroidism   • Thrombocytosis   • Rheumatoid arthritis involving multiple sites with positive rheumatoid factor (HCC)   • Lipoma of torso   • Other male erectile dysfunction   • Acute midline low back pain without sciatica   • Acute left-sided low back pain with left-sided sciatica     Past Medical History:   Diagnosis Date   • Arthritis    • Disease of thyroid gland     hypo   • Graves disease 1995   • Hallux limitus, acquired, right    • Hypothyroid     hypo   • Other tear of medial meniscus, current injury, right knee, initial encounter 6/6/2018    Added automatically from request for surgery [id number]   • 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 Shook Ask   • RA (rheumatoid arthritis) ([address])    • Rheumatoid arthritis involving multiple sites ([address]) 2022   • Wears glasses      Past Surgical History:   Procedure Laterality Date   • ANKLE SURGERY Right     ligament, chipped bones,dislocated   • BUNIONECTOMY  2014   • COLONOSCOPY     • ELBOW SURGERY Right     tendon surgery   • EPIDURAL BLOCK INJECTION Left 2023    Procedure: left L5-S1, S1 TRANSFORAMINAL epidural steroid injection (03595, 74852);   Surgeon: Estiven Molina DO;  Location[facility] MAIN OR;  Service: Pain Management    • FOOT ARTHRODESIS, MODIFIED ARLELANO Left    • FRACTURE SURGERY Left     elbow   • HERNIA REPAIR Right     inguinal   • UT ARTHRS KNE SURG W/MENISCECTOMY MED/LAT W/SHVG Right 2018    Procedure: MENISCECTOMY LATERAL /MEDIAL;  Surgeon: Deshaun Perez MD;  Location: [facility];  Service: Orthopedics   • UT CORRECTION HAMMERTOE Bilateral 2021    Procedure: REPAIR HAMMERTOE / MALLET TOE / CLAW TOE 2ND toe bilateral;  Surgeon: Corbin Aranda DPM;  Location: [facility];  Service: Podiatry   • UT CORRJ HALLUX VALGUS W/SESMDC W/RESCJ PROX PHAL Right 2021    Procedure: Debbie Perez;  Surgeon: Corbin Aranda DPM;  Location: [facility];  Service: Podiatry   • ROTATOR CUFF REPAIR Left    • WISDOM TOOTH EXTRACTION      x4   • WRIST SURGERY       Social History     Substance and Sexual Activity   Alcohol Use Yes    Comment: occ beer with going out to dinner     Social History     Substance and Sexual Activity   Drug Use No     Social History     Tobacco Use   Smoking Status Former   • Packs/day: 1.00   • Years: 20.00   • Total pack years: 20.00   • Types: Cigarettes   • Quit date:    • Years since quittin.7   • Passive exposure: Past   Smokeless Tobacco Never     Family History   Problem Relation Age of Onset   • Cancer Mother         lung-heavy smoker   • Hypertension Father    • Heart disease Father         leaky valve   • Skin cancer Father      Health Maintenance Due   Topic • HIV Screening    • Annual Physical    • DTaP,Tdap,and Td Vaccines (1 - Tdap)   • Pneumococcal Vaccine: 65+ Years (1 - PCV)   • COVID-19 Vaccine (3 - Mixed Product series)   • PT PLAN OF CARE    • Influenza Vaccine (1)      Meds/Allergies       Current Outpatient Medications:   •  Cholecalciferol (VITAMIN D3) 1000 units CAPS, Take 1,000 Units by mouth, Disp: , Rfl:   •  gabapentin (Neurontin) 300 mg capsule, Take 1 capsule (300 mg total) by mouth daily at bedtime Gabapentin may cause vision changes, clumsiness, unsteadiness, dizziness, drowsiness, sleepiness, or trouble with thinking. Make sure you know how you react to this medicine before you drive, use machines, or do anything else that could be dangerous if you are not alert, well-coordinated, or able to think or see well., Disp: 30 capsule, Rfl: 0  •  Hydroxyurea (HYDREA PO), Take 1,000 mg by mouth Mon-Wed-Fri, Disp: , Rfl:   •  hydroxyurea (HYDREA) 500 mg capsule, Take by mouth On Tues-Thurs-Sat-Sun , Disp: , Rfl:   •  levothyroxine 150 mcg tablet, TAKE 1 TABLET DAILY, Disp: 90 tablet, Rfl: 3  •  meloxicam (Mobic) 15 mg tablet, Take 1 tablet (15 mg total) by mouth daily, Disp: 14 tablet, Rfl: 0  •  sildenafil (VIAGRA) 100 mg tablet, Take 1 tablet (100 mg total) by mouth if needed for erectile dysfunction As directed, Disp: 6 tablet, Rfl: 12      Objective:    Vitals:   /74 (BP Location: Right arm, Patient Position: Sitting, Cuff Size: Standard)   Pulse 74   Ht 6' (1.829 m)   Wt 69 kg (152 lb 3.2 oz)   SpO2 97%   BMI 20.64 kg/m²   Body mass index is 20.64 kg/m². Vitals:    09/11/23 1209   Weight: 69 kg (152 lb 3.2 oz)       Physical Exam  Vitals and nursing note reviewed. Constitutional:       Appearance: Normal appearance. Cardiovascular:      Rate and Rhythm: Normal rate and regular rhythm. Heart sounds: Normal heart sounds. Pulmonary:      Effort: Pulmonary effort is normal.      Breath sounds: Normal breath sounds. Musculoskeletal:      Right lower leg: No edema. Left lower leg: No edema. Comments: SLR about 30-40 on the right side 10 degrees on the left side   Neurological:      Mental Status: He is alert. Lab Review   No visits with results within 2 Month(s) from this visit. Latest known visit with results is:   Orders Only on 04/28/2023   Component Date Value Ref Range Status   • Total Cholesterol 04/28/2023 190  <200 mg/dL Final   • HDL 04/28/2023 67  > OR = 40 mg/dL Final   • Triglycerides 04/28/2023 54  <150 mg/dL Final   • LDL Calculated 04/28/2023 109 (H)  mg/dL (calc) Final    Comment: Reference range: <100     Desirable range <100 mg/dL for primary prevention;    <70 mg/dL for patients with CHD or diabetic patients   with > or = 2 CHD risk factors. LDL-C is now calculated using the Jan-Cline   calculation, which is a validated novel method providing   better accuracy than the Friedewald equation in the   estimation of LDL-C. Bryson Hood et al. [facility]. 3589;845(77): 8312-4248   (http://education. [employer]. com/faq/ZXF935)     • Chol HDLC Ratio 04/28/2023 2.8  <5.0 (calc) Final   • Non-HDL Cholesterol 04/28/2023 123  <130 mg/dL (calc) Final    Comment: For patients with diabetes plus 1 major ASCVD risk   factor, treating to a non-HDL-C goal of <100 mg/dL   (LDL-C of <70 mg/dL) is considered a therapeutic   option. • Glucose, Random 04/28/2023 88  65 - 99 mg/dL Final    Comment:               Fasting reference interval        • BUN 04/28/2023 21  7 - 25 mg/dL Final   • Creatinine 04/28/2023 1.08  0.70 - 1.35 mg/dL Final   • eGFR 04/28/2023 76  > OR = 60 mL/min/1.73m2 Final    Comment: The eGFR is based on the CKD-EPI 2021 equation. To calculate   the new eGFR from a previous Creatinine or Cystatin C  result, go to [employer]. org/professionals/  kdoqi/gfr%5Fcalculator     • SL AMB BUN/CREATININE RATIO 31/36/5333 NOT APPLICABLE  6 - 22 (calc) Final   • Sodium 04/28/2023 140 135 - 146 mmol/L Final   • Potassium 04/28/2023 4.8  3.5 - 5.3 mmol/L Final   • Chloride 04/28/2023 104  98 - 110 mmol/L Final   • CO2 04/28/2023 29  20 - 32 mmol/L Final   • Calcium 04/28/2023 9.3  8.6 - 10.3 mg/dL Final   • Protein, Total 04/28/2023 6.7  6.1 - 8.1 g/dL Final   • Albumin 04/28/2023 4.1  3.6 - 5.1 g/dL Final   • Globulin 04/28/2023 2.6  1.9 - 3.7 g/dL (calc) Final   • Albumin/Globulin Ratio 04/28/2023 1.6  1.0 - 2.5 (calc) Final   • TOTAL BILIRUBIN 04/28/2023 0.4  0.2 - 1.2 mg/dL Final   • Alkaline Phosphatase 04/28/2023 68  35 - 144 U/L Final   • AST 04/28/2023 15  10 - 35 U/L Final   • ALT 04/28/2023 13  9 - 46 U/L Final   • Color UA 04/28/2023 YELLOW  YELLOW Final   • Urine Appearance 04/28/2023 CLEAR  CLEAR Final   • Specific Gravity 04/28/2023 1.024  1.001 - 1.035 Final   • Ph 04/28/2023 < OR = 5.0  5.0 - 8.0 Final   • Glucose, Urine 04/28/2023 NEGATIVE  NEGATIVE Final   • Bilirubin, Urine 04/28/2023 NEGATIVE  NEGATIVE Final   • Ketone, Urine 04/28/2023 TRACE (A)  NEGATIVE Final   • Blood, Urine 04/28/2023 NEGATIVE  NEGATIVE Final   • Protein, Urine 04/28/2023 NEGATIVE  NEGATIVE Final   • Nitrites Urine 04/28/2023 NEGATIVE  NEGATIVE Final   • Leukocyte Esterase 04/28/2023 NEGATIVE  NEGATIVE Final   • SL AMB WBC, URINE 04/28/2023 0-5  < OR = 5 /HPF Final   • RBC, Urine 04/28/2023 NONE SEEN  < OR = 2 /HPF Final   • Squamous Epithelial Cells 04/28/2023 NONE SEEN  < OR = 5 /HPF Final   • Bacteria, UA 04/28/2023 NONE SEEN  NONE SEEN /HPF Final   • Hyaline Casts 04/28/2023 NONE SEEN  NONE SEEN /LPF Final   • Comment(s) 04/28/2023    Final    Comment: This urine was analyzed for the presence of WBC,   RBC, bacteria, casts, and other formed elements. Only those elements seen were reported.            • White Blood Cell Count 04/28/2023 6.6  3.8 - 10.8 Thousand/uL Final   • Red Blood Cell Count 04/28/2023 4.77  4.20 - 5.80 Million/uL Final   • Hemoglobin 04/28/2023 16.8  13.2 - 17.1 g/dL Final   • HCT 04/28/2023 48.8  38.5 - 50.0 % Final   • MCV 04/28/2023 102.3 (H)  80.0 - 100.0 fL Final   • MCH 04/28/2023 35.2 (H)  27.0 - 33.0 pg Final   • MCHC 04/28/2023 34.4  32.0 - 36.0 g/dL Final   • RDW 04/28/2023 13.0  11.0 - 15.0 % Final   • Platelet Count 77/23/4756 487 (H)  140 - 400 Thousand/uL Final   • SL AMB MPV 04/28/2023 9.2  7.5 - 12.5 fL Final   • Neutrophils (Absolute) 04/28/2023 4,712  1,500 - 7,800 cells/uL Final   • Lymphocytes (Absolute) 04/28/2023 937  850 - 3,900 cells/uL Final   • Monocytes (Absolute) 04/28/2023 521  200 - 950 cells/uL Final   • Eosinophils (Absolute) 04/28/2023 356  15 - 500 cells/uL Final   • Basophils ABS 04/28/2023 73  0 - 200 cells/uL Final   • Neutrophils 04/28/2023 71.4  % Final   • Lymphocytes 04/28/2023 14.2  % Final   • Monocytes 04/28/2023 7.9  % Final   • Eosinophils 04/28/2023 5.4  % Final   • Basophils PCT 04/28/2023 1.1  % Final   • HEP C AB 04/28/2023 NON-REACTIVE  NON-REACTIVE Final   • Signal to Cut-Off 04/28/2023 0.08  <1.00 Final    Comment:    HCV antibody was non-reactive. There is no laboratory   evidence of HCV infection. In most cases, no further action is required. However,  if recent HCV exposure is suspected, a test for HCV RNA  (test code 25501) is suggested. For additional information please refer to  http://education. [employer]. Lemon/faq/USW48e9  (This link is being provided for informational/  educational purposes only.)        • Prostate Specific Antigen Total 04/28/2023 2.08  < OR = 4.00 ng/mL Final    Comment: The total PSA value from this assay system is   standardized against the WHO standard. The test   result will be approximately 20% lower when compared   to the equimolar-standardized total PSA (Naveed   Amalia). Comparison of serial PSA results should be   interpreted with this fact in mind. This test was performed using the Siemens   chemiluminescent method.  Values obtained from   different assay methods cannot be used  interchangeably. PSA levels, regardless of  value, should not be interpreted as absolute  evidence of the presence or absence of disease. • TSH W/RFX TO FREE T4 04/28/2023 1.55  0.40 - 4.50 mIU/L Final   • Hemoglobin A1C 04/28/2023 5.1  <5.7 % of total Hgb Final    Comment: For the purpose of screening for the presence of  diabetes:     <5.7%       Consistent with the absence of diabetes  5.7-6.4%    Consistent with increased risk for diabetes              (prediabetes)  > or =6.5%  Consistent with diabetes     This assay result is consistent with a decreased risk  of diabetes. Currently, no consensus exists regarding use of  hemoglobin A1c for diagnosis of diabetes in children. According to American Diabetes Association (ADA)  guidelines, hemoglobin A1c <7.0% represents optimal  control in non-pregnant diabetic patients. Different  metrics may apply to specific patient populations. Standards of Medical Care in Diabetes(ADA). • Urine Culture, Comprehensive 04/28/2023    Final    NO CULTURE INDICATED         Charito Piedra MD        "This note has been constructed using a voice recognition system. Therefore there may be syntax, spelling, and/or grammatical errors.  Please call if you have any questions. "

## 2023-09-11 NOTE — ASSESSMENT & PLAN NOTE
Currently patient not on any medication last cholesterol level is 190 we will continue to monitor with diet alone.

## 2023-09-11 NOTE — TELEPHONE ENCOUNTER
Spoke with Silvestre Wing about why he was seeing Darci Conn instead of Dr. Hannah Carmona. Pts request to change due to time and day availability.  Seeing Jennifer Jang on the 9/12/23

## 2023-09-11 NOTE — ASSESSMENT & PLAN NOTE
Patient continues to experience pain on and off even after receiving the epidural injection. He is also receiving physical therapy recommend follow-up with orthopedic he may be getting his second injection also pain went patient is on meloxicam 50 mg daily we will continue until seen by orthopedics.   In the past Medrol pack did not help much

## 2023-09-11 NOTE — TELEPHONE ENCOUNTER
Patient is requesting a transfer of care for the following reason:  Pt was scheduled w/ Dr Jono Drake as a NP. (unsure why)     Doctor: Uzma Gamboa    Would you release this patient from your care? Doctor: Radha Krishnan     Would you take this patient on as a patient?

## 2023-09-12 ENCOUNTER — OFFICE VISIT (OUTPATIENT)
Dept: PHYSICAL THERAPY | Facility: CLINIC | Age: 63
End: 2023-09-12
Payer: COMMERCIAL

## 2023-09-12 DIAGNOSIS — M54.42 ACUTE LEFT-SIDED LOW BACK PAIN WITH LEFT-SIDED SCIATICA: Primary | ICD-10-CM

## 2023-09-12 PROCEDURE — 97112 NEUROMUSCULAR REEDUCATION: CPT | Performed by: PHYSICAL THERAPIST

## 2023-09-12 NOTE — PROGRESS NOTES
Daily Note     Today's date: 2023  Patient name: Yisel Tan  : [id number]  MRN: [id number]  Referring provider: Juan Daniel Hall  Dx:   Encounter Diagnosis     ICD-10-CM    1. Acute left-sided low back pain with left-sided sciatica  M54.42                      Subjective: Pt states his symptoms remain fairly constant in L buttock/SI 6-7/10 and tightness in L post calf to just below lateral aspect of his knee. He has been doing his HEP as prescribed and reports prolonged walking increases his symptom more than anything else he does at home. "we haven't tried bending forward yet". Pt expresses concerns that he's been OOW x2 months due to this issue, and is only allowed 6 months total. He's afraid to use all his allotted time off and if he finds out he needs surgery too late, he'll lose his job. Objective: See treatment diary below; pt enters w/o crutch today for first time. Rep rotat in L SL 2x10 = NE  Prone lying 3' = NE  Prone P to A mobs L4/5 = inc/w  Prone lateral closing mob L L4/5 = NE  Flexion rotation = inc/w R or L  Rep flexion sitting 2x10 = dec/B buttock, NE thigh      Assessment: Tolerated treatment fair and thus far does not have a direction of preference established that fully centralizes or abolishes symptoms. . Patient does report symptoms slightly below his knee and does demonstrate/report increased radic symptoms while in prolonged sitting in the clinic. For these reason, rep flexion had not been tested to date. Pt reports best symptom reduction recently that he can recall w/ rep flexion in sitting today. Plan: Pt and his wife were instructed to trial rep flexion in sitting every 2 hours instead of SL rotation (substitute one for the other, keep remaining other exercises) If symptoms increase, discontinue rep flexion. Beyond this, we have exhausted mechanical assessment and potential of directional preference.  He should test this over the next 24 hours and discuss result at pain mgt appt tomorrow. Precautions: lumbar  Daily Exercise Log:  Start of Care: 8/8/23  POC expires 10/31/23  FOTO: 8/24/2023  Date 9/12/2023 9/5/2023 9/7/2023  RE   Visit # 11   9 10           Manual     10'   ROM/MMT     TT   Neuro Re-Ed    25' 20'   Mechanical assessment 45' see objective   Rep R rotat in L SL 5" 2x10 start;     1x10 end Rep R rotat in L SL 2x10 = cenralizing/B       Supine sciatic NG knee ext w/ PD    2x10 R/L 1x10 R/L re-instru not to flex hip >90 deg   Supine fig 4 stretch    30"x3 L 30"x3 L                   Ther Ex    15' 15'   B/L HR     1x10   Calf stretch    10"x5 L LE at wall 10"x5 L LE   L LE clamshells in SL    2x10 2x10; 2x   L LE rev clamshells w/ towel roll @ knees    2x10 2x10; 2x   Supine clamshell vs TB    grn 3"x10    Pt education                HEP        Ther Activity                        Gait Training                        Modalities        Estim + MHP    To End 5'              HEP:  Access Code: [email or web address]  URL: [email or web address]  Date: 08/31/2023  Prepared by:  Gertrude Liz    Exercises  - Sidelying Open Book Thoracic Rotation with Knee on Foam Roll  - 5 x daily - 2 sets - 10 reps - 5 hold  - Supine Hip External Rotation Stretch (Mirrored)  - 2 x daily - 3 reps - 30 hold  - Supine Sciatic Nerve Glide  - 2 x daily - 2 sets - 10 reps  - Sidelying Reverse Clamshell  - 2 x daily - 2 sets - 10 reps  - Clamshell (Mirrored)  - 2 x daily - 2 sets - 10 reps  - Gastroc Stretch on Wall  - 2 x daily - 5 reps - 10 hold

## 2023-09-13 ENCOUNTER — TELEPHONE (OUTPATIENT)
Dept: PAIN MEDICINE | Facility: CLINIC | Age: 63
End: 2023-09-13

## 2023-09-13 ENCOUNTER — OFFICE VISIT (OUTPATIENT)
Dept: PAIN MEDICINE | Facility: CLINIC | Age: 63
End: 2023-09-13
Payer: COMMERCIAL

## 2023-09-13 VITALS
DIASTOLIC BLOOD PRESSURE: 71 MMHG | WEIGHT: 152 LBS | HEART RATE: 86 BPM | BODY MASS INDEX: 20.59 KG/M2 | SYSTOLIC BLOOD PRESSURE: 121 MMHG | HEIGHT: 72 IN

## 2023-09-13 DIAGNOSIS — E03.9 ACQUIRED HYPOTHYROIDISM: ICD-10-CM

## 2023-09-13 DIAGNOSIS — M51.16 LUMBAR DISC DISEASE WITH RADICULOPATHY: ICD-10-CM

## 2023-09-13 DIAGNOSIS — M54.42 ACUTE LEFT-SIDED LOW BACK PAIN WITH LEFT-SIDED SCIATICA: Primary | ICD-10-CM

## 2023-09-13 DIAGNOSIS — M79.18 MYOFASCIAL PAIN SYNDROME: ICD-10-CM

## 2023-09-13 PROCEDURE — 99214 OFFICE O/P EST MOD 30 MIN: CPT | Performed by: STUDENT IN AN ORGANIZED HEALTH CARE EDUCATION/TRAINING PROGRAM

## 2023-09-13 RX ORDER — MELOXICAM 15 MG/1
15 TABLET ORAL DAILY
Qty: 30 TABLET | Refills: 0 | Status: SHIPPED | OUTPATIENT
Start: 2023-09-13 | End: 2023-10-13

## 2023-09-13 RX ORDER — GABAPENTIN 300 MG/1
300 CAPSULE ORAL 3 TIMES DAILY
Qty: 90 CAPSULE | Refills: 1 | Status: SHIPPED | OUTPATIENT
Start: 2023-09-13 | End: 2023-11-12

## 2023-09-13 NOTE — PATIENT INSTRUCTIONS
Increase gabapentin to 300 mg three times a day. Increase to 300 mg twice a day for 5 days and then to three times a day thereafter. Epidural Steroid Injection   AMBULATORY CARE:   What you need to know about an epidural steroid injection (NAOMY):  An NAOMY is a procedure to inject steroid medicine into the epidural space. The epidural space is between your spinal cord and vertebrae. Steroids reduce inflammation and fluid buildup in your spine that may be causing pain. You may be given pain medicine along with the steroids. How to prepare for an NAOMY:  Your provider will talk to you about how to prepare for your procedure. He or she will tell you what medicines to take or not take on the day of your procedure. You may need to stop taking blood thinners or other medicines several days before your procedure. You may need to adjust any diabetes medicine you take on the day of your procedure. Steroid medicine can increase your blood sugar level. Arrange for someone to drive you home when you are discharged. What will happen during an NAOMY:   You will be given medicine to numb the procedure area. You will be awake for the procedure, but you will not feel pain. You may also be given medicine to help you relax. Contrast liquid will be used to help your provider see the area better. Tell the provider if you have ever had an allergic reaction to contrast liquid. Your provider may place the needle into your neck area, middle of your back, or tailbone area. He or she may inject the medicine next to the nerves that are causing your pain. He or she may instead inject the medicine into a larger area of the epidural space. This helps the medicine spread to more nerves. Your provider will use a fluoroscope to help guide the needle to the right place. A fluoroscope is a type of x-ray. After the procedure, a bandage will be placed over the injection site to prevent infection.     What will happen after an NAOMY:  You will have a bandage over the injection site to prevent infection. Your provider will tell you when you can bathe and any activity guidelines. You will be able to go home. Risks of an NAOMY:  You may have temporary or permanent nerve damage or paralysis. You may have bleeding or develop a serious infection, such as meningitis (swelling of the brain coverings). An abscess may also develop. An abscess is a pus-filled area under the skin. You may need surgery to fix the abscess. You may have a seizure, anxiety, or trouble sleeping. If you are a man, you may have temporary erectile dysfunction (not able to have an erection). Call your local emergency number (911 in the [address]) if:   You have a seizure. You have trouble moving your legs. Seek care immediately if:   Blood soaks through your bandage. You have a fever or chills, severe back pain, and the procedure area is sensitive to the touch. You cannot control when you urinate or have a bowel movement. Call your doctor if:   You have weakness or numbness in your legs. Your wound is red, swollen, or draining pus. You have nausea or are vomiting. Your face or neck is red and you feel warm. You have more pain than you had before the procedure. You have swelling in your hands or feet. You have questions or concerns about your condition or care. Care for your wound as directed: You may remove the bandage before you go to bed the day of your procedure. You may take a shower, but do not take a bath for at least 24 hours. Self-care:   Do not drive,  use machines, or do strenuous activity for 24 hours after your procedure or as directed. Continue other treatments  as directed. Steroid injections alone will not control your pain. The injections are meant to be used with other treatments, such as physical therapy. Follow up with your doctor as directed:  Write down your questions so you remember to ask them during your visits.    © Copyright [employer] 2022 Information is for End User's use only and may not be sold, redistributed or otherwise used for commercial purposes. The above information is an  only. It is not intended as medical advice for individual conditions or treatments. Talk to your doctor, nurse or pharmacist before following any medical regimen to see if it is safe and effective for you. Core Strengthening Exercises   WHAT YOU NEED TO KNOW:   Your core includes the muscles of your lower back, hip, pelvis, and abdomen. Core strengthening exercises help heal and strengthen these muscles. This helps prevent another injury, and keeps your pelvis, spine, and hips in the correct position. DISCHARGE INSTRUCTIONS:   Call your doctor or physical therapist if:   You have sharp or worsening pain during exercise or at rest.    You have questions or concerns about your shoulder exercises. Safety tips:  Talk to your healthcare provider before you start an exercise program. A physical therapist can teach you how to do core strengthening exercises safely. Do the exercises on a mat or firm surface. A firm surface will support your spine and prevent low back pain. Do not do these exercises on a bed. Move slowly and smoothly. Avoid fast or jerky motions. Stop if you feel pain. You may feel some discomfort at first, but you should not feel pain. Tell your provider or physical therapist if you have pain while you exercise. Regular exercise will help decrease your discomfort over time. Breathe normally during core exercises. Do not hold your breath. This may cause an increase in blood pressure and prevent muscle strengthening. Your healthcare provider will tell you when to inhale and exhale during the exercise. Begin all of your exercises with abdominal bracing. Abdominal bracing helps warm up your core muscles. You can also practice abdominal bracing throughout the day.  Lie on your back with your knees bent and feet flat on the floor. Place your arms in a relaxed position beside your body. Tighten your abdominal muscles. Pull your belly button in and up toward your spine. Hold for 5 seconds. Relax your muscles. Repeat 10 times. Core strengthening exercises: Your healthcare provider will tell you how often to do these exercises. The provider will also tell you how many repetitions of each exercise you should do. Hold each exercise for 5 seconds or as directed. As you get stronger, increase your hold to 10 to 15 seconds. You can do some of these exercises on a stability ball, or with a weight. Ask your healthcare provider how to use a stability ball or weight for these exercises:  Bridging:  Lie on your back with your knees bent and feet flat on the floor. Rest your arms at your side. Tighten your buttocks, and then lift your hips 1 inch off the floor. Hold for 5 seconds. When you can do this exercise without pain for 10 seconds, increase the distance you lift your hips. A good goal is to be able to lift your hips so that your shoulders, hips, and knees are in a straight line. Dead bug:  Lie on your back with your knees bent and feet flat on the floor. Place your arms in a relaxed position beside your body. Begin with abdominal bracing. Next, raise one leg, keeping your knee bent. Hold for 5 seconds. Repeat with the other leg. When you can do this exercise without pain for 10 to 15 seconds, you may raise one straight leg and hold. Repeat with the other leg. Quadruped:  Place your hands and knees on the floor. Keep your wrists directly below your shoulders and your knees directly below your hips. Pull your belly button in toward your spine. Do not flatten or arch your back. Tighten your abdominal muscles below your belly button. Hold for 5 seconds. When you can do this exercise without pain for 10 to 15 seconds, you may extend one arm and hold. Repeat on the other side.          Side bridge exercises:      Standing side bridge:  Stand next to a wall and extend one arm toward the wall. Place your palm flat on the wall with your fingers pointing upward. Begin with abdominal bracing. Next, without moving your feet, slowly bend your arm to 90 degrees. Hold for 5 seconds. Repeat on the other side. When you can do this exercise without pain for 10 to 15 seconds, you may do the bent leg side bridge on the floor. Bent leg side bridge:  Lie on one side with your legs, hips, and shoulders in a straight line. Prop yourself up onto your forearm so your elbow is directly below your shoulder. Bend your knees back to 90 degrees. Begin with abdominal bracing. Next, lift your hips and balance yourself on your forearm and knees. Hold for 5 seconds. Repeat on the other side. When you can do this exercise without pain for 10 to 15 seconds, you may do the straight leg side bridge on the floor. Straight leg side bridge:  Lie on one side with your legs, hips, and shoulders in a straight line. Prop yourself up onto your forearm so your elbow is directly below your shoulder. Begin with abdominal bracing. Lift your hips off the floor and balance yourself on your forearm and the outside of your flexed foot. Do not let your ankle bend sideways. Hold for 5 seconds. Repeat on the other side. When you can do this exercise without pain for 10 to 15 seconds, ask your healthcare provider for more advanced exercises. Superman:  Lie on your stomach. Extend your arms forward on the floor. Tighten your abdominal muscles and lift your right hand and left leg off the floor. Hold this position. Slowly return to the starting position. Tighten your abdominal muscles and lift your left hand and right leg off the floor. Hold this position. Slowly return to the starting position. Clam:  Lie on your side with your knees bent. Put your bottom arm under your head to keep your neck in line.  Put your top hand on your hip to keep your pelvis from moving. Put your heels together, and keep them together during this exercise. Slowly raise your top knee toward the ceiling. Then lower your leg so your knees are together. Repeat this exercise 10 times. Then switch sides and do the exercise 10 times with the other leg. Curl up:  Lie on your back with your knees bent and feet flat on the floor. Place your hands, palms down, underneath your lower back. Next, with your elbows on the floor, lift your shoulders and chest 2 to 3 inches off the floor. Keep your head in line with your shoulders. Hold this position. Slowly return to the starting position. Straight leg raises:  Lie on your back with one leg straight. Bend the other knee and place your foot flat on the floor. Tighten your abdominal muscles. Keep your leg straight and slowly lift it straight up 6 to 12 inches off the floor. Hold this position. Lower your leg slowly. Do as many repetitions as directed on this side. Repeat with the other leg. Plank:  Lie on your stomach. Bend your elbows and place your forearms flat on the floor. Lift your chest, stomach, and knees off the floor. Make sure your elbows are below your shoulders. Your body should be in a straight line. Do not let your hips or lower back sink to the ground. Squeeze your abdominal muscles together and hold for 15 seconds. To make this exercise harder, hold for 30 seconds or lift 1 leg at a time. Bicycles:  Lie on your back. Bend both knees and bring them toward your chest. Your calves should be parallel to the floor. Place the palms of your hands on the back of your head. Straighten your right leg and keep it lifted 2 inches off the floor. Raise your head and shoulders off the floor and twist towards your left. Keep your head and shoulders lifted. Bend your right knee while you straighten your left leg. Keep your left leg 2 inches off the floor. Twist your head and chest towards the left leg.  Continue to straighten 1 leg at a time and twist.       Follow up with your doctor or physical therapist as directed:  Write down your questions so you remember to ask them during your visits. © Copyright [facility] 2022 Information is for End User's use only and may not be sold, redistributed or otherwise used for commercial purposes. The above information is an  only. It is not intended as medical advice for individual conditions or treatments. Talk to your doctor, nurse or pharmacist before following any medical regimen to see if it is safe and effective for you.

## 2023-09-13 NOTE — PROGRESS NOTES
Assessment:  1. Acute left-sided low back pain with left-sided sciatica    2. Acquired hypothyroidism    3. Myofascial pain syndrome        Mr. Dat Vincent is a pleasant 75-year-old male returning with low back pain with left-sided radicular features down into the foot. Patient was last seen in office on 8/15 by Dr. Marlene Graham and underwent left L5-S1, S1 transforaminal epidural steroid injection on 8/16/2023. Patient reports lack of relief with this injection. He states his symptoms are the same since the injection and rates pain as a 6-7 out of 10 on numeric rating scale. The pain is worse in the evening it is intermittent and the quality of pain is burning, dull aching, sharp, cramping, numbing, pins-and-needles like. He states is hard to say if the injection made any difference in his pain level. Today the patient continues to complain of left-sided radicular symptoms going down into the left foot. He was started on gabapentin 300 mg nightly but has not noticed much difference at this dose. He also was taking meloxicam which he states was helpful especially with sleep. On exam patient with myofascial tenderness in the left lumbar spine with negative straight leg raise and slump bilaterally. Given patient's lumbar MRI on 7/31/2023 demonstrating mild disc bulge with mild spinal canal stenosis at L4-L5 level along with mild disc bulge with mild central stenosis at the L5-S1 level we will move forward with repeat lumbar epidural steroid injection. We will take a different approach this time the is will be interlaminar instead of transforaminal.  Additionally we will increase gabapentin to 300 mg 3 times daily. Patient counseled to continue with physical therapy. Plan:  1. Schedule left parasagittal L5-S1 interlaminar LESI under fluoroscopic guidance. Epidural Injection Medical Necessity  The patient has evidence of  Lumbar canal stenosis severe enough to greatly impact quality of life or function.      The patient is concurrently involved in a conservative treatment plan including:    [x] Medications   [x] Physical therapy   [] Psychological therapy   [] Home exercise or stretching program   [x] Multidisciplinary healthcare provider team    Plan to perform with contrast without documented contrast allergy. No more than 15mg dexamethasone planned per session. The patient has not undergone more than 4 injections in the last 12 months. If Repeat Epidural  [] Prior epidural provided >50% pain relief and/or function for at least three months. [x] Patient failed to respond to prior epidural and plan for alternative approach as above. No orders of the defined types were placed in this encounter. 2. Increase gabapentin 300 mg TID  New Medications Ordered This Visit   Medications   • gabapentin (NEURONTIN) 300 mg capsule     Sig: Take 1 capsule (300 mg total) by mouth 3 (three) times a day     Dispense:  90 capsule     Refill:  1   • meloxicam (MOBIC) 15 mg tablet     Sig: Take 1 tablet (15 mg total) by mouth daily     Dispense:  30 tablet     Refill:  0       My impressions and treatment recommendations were discussed in detail with the patient, who verbalized understanding and had no further questions. [place] Prescription Drug Monitoring Program report was reviewed and was appropriate  and [place] Prescription Drug Monitoring Program report was reviewed and was appropriate     Complete risks and benefits including bleeding, infection, tissue reaction, nerve injury and allergic reaction were discussed. The approach was demonstrated using models and literature was provided. Verbal and written consent was obtained. Follow-up is planned in four weeks time or sooner as warranted. Discharge instructions were provided. I personally saw and examined the patient and I agree with the above discussed plan of care.     History of Present Illness:    Thomas Wu is a 70 y.o. male who presents to [facility] for initial evaluation of the above stated pain complaints. The patient has a past medical and chronic pain history as outlined in the assessment section. He was referred by Referral Self  No address on file . Mr. Albaro Diaz is a pleasant 78-year-old male returning with low back pain with left-sided radicular features down into the foot. Patient was last seen in office on 815 by Dr. Kalia Chacon and underwent left L5-S1, S1 transforaminal epidural steroid injection on 8/16/2023. Patient reports lack of relief with this injection. He states his symptoms are the same since the injection and rates pain as a 6-7 out of 10 on numeric rating scale. The pain is worse in the evening it is intermittent and the quality of pain is burning, dull aching, sharp, cramping, numbing, pins-and-needles like. He states is hard to say if the injection made any difference in his pain level. Today the patient continues to complain of left-sided radicular symptoms going down into the left foot. He was started on gabapentin 300 mg nightly but has not noticed much difference at this dose. He also was taking meloxicam which he states was helpful especially with sleep. Review of Systems:    Review of Systems   Respiratory: Negative for shortness of breath. Cardiovascular: Negative for chest pain. Gastrointestinal: Negative for constipation, diarrhea, nausea and vomiting. Musculoskeletal: Positive for gait problem. Negative for arthralgias, joint swelling and myalgias. Pain in Butt, Pain in left legs   Skin: Negative for rash. Neurological: Negative for dizziness, seizures and weakness. All other systems reviewed and are negative.           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 Arlette Schmidt   • RA (rheumatoid arthritis) ([address])    • Rheumatoid arthritis involving multiple sites ([address]) 2022   • Wears glasses        Past Surgical History:   Procedure Laterality Date   • ANKLE SURGERY Right     ligament, chipped bones,dislocated   • BUNIONECTOMY  2014   • COLONOSCOPY     • ELBOW SURGERY Right     tendon surgery   • EPIDURAL BLOCK INJECTION Left 2023    Procedure: left L5-S1, S1 TRANSFORAMINAL epidural steroid injection (93810, 42648);   Surgeon: Marlena Ayers DO;  Location: [facility] MAIN OR;  Service: Pain Management    • FOOT ARTHRODESIS, MODIFIED ARELLANO Left    • FRACTURE SURGERY Left     elbow   • HERNIA REPAIR Right     inguinal   • AZ ARTHRS KNE SURG W/MENISCECTOMY MED/LAT W/SHVG Right 2018    Procedure: MENISCECTOMY LATERAL /MEDIAL;  Surgeon: Javier Hill MD;  Location: [facility];  Service: Orthopedics   • AZ CORRECTION HAMMERTOE Bilateral 2021    Procedure: REPAIR HAMMERTOE / MALLET TOE / CLAW TOE 2ND toe bilateral;  Surgeon: Nancy Smiley DPM;  Location: [facility];  Service: Podiatry   • [address] W/SESMDC W/RESCJ PROX PHAL Right 2021    Procedure: Seamus Leigh;  Surgeon: Nancy Smiley DPM;  Location: [facility];  Service: Podiatry   • ROTATOR CUFF REPAIR Left    • WISDOM TOOTH EXTRACTION      x4   • WRIST SURGERY         Family History   Problem Relation Age of Onset   • Cancer Mother         passed   • Hypertension Father    • Heart disease Father         leaky valve   • Skin cancer Father    • Diabetes Father        Social History     Occupational History   • Not on file   Tobacco Use   • Smoking status: Former     Packs/day: 1.00     Years: 25.00     Total pack years: 25.00     Types: Cigarettes     Quit date: 2000     Years since quittin.7     Passive exposure: Past   • Smokeless tobacco: Never Vaping Use   • Vaping Use: Never used   Substance and Sexual Activity   • Alcohol use: Yes     Alcohol/week: 3.0 standard drinks of alcohol     Types: 3 Cans of beer per week     Comment: if that   • Drug use: No   • Sexual activity: Yes     Partners: Female         Current Outpatient Medications:   •  Cholecalciferol (VITAMIN D3) 1000 units CAPS, Take 1,000 Units by mouth, Disp: , Rfl:   •  gabapentin (NEURONTIN) 300 mg capsule, Take 1 capsule (300 mg total) by mouth 3 (three) times a day, Disp: 90 capsule, Rfl: 1  •  Hydroxyurea (HYDREA PO), Take 1,000 mg by mouth Mon-Wed-Fri, Disp: , Rfl:   •  hydroxyurea (HYDREA) 500 mg capsule, Take by mouth On Tues-Thurs-Sat-Sun , Disp: , Rfl:   •  levothyroxine 150 mcg tablet, TAKE 1 TABLET DAILY, Disp: 90 tablet, Rfl: 3  •  meloxicam (MOBIC) 15 mg tablet, Take 1 tablet (15 mg total) by mouth daily, Disp: 30 tablet, Rfl: 0  •  sildenafil (VIAGRA) 100 mg tablet, Take 1 tablet (100 mg total) by mouth if needed for erectile dysfunction As directed, Disp: 6 tablet, Rfl: 12    No Known Allergies    Physical Exam:    /71 (BP Location: Left arm, Patient Position: Sitting, Cuff Size: Adult)   Pulse 86   Ht 6' (1.829 m)   Wt 68.9 kg (152 lb)   BMI 20.61 kg/m²     Constitutional: normal, well developed, well nourished, alert, in no distress and non-toxic and no overt pain behavior. Eyes: anicteric  HEENT: grossly intact  Neck: supple, symmetric, trachea midline and no masses   Pulmonary:even and unlabored  Cardiovascular:No edema or pitting edema present  Skin:Normal without rashes or lesions and well hydrated  Psychiatric:Mood and affect appropriate  Neurologic:Cranial Nerves II-XII grossly intact  Musculoskeletal:ambulating with crutches. Low Back Exam:   Visual Exam: No rashes  Physical Exam: Patient is tender to palpation in area of the left lumbar paraspinal area. Tests:    Facet Loading - Patient has negative   bilateral facet loading.   Straight Leg Raise - Patient has negative   bilateral straight leg raise. Tyrone's Test/DRISS - Patient has negative   bilateral DRISS's test.      NEUROLOGICAL:   CN 2-10 intact bilaterally   Sensory Exam: no sensory deficits noted  Reflex: Normal  Strength :Normal Shoulder Abduction (C5 & Axillary Nerves), Bilaterally, Graded +5/5, Normal Elbow Flexion (C5, C6, & Musculocutaneous Nerves, Bilaterally, Graded +5/5, Normal Elbow Extension (C7 & Radial Nerve), Bilaterally, Graded +5/5, Normal Wrist Extension, Bilaterally, Graded +5/5, Normal Hand  Strength, Bilaterally, Graded +5/5, Normal Finger Abduction, Bilaterally, Graded +5/5, Normal Thumb Opposition, Bilaterally, Graded +5/5, Normal Hip Flexion, Bilaterally, Graded +5/5, Normal Hip Extension, Bilaterally, Graded +5/5, Normal Adduction of Hip, Bilaterally, Graded +5/5, Normal Abduction of hip, Bilaterally, Graded +5/5, Normal Knee Flexion, Bilaterally, Graded +5/5, Normal Knee Extension, Bilaterally, Graded +5/5, Normal Ankle Plantar Flexion, Bilaterally, Graded +5/5, Normal Ankle Dorsiflexion, Bilaterally, Graded +5/5, Normal Dorsiflexion of Great Toe, Bilaterally, Graded +5/5      Imaging  MRI Lumbar Spine at OSH 7/31/2023  MRI lumbar spine wo contrast  Order: [id number]  Impression    Impression: Spondylosis as described above including a left paracentral disc   extrusion at L5-S1 which compresses the traversing left S1 nerve root. Workstation:[id number]  Narrative    History: Acute left-sided low back pain with left-sided sciatica     Procedure: MRI of the lumbar spine was obtained with the following sequences:   Sagittal T1, sagittal T2, sagittal STIR and axial T2 weighted images. No   intravenous contrast.     Comparison: Lumbar spine radiographs dated 7/28/2023     Findings: For the purposes of this dictation, the lumbar vertebrae are labeled   from a caudal to cranial direction, the first vertebra with lumbar morphology is   labeled as L5.      Conus and lower thoracic cord: The conus terminates at the L1 level and is   unremarkable. The distal thoracic cord is normal in caliber and signal.     Marrow and Alignment: There is normal lumbar lordosis with mild right convex   curvature at the thoracolumbar junction and mild left convex curvature of the   lower lumbar spine. The vertebral bodies are otherwise normal in height and   alignment. No focal suspicious marrow signal abnormality. Disc desiccation and mild disc space narrowing at L1-L2, L4-L5, and L5-S1. Multilevel anterior endplate osteophyte formation. L1-L2: No disc herniation. No spinal canal or foraminal stenosis. L2-L3: No disc herniation. No spinal canal or foraminal stenosis. L3-L4: No disc herniation. No spinal canal or foraminal stenosis. L4-L5: Mild disc bulge and osteophytic ridging. Bilateral facet hypertrophy and   ligamentum flavum infolding. Mild spinal canal stenosis. Mild bilateral   foraminal stenosis. L5-S1: Mild disc bulge and osteophytic ridging with a superimposed left   paracentral disc extrusion with mild superior migration. The extrusion measures   10 x 14 x 12 mm (AP x transverse x craniocaudal) and compresses the traversing   left S1 nerve root in the subarticular zone (series 5001, image 7; series 8001,   image 23). Left facet hypertrophy. Mild central zone stenosis. No foraminal   stenosis. The posterior ligamentous structures and paraspinal musculature are normal in   signal.    No orders to display       No orders of the defined types were placed in this encounter.

## 2023-09-13 NOTE — TELEPHONE ENCOUNTER
Scheduled patient for LESI 9/22/23  Patient denies RX blood thinners/ NSAIDS  Nothing to eat or drink 1 hour prior to procedure  Needs to arrange transportation  Proper clothing for procedure  No vaccines 2 weeks prior or after procedure  If ill or place on antibiotics, please call to reschedule

## 2023-09-13 NOTE — H&P (VIEW-ONLY)
Assessment:  1. Acute left-sided low back pain with left-sided sciatica    2. Acquired hypothyroidism    3. Myofascial pain syndrome        Mr. Gisel Dennison is a pleasant 68-year-old male returning with low back pain with left-sided radicular features down into the foot. Patient was last seen in office on 8/15 by Dr. Eliel Ibarra and underwent left L5-S1, S1 transforaminal epidural steroid injection on 8/16/2023. Patient reports lack of relief with this injection. He states his symptoms are the same since the injection and rates pain as a 6-7 out of 10 on numeric rating scale. The pain is worse in the evening it is intermittent and the quality of pain is burning, dull aching, sharp, cramping, numbing, pins-and-needles like. He states is hard to say if the injection made any difference in his pain level. Today the patient continues to complain of left-sided radicular symptoms going down into the left foot. He was started on gabapentin 300 mg nightly but has not noticed much difference at this dose. He also was taking meloxicam which he states was helpful especially with sleep. On exam patient with myofascial tenderness in the left lumbar spine with negative straight leg raise and slump bilaterally. Given patient's lumbar MRI on 7/31/2023 demonstrating mild disc bulge with mild spinal canal stenosis at L4-L5 level along with mild disc bulge with mild central stenosis at the L5-S1 level we will move forward with repeat lumbar epidural steroid injection. We will take a different approach this time the is will be interlaminar instead of transforaminal.  Additionally we will increase gabapentin to 300 mg 3 times daily. Patient counseled to continue with physical therapy. Plan:  1. Schedule left parasagittal L5-S1 interlaminar LESI under fluoroscopic guidance. Epidural Injection Medical Necessity  The patient has evidence of  Lumbar canal stenosis severe enough to greatly impact quality of life or function.      The patient is concurrently involved in a conservative treatment plan including:    [x] Medications   [x] Physical therapy   [] Psychological therapy   [] Home exercise or stretching program   [x] Multidisciplinary healthcare provider team    Plan to perform with contrast without documented contrast allergy. No more than 15mg dexamethasone planned per session. The patient has not undergone more than 4 injections in the last 12 months. If Repeat Epidural  [] Prior epidural provided >50% pain relief and/or function for at least three months. [x] Patient failed to respond to prior epidural and plan for alternative approach as above. No orders of the defined types were placed in this encounter. 2. Increase gabapentin 300 mg TID  New Medications Ordered This Visit   Medications   • gabapentin (NEURONTIN) 300 mg capsule     Sig: Take 1 capsule (300 mg total) by mouth 3 (three) times a day     Dispense:  90 capsule     Refill:  1   • meloxicam (MOBIC) 15 mg tablet     Sig: Take 1 tablet (15 mg total) by mouth daily     Dispense:  30 tablet     Refill:  0       My impressions and treatment recommendations were discussed in detail with the patient, who verbalized understanding and had no further questions. [place] Prescription Drug Monitoring Program report was reviewed and was appropriate  and [place] Prescription Drug Monitoring Program report was reviewed and was appropriate     Complete risks and benefits including bleeding, infection, tissue reaction, nerve injury and allergic reaction were discussed. The approach was demonstrated using models and literature was provided. Verbal and written consent was obtained. Follow-up is planned in four weeks time or sooner as warranted. Discharge instructions were provided. I personally saw and examined the patient and I agree with the above discussed plan of care.     History of Present Illness:    Verne Mcardle is a 70 y.o. male who presents to [facility] for initial evaluation of the above stated pain complaints. The patient has a past medical and chronic pain history as outlined in the assessment section. He was referred by Referral Self  No address on file . Mr. Sherin Neely is a pleasant 57-year-old male returning with low back pain with left-sided radicular features down into the foot. Patient was last seen in office on 815 by Dr. Ludwin Robins and underwent left L5-S1, S1 transforaminal epidural steroid injection on 8/16/2023. Patient reports lack of relief with this injection. He states his symptoms are the same since the injection and rates pain as a 6-7 out of 10 on numeric rating scale. The pain is worse in the evening it is intermittent and the quality of pain is burning, dull aching, sharp, cramping, numbing, pins-and-needles like. He states is hard to say if the injection made any difference in his pain level. Today the patient continues to complain of left-sided radicular symptoms going down into the left foot. He was started on gabapentin 300 mg nightly but has not noticed much difference at this dose. He also was taking meloxicam which he states was helpful especially with sleep. Review of Systems:    Review of Systems   Respiratory: Negative for shortness of breath. Cardiovascular: Negative for chest pain. Gastrointestinal: Negative for constipation, diarrhea, nausea and vomiting. Musculoskeletal: Positive for gait problem. Negative for arthralgias, joint swelling and myalgias. Pain in Butt, Pain in left legs   Skin: Negative for rash. Neurological: Negative for dizziness, seizures and weakness. All other systems reviewed and are negative.           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 Oziel Cleaning   • RA (rheumatoid arthritis) ([address])    • Rheumatoid arthritis involving multiple sites ([address]) 2022   • Wears glasses        Past Surgical History:   Procedure Laterality Date   • ANKLE SURGERY Right     ligament, chipped bones,dislocated   • BUNIONECTOMY  2014   • COLONOSCOPY     • ELBOW SURGERY Right     tendon surgery   • EPIDURAL BLOCK INJECTION Left 2023    Procedure: left L5-S1, S1 TRANSFORAMINAL epidural steroid injection (03303, 70226);   Surgeon: Sharron Burnett DO;  Location: [facility] MAIN OR;  Service: Pain Management    • FOOT ARTHRODESIS, MODIFIED ARELLANO Left    • FRACTURE SURGERY Left     elbow   • HERNIA REPAIR Right     inguinal   • MI ARTHRS KNE SURG W/MENISCECTOMY MED/LAT W/SHVG Right 2018    Procedure: MENISCECTOMY LATERAL /MEDIAL;  Surgeon: Faby Pack MD;  Location: [facility];  Service: Orthopedics   • MI CORRECTION HAMMERTOE Bilateral 2021    Procedure: REPAIR HAMMERTOE / MALLET TOE / CLAW TOE 2ND toe bilateral;  Surgeon: Noemi Prader, DPM;  Location: [facility];  Service: Podiatry   • [address] W/SESMDC W/RESCJ PROX PHAL Right 2021    Procedure: Stefan Drake;  Surgeon: Noemi Prader, DPM;  Location: [facility];  Service: Podiatry   • ROTATOR CUFF REPAIR Left    • WISDOM TOOTH EXTRACTION      x4   • WRIST SURGERY         Family History   Problem Relation Age of Onset   • Cancer Mother         passed   • Hypertension Father    • Heart disease Father         leaky valve   • Skin cancer Father    • Diabetes Father        Social History     Occupational History   • Not on file   Tobacco Use   • Smoking status: Former     Packs/day: 1.00     Years: 25.00     Total pack years: 25.00     Types: Cigarettes     Quit date: 2000     Years since quittin.7     Passive exposure: Past   • Smokeless tobacco: Never Vaping Use   • Vaping Use: Never used   Substance and Sexual Activity   • Alcohol use: Yes     Alcohol/week: 3.0 standard drinks of alcohol     Types: 3 Cans of beer per week     Comment: if that   • Drug use: No   • Sexual activity: Yes     Partners: Female         Current Outpatient Medications:   •  Cholecalciferol (VITAMIN D3) 1000 units CAPS, Take 1,000 Units by mouth, Disp: , Rfl:   •  gabapentin (NEURONTIN) 300 mg capsule, Take 1 capsule (300 mg total) by mouth 3 (three) times a day, Disp: 90 capsule, Rfl: 1  •  Hydroxyurea (HYDREA PO), Take 1,000 mg by mouth Mon-Wed-Fri, Disp: , Rfl:   •  hydroxyurea (HYDREA) 500 mg capsule, Take by mouth On Tues-Thurs-Sat-Sun , Disp: , Rfl:   •  levothyroxine 150 mcg tablet, TAKE 1 TABLET DAILY, Disp: 90 tablet, Rfl: 3  •  meloxicam (MOBIC) 15 mg tablet, Take 1 tablet (15 mg total) by mouth daily, Disp: 30 tablet, Rfl: 0  •  sildenafil (VIAGRA) 100 mg tablet, Take 1 tablet (100 mg total) by mouth if needed for erectile dysfunction As directed, Disp: 6 tablet, Rfl: 12    No Known Allergies    Physical Exam:    /71 (BP Location: Left arm, Patient Position: Sitting, Cuff Size: Adult)   Pulse 86   Ht 6' (1.829 m)   Wt 68.9 kg (152 lb)   BMI 20.61 kg/m²     Constitutional: normal, well developed, well nourished, alert, in no distress and non-toxic and no overt pain behavior. Eyes: anicteric  HEENT: grossly intact  Neck: supple, symmetric, trachea midline and no masses   Pulmonary:even and unlabored  Cardiovascular:No edema or pitting edema present  Skin:Normal without rashes or lesions and well hydrated  Psychiatric:Mood and affect appropriate  Neurologic:Cranial Nerves II-XII grossly intact  Musculoskeletal:ambulating with crutches. Low Back Exam:   Visual Exam: No rashes  Physical Exam: Patient is tender to palpation in area of the left lumbar paraspinal area. Tests:    Facet Loading - Patient has negative   bilateral facet loading.   Straight Leg Raise - Patient has negative   bilateral straight leg raise. Tyrone's Test/DRISS - Patient has negative   bilateral DRISS's test.      NEUROLOGICAL:   CN 2-10 intact bilaterally   Sensory Exam: no sensory deficits noted  Reflex: Normal  Strength :Normal Shoulder Abduction (C5 & Axillary Nerves), Bilaterally, Graded +5/5, Normal Elbow Flexion (C5, C6, & Musculocutaneous Nerves, Bilaterally, Graded +5/5, Normal Elbow Extension (C7 & Radial Nerve), Bilaterally, Graded +5/5, Normal Wrist Extension, Bilaterally, Graded +5/5, Normal Hand  Strength, Bilaterally, Graded +5/5, Normal Finger Abduction, Bilaterally, Graded +5/5, Normal Thumb Opposition, Bilaterally, Graded +5/5, Normal Hip Flexion, Bilaterally, Graded +5/5, Normal Hip Extension, Bilaterally, Graded +5/5, Normal Adduction of Hip, Bilaterally, Graded +5/5, Normal Abduction of hip, Bilaterally, Graded +5/5, Normal Knee Flexion, Bilaterally, Graded +5/5, Normal Knee Extension, Bilaterally, Graded +5/5, Normal Ankle Plantar Flexion, Bilaterally, Graded +5/5, Normal Ankle Dorsiflexion, Bilaterally, Graded +5/5, Normal Dorsiflexion of Great Toe, Bilaterally, Graded +5/5      Imaging  MRI Lumbar Spine at OSH 7/31/2023  MRI lumbar spine wo contrast  Order: [id number]  Impression    Impression: Spondylosis as described above including a left paracentral disc   extrusion at L5-S1 which compresses the traversing left S1 nerve root. Workstation:[id number]  Narrative    History: Acute left-sided low back pain with left-sided sciatica     Procedure: MRI of the lumbar spine was obtained with the following sequences:   Sagittal T1, sagittal T2, sagittal STIR and axial T2 weighted images. No   intravenous contrast.     Comparison: Lumbar spine radiographs dated 7/28/2023     Findings: For the purposes of this dictation, the lumbar vertebrae are labeled   from a caudal to cranial direction, the first vertebra with lumbar morphology is   labeled as L5.      Conus and lower thoracic cord: The conus terminates at the L1 level and is   unremarkable. The distal thoracic cord is normal in caliber and signal.     Marrow and Alignment: There is normal lumbar lordosis with mild right convex   curvature at the thoracolumbar junction and mild left convex curvature of the   lower lumbar spine. The vertebral bodies are otherwise normal in height and   alignment. No focal suspicious marrow signal abnormality. Disc desiccation and mild disc space narrowing at L1-L2, L4-L5, and L5-S1. Multilevel anterior endplate osteophyte formation. L1-L2: No disc herniation. No spinal canal or foraminal stenosis. L2-L3: No disc herniation. No spinal canal or foraminal stenosis. L3-L4: No disc herniation. No spinal canal or foraminal stenosis. L4-L5: Mild disc bulge and osteophytic ridging. Bilateral facet hypertrophy and   ligamentum flavum infolding. Mild spinal canal stenosis. Mild bilateral   foraminal stenosis. L5-S1: Mild disc bulge and osteophytic ridging with a superimposed left   paracentral disc extrusion with mild superior migration. The extrusion measures   10 x 14 x 12 mm (AP x transverse x craniocaudal) and compresses the traversing   left S1 nerve root in the subarticular zone (series 5001, image 7; series 8001,   image 23). Left facet hypertrophy. Mild central zone stenosis. No foraminal   stenosis. The posterior ligamentous structures and paraspinal musculature are normal in   signal.    No orders to display       No orders of the defined types were placed in this encounter.

## 2023-09-15 ENCOUNTER — OFFICE VISIT (OUTPATIENT)
Dept: PHYSICAL THERAPY | Facility: CLINIC | Age: 63
End: 2023-09-15
Payer: COMMERCIAL

## 2023-09-15 DIAGNOSIS — M54.42 ACUTE LEFT-SIDED LOW BACK PAIN WITH LEFT-SIDED SCIATICA: Primary | ICD-10-CM

## 2023-09-15 PROCEDURE — 97110 THERAPEUTIC EXERCISES: CPT | Performed by: PHYSICAL THERAPIST

## 2023-09-15 PROCEDURE — 97112 NEUROMUSCULAR REEDUCATION: CPT | Performed by: PHYSICAL THERAPIST

## 2023-09-15 NOTE — PROGRESS NOTES
Daily Note     Today's date: 9/15/2023  Patient name: Arvid Leventhal  :   MRN: [id number]  Referring provider: Yesy Berrios  Dx:   Encounter Diagnosis     ICD-10-CM    1. Acute left-sided low back pain with left-sided sciatica  M54.42                      Subjective: Pt enters w/ 3/10 post/lat knee pain and 6-7/10 L buttock pain. States seated flexion is helping. Objective: See treatment diary below  Rep flexion in sitting 1/10 = NE  Rep DK to chest 5" 2x10 = Dec/B buttock (NE post/lat knee)    Pt continues to ambulate w/o AD    Assessment: Tolerated treatment well and thus far is reporting best relief w/ end range flexion which is better achieved today w/ Double knee to chest vs seated flexion. . Patient would benefit from continued PT and progression of core stabilization exercises as tolerated. HEP updated today working toward this. Plan: Continue per plan of care.   consider lumbar mech traction next visit if no symptom improvement     Precautions: lumbar  Daily Exercise Log:  Start of Care: 23  POC expires 10/31/23  FOTO: 2023  Date 2023 9/15/2023  2023 2023  RE   Visit # 11 12  9 10           Manual     10'   ROM/MMT     TT   Neuro Re-Ed  25'  25' 20'   Mechanical assessment 45' see objective See objective  Rep R rotat in L SL 5" 2x10 start;     1x10 end Rep R rotat in L SL 2x10 = cenralizing/B       Supine sciatic NG knee ext w/ PD    2x10 R/L 1x10 R/L re-instru not to flex hip >90 deg   Supine fig 4 stretch  pirif str w/ SOS w/ knee to chest - tested - inc c/o so held  30"x3 L 30"x3 L   Rep flexion in sitting  1x10      DK to chest  5" 2x10 to end range      Sup reverse crunch legs on pball w/ TA  1x10      Sup ball add w/ TA hooklying  5"x10      opp UE/LE omar supported omar dead bug  5"x5 ea way      Ther Ex  20'  15' 15'   B/L HR     1x10   Calf stretch    10"x5 L LE at wall 10"x5 L LE   L LE clamshells in SL  YTB 2x10 R/L  2x10 2x10; 2x   L LE rev clamshells w/ towel roll @ knees  2x10 R/L  2x10 2x10; 2x   Supine clamshell vs TB  Blue 5"x15  grn 3"x10    Pt education                HEP  Update & review      Ther Activity                        Gait Training                        Modalities        Estim + MHP    To End 5'              HEP:  Access Code: [email or web address]  URL: [email or web address]  Date: 09/15/2023  Prepared by:  Bridget Hammond    Exercises  - Supine Double Knee to Chest  - 4-5 x daily - 2 sets - 10 reps - 5 hold  - Sidelying Reverse Clamshell  - 2 x daily - 2 sets - 10 reps  - Clam with Resistance (Mirrored)  - 2 x daily - 2 sets - 10 reps  - Hooklying Isometric Hip Flexion  - 1 x daily - 2 sets - 5 reps - 5 hold  - Supine Hip Adduction Isometric with Ball  - 1 x daily - 10 reps - 5 hold  - Hooklying Clamshell with Resistance  - 1 x daily - 2 sets - 10 reps - 5 hold

## 2023-09-18 ENCOUNTER — OFFICE VISIT (OUTPATIENT)
Dept: PHYSICAL THERAPY | Facility: CLINIC | Age: 63
End: 2023-09-18
Payer: COMMERCIAL

## 2023-09-18 ENCOUNTER — TELEPHONE (OUTPATIENT)
Age: 63
End: 2023-09-18

## 2023-09-18 DIAGNOSIS — M54.42 ACUTE LEFT-SIDED LOW BACK PAIN WITH LEFT-SIDED SCIATICA: Primary | ICD-10-CM

## 2023-09-18 PROCEDURE — 97110 THERAPEUTIC EXERCISES: CPT | Performed by: PHYSICAL THERAPIST

## 2023-09-18 PROCEDURE — 97112 NEUROMUSCULAR REEDUCATION: CPT | Performed by: PHYSICAL THERAPIST

## 2023-09-18 NOTE — PROGRESS NOTES
Daily Note     Today's date: 2023  Patient name: Muna Seth  :   MRN: [id number]  Referring provider: Mireya Hernandez  Dx:   Encounter Diagnosis     ICD-10-CM    1. Acute left-sided low back pain with left-sided sciatica  M54.42                      Subjective: Pt enters w/ 6/10 sacral pain and 4/10 buttock pain, no LE symptoms. He reports good tolerance to HEP updates last visit. Objective: See treatment diary below; updated HEP. Assessment: Tolerated treatment well and was able to progress core stab in NWB, but attempted pallof press w/ poor tolerance. Patient would benefit from continued PT and advancement of core stab w/ progression of WB activities. Plan: Continue per plan of care.       Precautions: lumbar  Daily Exercise Log:  Start of Care: 23  POC expires 10/31/23  FOTO: 2023  Date 2023 9/15/2023 2023  2023  RE   Visit # 11 12 13  10           Manual     10'   ROM/MMT     TT   Neuro Re-Ed  25' 25'  20'   Mechanical assessment 45' see objective See objective   Rep R rotat in L SL 2x10 = cenralizing/B       Supine sciatic NG knee ext w/ PD     1x10 R/L re-instru not to flex hip >90 deg   Supine fig 4 stretch  pirif str w/ SOS w/ knee to chest - tested - inc c/o so held 20"x3 R/L fig 4  30"x3 L   Rep flexion in sitting  1x10      DK to chest  5" 2x10 to end range 5"x10 start & end     Sup reverse crunch legs on pball w/ TA  1x10 No ball 1x10     Sup ball add w/ TA hooklying  5"x10 omar 90/90 w/ TA hold     opp UE/LE omar supported omar dead bug  5"x5 ea way      qped alt LE exten   3"x10 R/L     qped hydrants   3"x8     Ther Ex  20' 20'  15'   B/L HR   2x10  1x10   Calf stretch     10"x5 L LE   L LE clamshells in SL  YTB 2x10 R/L RTB 2x10 R/L  2x10; 2x   L LE rev clamshells w/ towel roll @ knees  2x10 R/L 2# 2x10 R/L  2x10; 2x   Supine clamshell vs TB  Blue 5"x15 Stand alt hip abd Grn TB @ knees 2x10 R/L     Pt education                HEP  Update & review updated     Ther Activity                        Gait Training                        Modalities        [facility] + [facility]                Access Code: [email or web address]  URL: [email or web address]  Date: 09/18/2023  Prepared by:  Stephany Navas    Exercises  - Supine Double Knee to Chest  - 4-5 x daily - 2 sets - 10 reps - 5 hold  - Sidelying Reverse Clamshell  - 2 x daily - 2 sets - 10 reps  - Clam with Resistance (Mirrored)  - 2 x daily - 2 sets - 10 reps  - Supine Hip Adduction Isometric with Ball  - 1 x daily - 10 reps - 5 hold  - Hooklying Clamshell with Resistance  - 1 x daily - 2 sets - 10 reps - 5 hold  - Bridge  - 1 x daily - 2 sets - 10 reps  - Bilateral Bent Leg Lift  - 1 x daily - 2 sets - 10 reps  - Quadruped Alternating Leg Extensions  - 1 x daily - 2 sets - 10 reps - 3 hold  - Supine 90/90 Abdominal Bracing  - 1 x daily - 5 reps - 10 hold  - Quadruped Hip Abduction and External Rotation  - 1 x daily - 10 reps - 3 hold

## 2023-09-18 NOTE — TELEPHONE ENCOUNTER
Pt called in and would like to know if his insurance approved his procedure with Dr. Dionisio Myers because he hasn't received a call to confirm if it was approved or denied. Pt can be reached at [phone].

## 2023-09-18 NOTE — TELEPHONE ENCOUNTER
Spoke with patient, patient had called in to check the status if Lauren Sánchez had sent the Jovany Notes into [employer]. Please advise.

## 2023-09-19 ENCOUNTER — DOCUMENTATION (OUTPATIENT)
Dept: FAMILY MEDICINE CLINIC | Facility: CLINIC | Age: 63
End: 2023-09-19

## 2023-09-20 ENCOUNTER — OFFICE VISIT (OUTPATIENT)
Dept: PHYSICAL THERAPY | Facility: CLINIC | Age: 63
End: 2023-09-20
Payer: COMMERCIAL

## 2023-09-20 DIAGNOSIS — M54.42 ACUTE LEFT-SIDED LOW BACK PAIN WITH LEFT-SIDED SCIATICA: Primary | ICD-10-CM

## 2023-09-20 PROCEDURE — 97110 THERAPEUTIC EXERCISES: CPT | Performed by: PHYSICAL THERAPIST

## 2023-09-20 PROCEDURE — 97112 NEUROMUSCULAR REEDUCATION: CPT | Performed by: PHYSICAL THERAPIST

## 2023-09-20 NOTE — PROGRESS NOTES
Daily Note     Today's date: 2023  Patient name: Kimberli Farias  :   MRN: [id number]  Referring provider: Sosa Rudolph  Dx:   Encounter Diagnosis     ICD-10-CM    1. 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 L LE symptoms are now intermittent and brief. Buttock pain w/ standing 6/10. Objective: See treatment diary below      Assessment: Tolerated treatment well and is able to advance core stab exercises. Pt reports child's pose gives the same if not more relief as double knee to chest. . Patient would benefit from continued PT. He is reporting decreasing radic symptoms. He is still limited w/ nikko to upright/weight bearing activity likely due to this being and extension biased position, and lumbar extension continues to exacerbate his symptoms. Plan: Continue per plan of care. Pt to have 2nd pain mgt injection tomorrow.      Precautions: lumbar  Daily Exercise Log:  Start of Care: 23  RE: 9/15/2023  POC expires 10/31/23  FOTO: 2023  Date 2023 9/15/2023  RE 2023  FOTO    Visit # 11 12 13 14            Manual        ROM/MMT        Neuro Re-Ed  25' 25' 30'    Mechanical assessment 45' see objective See objective      Seated pallof press on pball    Dbl red tubing 1x10 ea way    B/L UE exten w/ scap set - sit on pball    Red tubing 1x10    Supine fig 4 stretch  pirif str w/ SOS w/ knee to chest - tested - inc c/o so held 20"x3 R/L fig 4 30"x3 R/L    Bridge w/ abd    RTB    Rep flexion in sitting  1x10      DK to chest  5" 2x10 to end range 5"x10 start & end     Sup reverse crunch legs on pball w/ TA  1x10 No ball 1x10 No ball 90/90 to full rev crunch 3" hold 2x10    Sup ball add w/ TA hooklying  5"x10 omar 90/90 w/ TA hold     opp UE/LE omar supported omar dead bug  5"x5 ea way      qped alt LE exten   3"x10 R/L Bird dog 3"x5 ea way qped hydrants   3"x8 3"x10 ea    Ther Ex  20' 20' 15'    B/L HR   2x10     qped to child's pose    1x10; 2x - notes relief    L LE clamshells in SL  YTB 2x10 R/L RTB 2x10 R/L RTB 2x10 R/L    L LE rev clamshells w/ towel roll @ knees  2x10 R/L 2# 2x10 R/L YTB 2x10 R/L    Supine clamshell vs TB  Blue 5"x15 Stand alt hip abd Grn TB @ knees 2x10 R/L     Pt education                HEP  Update & review updated     Ther Activity                        Gait Training                        Modalities        Estim + [facility]                Access Code: [email or web address]  URL: [email or web address]  Date: 09/18/2023  Prepared by:  Kobi Gonsalez    Exercises  - Supine Double Knee to Chest  - 4-5 x daily - 2 sets - 10 reps - 5 hold  - Sidelying Reverse Clamshell  - 2 x daily - 2 sets - 10 reps  - Clam with Resistance (Mirrored)  - 2 x daily - 2 sets - 10 reps  - Supine Hip Adduction Isometric with Ball  - 1 x daily - 10 reps - 5 hold  - Hooklying Clamshell with Resistance  - 1 x daily - 2 sets - 10 reps - 5 hold  - Bridge  - 1 x daily - 2 sets - 10 reps  - Bilateral Bent Leg Lift  - 1 x daily - 2 sets - 10 reps  - Quadruped Alternating Leg Extensions  - 1 x daily - 2 sets - 10 reps - 3 hold  - Supine 90/90 Abdominal Bracing  - 1 x daily - 5 reps - 10 hold  - Quadruped Hip Abduction and External Rotation  - 1 x daily - 10 reps - 3 hold

## 2023-09-20 NOTE — TELEPHONE ENCOUNTER
The patient called again needing Aurelio Roth to contact him in reference to the [employer] documents. The patient informs that if he does not send the documents he will not have his payment on time.     Please contact the patient

## 2023-09-22 ENCOUNTER — HOSPITAL ENCOUNTER (OUTPATIENT)
Facility: AMBULARY SURGERY CENTER | Age: 63
Setting detail: OUTPATIENT SURGERY
Discharge: HOME | End: 2023-09-22
Attending: STUDENT IN AN ORGANIZED HEALTH CARE EDUCATION/TRAINING PROGRAM | Admitting: STUDENT IN AN ORGANIZED HEALTH CARE EDUCATION/TRAINING PROGRAM
Payer: COMMERCIAL

## 2023-09-22 ENCOUNTER — HOSPITAL ENCOUNTER (OUTPATIENT)
Dept: RADIOLOGY | Facility: HOSPITAL | Age: 63
Setting detail: OUTPATIENT SURGERY
End: 2023-09-22
Attending: STUDENT IN AN ORGANIZED HEALTH CARE EDUCATION/TRAINING PROGRAM | Admitting: STUDENT IN AN ORGANIZED HEALTH CARE EDUCATION/TRAINING PROGRAM
Payer: COMMERCIAL

## 2023-09-22 ENCOUNTER — APPOINTMENT (OUTPATIENT)
Dept: PHYSICAL THERAPY | Facility: CLINIC | Age: 63
End: 2023-09-22
Payer: COMMERCIAL

## 2023-09-22 VITALS
HEART RATE: 69 BPM | RESPIRATION RATE: 16 BRPM | TEMPERATURE: 97.6 F | DIASTOLIC BLOOD PRESSURE: 75 MMHG | SYSTOLIC BLOOD PRESSURE: 158 MMHG | OXYGEN SATURATION: 98 %

## 2023-09-22 DIAGNOSIS — Z92.241 S/P EPIDURAL STEROID INJECTION: ICD-10-CM

## 2023-09-22 PROCEDURE — 62323 NJX INTERLAMINAR LMBR/SAC: CPT | Performed by: STUDENT IN AN ORGANIZED HEALTH CARE EDUCATION/TRAINING PROGRAM

## 2023-09-22 RX ORDER — METHYLPREDNISOLONE ACETATE 80 MG/ML
INJECTION, SUSPENSION INTRA-ARTICULAR; INTRALESIONAL; INTRAMUSCULAR; SOFT TISSUE AS NEEDED
Status: DISCONTINUED | OUTPATIENT
Start: 2023-09-22 | End: 2023-09-22 | Stop reason: HOSPADM

## 2023-09-22 RX ORDER — LIDOCAINE HYDROCHLORIDE 10 MG/ML
INJECTION, SOLUTION EPIDURAL; INFILTRATION; INTRACAUDAL; PERINEURAL AS NEEDED
Status: DISCONTINUED | OUTPATIENT
Start: 2023-09-22 | End: 2023-09-22 | Stop reason: HOSPADM

## 2023-09-22 RX ORDER — BUPIVACAINE HYDROCHLORIDE 2.5 MG/ML
INJECTION, SOLUTION EPIDURAL; INFILTRATION; INTRACAUDAL AS NEEDED
Status: DISCONTINUED | OUTPATIENT
Start: 2023-09-22 | End: 2023-09-22 | Stop reason: HOSPADM

## 2023-09-22 NOTE — OP NOTE
Pre-procedure Diagnosis: Lumbar radiculopathy  Post-procedure Diagnosis: Lumbar radiculopathy  Procedure Title(s):  1. L5-S1 Lumabr interlaminar epidural steroid injection      2. Intraoperative fluoroscopy  Attending Surgeon:   Ajith Mckenna MD  Anesthesia:   Local     Indications: The patient is a 70y.o. year-old male with a diagnosis of lumbar radiculopathy. The patient's history and physical exam were reviewed. The risks, benefits and alternatives to the procedure were discussed, and all questions were answered to the patient's satisfaction. The patient agreed to proceed, and written informed consent was obtained. Procedure in Detail: The patient was brought into the procedure room and placed in the prone position on the fluoroscopy table. The area of the lumbar spine was prepped with chlorhexidine gluconate solution times one and draped in a sterile manner. The L5-S1 interspace was identified and marked under AP fluoroscopy. The skin and subcutaneous tissues in the area were anesthetized with 1% lidocaine. A 20-gauge Tuohy epidural needle was directed toward the interspace under fluoroscopic guidance until the ligamentum flavum was engaged. From this point, a loss of resistance technique with air was used to identify entrance of the needle into the epidural space. Once an appropriate loss was obtained, negative aspiration was confirmed, and 1 ml Omnipaque 300 contrast solution was injected. An appropriate epidurogram was noted. Then, after negative aspiration, a solution consisting of 1-mL depo-medrol (80mg/mL) and 2-mL bupivacaine 0.25% and 3-mL preservative-free saline was easily injected. The needle was removed with a 1% lidocaine flush. The patient's back was cleaned and a bandage was placed over the site of needle insertion. Disposition: The patient tolerated the procedure well, and there were no apparent complications.  The patient was taken to the recovery area where written discharge instructions for the procedure were given.      Estimated Blood Loss: None  Specimens Obtained: N/A

## 2023-09-22 NOTE — INTERVAL H&P NOTE
H&P reviewed. After examining the patient I find no changes in the patients condition since the H&P had been written.     Vitals:    09/22/23 0755   BP: 141/66   Pulse: 78   Resp: 18   Temp: 97.6 °F (36.4 °C)   SpO2: 97%

## 2023-09-22 NOTE — DISCHARGE INSTRUCTIONS
Epidural Steroid Injection   WHAT YOU NEED TO KNOW:   An epidural steroid injection (NAOMY) is a procedure to inject steroid medicine into the epidural space. The epidural space is between your spinal cord and vertebrae. Steroids reduce inflammation and fluid buildup in your spine that may be causing pain. You may be given pain medicine along with the steroids. ACTIVITY  Do not drive or operate machinery today. No strenuous activity today - bending, lifting, etc.  You may resume normal activites starting tomorrow - start slowly and as tolerated. You may shower today, but no tub baths or hot tubs. You may have numbness for several hours from the local anesthetic. Please use caution and common sense, especially with weight-bearing activities. CARE OF THE INJECTION SITE  If you have soreness or pain, apply ice to the area today (20 minutes on/20 minutes off). Starting tomorrow, you may use warm, moist heat or ice if needed. You may have an increase or change in your discomfort for 36-48 hours after your treatment. Apply ice and continue with any pain medication you have been prescribed. Notify the [facility] if you have any of the following: redness, drainage, swelling, headache, stiff neck or fever above 100°F.    SPECIAL INSTRUCTIONS  Our office will contact you in approximately 7 days for a progress report. MEDICATIONS  Continue to take all routine medications. Our office may have instructed you to hold some medications. As no general anesthesia was used in today's procedure, you should not experience any side effects related to anesthesia. If you are diabetic, the steroids used in today's injection may temporarily increase your blood sugar levels after the first few days after your injection. Please keep a close eye on your sugars and alert the doctor who manages your diabetes if your sugars are significantly high from your baseline or you are symptomatic.      If you have a problem specifically related to your procedure, please call our office at [phone]. Problems not related to your procedure should be directed to your primary care physician.

## 2023-09-26 ENCOUNTER — OFFICE VISIT (OUTPATIENT)
Dept: PHYSICAL THERAPY | Facility: CLINIC | Age: 63
End: 2023-09-26
Payer: COMMERCIAL

## 2023-09-26 DIAGNOSIS — M54.42 ACUTE LEFT-SIDED LOW BACK PAIN WITH LEFT-SIDED SCIATICA: Primary | ICD-10-CM

## 2023-09-26 PROCEDURE — 97530 THERAPEUTIC ACTIVITIES: CPT | Performed by: PHYSICAL THERAPIST

## 2023-09-26 PROCEDURE — 97110 THERAPEUTIC EXERCISES: CPT | Performed by: PHYSICAL THERAPIST

## 2023-09-26 PROCEDURE — 97112 NEUROMUSCULAR REEDUCATION: CPT | Performed by: PHYSICAL THERAPIST

## 2023-09-26 NOTE — PROGRESS NOTES
Daily Note     Today's date: 2023  Patient name: Asiya Ricardo  : [birth year]  MRN: [id number]  Referring provider: Val Morales  Dx:   Encounter Diagnosis     ICD-10-CM    1. Acute left-sided low back pain with left-sided sciatica  M54.42                      Subjective: pt reports significant improvement in pain and LE symptoms since epidural on 2023. He asks if he can wash his car or weed wack. He reports mowing his lawn w/ new zero turn mower for 40 minutes. Minor, brief periods of LE paresthesia noted during session, as well as b/L LE fatigue. Objective: See treatment diary below. Discussed w/ pt he has had poor tolerance to weight bearing activity to date. Immediate advancement to bent over, reaching and twisting standing activity this soon would be poor judgement and put him a risk for flare up of symptoms. I suggested he begin w/ 5-10 minute walk and increase gradually (measured ie: 5 minute increments each day; not weed wacking or washing his car just yet). Also educated pt to resume any sustained activity, even if seemingly low intensity, with more caution and progression/grading ie: mowing lawn - start w/ 15-20 minutes and increase from there in case he chooses an activity that he doesn't realize provokes his pain. I pointed out we began with 20 minutes of standing functional/resistance activities today and will monitor his response and progress from there to ensure no exacerbation from sudden/large changes. We will progress standing/functional activity. Updated HEP. Assessment: Tolerated treatment well and expressed understanding of educ concepts. . Patient would benefit from continued PT and progression to functional lifting required for work. Plan: Continue per plan of care.       Precautions: lumbar  Daily Exercise Log:  Start of Care: 23  RE: 9/15/2023  POC expires 10/31/23  FOTO: 2023  Date   2023  FOTO 2023   Visit #   [phone] Manual        ROM/MMT        Neuro Re-Ed   25' 30' 20'   Mechanical assessment        Seated pallof press on pball    Dbl red tubing 1x10 ea way Stand pallof press cynthia 7# 2x10 ea way   B/L UE exten w/ scap set - sit on pball    Red tubing 1x10 Stand B/L UE ext w/ TA cynthia 10# 2x15   Supine fig 4 stretch   20"x3 R/L fig 4 30"x3 R/L 30"x3 R/L   Sit sciatic NG LAQ w/ PD     1x10 R/L   DK to chest   5"x10 start & end     Sup reverse crunch legs on pball w/ TA   No ball 1x10 No ball 90/90 to full rev crunch 3" hold 2x10    Sup ball add w/ TA hooklying   omar 90/90 w/ TA hold     opp UE/LE omar supported omar dead bug        qped alt LE exten   3"x10 R/L Bird dog 3"x5 ea way Bird dog 5" 1x10    qped hydrants   3"x8 3"x10 ea    Ther Ex   20' 15' 10'   B/L HR   2x10     qped to child's pose    1x10; 2x - notes relief 1x10;   L LE clamshells in SL   RTB 2x10 R/L RTB 2x10 R/L    L LE rev clamshells w/ towel roll @ knees   2# 2x10 R/L YTB 2x10 R/L    Supine clamshell vs TB   Stand alt hip abd Grn TB @ knees 2x10 R/L     Pt education     10'           HEP   updated     Ther Activity     15'   Fwd step up w/ alt knee hike     8" step w/ rail 2x10 r/L   Chair squat (taps) Add OH press    Chair +2" pad 2x10   Supine bridge w/ TB abd     grn 5" 2x10   Gait Training                        Modalities        Estim + MHP                HEP:  Access Code: [email or web address]  URL: [email or web address]  Date: 09/26/2023  Prepared by:  Paulino Muñoz    Exercises  - Cat Cow to Child's Pose  - 3 x daily - 10 reps  - Supine Hip External Rotation Stretch  - 1 x daily - 3 reps - 30 hold  - Supine Bridge with Resistance Band  - 1 x daily - 2 sets - 10 reps - 5 hold  - Sidelying Reverse Clamshell  - 1 x daily - 2 sets - 10 reps  - Clam with Resistance (Mirrored)  - 1 x daily - 2 sets - 10 reps  - Bilateral Bent Leg Lift  - 1 x daily - 2 sets - 10 reps  - Supine 90/90 Abdominal Bracing  - 1 x daily - 5 reps - 10 hold  - Quadruped Hip Abduction and External Rotation  - 1 x daily - 10 reps - 3 hold  - Runner's Step Up/Down  - 1 x daily - 2 sets - 10 reps  - Squat with Chair Touch  - 1 x daily - 2 sets - 10 reps  - Standing Anti-Rotation Press with Anchored Resistance  - 1 x daily - 2 sets - 10 reps  - Shoulder Extension with Resistance - Palms Forward  - 1 x daily - 2 sets - 15 reps  - Bird Dog  - 1 x daily - 10 reps - 5 hold    Access Code: [email or web address]  URL: [email or web address]  Date: 09/18/2023  Prepared by:  [employer]    Exercises  - Supine Double Knee to Chest  - 4-5 x daily - 2 sets - 10 reps - 5 hold  - Sidelying Reverse Clamshell  - 2 x daily - 2 sets - 10 reps  - Clam with Resistance (Mirrored)  - 2 x daily - 2 sets - 10 reps  - Supine Hip Adduction Isometric with Ball  - 1 x daily - 10 reps - 5 hold  - Hooklying Clamshell with Resistance  - 1 x daily - 2 sets - 10 reps - 5 hold  - Bridge  - 1 x daily - 2 sets - 10 reps  - Bilateral Bent Leg Lift  - 1 x daily - 2 sets - 10 reps  - Quadruped Alternating Leg Extensions  - 1 x daily - 2 sets - 10 reps - 3 hold  - Supine 90/90 Abdominal Bracing  - 1 x daily - 5 reps - 10 hold  - Quadruped Hip Abduction and External Rotation  - 1 x daily - 10 reps - 3 hold

## 2023-09-29 ENCOUNTER — OFFICE VISIT (OUTPATIENT)
Dept: PHYSICAL THERAPY | Facility: CLINIC | Age: 63
End: 2023-09-29
Payer: COMMERCIAL

## 2023-09-29 ENCOUNTER — TELEPHONE (OUTPATIENT)
Dept: PAIN MEDICINE | Facility: CLINIC | Age: 63
End: 2023-09-29

## 2023-09-29 DIAGNOSIS — M54.42 ACUTE LEFT-SIDED LOW BACK PAIN WITH LEFT-SIDED SCIATICA: Primary | ICD-10-CM

## 2023-09-29 PROCEDURE — 97530 THERAPEUTIC ACTIVITIES: CPT | Performed by: PHYSICAL THERAPIST

## 2023-09-29 PROCEDURE — 97112 NEUROMUSCULAR REEDUCATION: CPT | Performed by: PHYSICAL THERAPIST

## 2023-09-29 NOTE — PROGRESS NOTES
Daily Note     Today's date: 2023  Patient name: Giorgi Peacock  :   MRN: [id number]  Referring provider: Kathleen Strickland  Dx:   Encounter Diagnosis     ICD-10-CM    1. Acute left-sided low back pain with left-sided sciatica  M54.42                      Subjective: pt reports just little twinges behind his Left knee and L glut. He reports fatigue in R thigh w/ treadmill and STS and step ups. Objective: See treatment diary below      Assessment: Tolerated treatment well and reports progressions are challenging but not painful. Patient demonstrated fatigue post treatment, would benefit from continued PT and functional activity progression      Plan: Continue per plan of care.       Precautions: lumbar  Daily Exercise Log:  Start of Care: 23  RE: 9/15/2023  POC expires 10/31/23  FOTO: 2023  Date 2023  FOTO 2023   Visit # 16  13 14 15           Manual        ROM/MMT        Neuro Re-Ed 25'  25' 30' 20'   Monster walk w/ TB @ ankles RTB 20'x2 ea fwd/bkwd       Seated pallof press on pball Stand cynthia 7# 2x10 ea way   Dbl red tubing 1x10 ea way Stand pallof press cynthia 7# 2x10 ea way   B/L UE exten w/ scap set - sit on pball    Red tubing 1x10 Stand B/L UE ext w/ TA cynthia 10# 2x15   Supine fig 4 stretch 30"x2 R/L full pirif stretch  20"x3 R/L fig 4 30"x3 R/L 30"x3 R/L   Sit sciatic NG LAQ w/ PD 1x10 R/L    1x10 R/L   DK to chest   5"x10 start & end     Sup reverse crunch legs on pball w/ TA   No ball 1x10 No ball 90/90 to full rev crunch 3" hold 2x10    Sup ball add w/ TA hooklying   omar 90/90 w/ TA hold     Feet pball bridge 2x10       Bird dog Blue TB 5"x5 ea way  3"x10 R/L Bird dog 3"x5 ea way Bird dog 5" 1x10    qped hydrants RTB 3" 1x10 R/L  3"x8 3"x10 ea    Ther Ex 5'  20' 15' 10'   B/L HR   2x10     qped to child's pose    1x10; 2x - notes relief 1x10;   L LE sari in SL   RTB 2x10 R/L RTB 2x10 R/L    L LE rev sari w/ towel roll @ knees   2# 2x10 R/L YTB 2x10 R/L    Supine clamshell vs TB   Stand alt hip abd Grn TB @ knees 2x10 R/L     Stand fwd T 1x5 ea w/ rail       Knee hugs 20'x2       Stationary lunge w/ rail 1x5 R/L       Pt education     10'   HEP   updated     Ther Activity 15'    15'   Fwd step up w/ alt knee hike 8" step 2x10 R/L    8" step w/ rail 2x10 r/L   Chair squat (taps) W/ B/L OH press 7# DB's 2x10    Chair +2" pad 2x10   Supine bridge w/ TB abd     grn 5" 2x10   Sled push/pull        Treadmill  1.8 mph x4' reports 6/10 fatigue R thigh       Gait Training                        Modalities        Estim + P                HEP:  Access Code: [email or web address]  URL: [email or web address]  Date: 09/26/2023  Prepared by:  Betty Scott    Exercises  - Cat Cow to Child's Pose  - 3 x daily - 10 reps  - Supine Hip External Rotation Stretch  - 1 x daily - 3 reps - 30 hold  - Supine Bridge with Resistance Band  - 1 x daily - 2 sets - 10 reps - 5 hold  - Sidelying Reverse Clamshell  - 1 x daily - 2 sets - 10 reps  - Clam with Resistance (Mirrored)  - 1 x daily - 2 sets - 10 reps  - Bilateral Bent Leg Lift  - 1 x daily - 2 sets - 10 reps  - Supine 90/90 Abdominal Bracing  - 1 x daily - 5 reps - 10 hold  - Quadruped Hip Abduction and External Rotation  - 1 x daily - 10 reps - 3 hold  - Runner's Step Up/Down  - 1 x daily - 2 sets - 10 reps  - Squat with Chair Touch  - 1 x daily - 2 sets - 10 reps  - Standing Anti-Rotation Press with Anchored Resistance  - 1 x daily - 2 sets - 10 reps  - Shoulder Extension with Resistance - Palms Forward  - 1 x daily - 2 sets - 15 reps  - Bird Dog  - 1 x daily - 10 reps - 5 hold

## 2023-09-29 NOTE — TELEPHONE ENCOUNTER
Caller:Agapito pt    Doctor: Sean Aden    Reason for call: returning call    Call back#: [phone]    Patient is feeling better but now is getting quick shots of pain behind the knee and in between his butt and thigh area at times. 20% improvement and pain level is at 3 out of 10    At what point will he start the gabapentin, he will need to return to work within the next 3 to 6 months. He is questioning if he will need surgery prior to that point so he can back to work sooner.

## 2023-10-02 ENCOUNTER — OFFICE VISIT (OUTPATIENT)
Dept: PHYSICAL THERAPY | Facility: CLINIC | Age: 63
End: 2023-10-02
Payer: COMMERCIAL

## 2023-10-02 DIAGNOSIS — M54.42 ACUTE LEFT-SIDED LOW BACK PAIN WITH LEFT-SIDED SCIATICA: Primary | ICD-10-CM

## 2023-10-02 PROCEDURE — 97110 THERAPEUTIC EXERCISES: CPT | Performed by: PHYSICAL THERAPIST

## 2023-10-02 PROCEDURE — 97112 NEUROMUSCULAR REEDUCATION: CPT | Performed by: PHYSICAL THERAPIST

## 2023-10-02 PROCEDURE — 97530 THERAPEUTIC ACTIVITIES: CPT | Performed by: PHYSICAL THERAPIST

## 2023-10-02 NOTE — PROGRESS NOTES
Daily Note     Today's date: 10/2/2023  Patient name: Merle Mandel  : [birth year]  MRN: [id number]  Referring provider: Sangeeta Munson  Dx:   Encounter Diagnosis     ICD-10-CM    1. Acute left-sided low back pain with left-sided sciatica  M54.42                      Subjective: Pt reports continued intermittent calf cramping R & L w/ increases in activity at home, episodes are brief. He has progressed to 30 minute walks. Objective: See treatment diary below  Some R calf "cramping" after sled push/pull. LAQ w/ PD = NE; cat to child's pose lumbar flexion = dec/B    HEP updated today. Assessment: Tolerated treatment well and needs cues for technique for stationary lunges and dead bugs, some local LBP w/ dead bugs so switched to legs only. Calf pain w/ sled push/pul resolved w/ child's pose stretch. . Patient would benefit from continued PT and progression of core and functional activities. Plan: Continue per plan of care.       Precautions: lumbar  Daily Exercise Log:  Start of Care: 23  RE: 9/15/2023  POC expires 10/31/23  FOTO: 2023  Date 2023 10/2/2023  2023  FOTO 2023   Visit # 16 17  14 15           Manual        ROM/MMT        Neuro Re-Ed 25' 20'  30' 20'   Monster walk w/ TB @ ankles RTB 20'x2 ea fwd/bkwd       Seated pallof press on pball Stand cynthia 7# 2x10 ea way 1/2 kneel pallof press 8# 2x10 ea way  Dbl red tubing 1x10 ea way Stand pallof press cynthia 7# 2x10 ea way   B/L UE exten w/ scap set - sit on pball    Red tubing 1x10 Stand B/L UE ext w/ TA cynthia 10# 2x15   Supine fig 4 stretch 30"x2 R/L full pirif stretch   30"x3 R/L 30"x3 R/L   Sit sciatic NG LAQ w/ PD 1x10 R/L 1x10 R/L   1x10 R/L   Sup reverse crunch legs on pball w/ TA    No ball 90/90 to full rev crunch 3" hold 2x10    Sup unsupp dead bug - legs only w/ pball btwn hands/knees  2x5 R/L      Feet pball bridge 2x10 5"10 2x10      Bird dog Blue TB 5"x5 ea way Blue TB 5" x5 ea way  Bird dog 3"x5 ea way Bird dog 5" 1x10    qped hydrants RTB 3" 1x10 R/L   3"x10 ea    Ther Ex 5' 10'  15' 10'   B/L HR        qped to child's pose  1x10; 2x  1x10; 2x - notes relief 1x10;   L LE clamshells in SL    RTB 2x10 R/L    L LE rev clamshells w/ towel roll @ knees    YTB 2x10 R/L    Stand fwd T 1x5 ea w/ rail       Knee hugs 20'x2       Stationary lunge w/ rail 1x5 R/L 1x10 R/L      Pt education     8'   HEP        Ther Activity 15' 15'   15'   Fwd step up w/ alt knee hike 8" step 2x10 R/L 8" step w/ 5# DB uni OH press 1x10 R/L   8" step w/ rail 2x10 r/L   Chair squat (taps) W/ B/L OH press 7# DB's 2x10 W/ B/L OH press 8# DB's 2x10   Chair +2" pad 2x10   Supine bridge w/ TB abd     grn 5" 2x10   Sled push/pull  40# 25'x3 ea way      Treadmill  1.8 mph x4' reports 6/10 fatigue R thigh 1.8 mph 5' 3/10 R calf fatigue      Gait Training                        Modalities        Estim + MHP                HEP:  Access Code: [email or web address]  URL: [email or web address]  Date: 10/02/2023  Prepared by:  Chris Marcelo    Exercises  - Cat Cow to Child's Pose  - 3 x daily - 10 reps  - Supine Hip External Rotation Stretch  - 1 x daily - 3 reps - 30 hold  - Supine Bridge with Resistance Band  - 1 x daily - 2 sets - 10 reps - 5 hold  - Isometric Dead Bug  - 1 x daily - 2 sets - 10 reps  - Sidelying Reverse Clamshell  - 1 x daily - 2 sets - 10 reps  - Clam with Resistance (Mirrored)  - 1 x daily - 2 sets - 10 reps  - Quadruped Hip Abduction and External Rotation  - 1 x daily - 10 reps - 3 hold  - Bird Dog  - 1 x daily - 10 reps - 5 hold  - Runner's Step Up/Down  - 1 x daily - 2 sets - 10 reps  - Standing Anti-Rotation Press with Anchored Resistance  - 1 x daily - 2 sets - 10 reps  - Shoulder Extension with Resistance - Palms Forward  - 1 x daily - 2 sets - 15 reps  - Mini Squat to Shoulder Press with Barbell  - 1 x daily - 3 sets - 10 reps  - Forward Monster Walks  - 1 x daily - 3 sets - 10 reps  - Static Lunge  - 1 x daily - 10 reps

## 2023-10-03 ENCOUNTER — OFFICE VISIT (OUTPATIENT)
Dept: OBGYN CLINIC | Facility: HOSPITAL | Age: 63
End: 2023-10-03
Payer: COMMERCIAL

## 2023-10-03 VITALS
WEIGHT: 151.9 LBS | BODY MASS INDEX: 20.57 KG/M2 | DIASTOLIC BLOOD PRESSURE: 77 MMHG | HEART RATE: 89 BPM | SYSTOLIC BLOOD PRESSURE: 150 MMHG | HEIGHT: 72 IN

## 2023-10-03 DIAGNOSIS — M54.42 ACUTE LEFT-SIDED LOW BACK PAIN WITH LEFT-SIDED SCIATICA: Primary | ICD-10-CM

## 2023-10-03 PROCEDURE — 99212 OFFICE O/P EST SF 10 MIN: CPT | Performed by: ORTHOPAEDIC SURGERY

## 2023-10-03 RX ORDER — GABAPENTIN 100 MG/1
100 CAPSULE ORAL
Qty: 5 CAPSULE | Refills: 0 | Status: SHIPPED | OUTPATIENT
Start: 2023-10-03 | End: 2023-10-08

## 2023-10-03 NOTE — TELEPHONE ENCOUNTER
Caller: Vaibhav Meyer     Doctor: Dr Yesika Sosa     Reason for call: Patient returning call from RN    Call back#: [phone]

## 2023-10-03 NOTE — TELEPHONE ENCOUNTER
S/W pt. Advised pt of the same. Offered to schedule SOVS, pt declined. Pt asking when should he start to wean off the Gabapentin- when would be the appropriate time be? Please advise.

## 2023-10-03 NOTE — TELEPHONE ENCOUNTER
Pt also stated he will need to return to work within the next 3 to 6 months. He is questioning if he will need surgery prior to that point so he can back to work sooner. Please advise.

## 2023-10-03 NOTE — PROGRESS NOTES
Patient feeling better following LESI will wean off gabapentin. Wean plan of 300 mg BID for 5 days, followed by 300 mg nightly for 5 days, followed by 100 mg nightly for 5 days and then to off. Patient provided with script for 100 mg tablets.    Francisco J Dejesus MD

## 2023-10-03 NOTE — TELEPHONE ENCOUNTER
S/W pt. Advised pt of the previous tasks. Pt stated he already is taking Gabapentin 300 mg TID for 1 week now. Pt stated he just seen Dr. Diana Chavez today and that Dr. Diana Chavez doesn't see the need of surgery right now that the pt is doing good right now. Pt stated he will reassess in November and possibility return to work. His pain is a 3/10 but with every step he "gets a quick shot."  He is working with PT. He has weakness in both legs. He is hoping the injection will last to the end of the year. Pt is not sure if the Gabapentin is helping and is asking if he should start weaning off to see how he really is or wait yet to wean off? Please advise.

## 2023-10-03 NOTE — PROGRESS NOTES
NAME: Sonia Tao  : 1960  PCP: Jaison Dueñas MD      Chief Complaint: back and left lower extremity pain    HPI:  Sonia Tao is a 70 y.o. male presenting for initial visit with chief complaint of back and left lower extremity pain. Notes onset of back pain where he couldn't stand up straight about 8 weeks ago after laying stone on either side of his driveway. Back pain improved after he was prescribed muscle relaxer by PCP, however pain was still present. Denies any back or leg pain prior to 8 weeks ago. About 1 week ago, he was finishing up yard work without heavy lifting and mowing lawn and upon waking up in the morning notes significant posterior left lower extremity pain. He has been hunched forward to help alleviate the pain and using crutches due to increased pain when trying to straighten or use left lower extremity. Also with left lower extremity muscle cramping. Pain worse with prolonged sitting, standing and walking. He only gets relief with his left leg bent and lying on his right side. He was seen in the ED on  due to pain and symptoms. Denies kenyatta lower extremity weakness. Denies right sided symptoms. Denies any kenyatta trauma. Denies fever or chills. Denies any bladder or bowel changes. Denies heart or lung disease. Denies diabetes or kidney disease. Denies smoking. Conservative therapy includes the following:   Medrol dose zohra without much relief, Norco, flexeril with some relief. Denies recent injections. Denies recent formal physical therapy. These therapeutic modalities were ineffective. The patient has noticed significant impairment in performing the following ADLs: Albertus Duane has had to miss work due to increased pain and symptoms. 23 Update  Patient is here for follow up. Has been steadily improving with injection and physical therapy however had recent set back with increased pain in his left leg. Patient is quite frustrated at this time.   He denies any new symptoms. 10/3/2023 Update:   Nancy Cuevas presents for follow up accompanied by his wife. Since last visit he received a second NAOMY 11 days ago which provided very good relief and has lasted so far. He stopped taking meloxicam. He is still out of work. FAMILY HISTORY   Family History   Problem Relation Age of Onset   • Cancer Mother         passed   • Hypertension Father    • Heart disease Father         leaky valve   • Skin cancer Father    • Diabetes Father        PAST MEDICAL HISTORY:   Past Medical History:   Diagnosis Date   • Arthritis    • Disease of thyroid gland     hypo   • Graves disease 1995   • Hallux limitus, acquired, right    • Hypothyroid     hypo   • Other tear of medial meniscus, current injury, right knee, initial encounter 6/6/2018    Added automatically from request for surgery [id number]   • 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 Brenda Longoria   • RA (rheumatoid arthritis) ([address])    • Rheumatoid arthritis involving multiple sites ([address]) 9/19/2022   • Wears glasses        MEDICATIONS:  Current Outpatient Medications   Medication Sig Dispense Refill   • Cholecalciferol (VITAMIN D3) 1000 units CAPS Take 1,000 Units by mouth     • gabapentin (NEURONTIN) 300 mg capsule Take 1 capsule (300 mg total) by mouth 3 (three) times a day 90 capsule 1   • Hydroxyurea (HYDREA PO) Take 1,000 mg by mouth Mon-Wed-Fri     • hydroxyurea (HYDREA) 500 mg capsule Take by mouth On Tues-Thurs-Sat-Sun      • levothyroxine 150 mcg tablet TAKE 1 TABLET DAILY 90 tablet 3   • meloxicam (MOBIC) 15 mg tablet Take 1 tablet (15 mg total) by mouth daily 30 tablet 0   • sildenafil (VIAGRA) 100 mg tablet Take 1 tablet (100 mg total) by mouth if needed for erectile dysfunction As directed 6 tablet 12     No current facility-administered medications for this visit.        PAST SURGICAL HISTORY:  Past Surgical History:   Procedure Laterality Date   • ANKLE SURGERY Right     ligament, chipped bones,dislocated   • BUNIONECTOMY  2014   • COLONOSCOPY     • ELBOW SURGERY Right     tendon surgery   • EPIDURAL BLOCK INJECTION Left 2023    Procedure: left L5-S1, S1 TRANSFORAMINAL epidural steroid injection ([id number], [id number]); Surgeon: Sabra Hughes DO;  Location: [facility] MAIN OR;  Service: Pain Management    • FOOT ARTHRODESIS, MODIFIED ARELLANO Left    • FRACTURE SURGERY Left     elbow   • HERNIA REPAIR Right     inguinal   • LUMBAR EPIDURAL INJECTION N/A 2023    Procedure: L5-S1 LUMBAR epidural steroid injection ([id number]); Surgeon: Ubaldo Lucio MD;  Location: [facility] MAIN OR;  Service: Pain Management    • SC ARTHRS KNE SURG W/MENISCECTOMY MED/LAT W/SHVG Right 2018    Procedure: MENISCECTOMY LATERAL /MEDIAL;  Surgeon: Kayley Pena MD;  Location: [facility];  Service: Orthopedics   • SC CORRECTION HAMMERTOE Bilateral 2021    Procedure: REPAIR HAMMERTOE / MALLET TOE / CLAW TOE 2ND toe bilateral;  Surgeon: Saritha Santana DPM;  Location: [facility];  Service: Podiatry   • [address] W/SESMDC W/RESCJ PROX PHAL Right 2021    Procedure: Emelyju Vitoilsa;  Surgeon: Saritha Santana DPM;  Location: [facility];  Service: Podiatry   • ROTATOR CUFF REPAIR Left    • WISDOM TOOTH EXTRACTION      x4   • WRIST SURGERY         SOCIAL HISTORY:  Social History     Socioeconomic History   • Marital status: /Civil Union     Spouse name: Not on file   • Number of children: Not on file   • Years of education: Not on file   • Highest education level: Not on file   Occupational History   • Not on file   Tobacco Use   • Smoking status: Former     Packs/day: 1.00     Years: 25.00     Total pack years: 25.00     Types: Cigarettes     Quit date: 2000     Years since quittin.7     Passive exposure: Past   • Smokeless tobacco: Never   Vaping Use   • Vaping Use: Never used   Substance and Sexual Activity   • Alcohol use:  Yes     Alcohol/week: 3.0 standard drinks of alcohol     Types: 3 Cans of beer per week     Comment: if that   • Drug use: No   • Sexual activity: Yes     Partners: Female   Other Topics Concern   • Not on file   Social History Narrative   • Not on file     Social Determinants of Health     Financial Resource Strain: Low Risk  (5/4/2023)    Overall Financial Resource Strain (CARDIA)    • Difficulty of Paying Living Expenses: Not hard at all   Food Insecurity: Not on file   Transportation Needs: No Transportation Needs (5/4/2023)    PRAPARE - Transportation    • Lack of Transportation (Medical): No    • Lack of Transportation (Non-Medical): No   Physical Activity: Not on file   Stress: Not on file   Social Connections: Not on file   Intimate Partner Violence: Not on file   Housing Stability: Not on file       ALLERGIES:  No Known Allergies    ROS:  Constitutional:  No fever, chills, night sweats, loss of appetite   HEENT No no sore throat, difficulty swallowing   Cardiovascular:  No chest pain, palpitations, BLE edema, ISLAS   Respiratory:  No SOB, coughing, chest congestion   Gastrointestinal:  No nausea, vomiting, abdominal pain   Genitourinary:  No dysuria, hematuria, urinary urgency/frequency   Musculoskeletal:  See HPI   Skin:  No rash, erythema, edema   Neurologic:  See HPI   Psychiatric Illness:  No depression, anxiety, mood disorder, substance abuse disorder     PHYSICAL EXAM:  /77   Pulse 89   Ht 6' (1.829 m)   Wt 68.9 kg (151 lb 14.4 oz)   BMI 20.60 kg/m²           General:  Well-developed,appears well, no acute distress   Respiratory:  No SOB, no abnormal effort (use of accessory muscles). GI / Abdominal:  Soft. No abdominal masses or tenderness. Nondistended. Gait & balance No evidence of myelopathic gait. Lumbar spine range of motion:  -Forward flexion to 90  -Extension to neutral  -Lateral bend 45 right, 45 left  -Rotation 45 right, 45 left  There is no point tenderness with palpation along the posterior cervical, thoracic, lumbar spine.      Neurologic:    Lower Extremity Motor Function   Right  Left    Iliopsoas  5/5  5/5    Quadriceps 5/5 5/5   Tibialis anterior  5/5  5/5    EHL  5/5  5/5    Gastroc. muscle  5/5  5/5    Heel rise  5/5  5/5    Toe rise  5/5  5/5      Sensory: light touch is intact to bilateral upper and lower extremities     Reflexes:    Right Left   Patellar 1+ 1+   Achilles 1+ 1+   Babinski neg neg     Other tests:  Straight Leg Raise: positive left  Rhona SI: negative  DRISS SI: not tested  Greater troch: no tenderness   Internal/external hip ROM: intact, no pain   Flexion/extension knee ROM: intact, no pain     IMAGING: I have personally reviewed the images and these are my findings:  Lumbar spine xray from 8/1/2023: Degenerative changes in lumbar spine with loss of disc space height most notably at L5-S1; end plate sclerosis; facet arthrosis notably L5, S1; osteophyte formation; no apparent spondylolisthesis; no obvious dynamic instability on flexion/extension radiographs      MRI lumbar spine from 7/31/2023: Multilevel lumbar spondylosis most noted at L5-S1 with left-sided paracentral disc herniation and displacement of the left S1 nerve      ASSESSMENT / Medical Decision Making (MDM):  70 y.o. male with history of back and left lower extremity pain. Here for follow up. No incontinence of bowel/bladder, no fever, no chills, no night sweats. Physical exam showing no focal neurologic deficits    Imaging reviewed as above. Findings most notable for L5-S1 left-sided paracentral disc herniation    Impression of lumbar degenerative disease, lumbar disc herniation    Plan: The above clinical, physical and imaging findings were reviewed with the patient. Jaimie Amor  has a constellation of findings consistent with lumbar radiculopathy in setting lumbar degenerative disc disease, lumbar disc herniation. Jaimie Amor has improved since last visit significantly with PT, medications and second NAOMY.  Since he is doing this well, we discussed holding off on any surgery discussions today. He and his wife understand that if the epidural wears off quickly and sudden, next option would be trying another one vs surgery. At this point, recommend continued PT and medications for pain. Recommend patient follow up closely, in 1 month for repeat evaluation. Patient instructed to return to office/ER sooner if symptoms are not improving, getting worse, or new worrisome/neurologic symptoms arise.

## 2023-10-03 NOTE — TELEPHONE ENCOUNTER
S/W pt. Advised pt of the same. Pt stated he already took himself off the meloxicam.  Advised he had the injection on 9/22 and he has been improving since then. Advised it takes up to 2 weeks to see the full effect of the injection. Pt stated he can only be out of work for 6 months or he losses his job. He is trying to figure out if he needs surgery. Pt asking if the epidural will last to the end of the year? Advised the injections effect everyone differently. Offered to schedule pt SOVS.  Pt declined. Pt stated he is going to stay on the Gabapentin to Oct 20th. Then pt stated he wants to know when he should come off the Gabapentin? Pt stated before he goes back to work he needs to be off the Gabapentin. Pt stated he told the pharmacy to put the script on hold. Pt asking for [facility] to call him. Please advise.

## 2023-10-06 ENCOUNTER — OFFICE VISIT (OUTPATIENT)
Dept: PHYSICAL THERAPY | Facility: CLINIC | Age: 63
End: 2023-10-06
Payer: COMMERCIAL

## 2023-10-06 DIAGNOSIS — M54.42 ACUTE LEFT-SIDED LOW BACK PAIN WITH LEFT-SIDED SCIATICA: Primary | ICD-10-CM

## 2023-10-06 PROCEDURE — 97530 THERAPEUTIC ACTIVITIES: CPT | Performed by: PHYSICAL THERAPIST

## 2023-10-06 PROCEDURE — 97112 NEUROMUSCULAR REEDUCATION: CPT | Performed by: PHYSICAL THERAPIST

## 2023-10-06 PROCEDURE — 97110 THERAPEUTIC EXERCISES: CPT | Performed by: PHYSICAL THERAPIST

## 2023-10-06 NOTE — PROGRESS NOTES
Daily Note     Today's date: 10/6/2023  Patient name: Tomi Johnson  :   MRN: [id number]  Referring provider: Mary Pickens  Dx:   Encounter Diagnosis     ICD-10-CM    1. Acute left-sided low back pain with left-sided sciatica  M54.42                      Subjective: pt states he woke w/ R calf soreness yesterday. He did go for a 90 min walk on uneven terrain the day prior, and thinks "it was just muscle soreness, not my back" causing those symptoms. He mostly rested yesterday. Minor c/o pain 2/10 L glut/distal ham today upon arrival.  Pt also notes he thinks he's tolerating lumbar extension better now as he's caught himself stretching backward and it was ok. Objective: See treatment diary below; trial test of tolerance to lumbar extension via ALICE 30"x4 (prod L buttock/NW - not lingering). Ended w/ flexion stretch as a precaution. R calf cramping develops w/ sled push/pull and fwd step ups. This is relieve w/ lumbar flexion stretching. Pt was reminded/encouraged to use child's pose stretch prn for any LE symptoms and after testing nikko to lumbar extension. Assessment: Tolerated treatment well and demonstrating some improvement in awareness of body mechanics and activity progression. He still has some lingering LE symptoms intermittently and continues to respond to lumbar flexion for relief of radic symptoms. . Patient would benefit from continued PT and return of full ROM/activity as a progression/as nikko utilizing flexion prn. Plan: Continue per plan of care.       Precautions: lumbar  Daily Exercise Log:  Start of Care: 23  RE: 9/15/2023  POC expires 10/31/23  FOTO: 2023  Date 2023 10/2/2023 10/6/2023  2023   Visit # 16 17 18  15   Manual   5'     Stick rolling B/L hams/gastrocs   TT 5' prone - no c/o w/ prone     ROM/MMT        Neuro Re-Ed 25' 20' 10'  20'   Monster walk w/ TB @ ankles RTB 20'x2 ea fwd/bkwd       Seated pallof press on pball Stand cynthia 7# 2x10 ea way 1/2 kneel pallof press 8# 2x10 ea way   Stand pallof press cynthia 7# 2x10 ea way   B/L UE exten w/ scap set - sit on pball     Stand B/L UE ext w/ TA cynthia 10# 2x15   Supine fig 4 stretch 30"x2 R/L full pirif stretch  30"x2 R/L  30"x3 R/L   Sit sciatic NG LAQ w/ PD 1x10 R/L 1x10 R/L 1x10 R/L  1x10 R/L   Sup unsupp dead bug - legs only w/ pball btwn hands/knees  2x5 R/L      Feet pball bridge 2x10 5"10 2x10 5" 2x10     Bird dog Blue TB 5"x5 ea way Blue TB 5" x5 ea way   Bird dog 5" 1x10    qped hydrants RTB 3" 1x10 R/L       Ther Ex 5' 10' 15'  10'   Upright bike   L2x5' start     ALICE to test lumb ext nikko   30"x4 Prod/NW L buttock     B/L HR   2x10 R/L     qped to child's pose  1x10; 2x 1x10; mid & end session  1x10;   L LE clamshells in SL        L LE rev clamshells w/ towel roll @ knees        Stand fwd T 1x5 ea w/ rail       Knee hugs 20'x2  20'x2; 2x     Stationary lunge w/ rail 1x5 R/L 1x10 R/L 2x10 R/L     Pt education   TT see obj  8'   HEP        Ther Activity 15' 15' 15'  15'   Fwd step up w/ alt knee hike 8" step 2x10 R/L 8" step w/ 5# DB uni OH press 1x10 R/L 8" step w/ 7# DB uni OH press 2x10 R/L  8" step w/ rail 2x10 r/L   Chair squat (taps) W/ B/L OH press 7# DB's 2x10 W/ B/L OH press 8# DB's 2x10 W/ OH press 10# DB's 2x10  Chair +2" pad 2x10   Supine bridge w/ TB abd     grn 5" 2x10   Sled push/pull  40# 25'x3 ea way 55# 25'x2 ea fwd/bkwd; 2x     Treadmill  1.8 mph x4' reports 6/10 fatigue R thigh 1.8 mph 5' 3/10 R calf fatigue      Gait Training                        Modalities        Estim + P                HEP:  Access Code: [email or web address]  URL: [email or web address]  Date: 10/02/2023  Prepared by:  Jose Nobles    Exercises  - Cat Cow to Child's Pose  - 3 x daily - 10 reps  - Supine Hip External Rotation Stretch  - 1 x daily - 3 reps - 30 hold  - Supine Bridge with Resistance Band  - 1 x daily - 2 sets - 10 reps - 5 hold  - Isometric Dead Bug  - 1 x daily - 2 sets - 10 reps  - Sidelying Reverse Clamshell  - 1 x daily - 2 sets - 10 reps  - Clam with Resistance (Mirrored)  - 1 x daily - 2 sets - 10 reps  - Quadruped Hip Abduction and External Rotation  - 1 x daily - 10 reps - 3 hold  - Bird Dog  - 1 x daily - 10 reps - 5 hold  - Runner's Step Up/Down  - 1 x daily - 2 sets - 10 reps  - Standing Anti-Rotation Press with Anchored Resistance  - 1 x daily - 2 sets - 10 reps  - Shoulder Extension with Resistance - Palms Forward  - 1 x daily - 2 sets - 15 reps  - Mini Squat to Shoulder Press with Barbell  - 1 x daily - 3 sets - 10 reps  - Forward Monster Walks  - 1 x daily - 3 sets - 10 reps  - Static Lunge  - 1 x daily - 10 reps

## 2023-10-10 ENCOUNTER — OFFICE VISIT (OUTPATIENT)
Dept: PHYSICAL THERAPY | Facility: CLINIC | Age: 63
End: 2023-10-10
Payer: COMMERCIAL

## 2023-10-10 DIAGNOSIS — M54.42 ACUTE LEFT-SIDED LOW BACK PAIN WITH LEFT-SIDED SCIATICA: Primary | ICD-10-CM

## 2023-10-10 DIAGNOSIS — M54.42 ACUTE LEFT-SIDED LOW BACK PAIN WITH LEFT-SIDED SCIATICA: ICD-10-CM

## 2023-10-10 PROCEDURE — 97112 NEUROMUSCULAR REEDUCATION: CPT | Performed by: PHYSICAL THERAPIST

## 2023-10-10 PROCEDURE — 97110 THERAPEUTIC EXERCISES: CPT | Performed by: PHYSICAL THERAPIST

## 2023-10-10 PROCEDURE — 97530 THERAPEUTIC ACTIVITIES: CPT | Performed by: PHYSICAL THERAPIST

## 2023-10-10 RX ORDER — MELOXICAM 15 MG/1
15 TABLET ORAL DAILY
Qty: 30 TABLET | Refills: 0 | Status: SHIPPED | OUTPATIENT
Start: 2023-10-10 | End: 2023-11-09

## 2023-10-10 NOTE — PROGRESS NOTES
Daily Note     Today's date: 10/10/2023  Patient name: Sonia Tao  :   MRN: [id number]  Referring provider: Norris Current  Dx:   Encounter Diagnosis     ICD-10-CM    1. Acute left-sided low back pain with left-sided sciatica  M54.42                      Subjective: Pt yesterday did 2.5 hours of weed wacking, leaf blowing, using a backpack pump sprayer. He had R calf pain/cramping/stiffness after that. L calf not as bad. Pt reports no back pain or LE paresthesias. He did not try lumbar flexion to alleviate calf symptoms. Objective: See treatment diary below; reminded pt to try and utilize lumbar flexion to alleviate calf symptoms. Assessment: Tolerated treatment well. Patient demonstrated fatigue post treatment, would benefit from continued PT and functional activity progression to mimic job tasks to ensure successful RTW. Plan: Continue per plan of care.       Precautions: lumbar  Daily Exercise Log:  Start of Care: 23  RE: 9/15/2023  POC expires 10/31/23  FOTO: 2023  Date 2023 10/2/2023 10/6/2023 10/10/2023    Visit # 16 17 18 19    Manual   5'     Stick rolling B/L hams/gastrocs   TT 5' prone - no c/o w/ prone     ROM/MMT        Neuro Re-Ed 25' 20' 10' 15'    Monster walk w/ TB @ ankles RTB 20'x2 ea fwd/bkwd   RTB @ ankles 20'x2 ea fwd/bkwd    Seated pallof press on pball Stand cynthia 7# 2x10 ea way 1/2 kneel pallof press 8# 2x10 ea way      Supine fig 4 stretch 30"x2 R/L full pirif stretch  30"x2 R/L 30"x2 R/L    Sit sciatic NG LAQ w/ PD 1x10 R/L 1x10 R/L 1x10 R/L     Sup unsupp dead bug - legs only w/ pball btwn hands/knees  2x5 R/L      Feet pball bridge 2x10 5"10 2x10 5" 2x10     Bird dog Blue TB 5"x5 ea way Blue TB 5" x5 ea way  Blue TB 5"x10 ea way    qped hydrants RTB 3" 1x10 R/L   RTB 5"x10 R/L    Ther Ex 5' 10' 15' 15'    Upright bike   L2x5' start L2x5' start    ALICE to test lumb ext nikko   30"x4 Prod/NW L buttock 45"x3 (child's pose x10 after)    B/L HR 2x10 R/L     qped to child's pose  1x10; 2x 1x10; mid & end session 1x10 start & mid session    Stand fwd T 1x5 ea w/ rail   1x10 R/L    Knee hugs 20'x2  20'x2; 2x     Stationary lunge w/ rail 1x5 R/L 1x10 R/L 2x10 R/L 1x10 R/L    Pt education   TT see obj     HEP        Ther Activity 15' 15' 15' 15'    Fwd step up w/ alt knee hike 8" step 2x10 R/L 8" step w/ 5# DB uni OH press 1x10 R/L 8" step w/ 7# DB uni OH press 2x10 R/L     Chair squat (taps) W/ B/L OH press 7# DB's 2x10 W/ B/L OH press 8# DB's 2x10 W/ OH press 10# DB's 2x10 W/ OH press 10# DB's 2x10    OH carry kettlebell    10# 1x1' ea R/L    Sled push/pull  40# 25'x3 ea way 55# 25'x2 ea fwd/bkwd; 2x     Treadmill  1.8 mph x4' reports 6/10 fatigue R thigh 1.8 mph 5' 3/10 R calf fatigue      Floor to waist & return box lift    15# wooden box 1x10 emphasis on form    Gait Training                        Modalities        Estim + MHP                HEP:  Access Code: [email or web address]  URL: [email or web address]  Date: 10/02/2023  Prepared by:  Lincoln Quintero    Exercises  - Cat Cow to Child's Pose  - 3 x daily - 10 reps  - Supine Hip External Rotation Stretch  - 1 x daily - 3 reps - 30 hold  - Supine Bridge with Resistance Band  - 1 x daily - 2 sets - 10 reps - 5 hold  - Isometric Dead Bug  - 1 x daily - 2 sets - 10 reps  - Sidelying Reverse Clamshell  - 1 x daily - 2 sets - 10 reps  - Clam with Resistance (Mirrored)  - 1 x daily - 2 sets - 10 reps  - Quadruped Hip Abduction and External Rotation  - 1 x daily - 10 reps - 3 hold  - Bird Dog  - 1 x daily - 10 reps - 5 hold  - Runner's Step Up/Down  - 1 x daily - 2 sets - 10 reps  - Standing Anti-Rotation Press with Anchored Resistance  - 1 x daily - 2 sets - 10 reps  - Shoulder Extension with Resistance - Palms Forward  - 1 x daily - 2 sets - 15 reps  - Mini Squat to Shoulder Press with Barbell  - 1 x daily - 3 sets - 10 reps  - Forward Monster Walks  - 1 x daily - 3 sets - 10 reps  - Static Lunge  - 1 x daily - 10 reps

## 2023-10-13 ENCOUNTER — EVALUATION (OUTPATIENT)
Dept: PHYSICAL THERAPY | Facility: CLINIC | Age: 63
End: 2023-10-13
Payer: COMMERCIAL

## 2023-10-13 DIAGNOSIS — M54.42 ACUTE LEFT-SIDED LOW BACK PAIN WITH LEFT-SIDED SCIATICA: Primary | ICD-10-CM

## 2023-10-13 PROCEDURE — 97112 NEUROMUSCULAR REEDUCATION: CPT | Performed by: PHYSICAL THERAPIST

## 2023-10-13 PROCEDURE — 97110 THERAPEUTIC EXERCISES: CPT | Performed by: PHYSICAL THERAPIST

## 2023-10-13 PROCEDURE — 97530 THERAPEUTIC ACTIVITIES: CPT | Performed by: PHYSICAL THERAPIST

## 2023-10-13 NOTE — PROGRESS NOTES
PT Re-Evaluation     Today's date: 10/13/2023  Patient name: Thomas Wu  :   MRN: [id number]  Referring provider: Marco Lr MD  Dx:   Encounter Diagnosis     ICD-10-CM    1. Acute left-sided low back pain with left-sided sciatica  M54.42                      Assessment  Assessment details: 10/13/2023: Pt has attended 19 skilled PT sessions originally referred to us via Comp spine, then under Pain management supervision. He has received 2 injections, most recently by Dr Promise Dejesus who he is not scheduled to see again. He reports he will be continuing w/ his PCP Dr Altagracia Sanabria. Pt demonstrates full lumbar ROM now except for extension (improved) but has end range pain w/ L rotation. He has minimal, but continued L LE symptoms in glut and lateral knee, and reports cramping in R calf. His R LE remains weak in gluts/hams. He has limited but progressing tolerance to carrying at waist level and overhead, stairs and prolongd standing activity. He hopes to progress functional activity tolerance to allow RTW in a month, must be able to lift 50#.    2023 RE: Pt has attended 9 skilled PT visits and has had one pain mgt injection. With this combination, pt is demonstrating and reporting improvement. His pain has improved from constant to intermittent, he demonstrates improved postures, lumbar AROM and functional mobility. LE MMT is quite good. He remains highly irritable in extension biased positions. Directional preference has been lateral; initially side glide, not rotation in side lying and has been able to progress to sciatic nerve glides and pirif stretching. Pt will benefit from continued PT BIW x4 weeks w/ re-assessment. 2023 IE: Thomas Wu is a 70 y.o. male who presents with lumbar derangement with pain, decreased strength, decreased ROM, ambulatory dysfunction and postural dysfunction.  Due to these impairments, patient has difficulty performing ADL's, recreational activities, work-related activities, engaging in social activities, ambulation, stair negotiation, lifting/carrying, transfers. Patient's clinical presentation is consistent with their referring diagnosis of Acute left-sided low back pain with left-sided sciatica. Patient has been educated in gait training and plan of care, with home exercises to be introduced as appropriate. Patient would benefit from skilled physical therapy services to address their aforementioned functional limitations and progress towards prior level of function and independence with home exercise program and self symptom management/resolution. Impairments: abnormal or restricted ROM, activity intolerance, impaired physical strength, pain with function and weight-bearing intolerance  Understanding of Dx/Px/POC: good   Prognosis: good    Goals  Short Term Goals: Target Date 9/5/23  1. Initiate and advance HEP toward self symptom reduction/resolution. - met  2. Improve postural awareness and demonstrate self postural correction. - met  3. Improve AROM lumbar spine to min todd or better t/o. - met/ pain w/ extension and L rotation  4. Undisturbed sleep. = met  5. Pt to tolerate prolonged sitting/standing x 1 hour or more w/o c/o. - met       Long Term Goals: Target Date 11/10/23 - LTG's ongoing  1. Indep with HEP and self symptom prevention. 2. Achieve lumbar AROM to WNL w/o c/o.  3. Improve LE/core strength to good to allow pt to tolerate bending and lifting/carrying 20# x 10/1' w/o c/o.  4. Reduce c/o pain to 0-2/10 in L buttock not LE's w/ normal functional activities.       Plan  Patient would benefit from: skilled PT  Planned modality interventions: thermotherapy: hydrocollator packs, unattended electrical stimulation and cryotherapy  Planned therapy interventions: joint mobilization, patient education, postural training, abdominal trunk stabilization, functional ROM exercises, home exercise program, neuromuscular re-education, strengthening, stretching, therapeutic activities, therapeutic exercise, manual therapy, balance, body mechanics training and gait training  Other planned therapy interventions: mechanical assessment  Frequency: 2x week  Plan of Care beginning date: 10/13/2023  Plan of Care expiration date: 11/10/2023  Treatment plan discussed with: patient        Subjective Evaluation    History of Present Illness  Date of onset: 7/25/2023  Mechanism of injury: 10/13/2023: Pt reports symptoms remain intermittent. He has received 2 pain management injections to date. He still has mild L lateral knee and glut, but also reports "cramping" in his right calf and Left leg weakness vs R. He does report improved tolerance to functional activities like prolonged walking, yardwork etc but is still having symptoms and difficulty. He is now tolerating lumbar extension such as prone lying without issue; and prone on elbows and standing lumbar extension with produced LE symptoms that do not remain. Pt also c/o LE symptoms w/ lifting and carrying in the clinic, and reports some increased LE symptoms w/ repetitive stair negotiation. 9/7/2023 RE: Pt reports pain is now intermittent. He has no pain in sidelying and sleeps well. He ambulates w/ one or no crutch. His pain is mostly in L buttock>post/lat knee. He has remaining difficulty tolerating lumbar extension or extension biased positions ie: supine/prone lying. 8/8/2023 IE: Patient had lower back pain starting in early June when was digging a trench for his driveway to adjust the placement of the stones that were put down. Pain resolved at that time, with pt returning to work at [email or web address] with patient reporting return of pain on July 25th when he had to pour some more stone for his driveway, with pain starting the morning after this activity. Worsening pain continued, with pt visiting the ER and getting gabapentin prescribed for his leg pain.  Per notes review, pt was prescribed a medrol dose pack on 7/27/23, with pt noting no benefit from this. Pt referred to physical therapy, with discussion of potential need for injection also initiated by MD (per pt report). Recurrent probem    Patient Goals  Patient goals for therapy: decreased pain, increased motion, increased strength, independence with ADLs/IADLs and return to work  Patient goal: To be able to take my trip to [place] in September  Pain  Current pain ratin  At best pain ratin  At worst pain rating: 3  Location: L buttock>L post thigh/lat knee  Quality: cramping, dull ache and tight  Relieving factors: change in position  Aggravating factors: sitting and stair climbing (supine/prone)  Progression: improved    Social Support  Lives with: spouse    Employment status: not working (out of work due to back pain)  Exercise history: patient works a physically active job. Diagnostic Tests  MRI studies: abnormal (Spondylosis as described above including a left paracentral disc extrusion at L5-S1 which compresses the traversing left S1 nerve root. )  Treatments  Previous treatment: medication  Current treatment: physical therapy        Objective  *=painful  Lumbar AROM todd:  10/13/2023  9/7/33839  2023  Flexion:   Full stand  Full stand  full in sitting, pain with return to upright.    Extension:   Min* Prod/NW lat knee dillan to neutral not able to extend to neutral*  Sideglide Left:  Nil   Nil*knee  min limitation   Right:    Nil   Nil*knee  min-mod limitation*  L rotat in R SL  Nil* L glut  NT   NT  R rotat in L SL  Nil   NT   NT      Mechanical Assessment:   10/13/2023: intermittent L glut and lateral knee 1-2/10  Rep EIS 1x5 = prod/NW L lat knee  ALICE 30"x3 = prod/NW L glut  Rep lumb flexion from qped 1x10 = abolish    2023:  ALICE = periph/w (2023)  Rep SGIS against wall 5"x10 = inc/NW (2023)  Rep SGIS in doorway 5" 2x10 = NE (2023)  Rep R rotat in L SL 5"x10 = dec/B 4/10 glut (2023)  2nd set R rotat in L SL 5"x10= remains 4/10 (8/31/2023)    8/8/2023  Pt initially positioned in R sidelying (preferential position) with difficulty finding any position of comfort. Prone over 2 pillows: able to gradually achieve full prone position, with L rotated pelvis noted initially. MHP applied with gradual ability to shift position. Mild decrease in distal pain noted, with variable nature throughout. Prone over 1 pillow: able to tolerate progression, with slow transition, with some centralization of symptoms also noted. Attempted to go to full prone, with onset of L LE cramping and increased pain demonstrated. Palpation/observation:   10/13/2023: no lateral shift, pt no longer flexed in standing; remains TTP L pirif in sciatic notch. 9/7/2023: remains TTP L pirif only in sciatic notich. Standing posture w/ absent lumbar lordosis/slightly flexed. No lateral shift. 8/8/23: significant pain with palpation over L medial HS, L ITB, and L piriformis. Patient tolerating most movements poorly overall. Flexed standing posure - leans onto crutches. Gait:   10/13/2023: ambulates w/o AD, tolerance is > an hour. 9/7/2023: Ambulates w/ single crutch intermittently, gait no longer antalgic w/o AD and pt fully upright w/o crutches  8/8/23: Pt arrives ambulating by leaning onto bilateral crutches, with 2 point gait pattern utilized in flexed position. Crutches raised 1 level, with mild improvement in ability to stand more upright noted by end of session, but with continued significant pain. Transfers:   10/13/2023: no remaining issues w/ bed mobility, transfers or tolerance to lying positions other than ALICE will produce L glut symptoms. 9/7/2023: Pt demonstrates ease w/ bed mobility and transfers. He has limited nikko to prone and supine w/ legs straight. 8/8/23: Pt able to complete bed mobility and transfers with UE assistance with increased time, caution and effort required. Painful movements noted throughout session. Strength: 10/13/2023 10/13/2023    Right  Left  Hip flex 5/5  5/5  Knee ext 5/5  4+/5  Knee flex 5/5  4/5*  Ankle DF 5/5  5/5  Hip ER  5/5  5/5  Hip IR  5/5  5/5  Hip abd 5/5  5/5  Hip exten 5/5*  4/5*     Special tests 10/13/2023 9/7/2023  SLR  - B/L 85 deg +L 45 deg  Crossed SLR  - B/L 85 deg +R Repro L sx    Function:  10/13/2023 - increased/produced L glut/LE pain with: lumbar extension in standing or ALICE, 20# floor to waist lift w/ carry 40', repetitive stairs, and 10# uni arm OH carry x1'. He tolerated 55# sled push/pull 20'x2 each today without c/o. He reports increased cramping in R calf w repetitive stairs or prolonged walking >30-60 minutes.  Can perform B/L squat press w/ 10# DB's 2x10 w/o c/o and 15# box/waist lift and return 1x10 w/o c/o.  9/7/2023 - increased pain w/ stairs, supine/prone lying and prolonged sitting (pressure on L glut)       Precautions: lumbar  Daily Exercise Log:  Start of Care: 8/8/23  RE: 10/13/2023  POC expires 11/10/2023  FOTO: 10/13/2023  Date 9/29/2023 10/2/2023 10/6/2023 10/10/2023 10/13/2023  RE/FOTO   Visit # 16 17 18 19 20   Manual   5'     Stick rolling B/L hams/gastrocs   TT 5' prone - no c/o w/ prone     ROM/MMT        Neuro Re-Ed 25' 20' 10' 15' 15'   Monster walk w/ TB @ ankles RTB 20'x2 ea fwd/bkwd   RTB @ ankles 20'x2 ea fwd/bkwd    Seated pallof press on pball Stand cynthia 7# 2x10 ea way 1/2 kneel pallof press 8# 2x10 ea way   1/2 kneel pallof press 9# 2x10 ea way   Supine fig 4 stretch 30"x2 R/L full pirif stretch  30"x2 R/L 30"x2 R/L 30"x2 R/L     Sit sciatic NG LAQ w/ PD 1x10 R/L 1x10 R/L 1x10 R/L  1x10 R/L   Sup unsupp dead bug - legs only w/ pball btwn hands/knees  2x5 R/L      Feet pball bridge 2x10 5"10 2x10 5" 2x10     Bird dog Blue TB 5"x5 ea way Blue TB 5" x5 ea way  Blue TB 5"x10 ea way    qped hydrants RTB 3" 1x10 R/L   RTB 5"x10 R/L    steamboats     Blue TB @ knees 2x5 ea way; VC's slow down, work on balance/form   Ther Ex 5' 10' 15' 15' 15' Upright bike   L2x5' start L2x5' start    ALICE to test lumb ext nikko   30"x4 Prod/NW L buttock 45"x3 (child's pose x10 after) 45"X1 Prod/NW; EIS 5x prod/NW   B/L HR   2x10 R/L     qped to child's pose  1x10; 2x 1x10; mid & end session 1x10 start & mid session 1x10; 3x during session   Stand fwd T 1x5 ea w/ rail   1x10 R/L    Knee hugs 20'x2  20'x2; 2x  20'x4 knee hugs & soldier kicks   Stationary lunge w/ rail 1x5 R/L 1x10 R/L 2x10 R/L 1x10 R/L    Pt education   TT see obj  Use child's pose lumb flexion and pirif as much as possible to abolish any LE symptoms   HEP        Ther Activity 15' 15' 15' 15' 15'   Fwd step up w/ alt knee hike 8" step 2x10 R/L 8" step w/ 5# DB uni OH press 1x10 R/L 8" step w/ 7# DB uni OH press 2x10 R/L     Chair squat (taps) W/ B/L OH press 7# DB's 2x10 W/ B/L OH press 8# DB's 2x10 W/ OH press 10# DB's 2x10 W/ OH press 10# DB's 2x10    OH carry kettlebell    10# 1x1' ea R/L 10# 1'x1 R/L - minor LE sx   Sled push/pull  40# 25'x3 ea way 55# 25'x2 ea fwd/bkwd; 2x  55# 25'x2 each ea fwd/bkwd   Treadmill  1.8 mph x4' reports 6/10 fatigue R thigh 1.8 mph 5' 3/10 R calf fatigue      Floor to waist & return box lift    15# wooden box 1x10 emphasis on form 20# lift from floor, carry 40', lower to floor   Gait Training                        Modalities        Estim + MHP                HEP:  Access Code: [email or web address]  URL: [email or web address]  Date: 10/02/2023  Prepared by:  Joao Brothers    Exercises  - Cat Cow to Child's Pose  - 3 x daily - 10 reps  - Supine Hip External Rotation Stretch  - 1 x daily - 3 reps - 30 hold  - Supine Bridge with Resistance Band  - 1 x daily - 2 sets - 10 reps - 5 hold  - Isometric Dead Bug  - 1 x daily - 2 sets - 10 reps  - Sidelying Reverse Clamshell  - 1 x daily - 2 sets - 10 reps  - Clam with Resistance (Mirrored)  - 1 x daily - 2 sets - 10 reps  - Quadruped Hip Abduction and External Rotation  - 1 x daily - 10 reps - 3 hold  - Bird Dog  - 1 x daily - 10 reps - 5 hold  - Runner's Step Up/Down  - 1 x daily - 2 sets - 10 reps  - Standing Anti-Rotation Press with Anchored Resistance  - 1 x daily - 2 sets - 10 reps  - Shoulder Extension with Resistance - Palms Forward  - 1 x daily - 2 sets - 15 reps  - Mini Squat to Shoulder Press with Barbell  - 1 x daily - 3 sets - 10 reps  - Forward Monster Walks  - 1 x daily - 3 sets - 10 reps  - Static Lunge  - 1 x daily - 10 reps

## 2023-10-16 ENCOUNTER — RA CDI HCC (OUTPATIENT)
Dept: OTHER | Facility: HOSPITAL | Age: 63
End: 2023-10-16

## 2023-10-16 ENCOUNTER — APPOINTMENT (OUTPATIENT)
Dept: PHYSICAL THERAPY | Facility: CLINIC | Age: 63
End: 2023-10-16
Payer: COMMERCIAL

## 2023-10-16 NOTE — PROGRESS NOTES
[address] [facility] coding opportunities       Chart reviewed, no opportunity found: CHART REVIEWED, NO OPPORTUNITY FOUND        Patients Insurance        Commercial Insurance: [address]

## 2023-10-17 ENCOUNTER — OFFICE VISIT (OUTPATIENT)
Dept: PHYSICAL THERAPY | Facility: CLINIC | Age: 63
End: 2023-10-17
Payer: COMMERCIAL

## 2023-10-17 DIAGNOSIS — M54.42 ACUTE LEFT-SIDED LOW BACK PAIN WITH LEFT-SIDED SCIATICA: Primary | ICD-10-CM

## 2023-10-17 PROCEDURE — 97110 THERAPEUTIC EXERCISES: CPT | Performed by: PHYSICAL THERAPIST

## 2023-10-17 PROCEDURE — 97530 THERAPEUTIC ACTIVITIES: CPT | Performed by: PHYSICAL THERAPIST

## 2023-10-17 PROCEDURE — 97112 NEUROMUSCULAR REEDUCATION: CPT | Performed by: PHYSICAL THERAPIST

## 2023-10-17 NOTE — PROGRESS NOTES
Daily Note     Today's date: 10/17/2023  Patient name: Prakash Cavazos  : [birth year]  MRN: [id number]  Referring provider: Goldy Hoffmann MD  Dx:   Encounter Diagnosis     ICD-10-CM    1. Acute left-sided low back pain with left-sided sciatica  M54.42                      Subjective: Pt reports his Right leg continues to become weak with activity today, especially static lunges. He is challenged by current program. He continues w/ brief episodes of calf cramping and general R LE fatigue. Objective: See treatment diary below      Assessment: Tolerated treatment well. Patient demonstrated fatigue post treatment and reports LAQ w/ PD and pirif stretches give the most relief of cramping and fatigue today. No inc symptoms w/ box lift today. Plan: Continue per plan of care.       Precautions: lumbar  Daily Exercise Log:  Start of Care: 23  RE: 10/13/2023  POC expires 11/10/2023  FOTO: 10/13/2023  Date 10/17/2023  10/6/2023 10/10/2023 10/13/2023  RE/FOTO   Visit # 21  18 19 20   Manual   5'     Stick rolling B/L hams/gastrocs   TT 5' prone - no c/o w/ prone     ROM/MMT        Neuro Re-Ed 20'  10' 15' 15'   Monster walk w/ TB @ ankles RTB @ ankles 20'x1 fwd/bkwd   RTB @ ankles 20'x2 ea fwd/bkwd    Seated pallof press on pball     1/2 kneel pallof press 9# 2x10 ea way   Supine fig 4 stretch 30"x2 ea  30"x2 R/L 30"x2 R/L 30"x2 R/L     Sit sciatic NG LAQ w/ PD 2x10 R/L  1x10 R/L  1x10 R/L   Sup unsupp dead bug - legs only w/ pball btwn hands/knees        Feet pball bridge 5" 2x10  5" 2x10     Bird dog 5"x10 ea Blue   Blue TB 5"x10 ea way    Sup alt leg lowering (SOS to stab contra leg) 2x5 R/L       qped hydrants    RTB 5"x10 R/L    steamboats     Blue TB @ knees 2x5 ea way; VC's slow down, work on balance/form   Ther Ex 10'  15' 15' 15'   Upright bike   L2x5' start L2x5' start    ALICE to test lumb ext nikko   30"x4 Prod/NW L buttock 45"x3 (child's pose x10 after) 45"X1 Prod/NW; EIS 5x prod/NW   B/L HR   2x10 R/L qped to child's pose 1x10  1x10; mid & end session 1x10 start & mid session 1x10; 3x during session   Stand fwd T    1x10 R/L    Knee hugs 20'x2; 2x knee hugs only  20'x2; 2x  20'x4 knee hugs & soldier kicks   Stationary lunge w/ rail 5# DB 1x10 ea way  2x10 R/L 1x10 R/L    Pt education   TT see obj  Use child's pose lumb flexion and pirif as much as possible to abolish any LE symptoms   HEP        Ther Activity 15'  15' 15' 15'   Fwd step up w/ alt knee hike w/ Uni OH press 10# DB 8" step 2x10 R/L  8" step w/ 7# DB uni OH press 2x10 R/L     Chair squat (taps) w/ OH press W/ B/L HR; 10# DBs 2x10  W/ OH press 10# DB's 2x10 W/ OH press 10# DB's 2x10    OH carry kettlebell    10# 1x1' ea R/L 10# 1'x1 R/L - minor LE sx   Sled push/pull   55# 25'x2 ea fwd/bkwd; 2x  55# 25'x2 each ea fwd/bkwd   Treadmill         Floor to waist & return box lift 20# floor to table & return 2x5   15# wooden box 1x10 emphasis on form 20# lift from floor, carry 40', lower to floor   Gait Training                        Modalities        Estim + MHP                HEP:  Access Code: [email or web address]  URL: [email or web address]  Date: 10/02/2023  Prepared by:  Caio Briseon    Exercises  - Cat Cow to Child's Pose  - 3 x daily - 10 reps  - Supine Hip External Rotation Stretch  - 1 x daily - 3 reps - 30 hold  - Supine Bridge with Resistance Band  - 1 x daily - 2 sets - 10 reps - 5 hold  - Isometric Dead Bug  - 1 x daily - 2 sets - 10 reps  - Sidelying Reverse Clamshell  - 1 x daily - 2 sets - 10 reps  - Clam with Resistance (Mirrored)  - 1 x daily - 2 sets - 10 reps  - Quadruped Hip Abduction and External Rotation  - 1 x daily - 10 reps - 3 hold  - Bird Dog  - 1 x daily - 10 reps - 5 hold  - Runner's Step Up/Down  - 1 x daily - 2 sets - 10 reps  - Standing Anti-Rotation Press with Anchored Resistance  - 1 x daily - 2 sets - 10 reps  - Shoulder Extension with Resistance - Palms Forward  - 1 x daily - 2 sets - 15 reps  - Mini Squat to Shoulder Press with Barbell  - 1 x daily - 3 sets - 10 reps  - Forward Monster Walks  - 1 x daily - 3 sets - 10 reps  - Static Lunge  - 1 x daily - 10 reps

## 2023-10-19 ENCOUNTER — OFFICE VISIT (OUTPATIENT)
Dept: PHYSICAL THERAPY | Facility: CLINIC | Age: 63
End: 2023-10-19
Payer: COMMERCIAL

## 2023-10-19 DIAGNOSIS — M54.42 ACUTE LEFT-SIDED LOW BACK PAIN WITH LEFT-SIDED SCIATICA: Primary | ICD-10-CM

## 2023-10-19 PROCEDURE — 97112 NEUROMUSCULAR REEDUCATION: CPT | Performed by: PHYSICAL THERAPIST

## 2023-10-19 PROCEDURE — 97110 THERAPEUTIC EXERCISES: CPT | Performed by: PHYSICAL THERAPIST

## 2023-10-19 NOTE — PROGRESS NOTES
Daily Note     Today's date: 10/19/2023  Patient name: Brittany Russo  :   MRN: [id number]  Referring provider: Char Leon MD  Dx:   Encounter Diagnosis     ICD-10-CM    1. Acute left-sided low back pain with left-sided sciatica  M54.42                      Subjective: Pt reports continued, brief, "biting" symptoms in L glut/distal hamstring; and intermittent calf cramping. None of which last, all of which respond best to stretching hamstring/calf/piriformis. Objective: See treatment diary below      Assessment: Tolerated treatment well and needs VC's for form w/ RDL, single leg forward reach and dead bug . Patient demonstrated fatigue post treatment and cotinues to tolerate program progression w/ decreasing symptoms. Worst symptom production today w/ elbow planks (new). Plan: Progress treatment as tolerated.        Precautions: lumbar  Daily Exercise Log:  Start of Care: 23  RE: 10/13/2023  POC expires 11/10/2023  FOTO: 10/13/2023  Date 10/17/2023 10/19/2023   10/13/2023  RE/FOTO   Visit # 21 22   20   Manual                ROM/MMT        Neuro Re-Ed 20' 25'   15'   Monster walk w/ TB @ ankles RTB @ ankles 20'x1 fwd/bkwd       1/2 knee pallof     9# 2x10 ea way   Supine fig 4 stretch 30"x2 ea 30"x2 R/L   30"x2 R/L     Sit sciatic NG LAQ w/ PD 2x10 R/L    1x10 R/L   Sup unsupp dead bug - w/ pball btwn hands/knees  2x5 ea way      Feet pball bridge 5" 2x10 5" 2x10      Elbow plank  5"x5 prod/w buttock      Bird dog 5"x10 ea Blue       Sup alt leg lowering (SOS to stab contra leg) 2x5 R/L       Stand forward T  Min use rail 2x10 R/L      steamboats  Blue TB@ zepeda 2x10 R/L   Blue TB @ knees 2x5 ea way; VC's slow down, work on balance/form   Ther Ex 10' 12'   15'   Upright bike        ALICE to test lumb ext nikko     45"X1 Prod/NW; EIS 5x prod/NW   RDL w/ cane for form cuing  2x10      qped to child's pose 1x10    1x10; 3x during session   Knee hugs & soldier kicks 20'x2; 2x knee hugs only 20'x4 ea   20'x4 knee hugs & soldier kicks   Stationary lunge w/ rail 5# DB 1x10 ea way Walking lunges at rail 12'x5      Pt education     Use child's pose lumb flexion and pirif as much as possible to abolish any LE symptoms   HEP        Ther Activity 15' 8'   15'   Fwd step up w/ alt knee hike w/ Uni OH press 10# DB 8" step 2x10 R/L       Chair squat (taps) w/ OH press W/ B/L HR; 10# DBs 2x10       OH carry kettlebell  10# 1x1' ea R/L   10# 1'x1 R/L - minor LE sx   Sled push/pull     55# 25'x2 each ea fwd/bkwd   Box lift floor to waist/carry 30'/return to floor  15# box 5x mild symptoms during; resolves quickly      Floor to waist & return box lift 20# floor to table & return 2x5    20# lift from floor, carry 40', lower to floor   Gait Training                        Modalities        Estim + MHP                HEP:  Access Code: [email or web address]  URL: [email or web address]  Date: 10/02/2023  Prepared by:  Huseyin Troy    Exercises  - Cat Cow to Child's Pose  - 3 x daily - 10 reps  - Supine Hip External Rotation Stretch  - 1 x daily - 3 reps - 30 hold  - Supine Bridge with Resistance Band  - 1 x daily - 2 sets - 10 reps - 5 hold  - Isometric Dead Bug  - 1 x daily - 2 sets - 10 reps  - Sidelying Reverse Clamshell  - 1 x daily - 2 sets - 10 reps  - Clam with Resistance (Mirrored)  - 1 x daily - 2 sets - 10 reps  - Quadruped Hip Abduction and External Rotation  - 1 x daily - 10 reps - 3 hold  - Bird Dog  - 1 x daily - 10 reps - 5 hold  - Runner's Step Up/Down  - 1 x daily - 2 sets - 10 reps  - Standing Anti-Rotation Press with Anchored Resistance  - 1 x daily - 2 sets - 10 reps  - Shoulder Extension with Resistance - Palms Forward  - 1 x daily - 2 sets - 15 reps  - Mini Squat to Shoulder Press with Barbell  - 1 x daily - 3 sets - 10 reps  - Forward Monster Walks  - 1 x daily - 3 sets - 10 reps  - Static Lunge  - 1 x daily - 10 reps

## 2023-10-23 ENCOUNTER — OFFICE VISIT (OUTPATIENT)
Dept: FAMILY MEDICINE CLINIC | Facility: CLINIC | Age: 63
End: 2023-10-23
Payer: COMMERCIAL

## 2023-10-23 VITALS
BODY MASS INDEX: 22.08 KG/M2 | DIASTOLIC BLOOD PRESSURE: 76 MMHG | OXYGEN SATURATION: 98 % | HEART RATE: 69 BPM | SYSTOLIC BLOOD PRESSURE: 120 MMHG | HEIGHT: 72 IN | WEIGHT: 163 LBS

## 2023-10-23 DIAGNOSIS — D75.839 THROMBOCYTOSIS: ICD-10-CM

## 2023-10-23 DIAGNOSIS — M54.42 ACUTE LEFT-SIDED LOW BACK PAIN WITH LEFT-SIDED SCIATICA: Primary | ICD-10-CM

## 2023-10-23 DIAGNOSIS — E03.9 ACQUIRED HYPOTHYROIDISM: ICD-10-CM

## 2023-10-23 DIAGNOSIS — N52.8 OTHER MALE ERECTILE DYSFUNCTION: ICD-10-CM

## 2023-10-23 DIAGNOSIS — E78.5 HYPERLIPIDEMIA, UNSPECIFIED HYPERLIPIDEMIA TYPE: ICD-10-CM

## 2023-10-23 DIAGNOSIS — M05.79 RHEUMATOID ARTHRITIS INVOLVING MULTIPLE SITES WITH POSITIVE RHEUMATOID FACTOR (HCC): ICD-10-CM

## 2023-10-23 PROCEDURE — 99214 OFFICE O/P EST MOD 30 MIN: CPT | Performed by: INTERNAL MEDICINE

## 2023-10-23 NOTE — ASSESSMENT & PLAN NOTE
Patient with a history of rheumatoid arthritis currently not on any medication no symptoms at present time to suggest rheumatoid arthritis is acting up.

## 2023-10-23 NOTE — ASSESSMENT & PLAN NOTE
Currently patient is taking hydroxyurea 1000 mg alternating with 500 and is being followed by the hematologist who does the repeat CBCs periodically. Isotretinoin Counseling: Patient should get monthly blood tests, not donate blood, not drive at night if vision affected, not share medication, and not undergo elective surgery for 6 months after tx completed. Side effects reviewed, pt to contact office should one occur.

## 2023-10-23 NOTE — PROGRESS NOTES
Office Visit Note  10/23/23     Yisel Tan 70 y.o. male MRN: [id number]  : 1960    Assessment:     1. Acute left-sided low back pain with left-sided sciatica  Assessment & Plan:  Pain symptoms in the low back and left lower extremity much better compared to before patient is able to ambulate without any problem he is doing his day-to-day activities gradually increasing them. We will continue with the physical therapy exam is unremarkable at this time. Patient complains of cramps as mentioned in HPI we will increase the water intake magnesium oxide and see the difference besides the physical therapy with stretching exercises      2. Hyperlipidemia, unspecified hyperlipidemia type  Assessment & Plan:  Last cholesterol level 190 we will follow-up with repeat lipid profile later. 3. Acquired hypothyroidism  Assessment & Plan:  Continue levothyroxine 100 mcg daily follow-up with repeat TSH level later      4. Other male erectile dysfunction  Assessment & Plan:  Continue sildenafil as needed      5. Rheumatoid arthritis involving multiple sites with positive rheumatoid factor ([address])  Assessment & Plan:  Patient with a history of rheumatoid arthritis currently not on any medication no symptoms at present time to suggest rheumatoid arthritis is acting up. 6. Thrombocytosis  Assessment & Plan:  Currently patient is taking hydroxyurea 1000 mg alternating with 500 and is being followed by the hematologist who does the repeat CBCs periodically. Discussion Summary and Plan: Today's care plan and medications were reviewed with patient in detail and all their questions answered to their satisfaction. No chief complaint on file.      Subjective:  Patient is coming here for a follow-up evaluation with regards to the symptoms of low back pain radiating down the left lower extremity has been going on for the last 3 months time and received pain medications subsequently ended up having epidural injections to help them to which she is responding very well. He is continue to have physical therapy which also appears to be helping. However patient noticing sometimes discomfort feeling in the calf area and the back of the thigh and buttock area lasting for few seconds to the level of 2-3 and the pain level and then goes away. Also patient complains of cramps in both the lower extremities frequently. Patient admits to not drinking enough water. Medications reviewed labs reviewed physical therapist notes reviewed. The following portions of the patient's history were reviewed and updated as appropriate: allergies, current medications, past family history, past medical history, past social history, past surgical history and problem list.    Review of Systems   Constitutional:  Negative for chills and fever. HENT:  Negative for ear pain and sore throat. Eyes:  Negative for pain and visual disturbance. Respiratory:  Negative for cough and shortness of breath. Cardiovascular:  Negative for chest pain and palpitations. Gastrointestinal:  Negative for abdominal pain and vomiting. Genitourinary:  Negative for dysuria and hematuria. Musculoskeletal:  Positive for arthralgias and back pain. Skin:  Negative for color change and rash. Neurological:  Negative for seizures and syncope. All other systems reviewed and are negative.         Historical Information   Patient Active Problem List   Diagnosis    Hyperlipidemia    Hypothyroidism    Thrombocytosis    Rheumatoid arthritis involving multiple sites with positive rheumatoid factor (HCC)    Lipoma of torso    Other male erectile dysfunction    Acute midline low back pain without sciatica    Acute left-sided low back pain with left-sided sciatica     Past Medical History:   Diagnosis Date    Arthritis     Disease of thyroid gland     hypo    Graves disease 1995    Hallux limitus, acquired, right     Hypothyroid     hypo    Other tear of medial meniscus, current injury, right knee, initial encounter 6/6/2018    Added automatically from request for surgery [id number]    Platelet disorder [facility])     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 Kavya Ge    RA (rheumatoid arthritis) ([address])     Rheumatoid arthritis involving multiple sites ([address]) 9/19/2022    Wears glasses      Past Surgical History:   Procedure Laterality Date    ANKLE SURGERY Right     ligament, chipped bones,dislocated    BUNIONECTOMY  03/2014    COLONOSCOPY      ELBOW SURGERY Right     tendon surgery    EPIDURAL BLOCK INJECTION Left 8/16/2023    Procedure: left L5-S1, S1 TRANSFORAMINAL epidural steroid injection (53616, [id number]); Surgeon: Edgar Yu DO;  Location: [facility] MAIN OR;  Service: Pain Management     FOOT ARTHRODESIS, MODIFIED ARELLANO Left     FRACTURE SURGERY Left     elbow    HERNIA REPAIR Right     inguinal    LUMBAR EPIDURAL INJECTION N/A 9/22/2023    Procedure: L5-S1 LUMBAR epidural steroid injection (84788);   Surgeon: Radha Krishnan MD;  Location: [facility] MAIN OR;  Service: Pain Management     RI ARTHRS KNE SURG W/MENISCECTOMY MED/LAT W/SHVG Right 07/09/2018    Procedure: MENISCECTOMY LATERAL /MEDIAL;  Surgeon: Rosemarie Zhong MD;  Location: [facility];  Service: Orthopedics    RI CORRECTION HAMMERTOE Bilateral 05/14/2021    Procedure: REPAIR HAMMERTOE / MALLET TOE / CLAW TOE 2ND toe bilateral;  Surgeon: Edelmira Waterman DPM;  Location: [facility];  Service: Podiatry    RI [place] W/SESMDC W/RESCJ PROX PHAL Right 04/09/2021    Procedure: Deborah Groom;  Surgeon: Edelmira Waterman DPM;  Location: [facility];  Service: Podiatry    ROTATOR CUFF REPAIR Left     WISDOM TOOTH EXTRACTION      x4    WRIST SURGERY       Social History     Substance and Sexual Activity   Alcohol Use Yes    Alcohol/week: 3.0 standard drinks of alcohol    Types: 3 Cans of beer per week    Comment: if that     Social History     Substance and Sexual Activity   Drug Use No     Social History     Tobacco Use Smoking Status Former    Packs/day: 1.00    Years: 25.00    Total pack years: 25.00    Types: Cigarettes    Quit date: 2000    Years since quittin.8    Passive exposure: Past   Smokeless Tobacco Never     Family History   Problem Relation Age of Onset    Cancer Mother         passed    Hypertension Father     Heart disease Father         leaky valve    Skin cancer Father     Diabetes Father      Health Maintenance Due   Topic    HIV Screening     Annual Physical     DTaP,Tdap,and Td Vaccines (1 - Tdap)    Pneumococcal Vaccine: 65+ Years (1 - PCV)      Meds/Allergies       Current Outpatient Medications:     Cholecalciferol (VITAMIN D3) 1000 units CAPS, Take 1,000 Units by mouth, Disp: , Rfl:     gabapentin (NEURONTIN) 300 mg capsule, Take 1 capsule (300 mg total) by mouth 3 (three) times a day, Disp: 90 capsule, Rfl: 1    Hydroxyurea (HYDREA PO), Take 1,000 mg by mouth Mon-Wed-Fri, Disp: , Rfl:     hydroxyurea (HYDREA) 500 mg capsule, Take by mouth On -Sat-Sun , Disp: , Rfl:     levothyroxine 150 mcg tablet, TAKE 1 TABLET DAILY, Disp: 90 tablet, Rfl: 3    sildenafil (VIAGRA) 100 mg tablet, Take 1 tablet (100 mg total) by mouth if needed for erectile dysfunction As directed, Disp: 6 tablet, Rfl: 12    gabapentin (NEURONTIN) 100 mg capsule, Take 1 capsule (100 mg total) by mouth daily at bedtime for 5 days To take for 5 nights after weaning off current prescription of 300 mg TID., Disp: 5 capsule, Rfl: 0      Objective:    Vitals:   /76 (BP Location: Right arm, Patient Position: Sitting, Cuff Size: Standard)   Pulse 69   Ht 6' (1.829 m)   Wt 73.9 kg (163 lb)   SpO2 98%   BMI 22.11 kg/m²   Body mass index is 22.11 kg/m². Vitals:    10/23/23 1012   Weight: 73.9 kg (163 lb)       Physical Exam  Vitals and nursing note reviewed. Constitutional:       Appearance: Normal appearance. Cardiovascular:      Rate and Rhythm: Normal rate and regular rhythm.       Heart sounds: Normal heart sounds. Pulmonary:      Effort: Pulmonary effort is normal.      Breath sounds: Normal breath sounds. Abdominal:      Palpations: Abdomen is soft. Musculoskeletal:      Right lower leg: No edema. Left lower leg: No edema. Comments: SLR about 50 degrees on both sides   Neurological:      Mental Status: He is alert and oriented to person, place, and time. Lab Review   No visits with results within 2 Month(s) from this visit. Latest known visit with results is:   Orders Only on 04/28/2023   Component Date Value Ref Range Status    Total Cholesterol 04/28/2023 190  <200 mg/dL Final    HDL 04/28/2023 67  > OR = 40 mg/dL Final    Triglycerides 04/28/2023 54  <150 mg/dL Final    LDL Calculated 04/28/2023 109 (H)  mg/dL (calc) Final    Comment: Reference range: <100     Desirable range <100 mg/dL for primary prevention;    <70 mg/dL for patients with CHD or diabetic patients   with > or = 2 CHD risk factors. LDL-C is now calculated using the Jan-Cline   calculation, which is a validated novel method providing   better accuracy than the Friedewald equation in the   estimation of LDL-C. Raul Mercado et al. [facility]. [facility]3;476(52): 3876-1644   (http://education. [employer]. com/faq/GMO316)      Chol HDLC Ratio 04/28/2023 2.8  <5.0 (calc) Final    Non-HDL Cholesterol 04/28/2023 123  <130 mg/dL (calc) Final    Comment: For patients with diabetes plus 1 major ASCVD risk   factor, treating to a non-HDL-C goal of <100 mg/dL   (LDL-C of <70 mg/dL) is considered a therapeutic   option. Glucose, Random 04/28/2023 88  65 - 99 mg/dL Final    Comment:               Fasting reference interval         BUN 04/28/2023 21  7 - 25 mg/dL Final    Creatinine 04/28/2023 1.08  0.70 - 1.35 mg/dL Final    eGFR 04/28/2023 76  > OR = 60 mL/min/1.73m2 Final    Comment: The eGFR is based on the CKD-EPI 2021 equation.  To calculate   the new eGFR from a previous Creatinine or Cystatin C  result, go to [email or web address]  [email or web address]      SL AMB BUN/CREATININE RATIO 69/80/3743 NOT APPLICABLE  6 - 22 (calc) Final    Sodium 04/28/2023 140  135 - 146 mmol/L Final    Potassium 04/28/2023 4.8  3.5 - 5.3 mmol/L Final    Chloride 04/28/2023 104  98 - 110 mmol/L Final    CO2 04/28/2023 29  20 - 32 mmol/L Final    Calcium 04/28/2023 9.3  8.6 - 10.3 mg/dL Final    Protein, Total 04/28/2023 6.7  6.1 - 8.1 g/dL Final    Albumin 04/28/2023 4.1  3.6 - 5.1 g/dL Final    Globulin 04/28/2023 2.6  1.9 - 3.7 g/dL (calc) Final    Albumin/Globulin Ratio 04/28/2023 1.6  1.0 - 2.5 (calc) Final    TOTAL BILIRUBIN 04/28/2023 0.4  0.2 - 1.2 mg/dL Final    Alkaline Phosphatase 04/28/2023 68  35 - 144 U/L Final    AST 04/28/2023 15  10 - 35 U/L Final    ALT 04/28/2023 13  9 - 46 U/L Final    Color UA 04/28/2023 YELLOW  YELLOW Final    Urine Appearance 04/28/2023 CLEAR  CLEAR Final    Specific Gravity 04/28/2023 1.024  1.001 - 1.035 Final    Ph 04/28/2023 < OR = 5.0  5.0 - 8.0 Final    Glucose, Urine 04/28/2023 NEGATIVE  NEGATIVE Final    Bilirubin, Urine 04/28/2023 NEGATIVE  NEGATIVE Final    Ketone, Urine 04/28/2023 TRACE (A)  NEGATIVE Final    Blood, Urine 04/28/2023 NEGATIVE  NEGATIVE Final    Protein, Urine 04/28/2023 NEGATIVE  NEGATIVE Final    Nitrites Urine 04/28/2023 NEGATIVE  NEGATIVE Final    Leukocyte Esterase 04/28/2023 NEGATIVE  NEGATIVE Final    SL AMB WBC, URINE 04/28/2023 0-5  < OR = 5 /HPF Final    RBC, Urine 04/28/2023 NONE SEEN  < OR = 2 /HPF Final    Squamous Epithelial Cells 04/28/2023 NONE SEEN  < OR = 5 /HPF Final    Bacteria, UA 04/28/2023 NONE SEEN  NONE SEEN /HPF Final    Hyaline Casts 04/28/2023 NONE SEEN  NONE SEEN /LPF Final    Comment(s) 04/28/2023    Final    Comment: This urine was analyzed for the presence of WBC,   RBC, bacteria, casts, and other formed elements. Only those elements seen were reported.             White Blood Cell Count 04/28/2023 6.6  3.8 - 10.8 Thousand/uL Final    Red Blood Cell Count 04/28/2023 4.77  4.20 - 5.80 Million/uL Final    Hemoglobin 04/28/2023 16.8  13.2 - 17.1 g/dL Final    HCT 04/28/2023 48.8  38.5 - 50.0 % Final    MCV 04/28/2023 102.3 (H)  80.0 - 100.0 fL Final    MCH 04/28/2023 35.2 (H)  27.0 - 33.0 pg Final    MCHC 04/28/2023 34.4  32.0 - 36.0 g/dL Final    RDW 04/28/2023 13.0  11.0 - 15.0 % Final    Platelet Count 38/02/3225 487 (H)  140 - 400 Thousand/uL Final    SL AMB MPV 04/28/2023 9.2  7.5 - 12.5 fL Final    Neutrophils (Absolute) 04/28/2023 4,712  1,500 - 7,800 cells/uL Final    Lymphocytes (Absolute) 04/28/2023 937  850 - 3,900 cells/uL Final    Monocytes (Absolute) 04/28/2023 521  200 - 950 cells/uL Final    Eosinophils (Absolute) 04/28/2023 356  15 - 500 cells/uL Final    Basophils ABS 04/28/2023 73  0 - 200 cells/uL Final    Neutrophils 04/28/2023 71.4  % Final    Lymphocytes 04/28/2023 14.2  % Final    Monocytes 04/28/2023 7.9  % Final    Eosinophils 04/28/2023 5.4  % Final    Basophils PCT 04/28/2023 1.1  % Final    HEP C AB 04/28/2023 NON-REACTIVE  NON-REACTIVE Final    Signal to Cut-Off 04/28/2023 0.08  <1.00 Final    Comment:    HCV antibody was non-reactive. There is no laboratory   evidence of HCV infection. In most cases, no further action is required. However,  if recent HCV exposure is suspected, a test for HCV RNA  (test code 07301) is suggested. For additional information please refer to  http://education. twidox/faq/FIA94z2  (This link is being provided for informational/  educational purposes only.)         Prostate Specific Antigen Total 04/28/2023 2.08  < OR = 4.00 ng/mL Final    Comment: The total PSA value from this assay system is   standardized against the WHO standard. The test   result will be approximately 20% lower when compared   to the equimolar-standardized total PSA (Naveed   Avilla). Comparison of serial PSA results should be   interpreted with this fact in mind.      This test was performed using the Siemens   chemiluminescent method. Values obtained from   different assay methods cannot be used  interchangeably. PSA levels, regardless of  value, should not be interpreted as absolute  evidence of the presence or absence of disease. TSH W/RFX TO FREE T4 04/28/2023 1.55  0.40 - 4.50 mIU/L Final    Hemoglobin A1C 04/28/2023 5.1  <5.7 % of total Hgb Final    Comment: For the purpose of screening for the presence of  diabetes:     <5.7%       Consistent with the absence of diabetes  5.7-6.4%    Consistent with increased risk for diabetes              (prediabetes)  > or =6.5%  Consistent with diabetes     This assay result is consistent with a decreased risk  of diabetes. Currently, no consensus exists regarding use of  hemoglobin A1c for diagnosis of diabetes in children. According to American Diabetes Association (ADA)  guidelines, hemoglobin A1c <7.0% represents optimal  control in non-pregnant diabetic patients. Different  metrics may apply to specific patient populations. Standards of Medical Care in Diabetes(ADA). Urine Culture, Comprehensive 04/28/2023    Final    NO CULTURE INDICATED         Alice Garcia MD        "This note has been constructed using a voice recognition system. Therefore there may be syntax, spelling, and/or grammatical errors.  Please call if you have any questions. "

## 2023-10-23 NOTE — ASSESSMENT & PLAN NOTE
Pain symptoms in the low back and left lower extremity much better compared to before patient is able to ambulate without any problem he is doing his day-to-day activities gradually increasing them. We will continue with the physical therapy exam is unremarkable at this time.   Patient complains of cramps as mentioned in HPI we will increase the water intake magnesium oxide and see the difference besides the physical therapy with stretching exercises

## 2023-10-24 ENCOUNTER — OFFICE VISIT (OUTPATIENT)
Dept: PHYSICAL THERAPY | Facility: CLINIC | Age: 63
End: 2023-10-24
Payer: COMMERCIAL

## 2023-10-24 DIAGNOSIS — M54.42 ACUTE LEFT-SIDED LOW BACK PAIN WITH LEFT-SIDED SCIATICA: Primary | ICD-10-CM

## 2023-10-24 PROCEDURE — 97112 NEUROMUSCULAR REEDUCATION: CPT | Performed by: PHYSICAL THERAPIST

## 2023-10-24 PROCEDURE — 97530 THERAPEUTIC ACTIVITIES: CPT | Performed by: PHYSICAL THERAPIST

## 2023-10-24 PROCEDURE — 97110 THERAPEUTIC EXERCISES: CPT | Performed by: PHYSICAL THERAPIST

## 2023-10-24 NOTE — PROGRESS NOTES
Daily Note     Today's date: 10/24/2023  Patient name: Tomi Johnson  :   MRN: [id number]  Referring provider: Monica Fnotanez MD  Dx:   Encounter Diagnosis     ICD-10-CM    1. Acute left-sided low back pain with left-sided sciatica  M54.42                      Subjective: Seems like things are at a plateau. He continues w/ R thigh/calf cramping w/ activity; and intermittent L post knee/glut fold "biting" symptoms that are brief. Objective: See treatment diary below  Sit sciatic NG = NE  Prone on elbows fem NG = better thigh/NE shin  Tested EIL from qped today to determine response on R LE calf cramping. 1x5 = abolish. Assessment: Tolerated treatment well and continues w/ intermittent R quad/calf cramping and L post knee/buttock "biting" symptoms, but symptom onset does seem to be less irritable/pt able to perform more reps/weight/distance before onset . Patient  initially began w/ L LE symptoms. He reported an onset of R LE symptoms after his epidural. He reported increased L LE symptoms w/ several attempts at testing lumbar extension when exploring directional preference prior to epidural, so we have not forced progression to return of this ROM and certainly have not re-tested for potential therapeutic effect of REIL in the past few weeks until today. Pt reports R calf sympoms abolish w/ EIL from qped. He was advised to trial adding this to his HEP, but w/ extreme caution due to lumbar extension exacerbating his L LE symptoms previously. He was also encouraged to follow up w/ pain management for best coordination of care since he has responded best to a coordinated effort thus far. Also noteworthy, pt's SLR excursion at least 75 degrees today B/L after EIL, a drastic improvement. Plan: Continue per plan of care. HEP was updated today.      Precautions: lumbar  Daily Exercise Log:  Start of Care: 23  RE: 10/13/2023  POC expires 11/10/2023  FOTO: 10/13/2023  Date 10/17/2023 10/19/2023 10/24/2023  10/13/2023  RE/FOTO   Visit # 21 22 23  20   Manual                ROM/MMT        Neuro Re-Ed 20' 25' 20'  15'   EIL from qped   1x5 = abolish;  retested later in session 1x5 = abolish again     ALICE w/ B/L knee flexion fem NG   1x10 dec/b quad fatigue; 2x     Monster walk w/ TB @ ankles RTB @ ankles 20'x1 fwd/bkwd       1/2 knee pallof   Stand 10# 2x10 ea way  9# 2x10 ea way   Supine fig 4 stretch 30"x2 ea 30"x2 R/L 30"x2 R/L  30"x2 R/L     Sit sciatic NG LAQ w/ PD 2x10 R/L  1x10 R/L; 2x  1x10 R/L   Sup unsupp dead bug - w/ pball btwn hands/knees  2x5 ea way      Feet pball bridge 5" 2x10 5" 2x10 Bridge w/ abd blk TB 2x10     Elbow plank  5"x5 prod/w buttock      Bird dog 5"x10 ea Blue       Sup alt leg lowering (SOS to stab contra leg) 2x5 R/L       Stand forward T  Min use rail 2x10 R/L SL dead lift w/ 5# DB w/ rail 2x10 R/L     steamboats  Blue TB@ zepeda 2x10 R/L   Blue TB @ knees 2x5 ea way; VC's slow down, work on balance/form   Ther Ex 10' 12' 15'  15'   Upright bike        ALICE to test lumb ext nikko     45"X1 Prod/NW; EIS 5x prod/NW   RDL w/ cane for form cuing  2x10 7.5# on cane 2x10     qped to child's pose 1x10    1x10; 3x during session   Knee hugs & soldier kicks 20'x2; 2x knee hugs only 20'x4 ea 25'x2 ea  20'x4 knee hugs & soldier kicks   Stationary lunge w/ rail 5# DB 1x10 ea way Walking lunges at rail 12'x5 Walking lunges 25'x2 R quad fatigues     Pt education   See above  Use child's pose lumb flexion and pirif as much as possible to abolish any LE symptoms   HEP   Update & review     Ther Activity 15' 8' 10'  15'   Fwd step up w/ alt knee hike w/ Uni OH press 10# DB 8" step 2x10 R/L       Chair squat (taps) w/ OH press W/ B/L HR; 10# DBs 2x10  10# DB's w/ B/L HR 2x10 mild R calf c/o - abolish w/ EIL     OH carry kettlebell  10# 1x1' ea R/L   10# 1'x1 R/L - minor LE sx   Sled push/pull   55# 25'x3 ea fwd/bkwd; 2x symptom onset last lap   55# 25'x2 each ea fwd/bkwd   Box lift floor to waist/carry 30'/return to floor  15# box 5x mild symptoms during; resolves quickly      Floor to waist & return box lift 20# floor to table & return 2x5    20# lift from floor, carry 40', lower to floor   Gait Training                        Modalities        Estim + MHP                HEP:  Access Code: [email or web address]  URL: [email or web address]  Date: 10/24/2023  Prepared by: Naun Sayres    Exercises  - Quadruped Hip Drops  - 2 x daily - 2 sets - 5 reps  - Prone on elbows with knee flexion  - 2 x daily - 2 sets - 10 reps  - Supine Hip External Rotation Stretch  - 1 x daily - 3 reps - 30 hold  - Supine Bridge with Resistance Band  - 1 x daily - 2 sets - 10 reps - 5 hold  - Runner's Step Up/Down  - 1 x daily - 2 sets - 10 reps  - Standing Anti-Rotation Press with Anchored Resistance  - 1 x daily - 2 sets - 10 reps  - Mini Squat to Shoulder Press with Barbell  - 1 x daily - 2 sets - 10 reps  - Forward Monster Walks  - 1 x daily - 3 sets - 10 reps  - Static Lunge  - 1 x daily - 2 sets - 10 reps  - Bird Dog with Resistance  - 1 x daily - 10 reps - 5 hold  - Single Leg Deadlift with Kettlebell  - 1 x daily - 2 sets - 10 reps  Access Code: [email or web address]  URL: [email or web address]  Date: 10/02/2023  Prepared by:  Naun Sayres    Exercises  - Cat Cow to Child's Pose  - 3 x daily - 10 reps  - Supine Hip External Rotation Stretch  - 1 x daily - 3 reps - 30 hold  - Supine Bridge with Resistance Band  - 1 x daily - 2 sets - 10 reps - 5 hold  - Isometric Dead Bug  - 1 x daily - 2 sets - 10 reps  - Sidelying Reverse Clamshell  - 1 x daily - 2 sets - 10 reps  - Clam with Resistance (Mirrored)  - 1 x daily - 2 sets - 10 reps  - Quadruped Hip Abduction and External Rotation  - 1 x daily - 10 reps - 3 hold  - Bird Dog  - 1 x daily - 10 reps - 5 hold  - Runner's Step Up/Down  - 1 x daily - 2 sets - 10 reps  - Standing Anti-Rotation Press with Anchored Resistance  - 1 x daily - 2 sets - 10 reps  - Shoulder Extension with Resistance - Palms Forward  - 1 x daily - 2 sets - 15 reps  - Mini Squat to Shoulder Press with Barbell  - 1 x daily - 3 sets - 10 reps  - Forward Monster Walks  - 1 x daily - 3 sets - 10 reps  - Static Lunge  - 1 x daily - 10 reps

## 2023-10-25 ENCOUNTER — DOCUMENTATION (OUTPATIENT)
Dept: FAMILY MEDICINE CLINIC | Facility: CLINIC | Age: 63
End: 2023-10-25

## 2023-10-26 ENCOUNTER — TELEPHONE (OUTPATIENT)
Age: 63
End: 2023-10-26

## 2023-10-26 ENCOUNTER — OFFICE VISIT (OUTPATIENT)
Dept: PHYSICAL THERAPY | Facility: CLINIC | Age: 63
End: 2023-10-26
Payer: COMMERCIAL

## 2023-10-26 DIAGNOSIS — M54.42 ACUTE LEFT-SIDED LOW BACK PAIN WITH LEFT-SIDED SCIATICA: Primary | ICD-10-CM

## 2023-10-26 PROCEDURE — 97530 THERAPEUTIC ACTIVITIES: CPT | Performed by: PHYSICAL THERAPIST

## 2023-10-26 PROCEDURE — 97112 NEUROMUSCULAR REEDUCATION: CPT | Performed by: PHYSICAL THERAPIST

## 2023-10-26 NOTE — TELEPHONE ENCOUNTER
PT called asking for [employer]ito Sheridan. He said Michael needs to receive suitable documentation why he is out of work. The documentation they previously does not state why he is not working. He said Charito Sheridan would have the fax number for [employer] to send the necessary documents. Please f/u with PT if needed.     Thank You

## 2023-10-26 NOTE — PROGRESS NOTES
Daily Note     Today's date: 10/26/2023  Patient name: Yisel Tan  :   MRN: [id number]  Referring provider: Alfonso Boyle MD  Dx:   Encounter Diagnosis     ICD-10-CM    1. Acute left-sided low back pain with left-sided sciatica  M54.42                      Subjective: Pt reports he is still taking Gabapentin. He  reports REIL does seem to be helping his R LE cramping without adverse (L LE) symptoms. He experiences cramping discomfort R calf up to 6/10 with higher demand activities in clinic today. Objective: See treatment diary below; Advised pt to follow up w/ prescribing physician (Dr Franklin Tang) re: potentially weaning from Gabapentin and discussion of some remaining R & L LE symptoms. 6/10 R calf cramp w/ FSU w/ OH press 1x10 = dec/B 3/10  Followed w/ sciatic nerve glides 1x10 R/L = abolish R LE    Assessment: Tolerated treatment well and is responding well to extension and sciatic nerve gliding for R LE symptoms . Patient demonstrated fatigue post treatment and demonstrates improving, but still limited tolerance to lifting, stairs, carrying etc due to LE symptoms. He is able to resolve them w/ HEP interventions. Plan: Progress treatment as tolerated.        Precautions: lumbar  Daily Exercise Log:  Start of Care: 23  RE: 10/13/2023  POC expires 11/10/2023  FOTO: 10/13/2023  Date 10/17/2023 10/19/2023 10/24/2023 10/26/2023    Visit # 21 22 23 24    Manual                ROM/MMT        Neuro Re-Ed 20' 25' 20' 25'    EIL from qped   1x5 = abolish;  retested later in session 1x5 = abolish again 1x10 = dec/B R calf; 2x during session    ALICE w/ B/L knee flexion fem NG   1x10 dec/b quad fatigue; 2x 1x15    Monster walk w/ TB @ ankles RTB @ ankles 20'x1 fwd/bkwd       1/2 knee pallof   Stand 10# 2x10 ea way     Supine fig 4 stretch 30"x2 ea 30"x2 R/L 30"x2 R/L     Sit sciatic NG LAQ w/ PD 2x10 R/L  1x10 R/L; 2x 1x10 R/L; 2x during session    Sup unsupp dead bug - w/ pball btwn hands/knees 2x5 ea way      Feet pball bridge 5" 2x10 5" 2x10 Bridge w/ abd blk TB 2x10 Bridge w/ alt kick 1x10 R/L    Elbow plank  5"x5 prod/w buttock      Bird dog 5"x10 ea Blue   5"x10 ea blk TB    Sup alt leg lowering (SOS to stab contra leg) 2x5 R/L       Stand forward T  Min use rail 2x10 R/L SL dead lift w/ 5# DB w/ rail 2x10 R/L SL dead lift w/ 10# DB 2x10 R/L    steamboats  Blue TB@ zepeda 2x10 R/L  Blue TB @ shins 1x10 R/L    Ther Ex 10' 12' 15' 5'    RDL w/ cane for form cuing  2x10 7.5# on cane 2x10     qped to child's pose 1x10       Knee hugs & soldier kicks 20'x2; 2x knee hugs only 20'x4 ea 25'x2 ea 25'x2 ea    Stationary lunge w/ rail 5# DB 1x10 ea way Walking lunges at rail 12'x5 Walking lunges 25'x2 R quad fatigues Walking lunges 25'x2    Pt education   See above     HEP   Update & review     Ther Activity 15' 8' 10' 15'    Fwd step up w/ alt knee hike w/ Uni OH press 10# DB 8" step 2x10 R/L   10# DB 8" step 1x10 R/L (prod R calf cramping)    Chair squat (taps) w/ OH press W/ B/L HR; 10# DBs 2x10  10# DB's w/ B/L HR 2x10 mild R calf c/o - abolish w/ EIL     OH carry kettlebell  10# 1x1' ea R/L  10# 1'x1 ea R/L    Sled push/pull   55# 25'x3 ea fwd/bkwd; 2x symptom onset last lap      Box lift floor to waist/carry 30'/return to floor  15# box 5x mild symptoms during; resolves quickly      Floor to waist & return box lift 20# floor to table & return 2x5   20# floor to table & return 2x10 - R calf cramping 2nd set - resolves w/ EIL & sciatic NG            Modalities                        HEP:  Access Code: [email or web address]  URL: [email or web address]  Date: 10/24/2023  Prepared by:  Huseyin Mckinleyer    Exercises  - Quadruped Hip Drops  - 2 x daily - 2 sets - 5 reps  - Prone on elbows with knee flexion  - 2 x daily - 2 sets - 10 reps  - Supine Hip External Rotation Stretch  - 1 x daily - 3 reps - 30 hold  - Supine Bridge with Resistance Band  - 1 x daily - 2 sets - 10 reps - 5 hold  - Runner's Step Up/Down  - 1 x daily - 2 sets - 10 reps  - Standing Anti-Rotation Press with Anchored Resistance  - 1 x daily - 2 sets - 10 reps  - Mini Squat to Shoulder Press with Barbell  - 1 x daily - 2 sets - 10 reps  - Forward Monster Walks  - 1 x daily - 3 sets - 10 reps  - Static Lunge  - 1 x daily - 2 sets - 10 reps  - Bird Dog with Resistance  - 1 x daily - 10 reps - 5 hold  - Single Leg Deadlift with Kettlebell  - 1 x daily - 2 sets - 10 reps  Access Code: [email or web address]  URL: [email or web address]  Date: 10/02/2023  Prepared by:  Huseyin Mckinleyer    Exercises  - Cat Cow to Child's Pose  - 3 x daily - 10 reps  - Supine Hip External Rotation Stretch  - 1 x daily - 3 reps - 30 hold  - Supine Bridge with Resistance Band  - 1 x daily - 2 sets - 10 reps - 5 hold  - Isometric Dead Bug  - 1 x daily - 2 sets - 10 reps  - Sidelying Reverse Clamshell  - 1 x daily - 2 sets - 10 reps  - Clam with Resistance (Mirrored)  - 1 x daily - 2 sets - 10 reps  - Quadruped Hip Abduction and External Rotation  - 1 x daily - 10 reps - 3 hold  - Bird Dog  - 1 x daily - 10 reps - 5 hold  - Runner's Step Up/Down  - 1 x daily - 2 sets - 10 reps  - Standing Anti-Rotation Press with Anchored Resistance  - 1 x daily - 2 sets - 10 reps  - Shoulder Extension with Resistance - Palms Forward  - 1 x daily - 2 sets - 15 reps  - Mini Squat to Shoulder Press with Barbell  - 1 x daily - 3 sets - 10 reps  - Forward Monster Walks  - 1 x daily - 3 sets - 10 reps  - Static Lunge  - 1 x daily - 10 reps

## 2023-10-26 NOTE — TELEPHONE ENCOUNTER
Pt said he would like to add that he is on gabapentin and it causes drowsiness and dizziness. He doesn't feel like he can work like that. He said the main reason he can't go back to work is because he is still doing PT. He also mentioned cramping in his leg. Pt said [employer] is trying to force him back to work on 10/31 and he doesn't feel up to it. He said that the notes from Dr. Brian Escalona did not state a good enough reason for him to be out that long. He is asking for something to fax over as to why he can't go back to work. Please, call pt back so he is kept in the loop.

## 2023-10-27 NOTE — TELEPHONE ENCOUNTER
Panchito Doherty is calling back to follow up with Angela Robb. Attempt to call the office, no answer. He states that Angela Elliott know knows situation and wants to speak with her. Thank you!

## 2023-10-29 DIAGNOSIS — E03.9 ACQUIRED HYPOTHYROIDISM: Primary | ICD-10-CM

## 2023-10-30 ENCOUNTER — PATIENT MESSAGE (OUTPATIENT)
Dept: FAMILY MEDICINE CLINIC | Facility: CLINIC | Age: 63
End: 2023-10-30

## 2023-10-31 ENCOUNTER — OFFICE VISIT (OUTPATIENT)
Dept: PHYSICAL THERAPY | Facility: CLINIC | Age: 63
End: 2023-10-31
Payer: COMMERCIAL

## 2023-10-31 DIAGNOSIS — M54.42 ACUTE LEFT-SIDED LOW BACK PAIN WITH LEFT-SIDED SCIATICA: Primary | ICD-10-CM

## 2023-10-31 PROCEDURE — 97110 THERAPEUTIC EXERCISES: CPT

## 2023-10-31 PROCEDURE — 97112 NEUROMUSCULAR REEDUCATION: CPT

## 2023-10-31 NOTE — PROGRESS NOTES
Daily Note     Today's date: 10/31/2023  Patient name: Ashwin Gates  :   MRN: [id number]  Referring provider: Yissel Galicia MD  Dx:   Encounter Diagnosis     ICD-10-CM    1. Acute left-sided low back pain with left-sided sciatica  M54.42                      Subjective: pt reports things aren't too bad, he was only having L sided symptoms over the weekend not anything on the R bc he was not doing anything too strenuous. On arrival to session reports 2/10 in L LE behind the knee that has been intermittent today. He sees pain management again on . Objective: See treatment diary below      Assessment: Tolerated treatment well. Pt dem abolished in R LE symptoms with REIL and NG, pt re-edu if his symptoms are changing with spinal movements that this is more likely due to a lumbar component than due to previous inactivity. Pt dem improved tolerance to FSU today but cont with increased symptoms with bridging. Patient would benefit from continued PT      Plan: Continue per plan of care.       Precautions: lumbar  Daily Exercise Log:  Start of Care: 23  RE: 10/13/2023  POC expires 11/10/2023  FOTO: 10/13/2023  Date 10/17/2023 10/19/2023 10/24/2023 10/26/2023 10/31/2023   Visit # 21 22 23 24 25    Manual                ROM/MMT        Neuro Re-Ed 20' 25' 20' 25' 30'    EIL from qped   1x5 = abolish;  retested later in session 1x5 = abolish again 1x10 = dec/B R calf; 2x during session 1x10; 2x    ALICE w/ B/L knee flexion fem NG   1x10 dec/b quad fatigue; 2x 1x15 15x    Monster walk w/ TB @ ankles RTB @ ankles 20'x1 fwd/bkwd       1/2 knee pallof   Stand 10# 2x10 ea way     Supine fig 4 stretch 30"x2 ea 30"x2 R/L 30"x2 R/L     Sit sciatic NG LAQ w/ PD 2x10 R/L  1x10 R/L; 2x 1x10 R/L; 2x during session 1x10 R/L   Abolished R calf pain after steamboats    Sup unsupp dead bug - w/ pball btwn hands/knees  2x5 ea way      Feet pball bridge 5" 2x10 5" 2x10 Bridge w/ abd blk TB 2x10 Bridge w/ alt kick 1x10 R/L Bridge w/ alt kick 1x10 R/L     Produced L glute pain that did not last at end of activity    Elbow plank  5"x5 prod/w buttock      Bird dog 5"x10 ea Blue   5"x10 ea blk TB 5"x10 ea blk TB    Sup alt leg lowering (SOS to stab contra leg) 2x5 R/L       Stand forward T  Min use rail 2x10 R/L SL dead lift w/ 5# DB w/ rail 2x10 R/L SL dead lift w/ 10# DB 2x10 R/L SL dead lift w/ 10# DB 2x10 R/L   steamboats  Blue TB@ zepeda 2x10 R/L  Blue TB @ shins 1x10 R/L Blue TB @ shins 1x15 R/L     Produced R calf cramping 4/10   Ther Ex 10' 12' 15' 5' 5'    RDL w/ cane for form cuing  2x10 7.5# on cane 2x10     qped to child's pose 1x10       Knee hugs & soldier kicks 20'x2; 2x knee hugs only 20'x4 ea 25'x2 ea 25'x2 ea 25'x2 ea   Stationary lunge w/ rail 5# DB 1x10 ea way Walking lunges at rail 12'x5 Walking lunges 25'x2 R quad fatigues Walking lunges 25'x2 Walking lunges 25'x2    Pt education   See above     HEP   Update & review     Ther Activity 15' 8' 10' 15' 10'    Fwd step up w/ alt knee hike w/ Uni OH press 10# DB 8" step 2x10 R/L   10# DB 8" step 1x10 R/L (prod R calf cramping) 10# 8" 1x10 R/L    Chair squat (taps) w/ OH press W/ B/L HR; 10# DBs 2x10  10# DB's w/ B/L HR 2x10 mild R calf c/o - abolish w/ EIL     OH carry kettlebell  10# 1x1' ea R/L  10# 1'x1 ea R/L 10# 1'x1 ea R/L    Sled push/pull   55# 25'x3 ea fwd/bkwd; 2x symptom onset last lap      Box lift floor to waist/carry 30'/return to floor  15# box 5x mild symptoms during; resolves quickly      Floor to waist & return box lift 20# floor to table & return 2x5   20# floor to table & return 2x10 - R calf cramping 2nd set - resolves w/ EIL & sciatic NG            Modalities                        HEP:  Access Code: [email or web address]  URL: [email or web address]  Date: 10/24/2023  Prepared by:  Ben Turner    Exercises  - Quadruped Hip Drops  - 2 x daily - 2 sets - 5 reps  - Prone on elbows with knee flexion  - 2 x daily - 2 sets - 10 reps  - Supine Hip External Rotation Stretch  - 1 x daily - 3 reps - 30 hold  - Supine Bridge with Resistance Band  - 1 x daily - 2 sets - 10 reps - 5 hold  - Runner's Step Up/Down  - 1 x daily - 2 sets - 10 reps  - Standing Anti-Rotation Press with Anchored Resistance  - 1 x daily - 2 sets - 10 reps  - Mini Squat to Shoulder Press with Barbell  - 1 x daily - 2 sets - 10 reps  - Forward Monster Walks  - 1 x daily - 3 sets - 10 reps  - Static Lunge  - 1 x daily - 2 sets - 10 reps  - Bird Dog with Resistance  - 1 x daily - 10 reps - 5 hold  - Single Leg Deadlift with Kettlebell  - 1 x daily - 2 sets - 10 reps  Access Code: [email or web address]  URL: [email or web address]  Date: 10/02/2023  Prepared by:  Uma Verdin    Exercises  - Cat Cow to Child's Pose  - 3 x daily - 10 reps  - Supine Hip External Rotation Stretch  - 1 x daily - 3 reps - 30 hold  - Supine Bridge with Resistance Band  - 1 x daily - 2 sets - 10 reps - 5 hold  - Isometric Dead Bug  - 1 x daily - 2 sets - 10 reps  - Sidelying Reverse Clamshell  - 1 x daily - 2 sets - 10 reps  - Clam with Resistance (Mirrored)  - 1 x daily - 2 sets - 10 reps  - Quadruped Hip Abduction and External Rotation  - 1 x daily - 10 reps - 3 hold  - Bird Dog  - 1 x daily - 10 reps - 5 hold  - Runner's Step Up/Down  - 1 x daily - 2 sets - 10 reps  - Standing Anti-Rotation Press with Anchored Resistance  - 1 x daily - 2 sets - 10 reps  - Shoulder Extension with Resistance - Palms Forward  - 1 x daily - 2 sets - 15 reps  - Mini Squat to Shoulder Press with Barbell  - 1 x daily - 3 sets - 10 reps  - Forward Monster Walks  - 1 x daily - 3 sets - 10 reps  - Static Lunge  - 1 x daily - 10 reps

## 2023-11-01 ENCOUNTER — OFFICE VISIT (OUTPATIENT)
Dept: PHYSICAL THERAPY | Facility: CLINIC | Age: 63
End: 2023-11-01
Payer: COMMERCIAL

## 2023-11-01 DIAGNOSIS — M54.42 ACUTE LEFT-SIDED LOW BACK PAIN WITH LEFT-SIDED SCIATICA: Primary | ICD-10-CM

## 2023-11-01 DIAGNOSIS — N52.9 ERECTILE DYSFUNCTION, UNSPECIFIED ERECTILE DYSFUNCTION TYPE: ICD-10-CM

## 2023-11-01 PROCEDURE — 97112 NEUROMUSCULAR REEDUCATION: CPT | Performed by: PHYSICAL THERAPIST

## 2023-11-01 PROCEDURE — 97110 THERAPEUTIC EXERCISES: CPT | Performed by: PHYSICAL THERAPIST

## 2023-11-01 RX ORDER — SILDENAFIL 100 MG/1
100 TABLET, FILM COATED ORAL AS NEEDED
Qty: 6 TABLET | Refills: 12 | Status: SHIPPED | OUTPATIENT
Start: 2023-11-01

## 2023-11-01 NOTE — PROGRESS NOTES
Daily Note     Today's date: 2023  Patient name: Fanny Glaser  :   MRN: [id number]  Referring provider: Rossy Talavera MD  Dx:   Encounter Diagnosis     ICD-10-CM    1. Acute left-sided low back pain with left-sided sciatica  M54.42                      Subjective: Pt enters w/ "just a bite" of symptoms in L glut fold and post lat L knee. No R LE symptoms upon arrival.       Objective: See treatment diary below      Assessment: Tolerated treatment well and minor R LE symptoms w/ SL dead lift and walking lunges abolish w/ EIL . Brief L glut/post knee symptoms during activities resolve after a few moments w/o inteverntion. Patient  is progressing w/ functional activities, but continues to develop some radic symptoms R/L. He has management tools to address each. Plan: Continue per plan of care.       Precautions: lumbar  Daily Exercise Log:  Start of Care: 23  RE: 10/13/2023  POC expires 11/10/2023  FOTO: 10/13/2023  Date 2023  10/24/2023 10/26/2023 10/31/2023   Visit # 26  23 24 25    Manual                ROM/MMT        Neuro Re-Ed 30'  20' 25' 30'    EIL from qped 1x10; 3x during session  1x5 = abolish;  retested later in session 1x5 = abolish again 1x10 = dec/B R calf; 2x during session 1x10; 2x    ALICE w/ B/L knee flexion fem NG 2x10  1x10 dec/b quad fatigue; 2x 1x15 15x    Monster walk w/ TB @ ankles Grn TB @ ankles 20'x2 ea fwd/bkwd (L glut prod/NW)       1/2 knee pallof   Stand 10# 2x10 ea way     Supine fig 4 stretch 30"x2 R/L  30"x2 R/L     Sit sciatic NG LAQ w/ PD 1x10 supine w/ SOS  1x10 R/L; 2x 1x10 R/L; 2x during session 1x10 R/L   Abolished R calf pain after steamboats    Sup unsupp dead bug - w/ pball btwn hands/knees        Feet pball bridge Bridge w/ alt kick 2x10 prod L glut 2nd set - relief w/ fig 4  Bridge w/ abd blk TB 2x10 Bridge w/ alt kick 1x10 R/L Bridge w/ alt kick 1x10 R/L     Produced L glute pain that did not last at end of activity    Elbow plank 5" 2x5 prod/NW L lat knee/glut fold       Bird dog    5"x10 ea blk TB 5"x10 ea blk TB    Sup alt leg lowering (SOS to stab contra leg)        SL dead lift  10# DB 2x10 R/L  SL dead lift w/ 5# DB w/ rail 2x10 R/L SL dead lift w/ 10# DB 2x10 R/L SL dead lift w/ 10# DB 2x10 R/L   steamboats    Blue TB @ shins 1x10 R/L Blue TB @ shins 1x15 R/L     Produced R calf cramping 4/10   Ther Ex 15'  15' 5' 5'    RDL w/ cane for form cuing 10# kettlebell 2x10  7.5# on cane 2x10     qped to child's pose        Knee hugs & soldier kicks   25'x2 ea 25'x2 ea 25'x2 ea   Stationary lunge w/ rail Walking lunges 25'x2; 2x  Walking lunges 25'x2 R quad fatigues Walking lunges 25'x2 Walking lunges 25'x2    Upright bike L3x5'       Pt education   See above     HEP   Update & review     Ther Activity   10' 15' 10'    Fwd step up w/ alt knee hike w/ Uni OH press    10# DB 8" step 1x10 R/L (prod R calf cramping) 10# 8" 1x10 R/L    Chair squat (taps) w/ OH press   10# DB's w/ B/L HR 2x10 mild R calf c/o - abolish w/ EIL     OH carry kettlebell    10# 1'x1 ea R/L 10# 1'x1 ea R/L    Sled push/pull   55# 25'x3 ea fwd/bkwd; 2x symptom onset last lap      Box lift floor to waist/carry 30'/return to floor        Floor to waist & return box lift    20# floor to table & return 2x10 - R calf cramping 2nd set - resolves w/ EIL & sciatic NG            Modalities                        HEP:  Access Code: [email or web address]  URL: [email or web address]  Date: 10/24/2023  Prepared by:  Sha     Exercises  - Quadruped Hip Drops  - 2 x daily - 2 sets - 5 reps  - Prone on elbows with knee flexion  - 2 x daily - 2 sets - 10 reps  - Supine Hip External Rotation Stretch  - 1 x daily - 3 reps - 30 hold  - Supine Bridge with Resistance Band  - 1 x daily - 2 sets - 10 reps - 5 hold  - Runner's Step Up/Down  - 1 x daily - 2 sets - 10 reps  - Standing Anti-Rotation Press with Anchored Resistance  - 1 x daily - 2 sets - 10 reps  - Mini Squat to Shoulder Press with Barbell  - 1 x daily - 2 sets - 10 reps  - Forward Monster Walks  - 1 x daily - 3 sets - 10 reps  - Static Lunge  - 1 x daily - 2 sets - 10 reps  - Bird Dog with Resistance  - 1 x daily - 10 reps - 5 hold  - Single Leg Deadlift with Kettlebell  - 1 x daily - 2 sets - 10 reps  Access Code: [email or web address]  URL: [email or web address]  Date: 10/02/2023  Prepared by:  Colt Hackett    Exercises  - Cat Cow to Child's Pose  - 3 x daily - 10 reps  - Supine Hip External Rotation Stretch  - 1 x daily - 3 reps - 30 hold  - Supine Bridge with Resistance Band  - 1 x daily - 2 sets - 10 reps - 5 hold  - Isometric Dead Bug  - 1 x daily - 2 sets - 10 reps  - Sidelying Reverse Clamshell  - 1 x daily - 2 sets - 10 reps  - Clam with Resistance (Mirrored)  - 1 x daily - 2 sets - 10 reps  - Quadruped Hip Abduction and External Rotation  - 1 x daily - 10 reps - 3 hold  - Bird Dog  - 1 x daily - 10 reps - 5 hold  - Runner's Step Up/Down  - 1 x daily - 2 sets - 10 reps  - Standing Anti-Rotation Press with Anchored Resistance  - 1 x daily - 2 sets - 10 reps  - Shoulder Extension with Resistance - Palms Forward  - 1 x daily - 2 sets - 15 reps  - Mini Squat to Shoulder Press with Barbell  - 1 x daily - 3 sets - 10 reps  - Forward Monster Walks  - 1 x daily - 3 sets - 10 reps  - Static Lunge  - 1 x daily - 10 reps

## 2023-11-06 ENCOUNTER — OFFICE VISIT (OUTPATIENT)
Dept: PHYSICAL THERAPY | Facility: CLINIC | Age: 63
End: 2023-11-06
Payer: COMMERCIAL

## 2023-11-06 DIAGNOSIS — M54.42 ACUTE LEFT-SIDED LOW BACK PAIN WITH LEFT-SIDED SCIATICA: Primary | ICD-10-CM

## 2023-11-06 PROCEDURE — 97112 NEUROMUSCULAR REEDUCATION: CPT

## 2023-11-06 PROCEDURE — 97110 THERAPEUTIC EXERCISES: CPT

## 2023-11-06 NOTE — PROGRESS NOTES
Daily Note     Today's date: 2023  Patient name: Tamika Jiang  : [birth year]  MRN: [id number]  Referring provider: Mj Grijalva MD  Dx:   Encounter Diagnosis     ICD-10-CM    1. Acute left-sided low back pain with left-sided sciatica  M54.42                      Subjective: pt reports that the pain in his L LE and the cramping in his R LE are mostly only occurring during physical activity. Pt had R LE cramping while mowing the lawn yesterday. Presents to session with no noted pain in either LE. Objective: See treatment diary below      Assessment: Tolerated treatment well. Pt dem dec symptoms today with previously provoking activities (bridges and elbow planks). Pt had one episode of LE fatigue t/o session. Pt re-edu if his symptoms are directly changing with REIL, these are likely back symptoms in origin. Pt dem improving tolerance to TE. Patient would benefit from continued PT      Plan: Continue per plan of care.       Precautions: lumbar  Daily Exercise Log:  Start of Care: 23  RE: 10/13/2023  POC expires 11/10/2023  FOTO: 10/13/2023  Date 2023 2023  10/26/2023 10/31/2023   Visit # 26 27 RE/FOTO  24 25    Manual                ROM/MMT        Neuro Re-Ed 30' 28'  25' 30'    EIL from qped 1x10; 3x during session 1x10=NE on quad fatigue gradually resolved   1x10 to end   1x10 = dec/B R calf; 2x during session 1x10; 2x    ALICE w/ B/L knee flexion fem NG 2x10 2x10   1x15 15x    Monster walk w/ TB @ ankles Grn TB @ ankles 20'x2 ea fwd/bkwd (L glut prod/NW)       1/2 knee pallof  Tamara 10# 2x10 R/L       Supine fig 4 stretch 30"x2 R/L 30"x3 R/L       Sit sciatic NG LAQ w/ PD 1x10 supine w/ SOS 1x10 supine w/ SOS   1x10 R/L; 2x during session 1x10 R/L   Abolished R calf pain after steamboats    Sup unsupp dead bug - w/ pball btwn hands/knees        Feet pball bridge Bridge w/ alt kick 2x10 prod L glut 2nd set - relief w/ fig 4 Bridge w/ alt kick 2x10  R/L   Bridge w/ alt kick 1x10 R/L Bridge w/ alt kick 1x10 R/L     Produced L glute pain that did not last at end of activity    Elbow plank 5" 2x5 prod/NW L lat knee/glut fold 5" 2x5 no noted symptoms       Bird dog    5"x10 ea blk TB 5"x10 ea blk TB    Sup alt leg lowering (SOS to stab contra leg)  2x10 R/L       SL dead lift  10# DB 2x10 R/L 10# DB 2x10 R/L   SL dead lift w/ 10# DB 2x10 R/L SL dead lift w/ 10# DB 2x10 R/L   steamboats    Blue TB @ shins 1x10 R/L Blue TB @ shins 1x15 R/L     Produced R calf cramping 4/10   Ther Ex 15' 10'  5' 5'    RDL w/ cane for form cuing 10# kettlebell 2x10       qped to child's pose        Knee hugs & soldier kicks    25'x2 ea 25'x2 ea   Stationary lunge w/ rail Walking lunges 25'x2; 2x Walking lunges 20' 10# DB    R quad fatigue   Walking lunges 25'x2 Walking lunges 25'x2    Upright bike L3x5' L3x5'       Pt education        HEP        Ther Activity    15' 10'    Fwd step up w/ alt knee hike w/ Uni OH press    10# DB 8" step 1x10 R/L (prod R calf cramping) 10# 8" 1x10 R/L    Chair squat (taps) w/ OH press  Taps to low mat 10# DB's w/ B/L HR   2x10       OH carry kettlebell    10# 1'x1 ea R/L 10# 1'x1 ea R/L    Sled push/pull fwd/bwd  55#        Box lift floor to waist/carry 30'/return to floor        Floor to waist & return box lift    20# floor to table & return 2x10 - R calf cramping 2nd set - resolves w/ EIL & sciatic NG            Modalities                        HEP:  Access Code: [email or web address]  URL: [email or web address]  Date: 10/24/2023  Prepared by:  Guillermo Kay    Exercises  - Quadruped Hip Drops  - 2 x daily - 2 sets - 5 reps  - Prone on elbows with knee flexion  - 2 x daily - 2 sets - 10 reps  - Supine Hip External Rotation Stretch  - 1 x daily - 3 reps - 30 hold  - Supine Bridge with Resistance Band  - 1 x daily - 2 sets - 10 reps - 5 hold  - Runner's Step Up/Down  - 1 x daily - 2 sets - 10 reps  - Standing Anti-Rotation Press with Anchored Resistance  - 1 x daily - 2 sets - 10 reps  - Mini Squat to Shoulder Press with Barbell  - 1 x daily - 2 sets - 10 reps  - Forward Monster Walks  - 1 x daily - 3 sets - 10 reps  - Static Lunge  - 1 x daily - 2 sets - 10 reps  - Bird Dog with Resistance  - 1 x daily - 10 reps - 5 hold  - Single Leg Deadlift with Kettlebell  - 1 x daily - 2 sets - 10 reps  Access Code: [email or web address]  URL: [email or web address]  Date: 10/02/2023  Prepared by:  Huseyin Tryo    Exercises  - Cat Cow to Child's Pose  - 3 x daily - 10 reps  - Supine Hip External Rotation Stretch  - 1 x daily - 3 reps - 30 hold  - Supine Bridge with Resistance Band  - 1 x daily - 2 sets - 10 reps - 5 hold  - Isometric Dead Bug  - 1 x daily - 2 sets - 10 reps  - Sidelying Reverse Clamshell  - 1 x daily - 2 sets - 10 reps  - Clam with Resistance (Mirrored)  - 1 x daily - 2 sets - 10 reps  - Quadruped Hip Abduction and External Rotation  - 1 x daily - 10 reps - 3 hold  - Bird Dog  - 1 x daily - 10 reps - 5 hold  - Runner's Step Up/Down  - 1 x daily - 2 sets - 10 reps  - Standing Anti-Rotation Press with Anchored Resistance  - 1 x daily - 2 sets - 10 reps  - Shoulder Extension with Resistance - Palms Forward  - 1 x daily - 2 sets - 15 reps  - Mini Squat to Shoulder Press with Barbell  - 1 x daily - 3 sets - 10 reps  - Forward Monster Walks  - 1 x daily - 3 sets - 10 reps  - Static Lunge  - 1 x daily - 10 reps

## 2023-11-07 ENCOUNTER — OFFICE VISIT (OUTPATIENT)
Age: 63
End: 2023-11-07
Payer: COMMERCIAL

## 2023-11-07 VITALS
HEART RATE: 100 BPM | BODY MASS INDEX: 21.67 KG/M2 | WEIGHT: 160 LBS | DIASTOLIC BLOOD PRESSURE: 76 MMHG | SYSTOLIC BLOOD PRESSURE: 140 MMHG | OXYGEN SATURATION: 96 % | HEIGHT: 72 IN

## 2023-11-07 DIAGNOSIS — M54.42 ACUTE LEFT-SIDED LOW BACK PAIN WITH LEFT-SIDED SCIATICA: ICD-10-CM

## 2023-11-07 DIAGNOSIS — M54.50 ACUTE MIDLINE LOW BACK PAIN WITHOUT SCIATICA: Chronic | ICD-10-CM

## 2023-11-07 DIAGNOSIS — M05.79 RHEUMATOID ARTHRITIS INVOLVING MULTIPLE SITES WITH POSITIVE RHEUMATOID FACTOR (HCC): ICD-10-CM

## 2023-11-07 DIAGNOSIS — M54.16 LUMBAR RADICULOPATHY: Primary | ICD-10-CM

## 2023-11-07 PROCEDURE — 99214 OFFICE O/P EST MOD 30 MIN: CPT | Performed by: STUDENT IN AN ORGANIZED HEALTH CARE EDUCATION/TRAINING PROGRAM

## 2023-11-07 NOTE — PROGRESS NOTES
Assessment:  1. Lumbar radiculopathy    2. Acute midline low back pain without sciatica    3. Acute left-sided low back pain with left-sided sciatica    4. Rheumatoid arthritis involving multiple sites with positive rheumatoid factor ([address])      Patient presents as a follow-up visit after last being seen on 9/22/2023 where he underwent  left parasagittal lumbar epidural steroid injection under fluoroscopic guidance. Patient reports his symptoms are better since the last visit, rating his pain as a 3 to sometimes 6 out of 10 on numeric rating scale. He continues to get ongoing relief from the lumbar epidural steroid injection. He states pain is worse when he is under stress depending on the time of day and the pain is intermittent, occurring 30-6% of the time. He describes the pain as sharp, cramping in the bilateral thighs legs and low back. He is currently on gabapentin but is not sure it is making a significant difference in his symptoms. He denies any side effects from the current medication. Discussed with patient that he is to continue with physical therapy through the end of the month. He does report that he feels he is improving with physical therapy and his leg cramping is getting much better. Additionally we did discuss a wean plan for his gabapentin, which should be done by the second week of December. He continues to get ongoing benefit from injection therapy, and this can be repeated in early December. Overall, patient improving but not quite at baseline where he would be able to fill his functions at work. Given this, will recommend patient to return to work for the second week of December allowing him enough time to complete a full course of physical therapy, improve strength, and wean off gabapentin which would limit his functions and duties at work. Patient amenable to this plan. Plan:  Continue gabapentin 300 mg TID.  Wean plan established to wean off by early December  Continue with physical therapy and home exercise therapy  Can repeat L5-S1 LESI in December if symptoms worsen  Follow up as needed     My impressions and treatment recommendations were discussed in detail with the patient who verbalized understanding and had no further questions. Discharge instructions were provided. I personally saw and examined the patient and I agree with the above discussed plan of care. No orders of the defined types were placed in this encounter. No orders of the defined types were placed in this encounter. History of Present Illness:  Muna Seth is a 70 y.o. male who presents for a follow up office visit in regards to Back Pain. Patient presents as a follow-up visit after last being seen on 9/22/2023 where he underwent  left parasagittal lumbar epidural steroid injection under fluoroscopic guidance. Patient reports his symptoms are better since the last visit, rating his pain as a 3 to sometimes 6 out of 10 on numeric rating scale. He continues to get ongoing relief from the lumbar epidural steroid injection. He states pain is worse when he is under stress depending on the time of day and the pain is intermittent, occurring 30-6% of the time. He describes the pain as sharp, cramping in the bilateral thighs legs and low back. He is currently on gabapentin but is not sure it is making a significant difference in his symptoms. He denies any side effects from the current medication. I have personally reviewed and/or updated the patient's past medical history, past surgical history, family history, social history, current medications, allergies, and vital signs today. Review of Systems   Constitutional:  Negative for chills, fatigue, fever and unexpected weight change. HENT:  Negative for ear pain, sore throat and trouble swallowing. Eyes:  Negative for pain, redness and visual disturbance. Respiratory:  Negative for cough and shortness of breath.     Cardiovascular: Negative for chest pain, palpitations and leg swelling. Gastrointestinal:  Negative for abdominal pain, constipation, diarrhea, nausea and vomiting. Endocrine: Negative for polydipsia, polyphagia and polyuria. Genitourinary:  Negative for difficulty urinating, dysuria, frequency and hematuria. Musculoskeletal:  Positive for arthralgias, back pain and gait problem. Negative for joint swelling and myalgias. Skin:  Negative for color change and rash. Allergic/Immunologic: Negative for food allergies. Neurological:  Positive for dizziness. Negative for seizures, syncope, weakness, numbness and headaches. Hematological:  Does not bruise/bleed easily. Psychiatric/Behavioral:  Positive for sleep disturbance. Negative for dysphoric mood. The patient is not nervous/anxious. All other systems reviewed and are negative.       Patient Active Problem List   Diagnosis   • Hyperlipidemia   • Hypothyroidism   • Thrombocytosis   • Rheumatoid arthritis involving multiple sites with positive rheumatoid factor (HCC)   • Lipoma of torso   • Other male erectile dysfunction   • Acute midline low back pain without sciatica   • Acute left-sided low back pain with left-sided sciatica       Past Medical History:   Diagnosis Date   • Arthritis    • Disease of thyroid gland     hypo   • Graves disease 1995   • Hallux limitus, acquired, right    • Hypothyroid     hypo   • Other tear of medial meniscus, current injury, right knee, initial encounter 6/6/2018    Added automatically from request for surgery [id number]   • 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 Alissa Barrera   • RA (rheumatoid arthritis) ([address])    • Rheumatoid arthritis involving multiple sites ([address]) 9/19/2022   • Wears glasses        Past Surgical History:   Procedure Laterality Date   • ANKLE SURGERY Right     ligament, chipped bones,dislocated   • BUNIONECTOMY  03/2014   • COLONOSCOPY     • ELBOW SURGERY Right     tendon surgery   • EPIDURAL BLOCK INJECTION Left 2023    Procedure: left L5-S1, S1 TRANSFORAMINAL epidural steroid injection (36689, 21662); Surgeon: Wolfgang Grant DO;  Location: [facility] OR;  Service: Pain Management    • FOOT ARTHRODESIS, MODIFIED ARELLANO Left    • FRACTURE SURGERY Left     elbow   • HERNIA REPAIR Right     inguinal   • LUMBAR EPIDURAL INJECTION N/A 2023    Procedure: L5-S1 LUMBAR epidural steroid injection (29965); Surgeon: Francisco J Dejesus MD;  Location: [facility] MAIN OR;  Service: Pain Management    • GA ARTHRS KNE SURG W/MENISCECTOMY MED/LAT W/SHVG Right 2018    Procedure: MENISCECTOMY LATERAL /MEDIAL;  Surgeon: Gabriela Fraga MD;  Location: [facility];  Service: Orthopedics   • GA CORRECTION HAMMERTOE Bilateral 2021    Procedure: REPAIR HAMMERTOE / MALLET TOE / CLAW TOE 2ND toe bilateral;  Surgeon: Leonard Chamorro DPM;  Location: [facility];  Service: Podiatry   • [address] W/SESMDC W/RESCJ PROX PHAL Right 2021    Procedure: Errol Kelsey;  Surgeon: Leonard hCamorro DPM;  Location: [facility];  Service: Podiatry   • ROTATOR CUFF REPAIR Left    • WISDOM TOOTH EXTRACTION      x4   • WRIST SURGERY         Family History   Problem Relation Age of Onset   • Cancer Mother         passed   • Hypertension Father    • Heart disease Father         leaky valve   • Skin cancer Father    • Diabetes Father        Social History     Occupational History   • Not on file   Tobacco Use   • Smoking status: Former     Packs/day: 1.00     Years: 25.00     Total pack years: 25.00     Types: Cigarettes     Quit date: 2000     Years since quittin.8     Passive exposure: Past   • Smokeless tobacco: Never   Vaping Use   • Vaping Use: Never used   Substance and Sexual Activity   • Alcohol use:  Yes     Alcohol/week: 3.0 standard drinks of alcohol     Types: 3 Cans of beer per week     Comment: if that   • Drug use: No   • Sexual activity: Yes     Partners: Female       Current Outpatient Medications on File Prior to Visit   Medication Sig   • Cholecalciferol (VITAMIN D3) 1000 units CAPS Take 1,000 Units by mouth   • gabapentin (NEURONTIN) 300 mg capsule Take 1 capsule (300 mg total) by mouth 3 (three) times a day   • Hydroxyurea (HYDREA PO) Take 1,000 mg by mouth Mon-Wed-Fri   • hydroxyurea (HYDREA) 500 mg capsule Take by mouth On Tues-Thurs-Sat-Sun    • levothyroxine 150 mcg tablet TAKE 1 TABLET DAILY   • sildenafil (VIAGRA) 100 mg tablet Take 1 tablet (100 mg total) by mouth if needed for erectile dysfunction As directed   • gabapentin (NEURONTIN) 100 mg capsule Take 1 capsule (100 mg total) by mouth daily at bedtime for 5 days To take for 5 nights after weaning off current prescription of 300 mg TID. No current facility-administered medications on file prior to visit. No Known Allergies    Physical Exam:    /76   Pulse 100   Ht 6' (1.829 m)   Wt 72.6 kg (160 lb)   SpO2 96%   BMI 21.70 kg/m²     Constitutional:normal, well developed, well nourished, alert, in no distress and non-toxic and no overt pain behavior. Eyes:anicteric  HEENT:grossly intact  Neck:supple, symmetric, trachea midline and no masses   Pulmonary:even and unlabored  Cardiovascular:No edema or pitting edema present  Skin:Normal without rashes or lesions and well hydrated  Psychiatric:Mood and affect appropriate  Neurologic:Cranial Nerves II-XII grossly intact  Musculoskeletal:antalgic gait.      Imaging

## 2023-11-09 ENCOUNTER — EVALUATION (OUTPATIENT)
Dept: PHYSICAL THERAPY | Facility: CLINIC | Age: 63
End: 2023-11-09
Payer: COMMERCIAL

## 2023-11-09 DIAGNOSIS — M54.42 ACUTE LEFT-SIDED LOW BACK PAIN WITH LEFT-SIDED SCIATICA: Primary | ICD-10-CM

## 2023-11-09 PROCEDURE — 97112 NEUROMUSCULAR REEDUCATION: CPT | Performed by: PHYSICAL THERAPIST

## 2023-11-09 PROCEDURE — 97110 THERAPEUTIC EXERCISES: CPT | Performed by: PHYSICAL THERAPIST

## 2023-11-09 NOTE — PROGRESS NOTES
PT Re-Evaluation     Today's date: 2023  Patient name: Angie Medina  :   MRN: [id number]  Referring provider: Rylee Barksdale MD  Dx:   Encounter Diagnosis     ICD-10-CM    1. Acute left-sided low back pain with left-sided sciatica  M54.42                      Assessment  Assessment details: 2023: Pt has attended 27 skilled PT sessions and demonstrates improving tolerance to functional activities compared to a month ago. He does still experience radicular symptoms L glut fold and post/lat knee which are brief respond to LE stretching. He also experiences R calf/thigh cramping which abolishes to REIL, however this movement tends to produce L LE symptoms so must be done in limited doses. Pt met w/ pain mgt this week and plans to continue PT until RTW 2023. He will soon begin weaning from Gabapentin. We will extend his POC to allow continued functional activity progression while weaning from meds (cannot RTW on Gabapentin). 10/13/2023: Pt has attended 19 skilled PT sessions originally referred to us via Comp spine, then under Pain management supervision. He has received 2 injections, most recently by Dr Frank Farley who he is not scheduled to see again. He reports he will be continuing w/ his PCP Dr David Carr. Pt demonstrates full lumbar ROM now except for extension (improved) but has end range pain w/ L rotation. He has minimal, but continued L LE symptoms in glut and lateral knee, and reports cramping in R calf. His R LE remains weak in gluts/hams. He has limited but progressing tolerance to carrying at waist level and overhead, stairs and prolongd standing activity. He hopes to progress functional activity tolerance to allow RTW in a month, must be able to lift 50#.    2023 RE: Pt has attended 9 skilled PT visits and has had one pain mgt injection. With this combination, pt is demonstrating and reporting improvement.  His pain has improved from constant to intermittent, he demonstrates improved postures, lumbar AROM and functional mobility. LE MMT is quite good. He remains highly irritable in extension biased positions. Directional preference has been lateral; initially side glide, not rotation in side lying and has been able to progress to sciatic nerve glides and pirif stretching. Pt will benefit from continued PT BIW x4 weeks w/ re-assessment. 8/8/2023 IE: Sonya Heredia is a 70 y.o. male who presents with lumbar derangement with pain, decreased strength, decreased ROM, ambulatory dysfunction and postural dysfunction. Due to these impairments, patient has difficulty performing ADL's, recreational activities, work-related activities, engaging in social activities, ambulation, stair negotiation, lifting/carrying, transfers. Patient's clinical presentation is consistent with their referring diagnosis of Acute left-sided low back pain with left-sided sciatica. Patient has been educated in gait training and plan of care, with home exercises to be introduced as appropriate. Patient would benefit from skilled physical therapy services to address their aforementioned functional limitations and progress towards prior level of function and independence with home exercise program and self symptom management/resolution. Impairments: activity intolerance, impaired physical strength and pain with function  Understanding of Dx/Px/POC: good   Prognosis: good    Goals  Short Term Goals: Target Date 9/5/23  1. Initiate and advance HEP toward self symptom reduction/resolution. - met  2. Improve postural awareness and demonstrate self postural correction. - met  3. Improve AROM lumbar spine to min todd or better t/o. - met  4. Undisturbed sleep. = met  5. Pt to tolerate prolonged sitting/standing x 1 hour or more w/o c/o. - met       Long Term Goals:  REVISED Target Date 12/22/23 - LTG's ongoing  1. Indep with HEP and self symptom prevention. 2. Achieve lumbar AROM to WNL w/o c/o. - MET EXCEPT EXTENSION  3. Improve LE/core strength to good to allow pt to tolerate bending and lifting/carrying 20# x 10/1' w/o c/o. - MET  4. Reduce c/o pain to 0-2/10 in L buttock not LE's w/ normal functional activities. - IMPROVED/NOT MET  5. NEW GOAL: Pt to tolerate stairs, lifting & carrying 40#, lunging w/ 0-2/10 R LE symptoms that resolve within one minute. Plan  Patient would benefit from: skilled PT  Planned modality interventions: thermotherapy: hydrocollator packs, unattended electrical stimulation and cryotherapy  Planned therapy interventions: joint mobilization, patient education, postural training, abdominal trunk stabilization, functional ROM exercises, home exercise program, neuromuscular re-education, strengthening, stretching, therapeutic activities, therapeutic exercise, manual therapy, balance, body mechanics training and gait training  Other planned therapy interventions: mechanical assessment  Frequency: 2x week  Plan of Care beginning date: 11/9/2023  Plan of Care expiration date: 12/22/2023  Treatment plan discussed with: patient      Subjective Evaluation    History of Present Illness  Date of onset: 7/25/2023  Mechanism of injury: 11/9/2023: R calf cramping intermittently w/ weight bearing activity is his c/c 6/10. He continues w/ intermittent L LE symptoms less often/severe than R 5/10 L glut fold and lateral/post knee described as sharp/biting. He states following up w/ pain management yesterday to plan for discharge and RTW hopefully 12/14/23 w/ weaning off Gabapentin to start soon. 10/13/2023: Pt reports symptoms remain intermittent. He has received 2 pain management injections to date. He still has mild L lateral knee and glut, but also reports "cramping" in his right calf and Left leg weakness vs R. He does report improved tolerance to functional activities like prolonged walking, yardwork etc but is still having symptoms and difficulty.  He is now tolerating lumbar extension such as prone lying without issue; and prone on elbows and standing lumbar extension with produced LE symptoms that do not remain. Pt also c/o LE symptoms w/ lifting and carrying in the clinic, and reports some increased LE symptoms w/ repetitive stair negotiation. 2023 RE: Pt reports pain is now intermittent. He has no pain in sidelying and sleeps well. He ambulates w/ one or no crutch. His pain is mostly in L buttock>post/lat knee. He has remaining difficulty tolerating lumbar extension or extension biased positions ie: supine/prone lying. 2023 IE: Patient had lower back pain starting in early  when was digging a trench for his driveway to adjust the placement of the stones that were put down. Pain resolved at that time, with pt returning to work at [employer] with patient reporting return of pain on  when he had to pour some more stone for his driveway, with pain starting the morning after this activity. Worsening pain continued, with pt visiting the ER and getting gabapentin prescribed for his leg pain. Per notes review, pt was prescribed a medrol dose pack on 23, with pt noting no benefit from this. Pt referred to physical therapy, with discussion of potential need for injection also initiated by MD (per pt report). Recurrent probem    Patient Goals  Patient goals for therapy: decreased pain, increased motion, increased strength, independence with ADLs/IADLs and return to work  Patient goal: To be able to take my trip to [place] in September  Pain  Current pain ratin  At best pain ratin  At worst pain ratin  Location: L buttock>L post thigh/lat knee  Quality: cramping, dull ache and tight  Relieving factors: change in position  Aggravating factors: sitting and stair climbing (supine/prone)  Progression: improved    Social Support  Lives with: spouse    Employment status: not working (out of work due to back pain)  Exercise history: patient works a physically active job.        Diagnostic Tests  MRI studies: abnormal (Spondylosis as described above including a left paracentral disc extrusion at L5-S1 which compresses the traversing left S1 nerve root. )  Treatments  Previous treatment: medication  Current treatment: physical therapy      Objective  *=painful  Lumbar AROM todd:  11/9/2023  10/13/2023  9/7/64574  8/8/2023  Flexion:   Nil stand  Nil stand  Nil stand  full in sitting, pain with return to upright. Extension:   Min   Min* Prod/NW lat knee dillan to neutral not able to extend to neutral*  Sideglide Left:  Nil   Nil   Nil*knee  min limitation   Right:    Nkl   Nil   Nil*knee  min-mod limitation*  L rotat in R SL  Nil   Nil* L glut  NT   NT  R rotat in L SL  Nil   Nil   NT   NT      Mechanical Assessment:   11/9/2023; R calf cramping w/ functional activities 4-5/10  REIL 2x5 = abolish calf (prod/NW L glut & lat/post knee)    10/13/2023: intermittent L glut and lateral knee 1-2/10  Rep EIS 1x5 = prod/NW L lat knee  ALICE 30"x3 = prod/NW L glut  Rep lumb flexion from qped 1x10 = abolish    9/7/2023:  ALICE = periph/w (8/31/2023)  Rep SGIS against wall 5"x10 = inc/NW (8/31/2023)  Rep SGIS in doorway 5" 2x10 = NE (8/31/2023)  Rep R rotat in L SL 5"x10 = dec/B 4/10 glut (8/31/2023)  2nd set R rotat in L SL 5"x10= remains 4/10 (8/31/2023)    8/8/2023  Pt initially positioned in R sidelying (preferential position) with difficulty finding any position of comfort. Prone over 2 pillows: able to gradually achieve full prone position, with L rotated pelvis noted initially. MHP applied with gradual ability to shift position. Mild decrease in distal pain noted, with variable nature throughout. Prone over 1 pillow: able to tolerate progression, with slow transition, with some centralization of symptoms also noted. Attempted to go to full prone, with onset of L LE cramping and increased pain demonstrated.      Palpation/observation:   11/9/2023: no lateral shift; dec lumb lordosis in standing; palpation not re-tested  10/13/2023: no lateral shift, pt no longer flexed in standing; remains TTP L pirif in sciatic notch. 9/7/2023: remains TTP L pirif only in sciatic notich. Standing posture w/ absent lumbar lordosis/slightly flexed. No lateral shift. 8/8/23: significant pain with palpation over L medial HS, L ITB, and L piriformis. Patient tolerating most movements poorly overall. Flexed standing posure - leans onto crutches. Gait:   11/9/2023: no remaining known issues w/ tolerance to WB activity, has done a few hours of yardwork at at time. Normal gait. 10/13/2023: ambulates w/o AD, tolerance is > an hour. 9/7/2023: Ambulates w/ single crutch intermittently, gait no longer antalgic w/o AD and pt fully upright w/o crutches  8/8/23: Pt arrives ambulating by leaning onto bilateral crutches, with 2 point gait pattern utilized in flexed position. Crutches raised 1 level, with mild improvement in ability to stand more upright noted by end of session, but with continued significant pain. Transfers:   11/9/20263: no issues w/ bed mobility, transfers or ALICE  10/13/2023: no remaining issues w/ bed mobility, transfers or tolerance to lying positions other than ALICE will produce L glut symptoms. 9/7/2023: Pt demonstrates ease w/ bed mobility and transfers. He has limited nikko to prone and supine w/ legs straight. 8/8/23: Pt able to complete bed mobility and transfers with UE assistance with increased time, caution and effort required. Painful movements noted throughout session.      Strength: 11/9/2023 11/9/2023 10/13/2023 10/13/2023    Right  Left  Right  Left  Hip flex 5/5  5/5  5/5  5/5  Knee ext 5/5  5/5  5/5  4+/5  Knee flex 5/5  5/5  5/5  4/5*  Ankle DF 5/5  5/5  5/5  5/5  Hip ER  5/5  5/5  5/5  5/5  Hip IR  5/5  5/5  5/5  5/5  Hip abd 5/5  5/5  5/5  5/5  Hip exten 5/5 4/5*L knee 5/5*  4/5*     Functional strength:  11/9/2023 - elbow plank symptom onset at 15 sec L post/lat knee - tolerates 30 sec to fatigue   R/L Uni HR 1x5 without pain - nikko well    Special tests 11/9/2023 10/13/2023 9/7/2023  SLR  - b/L 85 deg B/L 85 deg +L 45 deg  Crossed SLR  - b/l 85 deg B/L 85 deg +R Repro L sx    Function:  11/9/2023 - No L LE symptoms w/ lumb exten in standing or ALICE, but prod/NW w/ REIL   25# box lift/carry 40' x2 = no c/o  sled push/pull 65# 20'x3 ea fwd/bkd - no c/o;    25# box lift floor/waist 5/10 R calf cramping - abolished w/ RE LE cramping w/ REIL 2x5   Walking lunges 10# DB's 20'x2 2/10 R calf cramping   Squat press w/ B/L 15# DB's 1x10 = no c/o   10# OH carry 90 sec - no c/o (UE fatigue)   Reports R calf/thigh cramping w/ recip stairs 1 flight   Sitting tolerance estimated at 90 minutes - develops L LE symtoms     10/13/2023 - increased/produced L glut/LE pain with: lumbar extension in standing or ALICE,   Increased/produced L glut/LE pain w/ 20# floor to waist lift w/ carry 40', repetitive stairs, and 10# uni arm OH carry x1'. 55# sled push/pull 20'x2 each today without c/o. Reports increased cramping in R calf w repetitive stairs or prolonged walking >30-60 minutes.   B/L squat press w/ 10# DB's 2x10 w/o c/o and 1  15# box/waist lift and return 1x10 w/o c/o.  9/7/2023 - increased pain w/ stairs, supine/prone lying and prolonged sitting (pressure on L glut)       Daily Exercise Log:  Start of Care: 8/8/23  RE: 11/9/2023  POC expires 12/22/2023  FOTO: 10/31/2023  Date 11/1/2023 11/6/2023 11/9/2023  10/31/2023   Visit # 26 27 28  RE/FOTO   25    Manual   5'             ROM/MMT   TT     Neuro Re-Ed 30' 35' 15'  30'    EIL from qped 1x10; 3x during session 1x10=NE on quad fatigue gradually resolved   1x10 to end  1x10 prod/NW L lat knee  1x10; 2x    ALICE w/ B/L knee flexion fem NG 2x10 2x10    15x    Monster walk w/ TB @ ankles Grn TB @ ankles 20'x2 ea fwd/bkwd (L glut prod/NW)       1/2 knee pallof  [place] 10# 2x10 R/L       Supine fig 4 stretch 30"x2 R/L 30"x3 R/L       Sit sciatic NG LAQ w/ PD 1x10 supine w/ SOS 1x10 supine w/ SOS  1x10 supine w/ SOS R/L  1x10 R/L   Abolished R calf pain after steamboats    Bridge w/ alt kick Bridge w/ alt kick 2x10 prod L glut 2nd set - relief w/ fig 4 Bridge w/ alt kick 2x10  R/L  2x10 R/L no c/o  Bridge w/ alt kick 1x10 R/L     Produced L glute pain that did not last at end of activity    Elbow plank 5" 2x5 prod/NW L lat knee/glut fold 5" 2x5 no noted symptoms  30"x1 (symptom onset @ 15")     Bird dog     5"x10 ea blk TB    Sup alt leg lowering (SOS to stab contra leg)  2x10 R/L       SL dead lift  10# DB 2x10 R/L 10# DB 2x10 R/L  10# DB 2x10 R/L - some R LE relief  SL dead lift w/ 10# DB 2x10 R/L   steamboats     Blue TB @ shins 1x15 R/L     Produced R calf cramping 4/10   Ther Ex 15' 10'   5'    RDL w/ cane for form cuing 10# kettlebell 2x10       qped to child's pose        Knee hugs & soldier kicks     25'x2 ea   Walking lunges Walking lunges 25'x2; 2x Walking lunges 20' 10# DB    R quad fatigue  10# DB's 20'x2 2/10 R calf cramping  Walking lunges 25'x2    Upright bike L3x5' L3x5'       Pt education        HEP        Ther Activity   25'  10'    Fwd step up w/ alt knee hike w/ Uni OH press     10# 8" 1x10 R/L    Chair squat (taps) w/ OH press  Taps to low mat 10# DB's w/ B/L HR   2x10  Taps to low mat 15# DB's 1x10 no HR no c/o     OH carry kettlebell   10# 90 sec no c/o  10# 1'x1 ea R/L    Sled push/pull fwd/bwd  55#   65# 20'x3 fwd/bkwd no c/o     Box lift floor to waist/carry 30'/return to floor   25# box lift/carry 40' & return; 2x no c/o     Floor to waist & return box lift   25# 1x10 5/10 R calf - abol w/ REIL             Modalities                        HEP:  Access Code: [email or web address]  URL: [email or web address]  Date: 10/24/2023  Prepared by:  Lincoln Quintero    Exercises  - Quadruped Hip Drops  - 2 x daily - 2 sets - 5 reps  - Prone on elbows with knee flexion  - 2 x daily - 2 sets - 10 reps  - Supine Hip External Rotation Stretch  - 1 x daily - 3 reps - 30 hold  - Supine Bridge with Resistance Band  - 1 x daily - 2 sets - 10 reps - 5 hold  - Runner's Step Up/Down  - 1 x daily - 2 sets - 10 reps  - Standing Anti-Rotation Press with Anchored Resistance  - 1 x daily - 2 sets - 10 reps  - Mini Squat to Shoulder Press with Barbell  - 1 x daily - 2 sets - 10 reps  - Forward Monster Walks  - 1 x daily - 3 sets - 10 reps  - Static Lunge  - 1 x daily - 2 sets - 10 reps  - Bird Dog with Resistance  - 1 x daily - 10 reps - 5 hold  - Single Leg Deadlift with Kettlebell  - 1 x daily - 2 sets - 10 reps

## 2023-11-13 ENCOUNTER — OFFICE VISIT (OUTPATIENT)
Dept: PHYSICAL THERAPY | Facility: CLINIC | Age: 63
End: 2023-11-13
Payer: COMMERCIAL

## 2023-11-13 DIAGNOSIS — M54.42 ACUTE LEFT-SIDED LOW BACK PAIN WITH LEFT-SIDED SCIATICA: Primary | ICD-10-CM

## 2023-11-13 PROCEDURE — 97112 NEUROMUSCULAR REEDUCATION: CPT | Performed by: PHYSICAL THERAPIST

## 2023-11-13 NOTE — PROGRESS NOTES
Daily Note     Today's date: 2023  Patient name: Christopher Veras  : [id number]  MRN: [id number]  Referring provider: Martha Tabares MD  Dx:   Encounter Diagnosis     ICD-10-CM    1. Acute left-sided low back pain with left-sided sciatica  M54.42                      Subjective: pt reports no L LE symptoms today during his session. R calf cramping relieved by EIL and SL dead lift. Pt states he plans on RTW 23. Objective: See treatment diary below      Assessment: Tolerated treatment well. Patient demonstrated fatigue post treatment and would benefit from continued PT      Plan: Continue per plan of care.       Daily Exercise Log:  Start of Care: 23  RE: 2023  POC expires 2023  FOTO: 10/31/2023  Date 2023    Visit # 26 27 28  RE/FOTO  29    Manual   5'             ROM/MMT   TT     Neuro Re-Ed 30' 35' 15' 20'    EIL from qped 1x10; 3x during session 1x10=NE on quad fatigue gradually resolved   1x10 to end  1x10 prod/NW L lat knee 1x10; 2x during session for R calf cramping relief    ALICE w/ B/L knee flexion fem NG 2x10 2x10   2x10    Monster walk w/ TB @ ankles Grn TB @ ankles 20'x2 ea fwd/bkwd (L glut prod/NW)   Blue TB @ ankles 20'x1 fwd/bkwd; 2x    1/2 knee pallof  [place] 10# 2x10 R/L       Supine fig 4 stretch 30"x2 R/L 30"x3 R/L       Sit sciatic NG LAQ w/ PD 1x10 supine w/ SOS 1x10 supine w/ SOS  1x10 supine w/ SOS R/L Sit 1x10 R/L    Bridge w/ alt kick Bridge w/ alt kick 2x10 prod L glut 2nd set - relief w/ fig 4 Bridge w/ alt kick 2x10  R/L  2x10 R/L no c/o     Elbow plank 5" 2x5 prod/NW L lat knee/glut fold 5" 2x5 no noted symptoms  30"x1 (symptom onset @ 15")     Bird dog        Sup alt leg lowering (SOS to stab contra leg)  2x10 R/L       SL dead lift  10# DB 2x10 R/L 10# DB 2x10 R/L  10# DB 2x10 R/L - some R LE relief 10# DB 2x10 R/L - some R LE relief    steamboats        Ther Ex 15' 10'  10'    RDL w/ cane for form cuing 10# nevaeh 2x10   10# on cane 2x10    qped to child's pose        Knee hugs & soldier kicks    Knee hugs 20'x4    Walking lunges Walking lunges 25'x2; 2x Walking lunges 20' 10# DB    R quad fatigue  10# DB's 20'x2 2/10 R calf cramping 0# 20'x4 R calf cramping    Upright bike L3x5' L3x5'   L3x5'    Pt education        HEP        Ther Activity   25' 15'    Fwd step up w/ alt knee hike w/ Uni OH press    10" step 10# DB 2x10 R/L    Chair squat (taps) w/ OH press  Taps to low mat 10# DB's w/ B/L HR   2x10  Taps to low mat 15# DB's 1x10 no HR no c/o Taps to low mat w/ OH press w/ HR  15# DB's 2x10    OH carry kettlebell   10# 90 sec no c/o     Sled push/pull fwd/bwd  55#   65# 20'x3 fwd/bkwd no c/o     Box lift floor to waist/carry 30'/return to floor   25# box lift/carry 40' & return; 2x no c/o     Floor to waist & return box lift   25# 1x10 5/10 R calf - abol w/ REIL 25# 2x10 VC's for form            Modalities                        HEP:  Access Code: [email or web address]  URL: [email or web address]  Date: 10/24/2023  Prepared by:  Caio Briseno    Exercises  - Quadruped Hip Drops  - 2 x daily - 2 sets - 5 reps  - Prone on elbows with knee flexion  - 2 x daily - 2 sets - 10 reps  - Supine Hip External Rotation Stretch  - 1 x daily - 3 reps - 30 hold  - Supine Bridge with Resistance Band  - 1 x daily - 2 sets - 10 reps - 5 hold  - Runner's Step Up/Down  - 1 x daily - 2 sets - 10 reps  - Standing Anti-Rotation Press with Anchored Resistance  - 1 x daily - 2 sets - 10 reps  - Mini Squat to Shoulder Press with Barbell  - 1 x daily - 2 sets - 10 reps  - Forward Monster Walks  - 1 x daily - 3 sets - 10 reps  - Static Lunge  - 1 x daily - 2 sets - 10 reps  - Bird Dog with Resistance  - 1 x daily - 10 reps - 5 hold  - Single Leg Deadlift with Kettlebell  - 1 x daily - 2 sets - 10 reps    Daily Exercise Log:  Start of Care: 8/8/23  RE: 11/9/2023  POC expires 12/22/2023  FOTO: 10/31/2023  Date 11/1/2023 11/6/2023 11/9/2023 11/13/2023 Visit # 26 27 28  RE/FOTO  29    Manual   5'             ROM/MMT   TT     Neuro Re-Ed 30' 35' 15'     EIL from qped 1x10; 3x during session 1x10=NE on quad fatigue gradually resolved   1x10 to end  1x10 prod/NW L lat knee     ALICE w/ B/L knee flexion fem NG 2x10 2x10       Monster walk w/ TB @ ankles Grn TB @ ankles 20'x2 ea fwd/bkwd (L glut prod/NW)       1/2 knee pallof  [place] 10# 2x10 R/L       Supine fig 4 stretch 30"x2 R/L 30"x3 R/L         Sit sciatic NG LAQ w/ PD 1x10 supine w/ SOS 1x10 supine w/ SOS  1x10 supine w/ SOS R/L     Bridge w/ alt kick Bridge w/ alt kick 2x10 prod L glut 2nd set - relief w/ fig 4 Bridge w/ alt kick 2x10  R/L  2x10 R/L no c/o       Elbow plank  5" 2x5 prod/NW L lat knee/glut fold  5" 2x5 no noted symptoms   30"x1 (symptom onset @ 15")      Bird dog          5"x10 ea blk TB   Sup alt leg lowering (SOS to stab contra leg)    2x10 R/L         SL dead lift   10# DB 2x10 R/L  10# DB 2x10 R/L   10# DB 2x10 R/L - some R LE relief    SL dead lift w/ 10# DB 2x10 R/L  steamboats          Blue TB @ shins 1x15 R/L     Produced R calf cramping 4/10  Ther Ex  15'  10'      5'   RDL w/ cane for form cuing  10# kettlebell 2x10          qped to child's pose            Knee hugs & soldier kicks          25'x2 ea  Walking lunges  Walking lunges 25'x2; 2x  Walking lunges 20' 10# DB    R quad fatigue   10# DB's 20'x2 2/10 R calf cramping    Walking lunges 25'x2   Upright bike  L3x5'  L3x5'         Pt education            HEP            Ther Activity      25'    10'   Fwd step up w/ alt knee hike w/ Uni OH press          10# 8" 1x10 R/L   Chair squat (taps) w/ OH press    Taps to low mat 10# DB's w/ B/L HR   2x10   Taps to low mat 15# DB's 1x10 no HR no c/o      OH carry kettlebell      10# 90 sec no c/o    10# 1'x1 ea R/L   Sled push/pull fwd/bwd   55#     65# 20'x3 fwd/bkwd no c/o      Box lift floor to waist/carry 30'/return to floor      25# box lift/carry 40' & return; 2x no c/o      Floor to waist & return box lift      25# 1x10 5/10 R calf - abol w/ REIL                  Modalities                                    HEP:  Access Code: [email or web address]  URL: [email or web address]  Date: 10/24/2023  Prepared by:  Max Jumper    Exercises  - Quadruped Hip Drops  - 2 x daily - 2 sets - 5 reps  - Prone on elbows with knee flexion  - 2 x daily - 2 sets - 10 reps  - Supine Hip External Rotation Stretch  - 1 x daily - 3 reps - 30 hold  - Supine Bridge with Resistance Band  - 1 x daily - 2 sets - 10 reps - 5 hold  - Runner's Step Up/Down  - 1 x daily - 2 sets - 10 reps  - Standing Anti-Rotation Press with Anchored Resistance  - 1 x daily - 2 sets - 10 reps  - Mini Squat to Shoulder Press with Barbell  - 1 x daily - 2 sets - 10 reps  - Forward Monster Walks  - 1 x daily - 3 sets - 10 reps  - Static Lunge  - 1 x daily - 2 sets - 10 reps  - Bird Dog with Resistance  - 1 x daily - 10 reps - 5 hold  - Single Leg Deadlift with Kettlebell  - 1 x daily - 2 sets - 10 reps

## 2023-11-16 ENCOUNTER — TELEPHONE (OUTPATIENT)
Dept: PHYSICAL THERAPY | Facility: CLINIC | Age: 63
End: 2023-11-16

## 2023-11-16 ENCOUNTER — APPOINTMENT (OUTPATIENT)
Dept: PHYSICAL THERAPY | Facility: CLINIC | Age: 63
End: 2023-11-16
Payer: COMMERCIAL

## 2023-11-20 ENCOUNTER — OFFICE VISIT (OUTPATIENT)
Dept: PHYSICAL THERAPY | Facility: CLINIC | Age: 63
End: 2023-11-20
Payer: COMMERCIAL

## 2023-11-20 DIAGNOSIS — M54.42 ACUTE LEFT-SIDED LOW BACK PAIN WITH LEFT-SIDED SCIATICA: Primary | ICD-10-CM

## 2023-11-20 PROCEDURE — 97112 NEUROMUSCULAR REEDUCATION: CPT | Performed by: PHYSICAL THERAPIST

## 2023-11-20 PROCEDURE — 97110 THERAPEUTIC EXERCISES: CPT | Performed by: PHYSICAL THERAPIST

## 2023-11-20 PROCEDURE — 97530 THERAPEUTIC ACTIVITIES: CPT | Performed by: PHYSICAL THERAPIST

## 2023-11-20 NOTE — PROGRESS NOTES
Daily Note     Today's date: 2023  Patient name: Christopher Veras  :   MRN: [id number]  Referring provider: Martha Tabares MD  Dx:   Encounter Diagnosis     ICD-10-CM    1. Acute left-sided low back pain with left-sided sciatica  M54.42                      Subjective: Pt reports he has started weaning process from Gabapentin. Objective: See treatment diary below      Assessment: Tolerated treatment well and and for the first time did not experience cramping in his right leg with activities in the clinic . Patient would benefit from continued PT      Plan: Continue per plan of care.       Daily Exercise Log:  Start of Care: 23  RE: 2023  POC expires 2023  FOTO: 10/31/2023  Date 2023    Visit # 26 27 28  RE/FOTO  29    Manual   5'             ROM/MMT   TT     Neuro Re-Ed 30' 35' 15' 20' 15'   EIL from qped 1x10; 3x during session 1x10=NE on quad fatigue gradually resolved   1x10 to end  1x10 prod/NW L lat knee 1x10; 2x during session for R calf cramping relief    ALICE w/ B/L knee flexion fem NG 2x10 2x10   2x10    Monster walk w/ TB @ ankles Grn TB @ ankles 20'x2 ea fwd/bkwd (L glut prod/NW)   Blue TB @ ankles 20'x1 fwd/bkwd; 2x Blue TB @ ankles 20'x2 fwd/bkwd; 2x   1/2 knee pallof  [place] 10# 2x10 R/L       Supine fig 4 stretch 30"x2 R/L 30"x3 R/L       Sit sciatic NG LAQ w/ PD 1x10 supine w/ SOS 1x10 supine w/ SOS  1x10 supine w/ SOS R/L Sit 1x10 R/L    Bridge w/ alt kick Bridge w/ alt kick 2x10 prod L glut 2nd set - relief w/ fig 4 Bridge w/ alt kick 2x10  R/L  2x10 R/L no c/o  Feet on pball bridges 5" 2x10   Elbow plank 5" 2x5 prod/NW L lat knee/glut fold 5" 2x5 no noted symptoms  30"x1 (symptom onset @ 15")  10"x5; 2x   Bird dog     Blk TB 2x10 ea way   Sup alt leg lowering (SOS to stab contra leg)  2x10 R/L       SL dead lift  10# DB 2x10 R/L 10# DB 2x10 R/L  10# DB 2x10 R/L - some R LE relief 10# DB 2x10 R/L - some R LE relief 10# DB 2x10 R/L   steamboats        Ther Ex 15' 10'  10' 10'   RDL w/ cane for form cuing 10# kettlebell 2x10   10# on cane 2x10    Knee hugs & soldier kicks    Knee hugs 20'x4 Knee hugs 20'x4   Walking lunges Walking lunges 25'x2; 2x Walking lunges 20' 10# DB    R quad fatigue  10# DB's 20'x2 2/10 R calf cramping 0# 20'x4 R calf cramping 20'x2; 2x  0#   Upright bike L3x5' L3x5'   L3x5' L3x5'   Pt education        HEP        Ther Activity   25' 15' 20'   Fwd step up w/ alt knee hike w/ Uni OH press    10" step 10# DB 2x10 R/L    Chair squat (taps) w/ OH press  Taps to low mat 10# DB's w/ B/L HR   2x10  Taps to low mat 15# DB's 1x10 no HR no c/o Taps to low mat w/ OH press w/ HR  15# DB's 2x10 Taps to low mat w/ OH press w/ HR 15# DB's 2x10   OH carry kettlebell   10# 90 sec no c/o     Sled push/pull fwd/bwd  55#   65# 20'x3 fwd/bkwd no c/o  70# 20'x2 fwd/bkwd; 2x   Box lift floor to waist/carry 30'/return to floor   25# box lift/carry 40' & return; 2x no c/o     Floor to waist & return box lift   25# 1x10 5/10 R calf - abol w/ REIL 25# 2x10 VC's for form 25# 2x10 w/ 180 deg pivot & turn    OH Pball wall dribble     1'x2   Modalities                        HEP:  Access Code: [email or web address]  URL: [email or web address]  Date: 10/24/2023  Prepared by:  Jose Nobles    Exercises  - Quadruped Hip Drops  - 2 x daily - 2 sets - 5 reps  - Prone on elbows with knee flexion  - 2 x daily - 2 sets - 10 reps  - Supine Hip External Rotation Stretch  - 1 x daily - 3 reps - 30 hold  - Supine Bridge with Resistance Band  - 1 x daily - 2 sets - 10 reps - 5 hold  - Runner's Step Up/Down  - 1 x daily - 2 sets - 10 reps  - Standing Anti-Rotation Press with Anchored Resistance  - 1 x daily - 2 sets - 10 reps  - Mini Squat to Shoulder Press with Barbell  - 1 x daily - 2 sets - 10 reps  - Forward Monster Walks  - 1 x daily - 3 sets - 10 reps  - Static Lunge  - 1 x daily - 2 sets - 10 reps  - Bird Dog with Resistance  - 1 x daily - 10 reps - 5 hold  - Single Leg Deadlift with Kettlebell  - 1 x daily - 2 sets - 10 reps    Daily Exercise Log:  Start of Care: 8/8/23  RE: 11/9/2023  POC expires 12/22/2023  FOTO: 10/31/2023  Date 11/1/2023 11/6/2023 11/9/2023 11/13/2023 11/20/2023   Visit # 26 27 28  RE/FOTO  29 30   Manual   5'             ROM/MMT   TT     Neuro Re-Ed 30' 35' 15'     EIL from qped 1x10; 3x during session 1x10=NE on quad fatigue gradually resolved   1x10 to end  1x10 prod/NW L lat knee     ALICE w/ B/L knee flexion fem NG 2x10 2x10       Monster walk w/ TB @ ankles Grn TB @ ankles 20'x2 ea fwd/bkwd (L glut prod/NW)       1/2 knee pallof  [place] 10# 2x10 R/L       Supine fig 4 stretch 30"x2 R/L 30"x3 R/L         Sit sciatic NG LAQ w/ PD 1x10 supine w/ SOS 1x10 supine w/ SOS  1x10 supine w/ SOS R/L     Bridge w/ alt kick Bridge w/ alt kick 2x10 prod L glut 2nd set - relief w/ fig 4 Bridge w/ alt kick 2x10  R/L  2x10 R/L no c/o       Elbow plank  5" 2x5 prod/NW L lat knee/glut fold  5" 2x5 no noted symptoms   30"x1 (symptom onset @ 15")      Bird dog          5"x10 ea blk TB   Sup alt leg lowering (SOS to stab contra leg)    2x10 R/L         SL dead lift   10# DB 2x10 R/L  10# DB 2x10 R/L   10# DB 2x10 R/L - some R LE relief    SL dead lift w/ 10# DB 2x10 R/L  steamboats          Blue TB @ shins 1x15 R/L     Produced R calf cramping 4/10  Ther Ex  15'  10'      5'   RDL w/ cane for form cuing  10# kettlebell 2x10          qped to child's pose            Knee hugs & soldier kicks          25'x2 ea  Walking lunges  Walking lunges 25'x2; 2x  Walking lunges 20' 10# DB    R quad fatigue   10# DB's 20'x2 2/10 R calf cramping    Walking lunges 25'x2   Upright bike  L3x5'  L3x5'         Pt education            HEP            Ther Activity      25'    10'   Fwd step up w/ alt knee hike w/ Uni OH press          10# 8" 1x10 R/L   Chair squat (taps) w/ OH press    Taps to low mat 10# DB's w/ B/L HR   2x10   Taps to low mat 15# DB's 1x10 no HR no c/o      OH carry kettlebell      10# 90 sec no c/o    10# 1'x1 ea R/L   Sled push/pull fwd/bwd   55#     65# 20'x3 fwd/bkwd no c/o      Box lift floor to waist/carry 30'/return to floor      25# box lift/carry 40' & return; 2x no c/o      Floor to waist & return box lift      25# 1x10 5/10 R calf - abol w/ REIL                  Modalities                                    HEP:  Access Code: [email or web address]  URL: [email or web address]  Date: 10/24/2023  Prepared by:  Edward Mcneill    Exercises  - Quadruped Hip Drops  - 2 x daily - 2 sets - 5 reps  - Prone on elbows with knee flexion  - 2 x daily - 2 sets - 10 reps  - Supine Hip External Rotation Stretch  - 1 x daily - 3 reps - 30 hold  - Supine Bridge with Resistance Band  - 1 x daily - 2 sets - 10 reps - 5 hold  - Runner's Step Up/Down  - 1 x daily - 2 sets - 10 reps  - Standing Anti-Rotation Press with Anchored Resistance  - 1 x daily - 2 sets - 10 reps  - Mini Squat to Shoulder Press with Barbell  - 1 x daily - 2 sets - 10 reps  - Forward Monster Walks  - 1 x daily - 3 sets - 10 reps  - Static Lunge  - 1 x daily - 2 sets - 10 reps  - Bird Dog with Resistance  - 1 x daily - 10 reps - 5 hold  - Single Leg Deadlift with Kettlebell  - 1 x daily - 2 sets - 10 reps

## 2023-11-22 ENCOUNTER — OFFICE VISIT (OUTPATIENT)
Dept: PHYSICAL THERAPY | Facility: CLINIC | Age: 63
End: 2023-11-22
Payer: COMMERCIAL

## 2023-11-22 DIAGNOSIS — M54.42 ACUTE LEFT-SIDED LOW BACK PAIN WITH LEFT-SIDED SCIATICA: Primary | ICD-10-CM

## 2023-11-22 LAB
T4 FREE SERPL-MCNC: 1.5 NG/DL (ref 0.8–1.8)
TSH SERPL-ACNC: 0.28 MIU/L (ref 0.4–4.5)

## 2023-11-22 PROCEDURE — 97112 NEUROMUSCULAR REEDUCATION: CPT | Performed by: PHYSICAL THERAPIST

## 2023-11-22 PROCEDURE — 97530 THERAPEUTIC ACTIVITIES: CPT | Performed by: PHYSICAL THERAPIST

## 2023-11-22 PROCEDURE — 97110 THERAPEUTIC EXERCISES: CPT | Performed by: PHYSICAL THERAPIST

## 2023-11-22 NOTE — PROGRESS NOTES
Daily Note     Today's date: 2023  Patient name: Jovan Regalado  :   MRN: [id number]  Referring provider: Aleyda Cancino MD  Dx:   Encounter Diagnosis     ICD-10-CM    1. Acute left-sided low back pain with left-sided sciatica  M54.42                      Subjective: Pt enters w/ no c/o. He has worn his work shoes to PT both sessions this week (steel toe). "I have a lot more confidence for returning to work now, and I don't think I'll need another epidural". Objective: See treatment diary below      Assessment: Tolerated treatment well and had no c/o during session w/ increased lifting, carrying, anti-rotation force etc . Patient  should be ready to D/C from PT as planned in early December. Plan: Continue per plan of care.       Daily Exercise Log:  Start of Care: 23  RE: 2023  POC expires 2023  FOTO: 10/31/2023  Date 2023   Visit # 31  28  RE/FOTO  29 30   Manual   5'             ROM/MMT   TT     Neuro Re-Ed 20'  15' 20' 15'   EIL from qped 1x10 - no c/o  1x10 prod/NW L lat knee 1x10; 2x during session for R calf cramping relief    ALICE w/ B/L knee flexion fem NG    2x10    Monster walk w/ TB @ ankles    Blue TB @ ankles 20'x1 fwd/bkwd; 2x Blue TB @ ankles 20'x2 fwd/bkwd; 2x   pallof Pallof posit side steps; dbl grn; 2 stepsx5 ea way; 2x       Supine fig 4 stretch        Sit sciatic NG LAQ w/ PD   1x10 supine w/ SOS R/L Sit 1x10 R/L    Bridge w/ alt kick 2x10  2x10 R/L no c/o  Feet on pball bridges 5" 2x10   Elbow plank 10"x5; 2x  30"x1 (symptom onset @ 15")  10"x5; 2x   Bird dog     Blk TB 2x10 ea way   SL dead lift  15# 2x10 R/L  10# DB 2x10 R/L - some R LE relief 10# DB 2x10 R/L - some R LE relief 10# DB 2x10 R/L   steamboats Grn TB @ ankles 2x10 R/L       Ther Ex 10'   10' 10'   RDL's 10# DB's 2x10   10# on cane 2x10    Knee hugs & soldier kicks Knee hugs w/ HR 20'x2; soldier kicks 20'x2   Knee hugs 20'x4 Knee hugs 20'x4   Walking lunges   10# DB's 20'x2 2/10 R calf cramping 0# 20'x4 R calf cramping 20'x2; 2x  0#   Upright bike L3x5'   L3x5' L3x5'   Pt education        HEP        Ther Activity 15'  25' 15' 20'   Fwd step up w/ alt knee hike w/ Uni OH press 10" step 10# DB 2x10 R/L   10" step 10# DB 2x10 R/L    Chair squat (taps) w/ OH press Taps to low mat 15# DB's 2x10  Taps to low mat 15# DB's 1x10 no HR no c/o Taps to low mat w/ OH press w/ HR  15# DB's 2x10 Taps to low mat w/ OH press w/ HR 15# DB's 2x10   Uni farmer's carry 25# 50'x2 ea R/L UE  10# 90 sec no c/o     Sled push/pull fwd/bwd    65# 20'x3 fwd/bkwd no c/o  70# 20'x2 fwd/bkwd; 2x   Box lift floor to waist/carry 30'/return to floor 25# box lift/carry 20'; 4x  25# box lift/carry 40' & return; 2x no c/o     Floor to waist & return box lift   25# 1x10 5/10 R calf - abol w/ REIL 25# 2x10 VC's for form 25# 2x10 w/ 180 deg pivot & turn    OH Pball wall dribble     1'x2   Modalities                        HEP:  Access Code: [email or web address]  URL: [email or web address]  Date: 10/24/2023  Prepared by:  Prabha Hopper    Exercises  - Quadruped Hip Drops  - 2 x daily - 2 sets - 5 reps  - Prone on elbows with knee flexion  - 2 x daily - 2 sets - 10 reps  - Supine Hip External Rotation Stretch  - 1 x daily - 3 reps - 30 hold  - Supine Bridge with Resistance Band  - 1 x daily - 2 sets - 10 reps - 5 hold  - Runner's Step Up/Down  - 1 x daily - 2 sets - 10 reps  - Standing Anti-Rotation Press with Anchored Resistance  - 1 x daily - 2 sets - 10 reps  - Mini Squat to Shoulder Press with Barbell  - 1 x daily - 2 sets - 10 reps  - Forward Monster Walks  - 1 x daily - 3 sets - 10 reps  - Static Lunge  - 1 x daily - 2 sets - 10 reps  - Bird Dog with Resistance  - 1 x daily - 10 reps - 5 hold  - Single Leg Deadlift with Kettlebell  - 1 x daily - 2 sets - 10 reps    Daily Exercise Log:  Start of Care: 8/8/23  RE: 11/9/2023  POC expires 12/22/2023  FOTO: 10/31/2023  Date 11/1/2023 11/6/2023 11/9/2023 11/13/2023 11/20/2023   Visit # 26 27 28  RE/FOTO  29 30   Manual   5'             ROM/MMT   TT     Neuro Re-Ed 30' 35' 15'     EIL from qped 1x10; 3x during session 1x10=NE on quad fatigue gradually resolved   1x10 to end  1x10 prod/NW L lat knee     ALICE w/ B/L knee flexion fem NG 2x10 2x10       Monster walk w/ TB @ ankles Grn TB @ ankles 20'x2 ea fwd/bkwd (L glut prod/NW)       1/2 knee pallof  [place] 10# 2x10 R/L       Supine fig 4 stretch 30"x2 R/L 30"x3 R/L         Sit sciatic NG LAQ w/ PD 1x10 supine w/ SOS 1x10 supine w/ SOS  1x10 supine w/ SOS R/L     Bridge w/ alt kick Bridge w/ alt kick 2x10 prod L glut 2nd set - relief w/ fig 4 Bridge w/ alt kick 2x10  R/L  2x10 R/L no c/o       Elbow plank  5" 2x5 prod/NW L lat knee/glut fold  5" 2x5 no noted symptoms   30"x1 (symptom onset @ 15")      Bird dog          5"x10 ea blk TB   Sup alt leg lowering (SOS to stab contra leg)    2x10 R/L         SL dead lift   10# DB 2x10 R/L  10# DB 2x10 R/L   10# DB 2x10 R/L - some R LE relief    SL dead lift w/ 10# DB 2x10 R/L  steamboats          Blue TB @ shins 1x15 R/L     Produced R calf cramping 4/10  Ther Ex  15'  10'      5'   RDL w/ cane for form cuing  10# kettlebell 2x10          qped to child's pose            Knee hugs & soldier kicks          25'x2 ea  Walking lunges  Walking lunges 25'x2; 2x  Walking lunges 20' 10# DB    R quad fatigue   10# DB's 20'x2 2/10 R calf cramping    Walking lunges 25'x2   Upright bike  L3x5'  L3x5'         Pt education            HEP            Ther Activity      25'    10'   Fwd step up w/ alt knee hike w/ Uni OH press          10# 8" 1x10 R/L   Chair squat (taps) w/ OH press    Taps to low mat 10# DB's w/ B/L HR   2x10   Taps to low mat 15# DB's 1x10 no HR no c/o      OH carry kettlebell      10# 90 sec no c/o    10# 1'x1 ea R/L   Sled push/pull fwd/bwd   55#     65# 20'x3 fwd/bkwd no c/o      Box lift floor to waist/carry 30'/return to floor      25# box lift/carry 40' & return; 2x no c/o      Floor to waist & return box lift      25# 1x10 5/10 R calf - abol w/ REIL                  Modalities                                    HEP:  Access Code: [email or web address]  URL: [email or web address]  Date: 10/24/2023  Prepared by:  Brisa Monique    Exercises  - Quadruped Hip Drops  - 2 x daily - 2 sets - 5 reps  - Prone on elbows with knee flexion  - 2 x daily - 2 sets - 10 reps  - Supine Hip External Rotation Stretch  - 1 x daily - 3 reps - 30 hold  - Supine Bridge with Resistance Band  - 1 x daily - 2 sets - 10 reps - 5 hold  - Runner's Step Up/Down  - 1 x daily - 2 sets - 10 reps  - Standing Anti-Rotation Press with Anchored Resistance  - 1 x daily - 2 sets - 10 reps  - Mini Squat to Shoulder Press with Barbell  - 1 x daily - 2 sets - 10 reps  - Forward Monster Walks  - 1 x daily - 3 sets - 10 reps  - Static Lunge  - 1 x daily - 2 sets - 10 reps  - Bird Dog with Resistance  - 1 x daily - 10 reps - 5 hold  - Single Leg Deadlift with Kettlebell  - 1 x daily - 2 sets - 10 reps

## 2023-11-24 ENCOUNTER — APPOINTMENT (OUTPATIENT)
Dept: PHYSICAL THERAPY | Facility: CLINIC | Age: 63
End: 2023-11-24
Payer: COMMERCIAL

## 2023-11-27 ENCOUNTER — OFFICE VISIT (OUTPATIENT)
Dept: PHYSICAL THERAPY | Facility: CLINIC | Age: 63
End: 2023-11-27
Payer: COMMERCIAL

## 2023-11-27 DIAGNOSIS — M54.42 ACUTE LEFT-SIDED LOW BACK PAIN WITH LEFT-SIDED SCIATICA: Primary | ICD-10-CM

## 2023-11-27 PROCEDURE — 97530 THERAPEUTIC ACTIVITIES: CPT | Performed by: PHYSICAL THERAPIST

## 2023-11-27 PROCEDURE — 97110 THERAPEUTIC EXERCISES: CPT | Performed by: PHYSICAL THERAPIST

## 2023-11-27 PROCEDURE — 97112 NEUROMUSCULAR REEDUCATION: CPT | Performed by: PHYSICAL THERAPIST

## 2023-11-28 ENCOUNTER — OFFICE VISIT (OUTPATIENT)
Dept: FAMILY MEDICINE CLINIC | Facility: CLINIC | Age: 63
End: 2023-11-28
Payer: COMMERCIAL

## 2023-11-28 ENCOUNTER — DOCUMENTATION (OUTPATIENT)
Dept: FAMILY MEDICINE CLINIC | Facility: CLINIC | Age: 63
End: 2023-11-28

## 2023-11-28 VITALS
SYSTOLIC BLOOD PRESSURE: 140 MMHG | HEIGHT: 72 IN | DIASTOLIC BLOOD PRESSURE: 72 MMHG | OXYGEN SATURATION: 97 % | HEART RATE: 83 BPM | WEIGHT: 160 LBS | BODY MASS INDEX: 21.67 KG/M2

## 2023-11-28 DIAGNOSIS — N52.8 OTHER MALE ERECTILE DYSFUNCTION: ICD-10-CM

## 2023-11-28 DIAGNOSIS — M05.79 RHEUMATOID ARTHRITIS INVOLVING MULTIPLE SITES WITH POSITIVE RHEUMATOID FACTOR (HCC): ICD-10-CM

## 2023-11-28 DIAGNOSIS — E03.9 ACQUIRED HYPOTHYROIDISM: ICD-10-CM

## 2023-11-28 DIAGNOSIS — M54.42 ACUTE LEFT-SIDED LOW BACK PAIN WITH LEFT-SIDED SCIATICA: Primary | ICD-10-CM

## 2023-11-28 DIAGNOSIS — E78.5 HYPERLIPIDEMIA, UNSPECIFIED HYPERLIPIDEMIA TYPE: ICD-10-CM

## 2023-11-28 DIAGNOSIS — D75.839 THROMBOCYTOSIS: ICD-10-CM

## 2023-11-28 PROCEDURE — 99214 OFFICE O/P EST MOD 30 MIN: CPT | Performed by: INTERNAL MEDICINE

## 2023-11-28 NOTE — ASSESSMENT & PLAN NOTE
Patient as mentioned in HPI doing much better he is planning to return to work on December 15. Physical therapy note appreciated they are going to discharge him on December 7.   Exam is unremarkable at this time movements are not restricted with the.

## 2023-11-28 NOTE — ASSESSMENT & PLAN NOTE
Patient with hydroxyurea 1000 mcg alternating with 500 being followed by the hematologist and will follow-up with CBC report.

## 2023-11-28 NOTE — ASSESSMENT & PLAN NOTE
Patient is currently not on any medications related to rheumatoid arthritis no symptoms at this present time to suggest rheumatoid arthritis activity.   Will monitor

## 2023-11-28 NOTE — ASSESSMENT & PLAN NOTE
Patient is currently taking 150 mcg of levothyroxine patient's TSH level is at 0.28 it was 1.55. Symptoms wise he is doing fine no evidence of any hyperthyroidism symptoms. I will recommend patient to continue the current dosage of 150 mcg repeat the TSH level in 4 to 6 weeks time and if it is still running low will adjust the dosage of the levothyroxine 137 mcg and follow-up.

## 2023-11-28 NOTE — PROGRESS NOTES
Name: Jovan Regalado      : [birth year]      MRN: [id number]  Encounter Provider: Aleyda Cancino MD  Encounter Date: 2023   Encounter department: [address] [place] [address] Road     1. Acute left-sided low back pain with left-sided sciatica  Assessment & Plan:  Patient as mentioned in HPI doing much better he is planning to return to work on December 15. Physical therapy note appreciated they are going to discharge him on . Exam is unremarkable at this time movements are not restricted with the.      2. Hyperlipidemia, unspecified hyperlipidemia type    3. Acquired hypothyroidism  Assessment & Plan:  Patient is currently taking 150 mcg of levothyroxine patient's TSH level is at 0.28 it was 1.55. Symptoms wise he is doing fine no evidence of any hyperthyroidism symptoms. I will recommend patient to continue the current dosage of 150 mcg repeat the TSH level in 4 to 6 weeks time and if it is still running low will adjust the dosage of the levothyroxine 137 mcg and follow-up. 4. Other male erectile dysfunction  Assessment & Plan: On sildenafil as needed      5. Rheumatoid arthritis involving multiple sites with positive rheumatoid factor ([address])  Assessment & Plan:  Patient is currently not on any medications related to rheumatoid arthritis no symptoms at this present time to suggest rheumatoid arthritis activity. Will monitor      6. Thrombocytosis  Assessment & Plan:  Patient with hydroxyurea 1000 mcg alternating with 500 being followed by the hematologist and will follow-up with CBC report. Subjective      Patient is coming here for a follow-up evaluation with regards to his symptoms of low back pain radiating down the left lower extremity is much better. Physical therapy has helped a lot patient able to do more activities and lift some weights also without any problem. No tingling numbness feeling no cramping of the muscles.   Medications reviewed labs reviewed-TSH level came back slightly low but he has been on the same doses of medication of 150 mcg daily for several years patient's T4 is normal.  No symptoms of hyperthyroidism including palpitations diarrhea or sweating. We will write a return to work note for December 15      Review of Systems   Constitutional:  Negative for chills and fever. HENT:  Negative for ear pain and sore throat. Eyes:  Negative for pain and visual disturbance. Respiratory:  Negative for cough and shortness of breath. Cardiovascular:  Negative for chest pain and palpitations. Gastrointestinal:  Negative for abdominal pain and vomiting. Genitourinary:  Negative for dysuria and hematuria. Musculoskeletal:  Negative for arthralgias and back pain. Skin:  Negative for color change and rash. Neurological:  Negative for seizures and syncope. All other systems reviewed and are negative. Current Outpatient Medications on File Prior to Visit   Medication Sig    Cholecalciferol (VITAMIN D3) 1000 units CAPS Take 1,000 Units by mouth    gabapentin (NEURONTIN) 100 mg capsule Take 1 capsule (100 mg total) by mouth daily at bedtime for 5 days To take for 5 nights after weaning off current prescription of 300 mg TID.    gabapentin (NEURONTIN) 300 mg capsule Take 1 capsule (300 mg total) by mouth 3 (three) times a day    Hydroxyurea (HYDREA PO) Take 1,000 mg by mouth Mon-Wed-Fri    hydroxyurea (HYDREA) 500 mg capsule Take by mouth On Tues-Thurs-Sat-Sun     levothyroxine 150 mcg tablet TAKE 1 TABLET DAILY    sildenafil (VIAGRA) 100 mg tablet Take 1 tablet (100 mg total) by mouth if needed for erectile dysfunction As directed       Objective     /72 (BP Location: Left arm, Patient Position: Sitting, Cuff Size: Standard)   Pulse 83   Ht 6' (1.829 m)   Wt 72.6 kg (160 lb)   SpO2 97%   BMI 21.70 kg/m²     Physical Exam  Vitals and nursing note reviewed. Constitutional:       Appearance: Normal appearance. Cardiovascular:      Rate and Rhythm: Normal rate and regular rhythm. Heart sounds: Normal heart sounds. Pulmonary:      Effort: Pulmonary effort is normal.      Breath sounds: Normal breath sounds. Abdominal:      Palpations: Abdomen is soft. Musculoskeletal:         General: Normal range of motion. Right lower leg: No edema. Left lower leg: No edema. Skin:     General: Skin is warm and dry. Neurological:      General: No focal deficit present. Mental Status: He is alert and oriented to person, place, and time.    Psychiatric:         Mood and Affect: Mood normal.         Behavior: Behavior normal.       Recent Results (from the past 1344 hour(s))   TSH, 3rd generation with Free T4 reflex    Collection Time: 11/22/23  9:48 AM   Result Value Ref Range    TSH W/RFX TO FREE T4 0.28 (L) 0.40 - 4.50 mIU/L   T4, free    Collection Time: 11/22/23  9:48 AM   Result Value Ref Range    Free t4 1.5 0.8 - 1.8 ng/dL      Olga Alcaraz MD

## 2023-11-30 ENCOUNTER — OFFICE VISIT (OUTPATIENT)
Dept: PHYSICAL THERAPY | Facility: CLINIC | Age: 63
End: 2023-11-30
Payer: COMMERCIAL

## 2023-11-30 DIAGNOSIS — M54.42 ACUTE LEFT-SIDED LOW BACK PAIN WITH LEFT-SIDED SCIATICA: Primary | ICD-10-CM

## 2023-11-30 PROCEDURE — 97110 THERAPEUTIC EXERCISES: CPT

## 2023-11-30 PROCEDURE — 97112 NEUROMUSCULAR REEDUCATION: CPT

## 2023-11-30 NOTE — PROGRESS NOTES
Daily Note     Today's date: 2023  Patient name: Asiya Ricardo  : [birth year]  MRN: [id number]  Referring provider: Gautam Felix MD  Dx:   Encounter Diagnosis     ICD-10-CM    1. Acute left-sided low back pain with left-sided sciatica  M54.42                      Subjective: pt reports to session with no noted pain. "I am going back to work in [address] so I am just finishing out these 3 appts to prepare" I cut my foot on glass so it is a little sore. Objective: See treatment diary below      Assessment: Tolerated treatment well. Pt dem mild R LE symptoms after 40 min of prior activity. This was abolished with hip drops. Pt dem improved tolerance to TE and appropriate for approaching DC . Patient would benefit from continued PT      Plan: Continue per plan of care.       Daily Exercise Log:  Start of Care: 23  RE: 2023  POC expires 2023  FOTO: 2023  Date 2023   Visit # 32 32 33 29 30   Manual                ROM/MMT        Neuro Re-Ed 20' 13' 15'  20' 15'   EIL from qped 1x10 - no c/o  1x10 to end  1x10; 2x during session for R calf cramping relief    ALICE w/ B/L knee flexion fem NG    2x10    Monster walk w/ TB @ ankles    Blue TB @ ankles 20'x1 fwd/bkwd; 2x Blue TB @ ankles 20'x2 fwd/bkwd; 2x   pallof Pallof posit side steps; dbl grn; 2 stepsx5 ea way; 2x Pallof posit side steps; dbl grn; 2 steps 5x ea way; 2x Pallof posit side steps; dbl grn; 2 steps 5x ea way; 2x      Supine fig 4 stretch  20"x3 R/L 20"x3 R/L      Sit sciatic NG LAQ w/ PD   Sitting 1x10 R/L  Sit 1x10 R/L    Bridge w/ alt kick 2x10 2x10  2x10   Feet on pball bridges 5" 2x10   Elbow plank 10"x5; 2x  10"x5; 2x   10"x5; 2x   Bird dog  Blk TB 5" 2x10 ea way Blk TB 5" 2x10 ea way  Blk TB 2x10 ea way   SL dead lift  15# 2x10 R/L    10# DB 2x10 R/L - some R LE relief 10# DB 2x10 R/L   steamboats Grn TB @ ankles 2x10 R/L       Ther Ex 10' 10' 30'  10' 10'   RDL's 10# DB's 2x10 10# DB's 2x12 10# DB 2x12  10# on cane 2x10    Knee hugs & soldier kicks Knee hugs w/ HR 20'x2; soldier kicks 20'x2 Knee hugs w/ HR 20'x2 20'x2 ea Knee hugs 20'x4 Knee hugs 20'x4   Walking lunges  20'x2 10# DB's; 2x 20'x2 10# DB's; 2x  0# 20'x4 R calf cramping 20'x2; 2x  0#   Upright bike L3x5'   L3x5' L3x5'   Pt education        HEP        Ther Activity 15' 20'  15' 20'   Fwd step up w/ alt knee hike w/ Uni OH press 10" step 10# DB 2x10 R/L  10" step 10# DB 2x10 R/L   10" step 10# DB 2x10 R/L    Chair squat (taps) w/ OH press Taps to low mat 15# DB's 2x10 Taps to low mat w/ HR & OH press; 15# DB's 2x12 Taps to low mat w/ HR & OH press; 15# DB's 2x12  Taps to low mat w/ OH press w/ HR  15# DB's 2x10 Taps to low mat w/ OH press w/ HR 15# DB's 2x10   Uni farmer's carry 25# 50'x2 ea R/L UE 30# 50'x2 ea R/L UE      Sled push/pull fwd/bwd   70# 20'x2 fwd/bkwd; 4 laps 70# 20'x2 fwd/bkwd; 4  laps   70# 20'x2 fwd/bkwd; 2x   Box lift floor to waist/carry 30'/return to floor 25# box lift/carry 20'; 4x  30# box lift/carry 20'; 4x     Floor to waist & return box lift    25# 2x10 VC's for form 25# 2x10 w/ 180 deg pivot & turn    OH Pball wall dribble     1'x2   OH carry 10# kettlebell  1' ea R/L 1' ea R/L      Modalities                        HEP:  Access Code: [email or web address]  URL: [email or web address]  Date: 10/24/2023  Prepared by:  Colt Hackett    Exercises  - Quadruped Hip Drops  - 2 x daily - 2 sets - 5 reps  - Prone on elbows with knee flexion  - 2 x daily - 2 sets - 10 reps  - Supine Hip External Rotation Stretch  - 1 x daily - 3 reps - 30 hold  - Supine Bridge with Resistance Band  - 1 x daily - 2 sets - 10 reps - 5 hold  - Runner's Step Up/Down  - 1 x daily - 2 sets - 10 reps  - Standing Anti-Rotation Press with Anchored Resistance  - 1 x daily - 2 sets - 10 reps  - Mini Squat to Shoulder Press with Barbell  - 1 x daily - 2 sets - 10 reps  - Forward Monster Walks  - 1 x daily - 3 sets - 10 reps  - Static Lunge - 1 x daily - 2 sets - 10 reps  - Bird Dog with Resistance  - 1 x daily - 10 reps - 5 hold  - Single Leg Deadlift with Kettlebell  - 1 x daily - 2 sets - 10 reps    Daily Exercise Log:  Start of Care: 8/8/23  RE: 11/9/2023  POC expires 12/22/2023  FOTO: 10/31/2023  Date 11/1/2023 11/6/2023 11/9/2023 11/13/2023 11/20/2023   Visit # 26 27 28  RE/FOTO  29 30   Manual   5'             ROM/MMT   TT     Neuro Re-Ed 30' 35' 15'     EIL from qped 1x10; 3x during session 1x10=NE on quad fatigue gradually resolved   1x10 to end  1x10 prod/NW L lat knee     ALICE w/ B/L knee flexion fem NG 2x10 2x10       Monster walk w/ TB @ ankles Grn TB @ ankles 20'x2 ea fwd/bkwd (L glut prod/NW)       1/2 knee pallof  [place] 10# 2x10 R/L       Supine fig 4 stretch 30"x2 R/L 30"x3 R/L         Sit sciatic NG LAQ w/ PD 1x10 supine w/ SOS 1x10 supine w/ SOS  1x10 supine w/ SOS R/L     Bridge w/ alt kick Bridge w/ alt kick 2x10 prod L glut 2nd set - relief w/ fig 4 Bridge w/ alt kick 2x10  R/L  2x10 R/L no c/o       Elbow plank  5" 2x5 prod/NW L lat knee/glut fold  5" 2x5 no noted symptoms   30"x1 (symptom onset @ 15")      Bird dog          5"x10 ea blk TB   Sup alt leg lowering (SOS to stab contra leg)    2x10 R/L         SL dead lift   10# DB 2x10 R/L  10# DB 2x10 R/L   10# DB 2x10 R/L - some R LE relief    SL dead lift w/ 10# DB 2x10 R/L  steamboats          Blue TB @ shins 1x15 R/L     Produced R calf cramping 4/10  Ther Ex  15'  10'      5'   RDL w/ cane for form cuing  10# kettlebell 2x10          qped to child's pose            Knee hugs & soldier kicks          25'x2 ea  Walking lunges  Walking lunges 25'x2; 2x  Walking lunges 20' 10# DB    R quad fatigue   10# DB's 20'x2 2/10 R calf cramping    Walking lunges 25'x2   Upright bike  L3x5'  L3x5'         Pt education            HEP            Ther Activity      25'    10'   Fwd step up w/ alt knee hike w/ Uni OH press          10# 8" 1x10 R/L   Chair squat (taps) w/ OH press    Taps to low mat 10# DB's w/ B/L HR   2x10   Taps to low mat 15# DB's 1x10 no HR no c/o      OH carry kettlebell      10# 90 sec no c/o    10# 1'x1 ea R/L   Sled push/pull fwd/bwd   55#     65# 20'x3 fwd/bkwd no c/o      Box lift floor to waist/carry 30'/return to floor      25# box lift/carry 40' & return; 2x no c/o      Floor to waist & return box lift      25# 1x10 5/10 R calf - abol w/ REIL                  Modalities                                    HEP:  Access Code: [email or web address]  URL: [email or web address]  Date: 10/24/2023  Prepared by:  Uma Verdin    Exercises  - Quadruped Hip Drops  - 2 x daily - 2 sets - 5 reps  - Prone on elbows with knee flexion  - 2 x daily - 2 sets - 10 reps  - Supine Hip External Rotation Stretch  - 1 x daily - 3 reps - 30 hold  - Supine Bridge with Resistance Band  - 1 x daily - 2 sets - 10 reps - 5 hold  - Runner's Step Up/Down  - 1 x daily - 2 sets - 10 reps  - Standing Anti-Rotation Press with Anchored Resistance  - 1 x daily - 2 sets - 10 reps  - Mini Squat to Shoulder Press with Barbell  - 1 x daily - 2 sets - 10 reps  - Forward Monster Walks  - 1 x daily - 3 sets - 10 reps  - Static Lunge  - 1 x daily - 2 sets - 10 reps  - Bird Dog with Resistance  - 1 x daily - 10 reps - 5 hold  - Single Leg Deadlift with Kettlebell  - 1 x daily - 2 sets - 10 reps

## 2023-12-04 ENCOUNTER — OFFICE VISIT (OUTPATIENT)
Dept: PHYSICAL THERAPY | Facility: CLINIC | Age: 63
End: 2023-12-04
Payer: COMMERCIAL

## 2023-12-04 DIAGNOSIS — M54.42 ACUTE LEFT-SIDED LOW BACK PAIN WITH LEFT-SIDED SCIATICA: Primary | ICD-10-CM

## 2023-12-04 PROCEDURE — 97110 THERAPEUTIC EXERCISES: CPT | Performed by: PHYSICAL THERAPIST

## 2023-12-04 PROCEDURE — 97530 THERAPEUTIC ACTIVITIES: CPT | Performed by: PHYSICAL THERAPIST

## 2023-12-04 PROCEDURE — 97112 NEUROMUSCULAR REEDUCATION: CPT | Performed by: PHYSICAL THERAPIST

## 2023-12-04 NOTE — PROGRESS NOTES
Daily Note     Today's date: 2023  Patient name: Angie Medina  :   MRN: [id number]  Referring provider: Rylee Barksdale MD  Dx:   Encounter Diagnosis     ICD-10-CM    1. Acute left-sided low back pain with left-sided sciatica  M54.42                      Subjective: Pt states he is doing well and is ready to RTW as planned. Objective: See treatment diary below      Assessment: Tolerated treatment well. Patient  has tolerated functional activity progression well and reports no remaining symptoms. Plan: Potential discharge next visit.      Daily Exercise Log:  Start of Care: 23  RE: 2023  POC expires 2023  FOTO: 2023  Date 2023    Visit # 32 32 33 34    Manual                ROM/MMT        Neuro Re-Ed 20' 15' 15'  15'    EIL from qped 1x10 - no c/o  1x10 to end  1x10 no c/o    pallof Pallof posit side steps; dbl grn; 2 stepsx5 ea way; 2x Pallof posit side steps; dbl grn; 2 steps 5x ea way; 2x Pallof posit side steps; dbl grn; 2 steps 5x ea way; 2x  Pallof posit side steps; dbl grn; 2 steps 5x ea way; 2x    Supine fig 4 stretch  20"x3 R/L 20"x3 R/L      Sit sciatic NG LAQ w/ PD   Sitting 1x10 R/L      Bridge w/ alt kick 2x10 2x10  2x10  SL bridge 2x10 R/L    Elbow plank 10"x5; 2x  10"x5; 2x  15"x5; 2x    Bird dog  Blk TB 5" 2x10 ea way Blk TB 5" 2x10 ea way Qped hip rainbows 2# 2x10 R/L    SL dead lift  15# 2x10 R/L    15# 2x10 R/L    steamboats Grn TB @ ankles 2x10 R/L       Ther Ex 10' 10' 30'  10'    RDL's 10# DB's 2x10 10# DB's 2x12 10# DB 2x12      Knee hugs & soldier kicks Knee hugs w/ HR 20'x2; soldier kicks 20'x2 Knee hugs w/ HR 20'x2 20'x2 ea Knee hugs w/ HR 20'x2    Walking lunges  20'x2 10# DB's; 2x 20'x2 10# DB's; 2x  20'x2; 2x no wgt    Upright bike L3x5'   L3x5'    Pt education        HEP        Ther Activity 15' 20'  20'    Fwd step up w/ alt knee hike w/ Uni OH press 10" step 10# DB 2x10 R/L  10" step 10# DB 2x10 R/L 10" step  15# DB 2x10 R/L    Chair squat (taps) w/ OH press Taps to low mat 15# DB's 2x10 Taps to low mat w/ HR & OH press; 15# DB's 2x12 Taps to low mat w/ HR & OH press; 15# DB's 2x12      Uni farmer's carry 25# 50'x2 ea R/L UE 30# 50'x2 ea R/L UE  30# 50'x2 ea R/L    Sled push/pull fwd/bwd   70# 20'x2 fwd/bkwd; 4 laps 70# 20'x2 fwd/bkwd; 4  laps      Box lift floor to waist/carry 30'/return to floor 25# box lift/carry 20'; 4x  30# box lift/carry 20'; 4x 35# box lift/carry 20' 4x; 2x    Floor to waist & return box lift    35# box 2x10    OH Pball wall dribble        OH carry 10# kettlebell  1' ea R/L 1' ea R/L      Modalities                        HEP:  Access Code: [email or web address]  URL: [email or web address]  Date: 10/24/2023  Prepared by:  Real Exon    Exercises  - Quadruped Hip Drops  - 2 x daily - 2 sets - 5 reps  - Prone on elbows with knee flexion  - 2 x daily - 2 sets - 10 reps  - Supine Hip External Rotation Stretch  - 1 x daily - 3 reps - 30 hold  - Supine Bridge with Resistance Band  - 1 x daily - 2 sets - 10 reps - 5 hold  - Runner's Step Up/Down  - 1 x daily - 2 sets - 10 reps  - Standing Anti-Rotation Press with Anchored Resistance  - 1 x daily - 2 sets - 10 reps  - Mini Squat to Shoulder Press with Barbell  - 1 x daily - 2 sets - 10 reps  - Forward Monster Walks  - 1 x daily - 3 sets - 10 reps  - Static Lunge  - 1 x daily - 2 sets - 10 reps  - Bird Dog with Resistance  - 1 x daily - 10 reps - 5 hold  - Single Leg Deadlift with Kettlebell  - 1 x daily - 2 sets - 10 reps

## 2023-12-07 ENCOUNTER — OFFICE VISIT (OUTPATIENT)
Dept: PHYSICAL THERAPY | Facility: CLINIC | Age: 63
End: 2023-12-07
Payer: COMMERCIAL

## 2023-12-07 DIAGNOSIS — M54.42 ACUTE LEFT-SIDED LOW BACK PAIN WITH LEFT-SIDED SCIATICA: Primary | ICD-10-CM

## 2023-12-07 PROCEDURE — 97110 THERAPEUTIC EXERCISES: CPT | Performed by: PHYSICAL THERAPIST

## 2023-12-07 PROCEDURE — 97112 NEUROMUSCULAR REEDUCATION: CPT | Performed by: PHYSICAL THERAPIST

## 2023-12-07 PROCEDURE — 97530 THERAPEUTIC ACTIVITIES: CPT | Performed by: PHYSICAL THERAPIST

## 2023-12-07 NOTE — PROGRESS NOTES
PT Discharge    Today's date: 2023  Patient name: Patricia Garrido  :   MRN: [id number]  Referring provider: Mar Qui MD  Dx:   Encounter Diagnosis     ICD-10-CM    1. Acute left-sided low back pain with left-sided sciatica  M54.42                      Assessment  Assessment details: 2023: Pt has attended 35 skilled PT sessions and has had no c/o in 3 weeks with excellent tolerance of functional activity progression for lifting, carrying, pushing, pulling and lumbar/LE full ROM. He has reached PLOF and states he is ready to RTW. All goals met. D/C today. 2023: Pt has attended 27 skilled PT sessions and demonstrates improving tolerance to functional activities compared to a month ago. He does still experience radicular symptoms L glut fold and post/lat knee which are brief respond to LE stretching. He also experiences R calf/thigh cramping which abolishes to REIL, however this movement tends to produce L LE symptoms so must be done in limited doses. Pt met w/ pain mgt this week and plans to continue PT until RTW 2023. He will soon begin weaning from Gabapentin. We will extend his POC to allow continued functional activity progression while weaning from meds (cannot RTW on Gabapentin). 10/13/2023: Pt has attended 19 skilled PT sessions originally referred to us via Comp spine, then under Pain management supervision. He has received 2 injections, most recently by Dr lD Ruiz who he is not scheduled to see again. He reports he will be continuing w/ his PCP Dr Lynda Unger. Pt demonstrates full lumbar ROM now except for extension (improved) but has end range pain w/ L rotation. He has minimal, but continued L LE symptoms in glut and lateral knee, and reports cramping in R calf. His R LE remains weak in gluts/hams. He has limited but progressing tolerance to carrying at waist level and overhead, stairs and prolongd standing activity.  He hopes to progress functional activity tolerance to allow RTW in a month, must be able to lift 50#.    9/7/2023 RE: Pt has attended 9 skilled PT visits and has had one pain mgt injection. With this combination, pt is demonstrating and reporting improvement. His pain has improved from constant to intermittent, he demonstrates improved postures, lumbar AROM and functional mobility. LE MMT is quite good. He remains highly irritable in extension biased positions. Directional preference has been lateral; initially side glide, not rotation in side lying and has been able to progress to sciatic nerve glides and pirif stretching. Pt will benefit from continued PT BIW x4 weeks w/ re-assessment. 8/8/2023 IE: Ning Montero is a 70 y.o. male who presents with lumbar derangement with pain, decreased strength, decreased ROM, ambulatory dysfunction and postural dysfunction. Due to these impairments, patient has difficulty performing ADL's, recreational activities, work-related activities, engaging in social activities, ambulation, stair negotiation, lifting/carrying, transfers. Patient's clinical presentation is consistent with their referring diagnosis of Acute left-sided low back pain with left-sided sciatica. Patient has been educated in gait training and plan of care, with home exercises to be introduced as appropriate. Patient would benefit from skilled physical therapy services to address their aforementioned functional limitations and progress towards prior level of function and independence with home exercise program and self symptom management/resolution. Understanding of Dx/Px/POC: good   Prognosis: good    Goals  Short Term Goals: Target Date 9/5/23  1. Initiate and advance HEP toward self symptom reduction/resolution. - met  2. Improve postural awareness and demonstrate self postural correction. - met  3. Improve AROM lumbar spine to min todd or better t/o. - met  4. Undisturbed sleep. = met  5.  Pt to tolerate prolonged sitting/standing x 1 hour or more w/o c/o. - met       Long Term Goals:  REVISED Target Date 12/22/23  1. Indep with HEP and self symptom prevention. - MET  2. Achieve lumbar AROM to WNL w/o c/o. - MET EXCEPT EXTENSION MIN HILLIARD NO PAIN  3. Improve LE/core strength to good to allow pt to tolerate bending and lifting/carrying 20# x 10/1' w/o c/o. - MET  4. Reduce c/o pain to 0-2/10 in L buttock not LE's w/ normal functional activities. - MET  5. NEW GOAL: Pt to tolerate stairs, lifting & carrying 40#, lunging w/ 0-2/10 R LE symptoms that resolve within one minute. - MET W/ NO C/O    Plan  Planned therapy interventions: home exercise program  Other planned therapy interventions: mechanical assessment  Plan of Care beginning date: 11/9/2023  Plan of Care expiration date: 12/22/2023  Treatment plan discussed with: patient        Subjective Evaluation    History of Present Illness  Date of onset: 7/25/2023  Mechanism of injury: 12/7/2023: Pt reports full resolution of R LE cramping, L hamstring/buttock pain, LE paresthesias and LE weakness. He states his pain is resolved and he is ready to RTW and D/C formal PT. He has reached PLOF. 11/9/2023: R calf cramping intermittently w/ weight bearing activity is his c/c 6/10. He continues w/ intermittent L LE symptoms less often/severe than R 5/10 L glut fold and lateral/post knee described as sharp/biting. He states following up w/ pain management yesterday to plan for discharge and RTW hopefully 12/14/23 w/ weaning off Gabapentin to start soon. 10/13/2023: Pt reports symptoms remain intermittent. He has received 2 pain management injections to date. He still has mild L lateral knee and glut, but also reports "cramping" in his right calf and Left leg weakness vs R. He does report improved tolerance to functional activities like prolonged walking, yardwork etc but is still having symptoms and difficulty.  He is now tolerating lumbar extension such as prone lying without issue; and prone on elbows and standing lumbar extension with produced LE symptoms that do not remain. Pt also c/o LE symptoms w/ lifting and carrying in the clinic, and reports some increased LE symptoms w/ repetitive stair negotiation. 2023 RE: Pt reports pain is now intermittent. He has no pain in sidelying and sleeps well. He ambulates w/ one or no crutch. His pain is mostly in L buttock>post/lat knee. He has remaining difficulty tolerating lumbar extension or extension biased positions ie: supine/prone lying. 2023 IE: Patient had lower back pain starting in early  when was digging a trench for his driveway to adjust the placement of the stones that were put down. Pain resolved at that time, with pt returning to work at [employer] with patient reporting return of pain on  when he had to pour some more stone for his driveway, with pain starting the morning after this activity. Worsening pain continued, with pt visiting the ER and getting gabapentin prescribed for his leg pain. Per notes review, pt was prescribed a medrol dose pack on 23, with pt noting no benefit from this. Pt referred to physical therapy, with discussion of potential need for injection also initiated by MD (per pt report). Recurrent probem    Patient Goals  Patient goals for therapy: decreased pain, increased motion, increased strength, independence with ADLs/IADLs and return to work  Patient goal: To be able to take my trip to [facility] in September  Pain  Current pain ratin  At best pain ratin  At worst pain ratin  Progression: resolved    Social Support  Lives with: spouse    Employment status: not working (out of work due to back pain)  Exercise history: patient works a physically active job.        Diagnostic Tests  MRI studies: abnormal (Spondylosis as described above including a left paracentral disc extrusion at L5-S1 which compresses the traversing left S1 nerve root. )  Treatments  Previous treatment: medication  Current treatment: physical therapy        Objective  *=painful  Lumbar AROM todd:  12/7/2023 11/9/2023  10/13/2023  9/[id number]/87917  8/8/2023  Flexion:   Nil stand Nil stand  Nil stand  Nil stand  full in sitting, pain with return to upright. Extension:   [employer]* Prod/NW lat knee dillan to neutral not able to extend to neutral*  Sideglide Left:  Nil  Nil   Nil   Nil   Nil*knee  min limitation   Right:    Nil  Nil   Nil   Nil*knee  min-mod limitation*  L rotat in R SL  Nil  Nil   Nil* L glut  NT   NT  R rotat in L SL  Nil  Nil   Nil   NT   NT      Mechanical Assessment:   12/7/2023: no c/o pain, no LE weakness  REIL 1x10 = NE  Rep FI sitting 1x10 = NE    11/9/2023; R calf cramping w/ functional activities 4-5/10  REIL 2x5 = abolish calf (prod/NW L glut & lat/post knee)    10/13/2023: intermittent L glut and lateral knee 1-2/10  Rep EIS 1x5 = prod/NW L lat knee  ALICE 30"x3 = prod/NW L glut  Rep lumb flexion from qped 1x10 = abolish    9/7/2023:  ALICE = periph/w (8/31/2023)  Rep SGIS against wall 5"x10 = inc/NW (8/31/2023)  Rep SGIS in doorway 5" 2x10 = NE (8/31/2023)  Rep R rotat in L SL 5"x10 = dec/B 4/10 glut (8/31/2023)  2nd set R rotat in L SL 5"x10= remains 4/10 (8/31/2023)    8/8/2023  Pt initially positioned in R sidelying (preferential position) with difficulty finding any position of comfort. Prone over 2 pillows: able to gradually achieve full prone position, with L rotated pelvis noted initially. MHP applied with gradual ability to shift position. Mild decrease in distal pain noted, with variable nature throughout. Prone over 1 pillow: able to tolerate progression, with slow transition, with some centralization of symptoms also noted. Attempted to go to full prone, with onset of L LE cramping and increased pain demonstrated.      Palpation/observation:   12/7/2023: dec lumbar lordosis in standing, no TTP, no lateral shift  11/9/2023: no lateral shift; dec lumb lordosis in standing; palpation not re-tested  10/13/2023: no lateral shift, pt no longer flexed in standing; remains TTP L pirif in sciatic notch. 9/7/2023: remains TTP L pirif only in sciatic notich. Standing posture w/ absent lumbar lordosis/slightly flexed. No lateral shift. 8/8/23: significant pain with palpation over L medial HS, L ITB, and L piriformis. Patient tolerating most movements poorly overall. Flexed standing posure - leans onto crutches. Gait:   12/7/2023: ambulates unlimited distances w/ no c/o  11/9/2023: no remaining known issues w/ tolerance to WB activity, has done a few hours of yardwork at at time. Normal gait. 10/13/2023: ambulates w/o AD, tolerance is > an hour. 9/7/2023: Ambulates w/ single crutch intermittently, gait no longer antalgic w/o AD and pt fully upright w/o crutches  8/8/23: Pt arrives ambulating by leaning onto bilateral crutches, with 2 point gait pattern utilized in flexed position. Crutches raised 1 level, with mild improvement in ability to stand more upright noted by end of session, but with continued significant pain. Transfers:   12/7/2023: no issues w/ xfers, bed mobility or ALICE  11/9/20263: no issues w/ bed mobility, transfers or ALICE  10/13/2023: no remaining issues w/ bed mobility, transfers or tolerance to lying positions other than ALICE will produce L glut symptoms. 9/7/2023: Pt demonstrates ease w/ bed mobility and transfers. He has limited nikko to prone and supine w/ legs straight. 8/8/23: Pt able to complete bed mobility and transfers with UE assistance with increased time, caution and effort required. Painful movements noted throughout session.      Strength: 12/7/2023 12/7/2023 11/9/2023 11/9/2023 10/13/2023 10/13/2023    Right  Left  Right  Left  Right  Left  Hip flex 5/5  5/5  5/5  5/5  5/5  5/5  Knee ext 5/5  5/5  5/5  5/5  5/5  4+/5  Knee flex 5/5  5/5  5/5  5/5  5/5  4/5*  Ankle DF 5/5 5/5 5/5 5/5 5/5 5/5  Hip ER  5/5 5/5 5/5 5/5 5/5 5/5  Hip IR  5/5  5/5  5/5  5/5  5/5  5/5  Hip abd 5/5  5/5  5/5  5/5  5/5  5/5  Hip exten 5/  5/5  5/5  4/5*L knee 5/5*  4/5*     Functional strength:  12/7/2023 - elbow plank 60+ sec no c/o   R/L uni HR 1x10 no c/o  11/9/2023 - elbow plank symptom onset at 15 sec L post/lat knee - tolerates 30 sec to fatigue   R/L Uni HR 1x5 without pain - nikko well    Special tests 12/7/2023 11/9/2023 10/13/2023 9/7/2023  SLR  - B/L 85 deg - b/L 85 deg B/L 85 deg +L 45 deg  Crossed SLR  - B/L 85 deg - b/l 85 deg B/L 85 deg +R Repro L sx    Function:  12/7/2023: No L LE symptoms w/ lumb exten in standing or w/ REIL   35# box lift/carry 40' x2 = no c/o  sled push/pull 75# 20'x3 ea fwd/bkd - no c/o    35# box lift floor/waist 2x10 no c/o   Walking lunges 10# DB's no c/o   Squat press w/ B/L 15# DB's 2x10 = no c/o   10# OH carry 90 sec - no c/o (UE fatigue)   No c/o w/ reciprocal stairs   Sitting tolerance not limited. RDL's w/ 10# DB's 2x10 no c/o   Farmer's carry uni UE 30# 50'x2 ea R/L no c/o  11/9/2023 - No L LE symptoms w/ lumb exten in standing or ALICE, but prod/NW w/ REIL   25# box lift/carry 40' x2 = no c/o  sled push/pull 65# 20'x3 ea fwd/bkd - no c/o;    25# box lift floor/waist 5/10 R calf cramping - abolished w/ RE LE cramping w/ REIL 2x5   Walking lunges 10# DB's 20'x2 2/10 R calf cramping   Squat press w/ B/L 15# DB's 1x10 = no c/o   10# OH carry 90 sec - no c/o (UE fatigue)   Reports R calf/thigh cramping w/ recip stairs 1 flight   Sitting tolerance estimated at 90 minutes - develops L LE symtoms     10/13/2023 - increased/produced L glut/LE pain with: lumbar extension in standing or ALICE,   Increased/produced L glut/LE pain w/ 20# floor to waist lift w/ carry 40', repetitive stairs, and 10# uni arm OH carry x1'. 55# sled push/pull 20'x2 each today without c/o. Reports increased cramping in R calf w repetitive stairs or prolonged walking >30-60 minutes.   B/L squat press w/ 10# DB's 2x10 w/o c/o and 1  15# box/waist lift and return 1x10 w/o c/o.  9/7/2023 - increased pain w/ stairs, supine/prone lying and prolonged sitting (pressure on L glut)       Daily Exercise Log:  Start of Care: 8/8/23  RE: 11/9/2023  POC expires 12/22/2023  FOTO: 11/9/2023  Date 11/22/2023 11/27/2023 11/30/2023 12/4/2023 12/7/2023  FOTO/RE   Visit # [phone]   Manual                ROM/MMT     TT   Neuro Re-Ed 20' 15' 15'  15' 15'   EIL from qped 1x10 - no c/o  1x10 to end  1x10 no c/o 1x10 no c/o   pallof Pallof posit side steps; dbl grn; 2 stepsx5 ea way; 2x Pallof posit side steps; dbl grn; 2 steps 5x ea way; 2x Pallof posit side steps; dbl grn; 2 steps 5x ea way; 2x  Pallof posit side steps; dbl grn; 2 steps 5x ea way; 2x    Supine fig 4 stretch  20"x3 R/L 20"x3 R/L   20"x3 R/L   Sit sciatic NG LAQ w/ PD   Sitting 1x10 R/L      Bridge w/ alt kick 2x10 2x10  2x10  SL bridge 2x10 R/L Bridge w/ alt kick 1x10 R/L   Elbow plank 10"x5; 2x  10"x5; 2x  15"x5; 2x 60+"x1   Bird dog  Blk TB 5" 2x10 ea way Blk TB 5" 2x10 ea way Qped hip rainbows 2# 2x10 R/L Qped hip rainbows 2# 2x10 R/L   SL dead lift  15# 2x10 R/L    15# 2x10 R/L 15# 2x10 R/L   steamboats Grn TB @ ankles 2x10 R/L       Ther Ex 10' 10' 30'  10' 15'   RDL's 10# DB's 2x10 10# DB's 2x12 10# DB's 2x12   10# DB's 2x12   Knee hugs & soldier kicks Knee hugs w/ HR 20'x2; soldier kicks 20'x2 Knee hugs w/ HR 20'x2 20'x2 ea Knee hugs w/ HR 20'x2    Walking lunges  20'x2 10# DB's; 2x 20'x2 10# DB's; 2x  20'x2; 2x no wgt 20'x2; 2x 10# DB's   Upright bike L3x5'   L3x5' L3x5'   Pt education        HEP        Ther Activity 15' 20'  20' 15'   Fwd step up w/ alt knee hike w/ Uni OH press 10" step 10# DB 2x10 R/L  10" step 10# DB 2x10 R/L   10" step  15# DB 2x10 R/L    Chair squat (taps) w/ OH press Taps to low mat 15# DB's 2x10 Taps to low mat w/ HR & OH press; 15# DB's 2x12 Taps to low mat w/ HR & OH press; 15# DB's 2x12      Uni farmer's carry 25# 50'x2 ea R/L UE 30# 50'x2 ea R/L UE  30# 50'x2 ea R/L 30# 50'x2 ea R/L Ue   Sled push/pull fwd/bwd   70# 20'x2 fwd/bkwd; 4 laps 70# 20'x2 fwd/bkwd; 4  laps      Box lift floor to waist/carry 30'/return to floor 25# box lift/carry 20'; 4x  30# box lift/carry 20'; 4x 35# box lift/carry 20' 4x; 2x 40# box lift/carry 40'x1   Floor to waist & return box lift    35# box 2x10 35# box w/ pivot/turn 180 deg floor/waist 2x10   OH carry 10# kettlebell  1' ea R/L 1' ea R/L      Modalities                        HEP:  Access Code: [email or web address]  URL: [email or web address]  Date: 10/24/2023  Prepared by:  Leny Buckner    Exercises  - Quadruped Hip Drops  - 2 x daily - 2 sets - 5 reps  - Prone on elbows with knee flexion  - 2 x daily - 2 sets - 10 reps  - Supine Hip External Rotation Stretch  - 1 x daily - 3 reps - 30 hold  - Supine Bridge with Resistance Band  - 1 x daily - 2 sets - 10 reps - 5 hold  - Runner's Step Up/Down  - 1 x daily - 2 sets - 10 reps  - Standing Anti-Rotation Press with Anchored Resistance  - 1 x daily - 2 sets - 10 reps  - Mini Squat to Shoulder Press with Barbell  - 1 x daily - 2 sets - 10 reps  - Forward Monster Walks  - 1 x daily - 3 sets - 10 reps  - Static Lunge  - 1 x daily - 2 sets - 10 reps  - Bird Dog with Resistance  - 1 x daily - 10 reps - 5 hold  - Single Leg Deadlift with Kettlebell  - 1 x daily - 2 sets - 10 reps

## 2024-01-02 DIAGNOSIS — N52.9 ERECTILE DYSFUNCTION, UNSPECIFIED ERECTILE DYSFUNCTION TYPE: ICD-10-CM

## 2024-01-02 NOTE — TELEPHONE ENCOUNTER
Good morning Dr. Coronel and Barbie, I need  a new prescription for Viagra 100mg, Quanty 6, refills [facility] for [facility] [place] [phone]. Thank you and Happy New Year!!

## 2024-01-03 RX ORDER — SILDENAFIL 100 MG/1
100 TABLET, FILM COATED ORAL AS NEEDED
Qty: 6 TABLET | Refills: 12 | Status: SHIPPED | OUTPATIENT
Start: 2024-01-03

## 2024-02-01 ENCOUNTER — RA CDI HCC (OUTPATIENT)
Dept: OTHER | Facility: HOSPITAL | Age: 64
End: 2024-02-01

## 2024-02-01 NOTE — PROGRESS NOTES
HCC coding opportunities       Chart reviewed, no opportunity found: CHART REVIEWED, NO OPPORTUNITY FOUND        Patients Insurance        Commercial Insurance: [employer]

## 2024-02-07 NOTE — PROGRESS NOTES
Office Visit Note  24     Agapito Martell 63 y.o. male MRN: [id number]  : 1960    Assessment:     1. Acquired hypothyroidism  Assessment & Plan:  Last TSH level is 0.81 currently taking 150 mcg of levothyroxine we will continue with the same follow-up with repeat TSH level later      2. Rheumatoid arthritis involving multiple sites with positive rheumatoid factor (HCC)  Assessment & Plan:  Patient with a diagnosis of rheumatoid arthritis in the past currently not taking any medications he has a follow-up appointment with rheumatologist also next week does not complain of any pain aches in the joints at this time.      3. Acute left-sided low back pain with left-sided sciatica  Assessment & Plan:  Patient is doing much better with the pain currently not on any pain medications gabapentin is also being discontinued he is ambulatory back to work he finished the physical therapy.  Continue with current management however patient was told not to lift any heavy object move or push any heavy object      4. Other male erectile dysfunction  Assessment & Plan:  Patient is on Viagra 100 mg as needed      5. Thrombocytosis  Assessment & Plan:  Last platelet count we have visit 487 but he has more blood test done through the hematologist I do not have the reports currently he is on hydroxyurea 1000 mcg alternating with 500 mcg will follow it up                 Discussion Summary and Plan:  Today's care plan and medications were reviewed with patient in detail and all their questions answered to their satisfaction.    Chief Complaint   Patient presents with    Follow-up      Subjective:  Patient is coming here for a follow-up evaluation with a history of hypothyroidism and also history of left-sided low back pain which got better he finished the physical therapy currently not on any medication now and then he feels slight discomfort feeling but otherwise he is doing all right.  He had lab work done for thyroid came  back normal TSH level he has been taking the medication every day in the morning.  Medications reviewed labs reviewed.  Patient is also followed by the rheumatology he has an appointment next month on the 15th regarding rheumatoid arthritis is currently not on any medications for the same he was on hydroxychloroquine in the past.  Patient is also being seen by the hematologist regarding thrombocytosis currently on hydroxyurea.        The following portions of the patient's history were reviewed and updated as appropriate: allergies, current medications, past family history, past medical history, past social history, past surgical history and problem list.    Review of Systems   Constitutional:  Negative for chills and fever.   HENT:  Negative for ear pain and sore throat.    Eyes:  Negative for pain and visual disturbance.   Respiratory:  Negative for cough and shortness of breath.    Cardiovascular:  Negative for chest pain and palpitations.   Gastrointestinal:  Negative for abdominal pain and vomiting.   Genitourinary:  Negative for dysuria and hematuria.   Musculoskeletal:  Negative for arthralgias and back pain.   Skin:  Negative for color change and rash.   Neurological:  Negative for seizures and syncope.   All other systems reviewed and are negative.        Historical Information   Patient Active Problem List   Diagnosis    Hyperlipidemia    Hypothyroidism    Thrombocytosis    Rheumatoid arthritis involving multiple sites with positive rheumatoid factor (HCC)    Lipoma of torso    Other male erectile dysfunction    Acute left-sided low back pain with left-sided sciatica     Past Medical History:   Diagnosis Date    Arthritis     Disease of thyroid gland     hypo    Graves disease 1995    Hallux limitus, acquired, right     Hypothyroid     hypo    Other tear of medial meniscus, current injury, right knee, initial encounter 6/6/2018    Added automatically from request for surgery [id number]    Platelet disorder  ([facility])     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 Jc    RA (rheumatoid arthritis) ([facility])     Rheumatoid arthritis involving multiple sites ([facility]) 9/19/2022    Wears glasses      Past Surgical History:   Procedure Laterality Date    ANKLE SURGERY Right     ligament, chipped bones,dislocated    BUNIONECTOMY  03/2014    COLONOSCOPY      ELBOW SURGERY Right     tendon surgery    EPIDURAL BLOCK INJECTION Left 8/16/2023    Procedure: left L5-S1, S1 TRANSFORAMINAL epidural steroid injection (04137, [id number]);  Surgeon: Gunner Aguayo DO;  Location: [facility] MAIN OR;  Service: Pain Management     FOOT ARTHRODESIS, MODIFIED ARELLANO Left     FRACTURE SURGERY Left     elbow    HERNIA REPAIR Right     inguinal    LUMBAR EPIDURAL INJECTION N/A 9/22/2023    Procedure: L5-S1 LUMBAR epidural steroid injection (30640);  Surgeon: Chandra Serra MD;  Location: [facility] MAIN OR;  Service: Pain Management     AK ARTHRS KNE SURG W/MENISCECTOMY MED/LAT W/SHVG Right 07/09/2018    Procedure: MENISCECTOMY LATERAL /MEDIAL;  Surgeon: Daivd Sloan MD;  Location: [facility] MAIN OR;  Service: Orthopedics    AK CORRECTION HAMMERTOE Bilateral 05/14/2021    Procedure: REPAIR HAMMERTOE / MALLET TOE / CLAW TOE 2ND toe bilateral;  Surgeon: Filipe Oliva DPM;  Location: [facility] MAIN OR;  Service: Podiatry    AK CORRJ HLX VLGS BNCTY SESMDC RESCJ PROX PHLX BASE Right 04/09/2021    Procedure: BUNIONECTOMY SMITH;  Surgeon: Filipe Oliva DPM;  Location: [facility] MAIN OR;  Service: Podiatry    ROTATOR CUFF REPAIR Left     WISDOM TOOTH EXTRACTION      x4    WRIST SURGERY       Social History     Substance and Sexual Activity   Alcohol Use Yes    Alcohol/week: 3.0 standard drinks of alcohol    Types: 3 Cans of beer per week    Comment: if that     Social History     Substance and Sexual Activity   Drug Use No     Social History     Tobacco Use   Smoking Status Former    Current packs/day: 0.00    Average packs/day: 1 pack/day for 25.0 years (25.0 ttl pk-yrs)    Types: Cigarettes     Start date: 1975    Quit date: 2000    Years since quittin.1    Passive exposure: Past   Smokeless Tobacco Never     Family History   Problem Relation Age of Onset    Cancer Mother         passed    Hypertension Father     Heart disease Father         leaky valve    Skin cancer Father     Diabetes Father      Health Maintenance Due   Topic    HIV Screening     Annual Physical     DTaP,Tdap,and Td Vaccines (1 - Tdap)    Pneumococcal Vaccine: 65+ Years (1 - PCV)    COVID-19 Vaccine (2023- season)    PT PLAN OF CARE     Depression Screening       Meds/Allergies       Current Outpatient Medications:     Cholecalciferol (VITAMIN D3) 1000 units CAPS, Take 1,000 Units by mouth, Disp: , Rfl:     Hydroxyurea (HYDREA PO), Take 1,000 mg by mouth Mon-Wed-Fri, Disp: , Rfl:     hydroxyurea (HYDREA) 500 mg capsule, Take by mouth On Tues-Thurs-Sat-Sun , Disp: , Rfl:     levothyroxine 150 mcg tablet, TAKE 1 TABLET DAILY, Disp: 90 tablet, Rfl: 3    sildenafil (VIAGRA) 100 mg tablet, Take 1 tablet (100 mg total) by mouth if needed for erectile dysfunction As directed, Disp: 6 tablet, Rfl: 12      Objective:    Vitals:   /75 (BP Location: Right arm, Patient Position: Sitting, Cuff Size: Standard)   Pulse 82   Ht 6' (1.829 m)   Wt 71.4 kg (157 lb 6.4 oz)   SpO2 96%   BMI 21.35 kg/m²   Body mass index is 21.35 kg/m².  Vitals:    24 1008   Weight: 71.4 kg (157 lb 6.4 oz)       Physical Exam  Vitals and nursing note reviewed.   Constitutional:       Appearance: Normal appearance.   Cardiovascular:      Rate and Rhythm: Normal rate and regular rhythm.      Heart sounds: Normal heart sounds.   Pulmonary:      Effort: Pulmonary effort is normal.      Breath sounds: Normal breath sounds.   Abdominal:      Palpations: Abdomen is soft.   Musculoskeletal:      Right lower leg: No edema.      Left lower leg: No edema.   Skin:     General: Skin is warm and dry.   Neurological:      General: No focal deficit  "present.      Mental Status: He is alert and oriented to person, place, and time.   Psychiatric:         Mood and Affect: Mood normal.         Behavior: Behavior normal.         Lab Review   No visits with results within 2 Month(s) from this visit.   Latest known visit with results is:   Orders Only on 11/22/2023   Component Date Value Ref Range Status    TSH W/RFX TO FREE T4 11/22/2023 0.28 (L)  0.40 - 4.50 mIU/L Final    Free t4 11/22/2023 1.5  0.8 - 1.8 ng/dL Final         Ronnell Coronel MD        \"This note has been constructed using a voice recognition system.Therefore there may be syntax, spelling, and/or grammatical errors. Please call if you have any questions. \"  "

## 2024-02-08 ENCOUNTER — OFFICE VISIT (OUTPATIENT)
Dept: FAMILY MEDICINE CLINIC | Facility: CLINIC | Age: 64
End: 2024-02-08
Payer: COMMERCIAL

## 2024-02-08 VITALS
HEART RATE: 82 BPM | WEIGHT: 157.4 LBS | BODY MASS INDEX: 21.32 KG/M2 | OXYGEN SATURATION: 96 % | DIASTOLIC BLOOD PRESSURE: 75 MMHG | SYSTOLIC BLOOD PRESSURE: 136 MMHG | HEIGHT: 72 IN

## 2024-02-08 DIAGNOSIS — D75.839 THROMBOCYTOSIS: ICD-10-CM

## 2024-02-08 DIAGNOSIS — E03.9 ACQUIRED HYPOTHYROIDISM: Primary | ICD-10-CM

## 2024-02-08 DIAGNOSIS — M05.79 RHEUMATOID ARTHRITIS INVOLVING MULTIPLE SITES WITH POSITIVE RHEUMATOID FACTOR (HCC): ICD-10-CM

## 2024-02-08 DIAGNOSIS — N52.8 OTHER MALE ERECTILE DYSFUNCTION: ICD-10-CM

## 2024-02-08 DIAGNOSIS — M54.42 ACUTE LEFT-SIDED LOW BACK PAIN WITH LEFT-SIDED SCIATICA: ICD-10-CM

## 2024-02-08 PROCEDURE — 99214 OFFICE O/P EST MOD 30 MIN: CPT | Performed by: INTERNAL MEDICINE

## 2024-02-08 NOTE — ASSESSMENT & PLAN NOTE
Patient is doing much better with the pain currently not on any pain medications gabapentin is also being discontinued he is ambulatory back to work he finished the physical therapy.  Continue with current management however patient was told not to lift any heavy object move or push any heavy object

## 2024-02-08 NOTE — ASSESSMENT & PLAN NOTE
Last TSH level is 0.81 currently taking 150 mcg of levothyroxine we will continue with the same follow-up with repeat TSH level later

## 2024-02-08 NOTE — ASSESSMENT & PLAN NOTE
Last platelet count we have visit 487 but he has more blood test done through the hematologist I do not have the reports currently he is on hydroxyurea 1000 mcg alternating with 500 mcg will follow it up

## 2024-02-08 NOTE — ASSESSMENT & PLAN NOTE
Patient with a diagnosis of rheumatoid arthritis in the past currently not taking any medications he has a follow-up appointment with rheumatologist also next week does not complain of any pain aches in the joints at this time.

## 2024-03-04 ENCOUNTER — TELEPHONE (OUTPATIENT)
Age: 64
End: 2024-03-04

## 2024-03-04 NOTE — TELEPHONE ENCOUNTER
Caller: Patient    Doctor: Ponce    Reason for call: Pt had L5-S1 injection on 9/22/23 with good relief for 5 months. Pt's pain is returning, he states it is the same exact pain, rating it a 9/10 at its worst. Pt is requesting a repeat injection.  Pt is [place] of [place] until 3[place]11, then returns to work 3/14.. Requesting to be scheduled in that time frame if at all possible.    Call back#: [phone]

## 2024-03-04 NOTE — TELEPHONE ENCOUNTER
Scheduled patient for LESI 03/22/2024  Patient denies RX blood thinners/ NSAIDS  Nothing to eat or drink 1 hour prior to procedure  Needs to arrange transportation  Proper clothing for procedure  No vaccines 2 weeks prior or after procedure  If ill or place on antibiotics, please call to reschedule

## 2024-03-04 NOTE — TELEPHONE ENCOUNTER
Caller: Patient     Doctor: Ponce     Reason for call: Calling back to see if the 15th is still available. He got the ok from employer and if still available would like to come in on this date     Call back#: [phone]

## 2024-03-11 ENCOUNTER — TELEPHONE (OUTPATIENT)
Age: 64
End: 2024-03-11

## 2024-03-11 DIAGNOSIS — Z13.0 SCREENING FOR DEFICIENCY ANEMIA: Primary | ICD-10-CM

## 2024-03-11 DIAGNOSIS — R73.03 PRE-DIABETES: ICD-10-CM

## 2024-03-11 DIAGNOSIS — E78.5 DYSLIPIDEMIA: ICD-10-CM

## 2024-03-11 DIAGNOSIS — Z12.5 SCREENING PSA (PROSTATE SPECIFIC ANTIGEN): ICD-10-CM

## 2024-03-11 DIAGNOSIS — E03.9 HYPOTHYROIDISM, UNSPECIFIED TYPE: ICD-10-CM

## 2024-03-14 DIAGNOSIS — N52.9 ERECTILE DYSFUNCTION, UNSPECIFIED ERECTILE DYSFUNCTION TYPE: ICD-10-CM

## 2024-03-14 RX ORDER — SILDENAFIL 100 MG/1
100 TABLET, FILM COATED ORAL AS NEEDED
Qty: 6 TABLET | Refills: 1 | Status: SHIPPED | OUTPATIENT
Start: 2024-03-14

## 2024-03-14 NOTE — TELEPHONE ENCOUNTER
----- Message from Agapito Martell sent at 3/14/2024  1:42 PM EDT -----  Regarding: new prescription  Contact: [phone]  Good afternoon, DR Coronel and amanda, I need a new prescription for sildenafil the last one has no refills left. please send to [facility]                     Thank you                            Agapito                    [phone]

## 2024-03-15 ENCOUNTER — HOSPITAL ENCOUNTER (OUTPATIENT)
Facility: AMBULARY SURGERY CENTER | Age: 64
Setting detail: OUTPATIENT SURGERY
Discharge: HOME | End: 2024-03-15
Attending: STUDENT IN AN ORGANIZED HEALTH CARE EDUCATION/TRAINING PROGRAM | Admitting: STUDENT IN AN ORGANIZED HEALTH CARE EDUCATION/TRAINING PROGRAM
Payer: COMMERCIAL

## 2024-03-15 ENCOUNTER — APPOINTMENT (OUTPATIENT)
Dept: RADIOLOGY | Facility: HOSPITAL | Age: 64
End: 2024-03-15
Payer: COMMERCIAL

## 2024-03-15 VITALS
OXYGEN SATURATION: 98 % | SYSTOLIC BLOOD PRESSURE: 142 MMHG | HEART RATE: 70 BPM | TEMPERATURE: 97.3 F | DIASTOLIC BLOOD PRESSURE: 84 MMHG | RESPIRATION RATE: 16 BRPM

## 2024-03-15 PROBLEM — M54.16 LUMBAR RADICULOPATHY: Status: ACTIVE | Noted: 2024-03-15

## 2024-03-15 PROCEDURE — 62323 NJX INTERLAMINAR LMBR/SAC: CPT | Performed by: STUDENT IN AN ORGANIZED HEALTH CARE EDUCATION/TRAINING PROGRAM

## 2024-03-15 PROCEDURE — NC001 PR NO CHARGE: Performed by: STUDENT IN AN ORGANIZED HEALTH CARE EDUCATION/TRAINING PROGRAM

## 2024-03-15 RX ORDER — METHYLPREDNISOLONE ACETATE 80 MG/ML
INJECTION, SUSPENSION INTRA-ARTICULAR; INTRALESIONAL; INTRAMUSCULAR; SOFT TISSUE AS NEEDED
Status: DISCONTINUED | OUTPATIENT
Start: 2024-03-15 | End: 2024-03-15 | Stop reason: HOSPADM

## 2024-03-15 RX ORDER — BUPIVACAINE HYDROCHLORIDE 2.5 MG/ML
INJECTION, SOLUTION EPIDURAL; INFILTRATION; INTRACAUDAL AS NEEDED
Status: DISCONTINUED | OUTPATIENT
Start: 2024-03-15 | End: 2024-03-15 | Stop reason: HOSPADM

## 2024-03-15 RX ORDER — LIDOCAINE HYDROCHLORIDE 10 MG/ML
INJECTION, SOLUTION EPIDURAL; INFILTRATION; INTRACAUDAL; PERINEURAL AS NEEDED
Status: DISCONTINUED | OUTPATIENT
Start: 2024-03-15 | End: 2024-03-15 | Stop reason: HOSPADM

## 2024-03-15 NOTE — OP NOTE
Pre-procedure Diagnosis: Lumbar radiculopathy  Post-procedure Diagnosis: Lumbar radiculopathy  Procedure Title(s):  1.  L5-S1 interlaminar epidural steroid injection      2. Intraoperative fluoroscopy  Attending Surgeon:   Chandra Serra MD  Anesthesia:   Local     Indications: The patient is a 63 y.o. year-old male with a diagnosis of lumbar radiculopathy. The patient's history and physical exam were reviewed.  The risks, benefits and alternatives to the procedure were discussed, and all questions were answered to the patient's satisfaction. The patient agreed to proceed, and written informed consent was obtained.    Procedure in Detail: The patient was brought into the procedure room and placed in the prone position on the fluoroscopy table. The area of the lumbar spine was prepped with chlorhexidine gluconate solution times one and draped in a sterile manner.    The L5-S1 interspace was identified and marked under AP fluoroscopy. The skin and subcutaneous tissues in the area were anesthetized with 1% lidocaine. A 20-gauge Tuohy epidural needle was directed toward the interspace under fluoroscopic guidance until the ligamentum flavum was engaged. From this point, a loss of resistance technique with air was used to identify entrance of the needle into the epidural space. Once an appropriate loss was obtained, negative aspiration was confirmed, and 1 ml Omnipaque 300 contrast solution was injected. An appropriate epidurogram was noted.    Then, after negative aspiration, a solution consisting of 1-mL depo-medrol (80mg/mL) and 1-mL bupivacaine 0.25% and 3-mL preservative-free saline was easily injected. The needle was removed with a 1% lidocaine flush. The patient's back was cleaned and a bandage was placed over the site of needle insertion.    Disposition: The patient tolerated the procedure well, and there were no apparent complications. The patient was taken to the recovery area where written discharge  instructions for the procedure were given.     Estimated Blood Loss: None  Specimens Obtained: N/A

## 2024-03-15 NOTE — DISCHARGE INSTRUCTIONS
Epidural Steroid Injection   WHAT YOU NEED TO KNOW:   An epidural steroid injection (NAOMY) is a procedure to inject steroid medicine into the epidural space. The epidural space is between your spinal cord and vertebrae. Steroids reduce inflammation and fluid buildup in your spine that may be causing pain. You may be given pain medicine along with the steroids.          ACTIVITY  Do not drive or operate machinery today.  No strenuous activity today - bending, lifting, etc.  You may resume normal activites starting tomorrow - start slowly and as tolerated.  You may shower today, but no tub baths or hot tubs.  You may have numbness for several hours from the local anesthetic. Please use caution and common sense, especially with weight-bearing activities.    CARE OF THE INJECTION SITE  If you have soreness or pain, apply ice to the area today (20 minutes on/20 minutes off).  Starting tomorrow, you may use warm, moist heat or ice if needed.  You may have an increase or change in your discomfort for 36-48 hours after your treatment.  Apply ice and continue with any pain medication you have been prescribed.  Notify the [facility] if you have any of the following: redness, drainage, swelling, headache, stiff neck or fever above 100°F.    SPECIAL INSTRUCTIONS  Our office will contact you in approximately 7 days for a progress report.    MEDICATIONS  Continue to take all routine medications.  Our office may have instructed you to hold some medications.    As no general anesthesia was used in today's procedure, you should not experience any side effects related to anesthesia.     If you are diabetic, the steroids used in today's injection may temporarily increase your blood sugar levels after the first few days after your injection. Please keep a close eye on your sugars and alert the doctor who manages your diabetes if your sugars are significantly high from your baseline or you are symptomatic.     If you have a  problem specifically related to your procedure, please call our office at [phone].  Problems not related to your procedure should be directed to your primary care physician.Epidural Steroid Injection   WHAT YOU NEED TO KNOW:   An epidural steroid injection (NAOMY) is a procedure to inject steroid medicine into the epidural space. The epidural space is between your spinal cord and vertebrae. Steroids reduce inflammation and fluid buildup in your spine that may be causing pain. You may be given pain medicine along with the steroids.          ACTIVITY  Do not drive or operate machinery today.  No strenuous activity today - bending, lifting, etc.  You may resume normal activites starting tomorrow - start slowly and as tolerated.  You may shower today, but no tub baths or hot tubs.  You may have numbness for several hours from the local anesthetic. Please use caution and common sense, especially with weight-bearing activities.    CARE OF THE INJECTION SITE  If you have soreness or pain, apply ice to the area today (20 minutes on/20 minutes off).  Starting tomorrow, you may use warm, moist heat or ice if needed.  You may have an increase or change in your discomfort for 36-48 hours after your treatment.  Apply ice and continue with any pain medication you have been prescribed.  Notify the [facility] if you have any of the following: redness, drainage, swelling, headache, stiff neck or fever above 100°F.    SPECIAL INSTRUCTIONS  Our office will contact you in approximately 7 days for a progress report.    MEDICATIONS  Continue to take all routine medications.  Our office may have instructed you to hold some medications.    As no general anesthesia was used in today's procedure, you should not experience any side effects related to anesthesia.     If you are diabetic, the steroids used in today's injection may temporarily increase your blood sugar levels after the first few days after your injection. Please  keep a close eye on your sugars and alert the doctor who manages your diabetes if your sugars are significantly high from your baseline or you are symptomatic.     If you have a problem specifically related to your procedure, please call our office at [phone].  Problems not related to your procedure should be directed to your primary care physician.

## 2024-03-15 NOTE — H&P
History of Present Illness: The patient is a 63 y.o. male who presents with complaints of low back pain.     Past Medical History:   Diagnosis Date    Arthritis     Disease of thyroid gland     hypo    Graves disease 1995    Hallux limitus, acquired, right     Hypothyroid     hypo    Other tear of medial meniscus, current injury, right knee, initial encounter 6/6/2018    Added automatically from request for surgery [id number]    Platelet disorder ([facility])     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 Jc    RA (rheumatoid arthritis) (HCC)     Rheumatoid arthritis involving multiple sites (HCC) 9/19/2022    Wears glasses        Past Surgical History:   Procedure Laterality Date    ANKLE SURGERY Right     ligament, chipped bones,dislocated    BUNIONECTOMY  03/2014    COLONOSCOPY      ELBOW SURGERY Right     tendon surgery    EPIDURAL BLOCK INJECTION Left 8/16/2023    Procedure: left L5-S1, S1 TRANSFORAMINAL epidural steroid injection (44068, [id number]);  Surgeon: Gunner Aguayo DO;  Location: [facility] MAIN OR;  Service: Pain Management     FOOT ARTHRODESIS, MODIFIED ARELLANO Left     FRACTURE SURGERY Left     elbow    HERNIA REPAIR Right     inguinal    LUMBAR EPIDURAL INJECTION N/A 9/22/2023    Procedure: L5-S1 LUMBAR epidural steroid injection ([id number]);  Surgeon: Chandra Serra MD;  Location: [facility] MAIN OR;  Service: Pain Management     VA ARTHRS KNE SURG W/MENISCECTOMY MED/LAT W/SHVG Right 07/09/2018    Procedure: MENISCECTOMY LATERAL /MEDIAL;  Surgeon: David Sloan MD;  Location: [facility] MAIN OR;  Service: Orthopedics    VA CORRECTION HAMMERTOE Bilateral 05/14/2021    Procedure: REPAIR HAMMERTOE / MALLET TOE / CLAW TOE 2ND toe bilateral;  Surgeon: Filipe Oliva DPM;  Location: [facility] MAIN OR;  Service: Podiatry    VA CORRJ HLX VLGS BNCTY SESMDC RESCJ PROX PHLX BASE Right 04/09/2021    Procedure: BUNIONECTOMY SMITH;  Surgeon: Filipe Oliva DPM;  Location: [facility] MAIN OR;  Service: Podiatry    ROTATOR CUFF REPAIR Left     WISDOM TOOTH  EXTRACTION      x4    WRIST SURGERY         No current facility-administered medications for this encounter.    No Known Allergies    Physical Exam:   Vitals:    03/15/24 0752   BP: 151/70   Pulse: 86   Resp: 16   Temp: (!) 97.3 °F (36.3 °C)   SpO2: 96%     General: Awake, Alert, Oriented x 3, Mood and affect appropriate  Respiratory: Respirations even and unlabored  Cardiovascular: Peripheral pulses intact; no edema  Musculoskeletal Exam: low back pain.     ASA Score: 3    Patient/Chart Verification  Patient ID Verified: Verbal, Armband  ID Band Applied: Yes  Consents Confirmed: Procedural  H&P( within 30 days) Verified: Yes  Interval H&P(within 24 hr) Complete (required for Outpatients and Surgery Admit only): Yes  Beta Blocker given : N/A  Pre-op Lab/Test Results Available: N/A  Does Patient Have a Prosthetic Device/Implant: No    Assessment: Lumbar radiculopathy    Plan: L5-S1 LESI

## 2024-03-22 ENCOUNTER — TELEPHONE (OUTPATIENT)
Dept: OBGYN CLINIC | Facility: HOSPITAL | Age: 64
End: 2024-03-22

## 2024-03-26 ENCOUNTER — TELEPHONE (OUTPATIENT)
Age: 64
End: 2024-03-26

## 2024-03-26 NOTE — TELEPHONE ENCOUNTER
Patient called stating he received an epidural last week due to his back and is now in a lot of pain. Patient would like to know if he could get a doctor's note for work to put him on light duty, no lifting over 10 lbs, and no pushing and pulling until his back is better. Patient stated if Dr Coronel needs to see him in the office first, he is willing to make an appointment. Please call patient back to let him know his options.

## 2024-03-26 NOTE — TELEPHONE ENCOUNTER
Caller: pt    Doctor: paula    Reason for call: pt needs a work note stating what he can and can not do. Please advise.    Call back#: [phone]

## 2024-03-26 NOTE — TELEPHONE ENCOUNTER
The patient called again he needs the status of the letter for work   he would like to know if Dr Coronel will do the note if he does it needs to say no lifting greater than 10 pounds and no repetitive pushing or pulling    he would also like to know if it can be mailed to him and    he also wanted to know if he needs to be seen in order to get the note      please call the patient and let him know thank you

## 2024-03-26 NOTE — TELEPHONE ENCOUNTER
Pt called back states he called pain management for the letter and they state letter should come from primary Dr.    Informed pt he has an appt scheduled for today, pt states he never scheduled this appt and is currently at work.    His availability for an appt is March 29 - April 4 w the exception of the 2nd.    Pt states both Dr's are aware of his back issues and is trying to get a letter for work restrictions so he doesn't further hurt his back. Pt is asking for a returned call to discuss.    Please advise

## 2024-03-26 NOTE — TELEPHONE ENCOUNTER
S/W pt.  Advised pt SJ returns to the office on Monday and is not sure if SJ will write the note.  Advised he may want to reach out to his PCP in the meantime.  Pt stated he already did and PCP said he needs to get note from SJ.  Pt stated he is looking for work restrictions: no heavy lifting greater than 10 lbs and no repetitive lifting or pulling to his back gets better.  Pt stated with his last NAOMY, at first he was 50% better but now he has no improvement.  Pt verbalized understanding.  Please advise.

## 2024-03-28 NOTE — PROGRESS NOTES
Office Visit Note  24     Agapito Martell 63 y.o. male MRN: [id number]  : 1960    Assessment:     1. Acute left-sided low back pain with left-sided sciatica  Assessment & Plan:  Patient with as mentioned acute left-sided low back pain status post steroid injection in the back on 15 March we will start him on gabapentin gradually increase the dose from 100 mg to 300 mg at bedtime.  Will also give him a note for work for light duty and recommend follow-up with the pain management.      2. Thrombocytosis  Assessment & Plan:  Continue hydroxyurea follow-up with a platelet count      3. Acquired hypothyroidism  Assessment & Plan:  Currently patient is taking 150 mcg levothyroxine we will continue follow-up with repeat TSH      4. Erectile dysfunction, unspecified erectile dysfunction type    5. Hyperlipidemia, unspecified hyperlipidemia type  Assessment & Plan:  Last cholesterol 190 patient to have a repeat 1 done soon we will follow it up with the same      6. Other male erectile dysfunction  Assessment & Plan:  Continue sildenafil as needed      7. Rheumatoid arthritis involving multiple sites with positive rheumatoid factor (HCC)  Assessment & Plan:  Currently not on any medication.  Since patient is asymptomatic he has been discharged by the rheumatologist from there service                 Discussion Summary and Plan:  Today's care plan and medications were reviewed with patient in detail and all their questions answered to their satisfaction.    Chief Complaint   Patient presents with    Follow-up    Acute left-sided low back pain with left-sided sciatica      Subjective:  Patient is coming here for evaluation regarding symptoms of left-sided low back pain radiating down the left lower extremity up to the knee.  History of low back pain in the past was seen by the pain management and received epidural injection which is helping for 6 months however this time he was again seen by the pain management on  the 15th after he developed this pain when he was traveling the injection lasted only 2 days.  Pain is not as severe as it was in the first time.  Patient's workplace wants him to come back on light duty.  Patient was on Neurontin in the past when he was having this pain.  Medications reviewed.    Patient was seen by the rheumatologist with regards to rheumatoid arthritis since patient is totally asymptomatic labs coming back normal is not on any medication she has discharged him.        The following portions of the patient's history were reviewed and updated as appropriate: allergies, current medications, past family history, past medical history, past social history, past surgical history and problem list.    Review of Systems   Constitutional:  Negative for chills and fever.   HENT:  Negative for ear pain and sore throat.    Eyes:  Negative for pain and visual disturbance.   Respiratory:  Negative for cough and shortness of breath.    Cardiovascular:  Negative for chest pain and palpitations.   Gastrointestinal:  Negative for abdominal pain and vomiting.   Genitourinary:  Negative for dysuria and hematuria.   Musculoskeletal:  Positive for back pain. Negative for arthralgias.   Skin:  Negative for color change and rash.   Neurological:  Negative for seizures and syncope.   All other systems reviewed and are negative.        Historical Information   Patient Active Problem List   Diagnosis    Hyperlipidemia    Hypothyroidism    Thrombocytosis    Rheumatoid arthritis involving multiple sites with positive rheumatoid factor (HCC)    Lipoma of torso    Other male erectile dysfunction    Acute left-sided low back pain with left-sided sciatica    Lumbar radiculopathy     Past Medical History:   Diagnosis Date    Arthritis     Disease of thyroid gland     hypo    Graves disease 1995    Hallux limitus, acquired, right     Hypothyroid     hypo    Other tear of medial meniscus, current injury, right knee, initial encounter  6/6/2018    Added automatically from request for surgery [id number]    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    RA (rheumatoid arthritis) (HCC)     Rheumatoid arthritis involving multiple sites (HCC) 9/19/2022    Wears glasses      Past Surgical History:   Procedure Laterality Date    ANKLE SURGERY Right     ligament, chipped bones,dislocated    BUNIONECTOMY  03/2014    COLONOSCOPY      ELBOW SURGERY Right     tendon surgery    EPIDURAL BLOCK INJECTION Left 8/16/2023    Procedure: left L5-S1, S1 TRANSFORAMINAL epidural steroid injection (05811, [id number]);  Surgeon: Gunner Aguayo DO;  Location: [facility] MAIN OR;  Service: Pain Management     EPIDURAL BLOCK INJECTION N/A 3/15/2024    Procedure: L5-S1 LUMBAR EPIDURAL STEROID INJECTION;  Surgeon: Chandra Serra MD;  Location: [facility] MAIN OR;  Service: Pain Management     FOOT ARTHRODESIS, MODIFIED ARELLANO Left     FRACTURE SURGERY Left     elbow    HERNIA REPAIR Right     inguinal    LUMBAR EPIDURAL INJECTION N/A 9/22/2023    Procedure: L5-S1 LUMBAR epidural steroid injection ([id number]);  Surgeon: Chandra Serra MD;  Location: [facility] MAIN OR;  Service: Pain Management     NV ARTHRS KNE SURG W/MENISCECTOMY MED/LAT W/SHVG Right 07/09/2018    Procedure: MENISCECTOMY LATERAL /MEDIAL;  Surgeon: David Sloan MD;  Location: [facility] MAIN OR;  Service: Orthopedics    NV CORRECTION HAMMERTOE Bilateral 05/14/2021    Procedure: REPAIR HAMMERTOE / MALLET TOE / CLAW TOE 2ND toe bilateral;  Surgeon: Filipe Oliva DPM;  Location: [facility] MAIN OR;  Service: Podiatry    NV CORRJ HLX VLGS BNCTY SESMDC RESCJ PROX PHLX BASE Right 04/09/2021    Procedure: BUNIONECTOMY SMITH;  Surgeon: Filipe Oliva DPM;  Location: [facility] MAIN OR;  Service: Podiatry    ROTATOR CUFF REPAIR Left     WISDOM TOOTH EXTRACTION      x4    WRIST SURGERY       Social History     Substance and Sexual Activity   Alcohol Use Yes    Alcohol/week: 3.0 standard drinks of alcohol    Types: 3 Cans of beer per week     Comment: if that     Social History     Substance and Sexual Activity   Drug Use No     Social History     Tobacco Use   Smoking Status Former    Current packs/day: 0.00    Average packs/day: 1 pack/day for 25.0 years (25.0 ttl pk-yrs)    Types: Cigarettes    Start date: 1975    Quit date: 2000    Years since quittin.2    Passive exposure: Past   Smokeless Tobacco Never     Family History   Problem Relation Age of Onset    Cancer Mother         passed    Hypertension Father     Heart disease Father         leaky valve    Skin cancer Father     Diabetes Father      Health Maintenance Due   Topic    HIV Screening     Annual Physical     DTaP,Tdap,and Td Vaccines (1 - Tdap)    Pneumococcal Vaccine: 65+ Years (1 of 1 - PCV)    COVID-19 Vaccine ( season)    PT PLAN OF CARE     Depression Screening       Meds/Allergies       Current Outpatient Medications:     Cholecalciferol (VITAMIN D3) 1000 units CAPS, Take 1,000 Units by mouth, Disp: , Rfl:     Hydroxyurea (HYDREA PO), Take 1,000 mg by mouth Mon-Wed-Fri, Disp: , Rfl:     hydroxyurea (HYDREA) 500 mg capsule, Take by mouth On -Sat-Sun , Disp: , Rfl:     levothyroxine 150 mcg tablet, TAKE 1 TABLET DAILY, Disp: 90 tablet, Rfl: 3    sildenafil (VIAGRA) 100 mg tablet, Take 1 tablet (100 mg total) by mouth if needed for erectile dysfunction As directed, Disp: 6 tablet, Rfl: 1      Objective:    Vitals:   /71 (BP Location: Right arm, Patient Position: Sitting, Cuff Size: Standard)   Pulse 83   Ht 6' (1.829 m)   Wt 69.4 kg (153 lb)   SpO2 97%   BMI 20.75 kg/m²   Body mass index is 20.75 kg/m².  Vitals:    24 0957   Weight: 69.4 kg (153 lb)       Physical Exam  Vitals and nursing note reviewed.   Constitutional:       Appearance: Normal appearance.   Cardiovascular:      Rate and Rhythm: Normal rate and regular rhythm.      Heart sounds: Normal heart sounds.   Pulmonary:      Effort: Pulmonary effort is normal.      Breath  "sounds: Normal breath sounds.   Abdominal:      General: Abdomen is flat.      Palpations: Abdomen is soft.   Musculoskeletal:      Right lower leg: No edema.      Left lower leg: No edema.      Comments: Spine movements causing discomfort in the low back area radiating down up to the left knee SLR is about 40 degrees on the left side.   Skin:     General: Skin is warm and dry.   Neurological:      Mental Status: He is alert and oriented to person, place, and time.   Psychiatric:         Mood and Affect: Mood normal.         Behavior: Behavior normal.         Lab Review   No visits with results within 2 Month(s) from this visit.   Latest known visit with results is:   Orders Only on 11/22/2023   Component Date Value Ref Range Status    TSH W/RFX TO FREE T4 11/22/2023 0.28 (L)  0.40 - 4.50 mIU/L Final    Free t4 11/22/2023 1.5  0.8 - 1.8 ng/dL Final         Ronnell Coronel MD        \"This note has been constructed using a voice recognition system.Therefore there may be syntax, spelling, and/or grammatical errors. Please call if you have any questions. \"  "

## 2024-03-29 ENCOUNTER — DOCUMENTATION (OUTPATIENT)
Dept: FAMILY MEDICINE CLINIC | Facility: CLINIC | Age: 64
End: 2024-03-29

## 2024-03-29 ENCOUNTER — OFFICE VISIT (OUTPATIENT)
Dept: FAMILY MEDICINE CLINIC | Facility: CLINIC | Age: 64
End: 2024-03-29
Payer: COMMERCIAL

## 2024-03-29 VITALS
DIASTOLIC BLOOD PRESSURE: 71 MMHG | OXYGEN SATURATION: 97 % | WEIGHT: 153 LBS | BODY MASS INDEX: 20.72 KG/M2 | HEART RATE: 83 BPM | SYSTOLIC BLOOD PRESSURE: 135 MMHG | HEIGHT: 72 IN

## 2024-03-29 DIAGNOSIS — N52.9 ERECTILE DYSFUNCTION, UNSPECIFIED ERECTILE DYSFUNCTION TYPE: ICD-10-CM

## 2024-03-29 DIAGNOSIS — M05.79 RHEUMATOID ARTHRITIS INVOLVING MULTIPLE SITES WITH POSITIVE RHEUMATOID FACTOR (HCC): ICD-10-CM

## 2024-03-29 DIAGNOSIS — D75.839 THROMBOCYTOSIS: ICD-10-CM

## 2024-03-29 DIAGNOSIS — E78.5 HYPERLIPIDEMIA, UNSPECIFIED HYPERLIPIDEMIA TYPE: ICD-10-CM

## 2024-03-29 DIAGNOSIS — E03.9 ACQUIRED HYPOTHYROIDISM: ICD-10-CM

## 2024-03-29 DIAGNOSIS — N52.8 OTHER MALE ERECTILE DYSFUNCTION: ICD-10-CM

## 2024-03-29 DIAGNOSIS — M54.42 ACUTE LEFT-SIDED LOW BACK PAIN WITH LEFT-SIDED SCIATICA: Primary | ICD-10-CM

## 2024-03-29 PROCEDURE — 99214 OFFICE O/P EST MOD 30 MIN: CPT | Performed by: INTERNAL MEDICINE

## 2024-03-29 RX ORDER — SILDENAFIL 100 MG/1
100 TABLET, FILM COATED ORAL AS NEEDED
Qty: 6 TABLET | Refills: 12 | Status: SHIPPED | OUTPATIENT
Start: 2024-03-29

## 2024-03-29 RX ORDER — GABAPENTIN 100 MG/1
CAPSULE ORAL
Qty: 100 CAPSULE | Refills: 1 | Status: SHIPPED | OUTPATIENT
Start: 2024-03-29

## 2024-03-29 NOTE — ASSESSMENT & PLAN NOTE
Patient with as mentioned acute left-sided low back pain status post steroid injection in the back on 15 March we will start him on gabapentin gradually increase the dose from 100 mg to 300 mg at bedtime.  Will also give him a note for work for light duty and recommend follow-up with the pain management.

## 2024-03-29 NOTE — ASSESSMENT & PLAN NOTE
Currently not on any medication.  Since patient is asymptomatic he has been discharged by the rheumatologist from there service

## 2024-04-01 NOTE — TELEPHONE ENCOUNTER
S/w the patient and reviewed. He stated he was able to get a light duty note from his PCP and he was started on Gabapentin.   He stated he did send you a mychart message.  He did not receive any relief with the previous injection.   Would you like him to make an ovs for a reevaluation?

## 2024-04-03 ENCOUNTER — TELEPHONE (OUTPATIENT)
Dept: FAMILY MEDICINE CLINIC | Facility: CLINIC | Age: 64
End: 2024-04-03

## 2024-04-04 ENCOUNTER — TELEPHONE (OUTPATIENT)
Dept: FAMILY MEDICINE CLINIC | Facility: CLINIC | Age: 64
End: 2024-04-04

## 2024-04-05 ENCOUNTER — TELEPHONE (OUTPATIENT)
Dept: FAMILY MEDICINE CLINIC | Facility: CLINIC | Age: 64
End: 2024-04-05

## 2024-04-10 ENCOUNTER — OFFICE VISIT (OUTPATIENT)
Dept: PAIN MEDICINE | Facility: CLINIC | Age: 64
End: 2024-04-10
Payer: COMMERCIAL

## 2024-04-10 ENCOUNTER — TELEPHONE (OUTPATIENT)
Dept: PAIN MEDICINE | Facility: CLINIC | Age: 64
End: 2024-04-10

## 2024-04-10 VITALS
SYSTOLIC BLOOD PRESSURE: 135 MMHG | WEIGHT: 157 LBS | HEART RATE: 71 BPM | HEIGHT: 72 IN | DIASTOLIC BLOOD PRESSURE: 77 MMHG | BODY MASS INDEX: 21.26 KG/M2

## 2024-04-10 DIAGNOSIS — M51.16 LUMBAR DISC DISEASE WITH RADICULOPATHY: ICD-10-CM

## 2024-04-10 DIAGNOSIS — M54.16 LUMBAR RADICULOPATHY: Primary | ICD-10-CM

## 2024-04-10 DIAGNOSIS — G89.29 CHRONIC LEFT-SIDED LOW BACK PAIN WITH LEFT-SIDED SCIATICA: ICD-10-CM

## 2024-04-10 DIAGNOSIS — M54.42 CHRONIC LEFT-SIDED LOW BACK PAIN WITH LEFT-SIDED SCIATICA: ICD-10-CM

## 2024-04-10 PROCEDURE — 99214 OFFICE O/P EST MOD 30 MIN: CPT

## 2024-04-10 NOTE — H&P (VIEW-ONLY)
Pain Medicine Follow-Up Note    Assessment:  1. Lumbar radiculopathy    2. Chronic left-sided low back pain with left-sided sciatica    3. Lumbar disc disease with radiculopathy        Plan:    My impressions and treatment recommendations were discussed in detail with the patient who verbalized understanding and had no further questions.      Patient follows up after having a L5-S1 lumbar epidural steroid injection on 3/15/2024.  Patient disappointed previous lumbar epidural steroid injection provided him significant pain relief however this last injection minimal pain relief for approximately 2 weeks.  I recommend that the patient have an additional epidural steroid injection from a different approach.  I recommend that he have a left L5-S1 transforaminal epidural steroid injection.  It may take up to 2 weeks to be effective for the patient. Complete risks and benefits including bleeding, infection, tissue reaction, nerve injury and allergic reaction were discussed. The approach was demonstrated using models and literature was provided. Verbal and written consent was obtained.    Patient's work status to be determined by patient's PCP.    Follow-up is planned in 4 weeks after injection time or sooner as warranted.  Discharge instructions were provided. I personally saw and examined the patient and I agree with the above discussed plan of care.    History of Present Illness:    Agapito Martell is a 63 y.o. male who presents to [facility] for interval re-evaluation of the above stated pain complaints. The patient has a past medical and chronic pain history as outlined in the assessment section. He was last seen on 3/15/2024.    At today's visit patient states that their pain symptoms are the same with a pain score of 6/10 on the verbal numeric pain scale.  The patient's pain is worse in the evening.  The patient's pain is intermittent in nature.  And the quality of the patient's pain is  described as sharp.  The patient's pain is located in the left low back radiating through his left buttock and down his left thigh.  The patient states the amount of pain relief he is obtaining from his current pain relievers which consist of gabapentin 300 mg at at bedtime he is not enough to make a difference in his life due to it only reducing his pain symptoms between 10 to 20%.  Patient denies any significant side effects using gabapentin.    Other than as stated above, the patient denies any interval changes in medications, medical condition, mental condition, symptoms, or allergies since the last office visit.         Review of Systems:    Review of Systems   Respiratory:  Negative for shortness of breath.    Cardiovascular:  Negative for chest pain.   Gastrointestinal:  Negative for constipation, diarrhea, nausea and vomiting.   Musculoskeletal:  Positive for gait problem. Negative for arthralgias, joint swelling and myalgias.        Pain in lower back and down left leg    Skin:  Negative for rash.   Neurological:  Negative for dizziness, seizures and weakness.   All other systems reviewed and are negative.        Past Medical History:   Diagnosis Date    Arthritis     Disease of thyroid gland     hypo    Graves disease 1995    Hallux limitus, acquired, right     Hypothyroid     hypo    Other tear of medial meniscus, current injury, right knee, initial encounter 6/6/2018    Added automatically from request for surgery [id number]    Platelet disorder ([facility])     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 Jc    RA (rheumatoid arthritis) ([facility])     Rheumatoid arthritis involving multiple sites ([facility]) 9/19/2022    Wears glasses        Past Surgical History:   Procedure Laterality Date    ANKLE SURGERY Right     ligament, chipped bones,dislocated    BUNIONECTOMY  03/2014    COLONOSCOPY      ELBOW SURGERY Right     tendon surgery    EPIDURAL BLOCK INJECTION Left 8/16/2023    Procedure: left L5-S1, S1 TRANSFORAMINAL epidural steroid  injection ([id number], [id number]);  Surgeon: Gunner Aguayo DO;  Location: [facility] MAIN OR;  Service: Pain Management     EPIDURAL BLOCK INJECTION N/A 3/15/2024    Procedure: L5-S1 LUMBAR EPIDURAL STEROID INJECTION;  Surgeon: Chandra Serra MD;  Location: [facility] MAIN OR;  Service: Pain Management     FOOT ARTHRODESIS, MODIFIED ARELLANO Left     FRACTURE SURGERY Left     elbow    HERNIA REPAIR Right     inguinal    LUMBAR EPIDURAL INJECTION N/A 2023    Procedure: L5-S1 LUMBAR epidural steroid injection ([id number]);  Surgeon: Chandra Serra MD;  Location: [facility] MAIN OR;  Service: Pain Management     PA ARTHRS KNE SURG W/MENISCECTOMY MED/LAT W/SHVG Right 2018    Procedure: MENISCECTOMY LATERAL /MEDIAL;  Surgeon: David Sloan MD;  Location: [facility] MAIN OR;  Service: Orthopedics    PA CORRECTION HAMMERTOE Bilateral 2021    Procedure: REPAIR HAMMERTOE / MALLET TOE / CLAW TOE 2ND toe bilateral;  Surgeon: Filipe Oliva DPM;  Location: [facility] MAIN [facility];  Service: Podiatry    PA CORRJ HLX VLGS BNCTY SESMDC RESCJ PROX PHLX BASE Right 2021    Procedure: BUNIONECTOMY SMITH;  Surgeon: Filipe Oliva DPM;  Location: [facility] MAIN OR;  Service: Podiatry    ROTATOR CUFF REPAIR Left     WISDOM TOOTH EXTRACTION      x4    WRIST SURGERY         Family History   Problem Relation Age of Onset    Cancer Mother         passed    Hypertension Father     Heart disease Father         leaky valve    Skin cancer Father     Diabetes Father        Social History     Occupational History    Not on file   Tobacco Use    Smoking status: Former     Current packs/day: 0.00     Average packs/day: 1 pack/day for 25.0 years (25.0 ttl pk-yrs)     Types: Cigarettes     Start date: 1975     Quit date: 2000     Years since quittin.2     Passive exposure: Past    Smokeless tobacco: Never   Vaping Use    Vaping status: Never Used   Substance and Sexual Activity    Alcohol use: Yes     Alcohol/week: 3.0 standard drinks of alcohol     Types: 3  Cans of beer per week     Comment: if that    Drug use: No    Sexual activity: Yes     Partners: Female         Current Outpatient Medications:     Cholecalciferol (VITAMIN D3) 1000 units CAPS, Take 1,000 Units by mouth, Disp: , Rfl:     gabapentin (NEURONTIN) 100 mg capsule, Take gabapentin 100 mg 1 capsule daily at bedtime for 3 days followed by 100 mg capsules 2 daily for 3 days at bedtime followed by 100 mg capsules 3 daily at bedtime, Disp: 100 capsule, Rfl: 1    Hydroxyurea (HYDREA PO), Take 1,000 mg by mouth Mon-Wed-Fri, Disp: , Rfl:     hydroxyurea (HYDREA) 500 mg capsule, Take by mouth On Tues-Thurs-Sat-Sun , Disp: , Rfl:     levothyroxine 150 mcg tablet, TAKE 1 TABLET DAILY, Disp: 90 tablet, Rfl: 3    sildenafil (VIAGRA) 100 mg tablet, Take 1 tablet (100 mg total) by mouth if needed for erectile dysfunction As directed, Disp: 6 tablet, Rfl: 12    No Known Allergies    Physical Exam:    /77   Pulse 71   Ht 6' (1.829 m)   Wt 71.2 kg (157 lb)   BMI 21.29 kg/m²     Constitutional:normal, well developed, well nourished, alert, in no distress and non-toxic and no overt pain behavior.  Eyes:anicteric  HEENT:grossly intact  Neck:supple, symmetric, trachea midline and no masses   Pulmonary:even and unlabored  Cardiovascular:No edema or pitting edema present  Skin:Normal without rashes or lesions and well hydrated  Psychiatric:Mood and affect appropriate  Neurologic:Cranial Nerves II-XII grossly intact  Musculoskeletal:antalgic gait    Lumbar Spine Exam    Appearance:  Normal lordosis  Palpation/Tenderness:  left lumbar paraspinal tenderness  left sacroiliac joint tenderness  left piriformis tenderness  Special Tests:  Left Straight Leg Test:  positive  Left Tyrone's Maneuver:  negative  Left Gaenslen's Test:  negative  Left Pelvic Distraction Test:  negative  Left Piriformis Stretch Test:  negative      This document was created using speech voice recognition software.   Grammatical errors, random word  insertions, pronoun errors, and incomplete sentences are an occasional consequence of this system due to software limitations, ambient noise, and hardware issues.   Any formal questions or concerns about content, text, or information contained within the body of this dictation should be directly addressed to the provider for clarification.

## 2024-04-10 NOTE — PROGRESS NOTES
Pain Medicine Follow-Up Note    Assessment:  1. Lumbar radiculopathy    2. Chronic left-sided low back pain with left-sided sciatica    3. Lumbar disc disease with radiculopathy        Plan:    My impressions and treatment recommendations were discussed in detail with the patient who verbalized understanding and had no further questions.      Patient follows up after having a L5-S1 lumbar epidural steroid injection on 3/15/2024.  Patient disappointed previous lumbar epidural steroid injection provided him significant pain relief however this last injection minimal pain relief for approximately 2 weeks.  I recommend that the patient have an additional epidural steroid injection from a different approach.  I recommend that he have a left L5-S1 transforaminal epidural steroid injection.  It may take up to 2 weeks to be effective for the patient. Complete risks and benefits including bleeding, infection, tissue reaction, nerve injury and allergic reaction were discussed. The approach was demonstrated using models and literature was provided. Verbal and written consent was obtained.    Patient's work status to be determined by patient's PCP.    Follow-up is planned in 4 weeks after injection time or sooner as warranted.  Discharge instructions were provided. I personally saw and examined the patient and I agree with the above discussed plan of care.    History of Present Illness:    Agapito Martell is a 63 y.o. male who presents to [facility] for interval re-evaluation of the above stated pain complaints. The patient has a past medical and chronic pain history as outlined in the assessment section. He was last seen on 3/15/2024.    At today's visit patient states that their pain symptoms are the same with a pain score of 6/10 on the verbal numeric pain scale.  The patient's pain is worse in the evening.  The patient's pain is intermittent in nature.  And the quality of the patient's pain is  described as sharp.  The patient's pain is located in the left low back radiating through his left buttock and down his left thigh.  The patient states the amount of pain relief he is obtaining from his current pain relievers which consist of gabapentin 300 mg at at bedtime he is not enough to make a difference in his life due to it only reducing his pain symptoms between 10 to 20%.  Patient denies any significant side effects using gabapentin.    Other than as stated above, the patient denies any interval changes in medications, medical condition, mental condition, symptoms, or allergies since the last office visit.         Review of Systems:    Review of Systems   Respiratory:  Negative for shortness of breath.    Cardiovascular:  Negative for chest pain.   Gastrointestinal:  Negative for constipation, diarrhea, nausea and vomiting.   Musculoskeletal:  Positive for gait problem. Negative for arthralgias, joint swelling and myalgias.        Pain in lower back and down left leg    Skin:  Negative for rash.   Neurological:  Negative for dizziness, seizures and weakness.   All other systems reviewed and are negative.        Past Medical History:   Diagnosis Date    Arthritis     Disease of thyroid gland     hypo    Graves disease 1995    Hallux limitus, acquired, right     Hypothyroid     hypo    Other tear of medial meniscus, current injury, right knee, initial encounter 6/6/2018    Added automatically from request for surgery [id number]    Platelet disorder ([facility])     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 Jc    RA (rheumatoid arthritis) ([facility])     Rheumatoid arthritis involving multiple sites ([facility]) 9/19/2022    Wears glasses        Past Surgical History:   Procedure Laterality Date    ANKLE SURGERY Right     ligament, chipped bones,dislocated    BUNIONECTOMY  03/2014    COLONOSCOPY      ELBOW SURGERY Right     tendon surgery    EPIDURAL BLOCK INJECTION Left 8/16/2023    Procedure: left L5-S1, S1 TRANSFORAMINAL epidural steroid  injection (01664, [id number]);  Surgeon: Gunner Aguayo DO;  Location: [facility] MAIN OR;  Service: Pain Management     EPIDURAL BLOCK INJECTION N/A 3/15/2024    Procedure: L5-S1 LUMBAR EPIDURAL STEROID INJECTION;  Surgeon: Chandra Serra MD;  Location: [facility] MAIN OR;  Service: Pain Management     FOOT ARTHRODESIS, MODIFIED ARELLANO Left     FRACTURE SURGERY Left     elbow    HERNIA REPAIR Right     inguinal    LUMBAR EPIDURAL INJECTION N/A 2023    Procedure: L5-S1 LUMBAR epidural steroid injection (05642);  Surgeon: Chandra Serra MD;  Location: [facility] MAIN OR;  Service: Pain Management     NH ARTHRS KNE SURG W/MENISCECTOMY MED/LAT W/SHVG Right 2018    Procedure: MENISCECTOMY LATERAL /MEDIAL;  Surgeon: David Sloan MD;  Location: [facility] MAIN OR;  Service: Orthopedics    NH CORRECTION HAMMERTOE Bilateral 2021    Procedure: REPAIR HAMMERTOE / MALLET TOE / CLAW TOE 2ND toe bilateral;  Surgeon: Filipe Oliva DPM;  Location: [facility] MAIN OR;  Service: Podiatry    NH CORRJ HLX VLGS BNCTY SESMDC RESCJ PROX PHLX BASE Right 2021    Procedure: BUNIONECTOMY SMITH;  Surgeon: Filipe Oliva DPM;  Location: [facility] MAIN OR;  Service: Podiatry    ROTATOR CUFF REPAIR Left     WISDOM TOOTH EXTRACTION      x4    WRIST SURGERY         Family History   Problem Relation Age of Onset    Cancer Mother         passed    Hypertension Father     Heart disease Father         leaky valve    Skin cancer Father     Diabetes Father        Social History     Occupational History    Not on file   Tobacco Use    Smoking status: Former     Current packs/day: 0.00     Average packs/day: 1 pack/day for 25.0 years (25.0 ttl pk-yrs)     Types: Cigarettes     Start date: 1975     Quit date: 2000     Years since quittin.2     Passive exposure: Past    Smokeless tobacco: Never   Vaping Use    Vaping status: Never Used   Substance and Sexual Activity    Alcohol use: Yes     Alcohol/week: 3.0 standard drinks of alcohol     Types: 3  Cans of beer per week     Comment: if that    Drug use: No    Sexual activity: Yes     Partners: Female         Current Outpatient Medications:     Cholecalciferol (VITAMIN D3) 1000 units CAPS, Take 1,000 Units by mouth, Disp: , Rfl:     gabapentin (NEURONTIN) 100 mg capsule, Take gabapentin 100 mg 1 capsule daily at bedtime for 3 days followed by 100 mg capsules 2 daily for 3 days at bedtime followed by 100 mg capsules 3 daily at bedtime, Disp: 100 capsule, Rfl: 1    Hydroxyurea (HYDREA PO), Take 1,000 mg by mouth Mon-Wed-Fri, Disp: , Rfl:     hydroxyurea (HYDREA) 500 mg capsule, Take by mouth On Tues-Thurs-Sat-Sun , Disp: , Rfl:     levothyroxine 150 mcg tablet, TAKE 1 TABLET DAILY, Disp: 90 tablet, Rfl: 3    sildenafil (VIAGRA) 100 mg tablet, Take 1 tablet (100 mg total) by mouth if needed for erectile dysfunction As directed, Disp: 6 tablet, Rfl: 12    No Known Allergies    Physical Exam:    /77   Pulse 71   Ht 6' (1.829 m)   Wt 71.2 kg (157 lb)   BMI 21.29 kg/m²     Constitutional:normal, well developed, well nourished, alert, in no distress and non-toxic and no overt pain behavior.  Eyes:anicteric  HEENT:grossly intact  Neck:supple, symmetric, trachea midline and no masses   Pulmonary:even and unlabored  Cardiovascular:No edema or pitting edema present  Skin:Normal without rashes or lesions and well hydrated  Psychiatric:Mood and affect appropriate  Neurologic:Cranial Nerves II-XII grossly intact  Musculoskeletal:antalgic gait    Lumbar Spine Exam    Appearance:  Normal lordosis  Palpation/Tenderness:  left lumbar paraspinal tenderness  left sacroiliac joint tenderness  left piriformis tenderness  Special Tests:  Left Straight Leg Test:  positive  Left Tyrone's Maneuver:  negative  Left Gaenslen's Test:  negative  Left Pelvic Distraction Test:  negative  Left Piriformis Stretch Test:  negative      This document was created using speech voice recognition software.   Grammatical errors, random word  insertions, pronoun errors, and incomplete sentences are an occasional consequence of this system due to software limitations, ambient noise, and hardware issues.   Any formal questions or concerns about content, text, or information contained within the body of this dictation should be directly addressed to the provider for clarification.

## 2024-04-10 NOTE — PATIENT INSTRUCTIONS
Epidural Steroid Injection, Ambulatory Care   GENERAL INFORMATION:   What do I need to know about an epidural steroid injection?  An epidural steroid injection (NAOMY) is a procedure to inject steroid medicine into the epidural space. The epidural space is between your spinal cord and vertebrae. Steroids reduce inflammation and fluid buildup in your spine that may be causing pain. You may be given pain medicine along with the steroids.   How do I prepare for an NAOMY?  Your healthcare provider will talk to you about how to prepare for your procedure. He will tell you what medicines to take or not take on the day of your procedure. You may need to stop taking blood thinners or other medicines several days before your procedure. You may need to adjust any diabetes medicine you take on the day of your procedure. Steroid medicine can increase your blood sugar level.   What will happen during an NAOMY?   You will be given medicine to numb the procedure area. You will be awake for the procedure, but you will not feel pain. You may also be given medicine to help you relax during the procedure. Contrast liquid will be used to help your healthcare provider see the area better. Tell the healthcare provider if you have ever had an allergic reaction to contrast liquid.    Your healthcare provider may place the needle into your neck area, middle of your back, or tailbone area. He may inject the medicine next to the nerves that are causing your pain. He may instead inject the medicine into a larger area of the epidural space. This helps the medicine spread to more nerves. Your healthcare provider will use a fluoroscope to help guide the needle to the right place. A fluoroscope is a type of x-ray. After the procedure, a bandage will be placed over the injection site to prevent infection.  What are the risks of an NAOMY?  You may have temporary or permanent nerve damage or paralysis. You may have bleeding or develop a serious infection,  such as meningitis (swelling of the brain coverings). An abscess may also develop. You may need surgery to fix the abscess. You may have a seizure, anxiety, or trouble sleeping. If you are a man, you may have temporary erectile dysfunction (not able to have an erection).   CARE AGREEMENT:   You have the right to help plan your care. Learn about your health condition and how it may be treated. Discuss treatment options with your caregivers to decide what care you want to receive. You always have the right to refuse treatment. The above information is an  only. It is not intended as medical advice for individual conditions or treatments. Talk to your doctor, nurse or pharmacist before following any medical regimen to see if it is safe and effective for you.  © 2014 [employer] Information is for End User's use only and may not be sold, redistributed or otherwise used for commercial purposes. All illustrations and images included in CareNotes® are the copyrighted property of [employer] Inc[employer] or [employer].

## 2024-04-26 ENCOUNTER — APPOINTMENT (OUTPATIENT)
Dept: RADIOLOGY | Facility: HOSPITAL | Age: 64
End: 2024-04-26
Payer: COMMERCIAL

## 2024-04-26 ENCOUNTER — HOSPITAL ENCOUNTER (OUTPATIENT)
Facility: AMBULARY SURGERY CENTER | Age: 64
Setting detail: OUTPATIENT SURGERY
Discharge: HOME | End: 2024-04-26
Attending: STUDENT IN AN ORGANIZED HEALTH CARE EDUCATION/TRAINING PROGRAM | Admitting: STUDENT IN AN ORGANIZED HEALTH CARE EDUCATION/TRAINING PROGRAM
Payer: COMMERCIAL

## 2024-04-26 VITALS
SYSTOLIC BLOOD PRESSURE: 149 MMHG | RESPIRATION RATE: 18 BRPM | DIASTOLIC BLOOD PRESSURE: 94 MMHG | TEMPERATURE: 98.6 F | HEART RATE: 82 BPM | OXYGEN SATURATION: 98 %

## 2024-04-26 PROCEDURE — NC001 PR NO CHARGE: Performed by: STUDENT IN AN ORGANIZED HEALTH CARE EDUCATION/TRAINING PROGRAM

## 2024-04-26 PROCEDURE — 64483 NJX AA&/STRD TFRM EPI L/S 1: CPT | Performed by: STUDENT IN AN ORGANIZED HEALTH CARE EDUCATION/TRAINING PROGRAM

## 2024-04-26 RX ORDER — BUPIVACAINE HYDROCHLORIDE 2.5 MG/ML
INJECTION, SOLUTION EPIDURAL; INFILTRATION; INTRACAUDAL AS NEEDED
Status: DISCONTINUED | OUTPATIENT
Start: 2024-04-26 | End: 2024-04-26 | Stop reason: HOSPADM

## 2024-04-26 RX ORDER — METHYLPREDNISOLONE ACETATE 80 MG/ML
INJECTION, SUSPENSION INTRA-ARTICULAR; INTRALESIONAL; INTRAMUSCULAR; SOFT TISSUE AS NEEDED
Status: DISCONTINUED | OUTPATIENT
Start: 2024-04-26 | End: 2024-04-26 | Stop reason: HOSPADM

## 2024-04-26 RX ORDER — LIDOCAINE HYDROCHLORIDE 10 MG/ML
INJECTION, SOLUTION EPIDURAL; INFILTRATION; INTRACAUDAL; PERINEURAL AS NEEDED
Status: DISCONTINUED | OUTPATIENT
Start: 2024-04-26 | End: 2024-04-26 | Stop reason: HOSPADM

## 2024-04-26 NOTE — DISCHARGE INSTRUCTIONS

## 2024-04-26 NOTE — INTERVAL H&P NOTE
H&P reviewed. After examining the patient I find no changes in the patients condition since the H&P had been written.    Vitals:    04/26/24 0715   BP: 139/75   Pulse: 69   Resp: 18   Temp: 98.6 °F (37 °C)   SpO2: 98%

## 2024-04-26 NOTE — OP NOTE
Pre-procedure Diagnosis: Lumbar radiculopathy  Post-procedure Diagnosis: Lumbar radiculopathy  Procedure Title(s):  left  L5-S1 TRANSFORAMINAL EPIDURAL STEROID INJECTION]  Attending Surgeon:   Chandra Serra MD  Anesthesia:   Local     Indications: The patient is a 64 y.o. year-old male with a diagnosis of lumbar radiculopathy. The patient's history and physical exam were reviewed. The risks, benefits and alternatives to the procedure were discussed, and all questions were answered to the patient's satisfaction. The patient agreed to proceed, and written informed consent was obtained.    Procedure in Detail: The patient was brought into the procedure room and placed in the prone position on the fluoroscopy table. The area of the lumbar spine was prepped with chloraprep solution  then draped in a sterile manner.    The L5 vertebral body was identified with AP fluoroscopy. An oblique view to the  left  was obtained to better visualize the inferior junction of the pedicle and transverse process. The 6 o'clock position of the pedicle was marked and identified. The skin and subcutaneous tissues in the area were anesthetized with 1% lidocaine. A 22-gauge, 5 inch needle was directed toward the targeted point under fluoroscopy until bone was contacted. The needle was then walked inferiorly until the neural foramen was entered. A lateral fluoroscopic view was then used to place the needle tip at the 10 o'clock position of the foramen.         Negative aspiration was confirmed, and 1 ml Omnipaque 240 was injected at each level. Appropriate neurograms were observed under AP fluoroscopy. Then, after negative aspiration, a solution consisting of [1-mL] 0.25% bupivacaine and [0.5-mL] depomedrol (40 mg/ml) was easily injected at each level. The needles were removed with a 1% lidocaine flush. The patient's back was cleaned and a bandage was placed over the needle insertion points.    Disposition: The patient tolerated the  procedure well, and there were no apparent complications. The patient was taken to the recovery area where written discharge instructions for the procedure were given.     Estimated Blood Loss: None  Specimens Obtained: N/A

## 2024-05-03 ENCOUNTER — TELEPHONE (OUTPATIENT)
Dept: PAIN MEDICINE | Facility: CLINIC | Age: 64
End: 2024-05-03

## 2024-05-06 ENCOUNTER — RA CDI HCC (OUTPATIENT)
Dept: OTHER | Facility: HOSPITAL | Age: 64
End: 2024-05-06

## 2024-05-06 NOTE — PROGRESS NOTES
HCC coding opportunities       Chart reviewed, no opportunity found: CHART REVIEWED, NO OPPORTUNITY FOUND        Patients Insurance        Commercial Insurance: [employer]

## 2024-05-08 LAB — HBA1C MFR BLD HPLC: 5.3 %

## 2024-05-10 NOTE — PROGRESS NOTES
Office Visit Note  24     Agapito Martell 64 y.o. male MRN: [id number]  : 1960    Assessment:     1. Acute left-sided low back pain with left-sided sciatica  Assessment & Plan:  As mentioned in HPI stable patient is having problems with his low back pain radiating in the left lower extremity recommend to continue with the chiropractor for now we will see if we can reduce the dose of the gabapentin to 200 mg daily at bedtime.  Follow-up with the spine and pain management also    Orders:  -     gabapentin (NEURONTIN) 100 mg capsule; Take 1 capsule (100 mg total) by mouth 2 (two) times a day    2. Acquired hypothyroidism  Assessment & Plan:  Patient TSH is normal currently on 150 mcg of levothyroxine continue the same      3. Thrombocytosis  Assessment & Plan:  Patient on hydroxyurea total of 1500 mg daily continue      4. Hyperlipidemia, unspecified hyperlipidemia type  Assessment & Plan:  Patient lipid profile cholesterol 188 LDL at 108 recommend regarding the diet exercise we will follow-up with repeat lab at a later date      5. Other male erectile dysfunction  Assessment & Plan:  Sildenafil as needed      6. Rheumatoid arthritis involving multiple sites with positive rheumatoid factor (HCC)  Assessment & Plan:  Currently not on any medications stable                 Discussion Summary and Plan:  Today's care plan and medications were reviewed with patient in detail and all their questions answered to their satisfaction.    Chief Complaint   Patient presents with   • Follow-up      Subjective:  Patient is coming here for a follow-up evaluation with regards to symptoms of his low back pain radiating down the left lower extremity in the buttock area in the thigh area he has received a second epidural injection with some difference in the pain but still has it.  Patient will not be able to go to work yet because of the nature of work which involves lifting.  He is also being followed by the chiropractor  regularly.    Patient is on gabapentin 300 mg at bedtime.  It is helping a lot is a question.  It was also noticed within this dosage of medication patient is getting upset quickly according to his wife.    Medications reviewed labs reviewed A1c is normal LDL is a thyroid in 8        The following portions of the patient's history were reviewed and updated as appropriate: allergies, current medications, past family history, past medical history, past social history, past surgical history and problem list.    Review of Systems   Constitutional:  Negative for chills and fever.   HENT:  Negative for ear pain and sore throat.    Eyes:  Negative for pain and visual disturbance.   Respiratory:  Negative for cough and shortness of breath.    Cardiovascular:  Negative for chest pain and palpitations.   Gastrointestinal:  Negative for abdominal pain and vomiting.   Genitourinary:  Negative for dysuria and hematuria.   Musculoskeletal:  Negative for arthralgias and back pain.   Skin:  Negative for color change and rash.   Neurological:  Negative for seizures and syncope.   All other systems reviewed and are negative.        Historical Information   Patient Active Problem List   Diagnosis   • Hyperlipidemia   • Hypothyroidism   • Thrombocytosis   • Rheumatoid arthritis involving multiple sites with positive rheumatoid factor ([facility])   • Lipoma of torso   • Other male erectile dysfunction   • Acute left-sided low back pain with left-sided sciatica   • Lumbar radiculopathy     Past Medical History:   Diagnosis Date   • Arthritis    • Disease of thyroid gland     hypo   • Graves disease 1995   • Hallux limitus, acquired, right    • Hypothyroid     hypo   • Other tear of medial meniscus, current injury, right knee, initial encounter 6/6/2018    Added automatically from request for surgery [id number]   • Platelet disorder ([facility])     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 Jc   • RA (rheumatoid arthritis) ([facility])    • Rheumatoid arthritis involving  multiple sites ([facility]) 9/19/2022   • Wears glasses      Past Surgical History:   Procedure Laterality Date   • ANKLE SURGERY Right     ligament, chipped bones,dislocated   • BUNIONECTOMY  03/2014   • COLONOSCOPY     • ELBOW SURGERY Right     tendon surgery   • EPIDURAL BLOCK INJECTION Left 8/16/2023    Procedure: left L5-S1, S1 TRANSFORAMINAL epidural steroid injection ([id number], [id number]);  Surgeon: Gunner Aguayo DO;  Location: [facility] MAIN OR;  Service: Pain Management    • EPIDURAL BLOCK INJECTION N/A 3/15/2024    Procedure: L5-S1 LUMBAR EPIDURAL STEROID INJECTION;  Surgeon: Chandra Serra MD;  Location: [facility] MAIN OR;  Service: Pain Management    • EPIDURAL BLOCK INJECTION Left 4/26/2024    Procedure: LEFT L5-S1 TRANSFORAMINAL EPIDURAL STEROID INJECTION;  Surgeon: Chandra Serra MD;  Location: [facility] MAIN OR;  Service: Pain Management    • FOOT ARTHRODESIS, MODIFIED ARELLANO Left    • FRACTURE SURGERY Left     elbow   • HERNIA REPAIR Right     inguinal   • LUMBAR EPIDURAL INJECTION N/A 9/22/2023    Procedure: L5-S1 LUMBAR epidural steroid injection ([id number]);  Surgeon: Chandra Serra MD;  Location: [facility] MAIN OR;  Service: Pain Management    • CO ARTHRS KNE SURG W/MENISCECTOMY MED/LAT W/SHVG Right 07/09/2018    Procedure: MENISCECTOMY LATERAL /MEDIAL;  Surgeon: David Sloan MD;  Location: [facility] MAIN OR;  Service: Orthopedics   • CO CORRECTION HAMMERTOE Bilateral 05/14/2021    Procedure: REPAIR HAMMERTOE / MALLET TOE / CLAW TOE 2ND toe bilateral;  Surgeon: Filipe Oliva DPM;  Location: [facility] MAIN OR;  Service: Podiatry   • CO CORRJ HLX VLGS BNCTY SESMDC RESCJ PROX PHLX BASE Right 04/09/2021    Procedure: BUNIONECTOMY SMITH;  Surgeon: Filipe Oliva DPM;  Location: [facility] MAIN OR;  Service: Podiatry   • ROTATOR CUFF REPAIR Left    • WISDOM TOOTH EXTRACTION      x4   • WRIST SURGERY       Social History     Substance and Sexual Activity   Alcohol Use Yes   • Alcohol/week: 3.0 standard drinks of alcohol   • Types: 3 Cans  of beer per week    Comment: if that     Social History     Substance and Sexual Activity   Drug Use No     Social History     Tobacco Use   Smoking Status Former   • Current packs/day: 0.00   • Average packs/day: 1 pack/day for 25.0 years (25.0 ttl pk-yrs)   • Types: Cigarettes   • Start date: 1975   • Quit date: 2000   • Years since quittin.3   • Passive exposure: Past   Smokeless Tobacco Never     Family History   Problem Relation Age of Onset   • Cancer Mother         passed   • Hypertension Father    • Heart disease Father         leaky valve   • Skin cancer Father    • Diabetes Father      Health Maintenance Due   Topic   • Annual Physical    • DTaP,Tdap,and Td Vaccines (1 - Tdap)   • Pneumococcal Vaccine: 65+ Years (1 of  - PCV)   • COVID-19 Vaccine ( season)   • PT PLAN OF CARE    • Colorectal Cancer Screening    • Fall Risk       Meds/Allergies       Current Outpatient Medications:   •  Cholecalciferol (VITAMIN D3) 1000 units CAPS, Take 1,000 Units by mouth, Disp: , Rfl:   •  gabapentin (NEURONTIN) 100 mg capsule, Take 1 capsule (100 mg total) by mouth 2 (two) times a day, Disp: 60 capsule, Rfl: 5  •  Hydroxyurea (HYDREA PO), Take 1,000 mg by mouth Mon-Wed-Fri, Disp: , Rfl:   •  hydroxyurea (HYDREA) 500 mg capsule, Take by mouth On -Sat-Sun , Disp: , Rfl:   •  levothyroxine 150 mcg tablet, TAKE 1 TABLET DAILY, Disp: 90 tablet, Rfl: 3  •  sildenafil (VIAGRA) 100 mg tablet, Take 1 tablet (100 mg total) by mouth if needed for erectile dysfunction As directed, Disp: 6 tablet, Rfl: 12      Objective:    Vitals:   /74 (BP Location: Right arm, Patient Position: Sitting, Cuff Size: Standard)   Pulse 77   Ht 6' (1.829 m)   Wt 71.9 kg (158 lb 9.6 oz)   SpO2 97%   BMI 21.51 kg/m²   Body mass index is 21.51 kg/m².  Vitals:    24 1436   Weight: 71.9 kg (158 lb 9.6 oz)       Physical Exam    Lab Review   Orders Only on 2024   Component Date Value Ref Range Status  "  • Hemoglobin A1C 05/08/2024 5.3   Final         Ronnell Coronel MD        \"This note has been constructed using a voice recognition system.Therefore there may be syntax, spelling, and/or grammatical errors. Please call if you have any questions. \"  "

## 2024-05-13 ENCOUNTER — OFFICE VISIT (OUTPATIENT)
Dept: FAMILY MEDICINE CLINIC | Facility: CLINIC | Age: 64
End: 2024-05-13
Payer: COMMERCIAL

## 2024-05-13 ENCOUNTER — TELEPHONE (OUTPATIENT)
Dept: GASTROENTEROLOGY | Facility: CLINIC | Age: 64
End: 2024-05-13

## 2024-05-13 VITALS
BODY MASS INDEX: 21.48 KG/M2 | WEIGHT: 158.6 LBS | HEIGHT: 72 IN | SYSTOLIC BLOOD PRESSURE: 136 MMHG | DIASTOLIC BLOOD PRESSURE: 74 MMHG | HEART RATE: 77 BPM | OXYGEN SATURATION: 97 %

## 2024-05-13 DIAGNOSIS — M54.42 ACUTE LEFT-SIDED LOW BACK PAIN WITH LEFT-SIDED SCIATICA: Primary | ICD-10-CM

## 2024-05-13 DIAGNOSIS — E78.5 HYPERLIPIDEMIA, UNSPECIFIED HYPERLIPIDEMIA TYPE: ICD-10-CM

## 2024-05-13 DIAGNOSIS — M05.79 RHEUMATOID ARTHRITIS INVOLVING MULTIPLE SITES WITH POSITIVE RHEUMATOID FACTOR (HCC): ICD-10-CM

## 2024-05-13 DIAGNOSIS — N52.8 OTHER MALE ERECTILE DYSFUNCTION: ICD-10-CM

## 2024-05-13 DIAGNOSIS — D75.839 THROMBOCYTOSIS: ICD-10-CM

## 2024-05-13 DIAGNOSIS — E03.9 ACQUIRED HYPOTHYROIDISM: ICD-10-CM

## 2024-05-13 PROCEDURE — 99214 OFFICE O/P EST MOD 30 MIN: CPT | Performed by: INTERNAL MEDICINE

## 2024-05-13 RX ORDER — GABAPENTIN 100 MG/1
100 CAPSULE ORAL 2 TIMES DAILY
Qty: 60 CAPSULE | Refills: 5 | Status: SHIPPED | OUTPATIENT
Start: 2024-05-13

## 2024-05-13 RX ORDER — GABAPENTIN 100 MG/1
CAPSULE ORAL
Qty: 100 CAPSULE | Refills: 1 | Status: CANCELLED | OUTPATIENT
Start: 2024-05-13

## 2024-05-13 NOTE — TELEPHONE ENCOUNTER
Pt is due for a 5 yr colon recall due to hx of ta, hyperplastic polyps, last colon 7/2019 with Dr Carpenter. I called and spoke to pt whom did not have time to schedule and said would call back when he has time.  Will mail recall letter if do not hear back from him.

## 2024-05-13 NOTE — ASSESSMENT & PLAN NOTE
As mentioned in HPI stable patient is having problems with his low back pain radiating in the left lower extremity recommend to continue with the chiropractor for now we will see if we can reduce the dose of the gabapentin to 200 mg daily at bedtime.  Follow-up with the spine and pain management also

## 2024-05-13 NOTE — ASSESSMENT & PLAN NOTE
Patient lipid profile cholesterol 188 LDL at 108 recommend regarding the diet exercise we will follow-up with repeat lab at a later date

## 2024-05-15 ENCOUNTER — TELEPHONE (OUTPATIENT)
Dept: FAMILY MEDICINE CLINIC | Facility: CLINIC | Age: 64
End: 2024-05-15

## 2024-06-03 ENCOUNTER — TELEPHONE (OUTPATIENT)
Age: 64
End: 2024-06-03

## 2024-06-07 ENCOUNTER — TELEPHONE (OUTPATIENT)
Dept: FAMILY MEDICINE CLINIC | Facility: CLINIC | Age: 64
End: 2024-06-07

## 2024-06-10 ENCOUNTER — TELEPHONE (OUTPATIENT)
Dept: FAMILY MEDICINE CLINIC | Facility: CLINIC | Age: 64
End: 2024-06-10

## 2024-06-11 ENCOUNTER — DOCUMENTATION (OUTPATIENT)
Dept: FAMILY MEDICINE CLINIC | Facility: CLINIC | Age: 64
End: 2024-06-11

## 2024-06-12 ENCOUNTER — TELEPHONE (OUTPATIENT)
Dept: FAMILY MEDICINE CLINIC | Facility: CLINIC | Age: 64
End: 2024-06-12

## 2024-06-13 ENCOUNTER — TELEPHONE (OUTPATIENT)
Age: 64
End: 2024-06-13

## 2024-06-13 NOTE — TELEPHONE ENCOUNTER
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 It is a disability paperwork .It was faxed to: [phone]. He states it requests notes from May to current but he is requesting the notes from April as well be sent     It can be faxed back completed to: [phone] attn: Yolie Hairston .     Please call patient with any questions and call to notify once it is faxed back to them     Call back#: [phone]

## 2024-06-17 DIAGNOSIS — N52.9 ERECTILE DYSFUNCTION, UNSPECIFIED ERECTILE DYSFUNCTION TYPE: ICD-10-CM

## 2024-06-17 RX ORDER — SILDENAFIL 100 MG/1
100 TABLET, FILM COATED ORAL AS NEEDED
Qty: 6 TABLET | Refills: 12 | Status: SHIPPED | OUTPATIENT
Start: 2024-06-17

## 2024-06-18 NOTE — TELEPHONE ENCOUNTER
Caller: pt    Doctor: paula    Reason for call: pt is calling back to see if paperwork was faxed?    Call back#: [phone]

## 2024-06-24 LAB — EXTERNAL SARS COV-2 ANTIBODY IGG: NORMAL

## 2024-07-25 NOTE — PROGRESS NOTES
Adult Annual Physical  Name: Agapito Martell      : 1960      MRN: [id number]  Encounter Provider: Ronnell Coronel MD  Encounter Date: 2024   Encounter department: [facility] PRIMARY [facility]    Assessment & Plan   1. Acute left-sided low back pain with left-sided sciatica  Assessment & Plan:  As mentioned in history of present illness patient with recurrent low back pain radiating to the left lower extremity up to the the buttock area and also up to the knee unable to doing his day-to-day activities.  As mentioned he had tried physical therapy epidural injections with the pain management and also chiropractor.  Triggering factor being workplace environment where he has to do bending pushing and pulling and lifting.  Recommend at present time not to do any kind of an activity which is triggering the symptoms since he is not getting good response with any kind of treatment plans we are making for him.  2. Hyperlipidemia, unspecified hyperlipidemia type  Assessment & Plan:  Last lipid profile cholesterol was 188 we will continue with diet exercise  3. Acquired hypothyroidism  Assessment & Plan:  Patient last TSH level is at 3.67 currently taking 150 mcg of levothyroxine we will continue with the same.  4. Other male erectile dysfunction  Assessment & Plan:  Sildenafil as needed  5. Rheumatoid arthritis involving multiple sites with positive rheumatoid factor (HCC)  Assessment & Plan:  Currently patient is not taking any medications stable  6. Thrombocytosis  Assessment & Plan:  Patient's platelet count is at 413,000 on  being followed by the hematology    Immunizations and preventive care screenings were discussed with patient today. Appropriate education was printed on patient's after visit summary.    Discussed risks and benefits of prostate cancer screening. We discussed the controversial history of PSA screening for prostate cancer in the [place] as well as the risk of over detection  and over treatment of prostate cancer by way of PSA screening.  The patient understands that PSA blood testing is an imperfect way to screen for prostate cancer and that elevated PSA levels in the blood may also be caused by infection, inflammation, prostatic trauma or manipulation, urological procedures, or by benign prostatic enlargement.    The role of the digital rectal examination in prostate cancer screening was also discussed and I discussed with him that there is large interobserver variability in the findings of digital rectal examination.    Counseling:  Alcohol/drug use: discussed moderation in alcohol intake, the recommendations for healthy alcohol use, and avoidance of illicit drug use.  Dental Health: discussed importance of regular tooth brushing, flossing, and dental visits.  Injury prevention: discussed safety/seat belts, safety helmets, smoke detectors, carbon dioxide detectors, and smoking near bedding or upholstery.  Sexual health: discussed sexually transmitted diseases, partner selection, use of condoms, avoidance of unintended pregnancy, and contraceptive alternatives.  Exercise: the importance of regular exercise/physical activity was discussed. Recommend exercise 3-5 times per week for at least 30 minutes.          History of Present Illness     Adult Annual Physical:  Patient presents for annual physical.     Diet and Physical Activity:  - Diet/Nutrition: frequent junk food and poor diet.  - Exercise: no formal exercise.    General Health:  - Sleep: sleeps well and 4-6 hours of sleep on average.  - Hearing: normal hearing bilateral ears.  - Vision: vision problems, goes for regular eye exams and wears glasses.  - Dental: regular dental visits, brushes teeth twice daily and does not floss.     Health:  - History of STDs: no.   - Urinary symptoms: erectile dysfunction.     Advanced Care Planning:  - Has an advanced directive?: yes    - Has a durable medical POA?: yes    - ACP document given  to patient?: yes      Review of Systems   Constitutional:  Negative for chills and fever.   HENT:  Negative for ear pain and sore throat.    Eyes:  Negative for pain and visual disturbance.   Respiratory:  Negative for cough and shortness of breath.    Cardiovascular:  Negative for chest pain and palpitations.   Gastrointestinal:  Negative for abdominal pain and vomiting.   Genitourinary:  Negative for dysuria and hematuria.   Musculoskeletal:  Negative for arthralgias and back pain.   Skin:  Negative for color change and rash.   Neurological:  Negative for seizures and syncope.   All other systems reviewed and are negative.    Pertinent Medical History   Patient is coming here for a follow-up evaluation with regards to his low back pain which has been a persistent problem for past 1 year time.  Patient had physical therapy for almost 4 months time he has received 3 epidural injections with minimal effect and he also had been to chiropractor with minimal improvement in his back pain.    His work involves a lot of bending pushing pulling which is a triggering factor for all the symptoms which he is developing.  Medication reviewed labs reviewed.    Patient also with thrombocytosis on hydroxyurea being followed by the hematologist.      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 Jc    RA (rheumatoid arthritis) ([facility])     Rheumatoid arthritis involving multiple sites ([facility]) 9/19/2022    Wears glasses      Past Surgical History:   Procedure  Laterality Date    ANKLE SURGERY Right     ligament, chipped bones,dislocated    BUNIONECTOMY  03/2014    COLONOSCOPY      ELBOW SURGERY Right     tendon surgery    EPIDURAL BLOCK INJECTION Left 8/16/2023    Procedure: left L5-S1, S1 TRANSFORAMINAL epidural steroid injection (83148, [id number]);  Surgeon: Gunner Aguayo DO;  Location: [facility] MAIN OR;  Service: Pain Management     EPIDURAL BLOCK INJECTION N/A 3/15/2024    Procedure: L5-S1 LUMBAR EPIDURAL STEROID INJECTION;  Surgeon: Chandra Serra MD;  Location: [facility] MAIN OR;  Service: Pain Management     EPIDURAL BLOCK INJECTION Left 4/26/2024    Procedure: LEFT L5-S1 TRANSFORAMINAL EPIDURAL STEROID INJECTION;  Surgeon: Chandra Serra MD;  Location: [facility] MAIN OR;  Service: Pain Management     FOOT ARTHRODESIS, MODIFIED ARELLANO Left     FRACTURE SURGERY Left     elbow    HERNIA REPAIR Right     inguinal    LUMBAR EPIDURAL INJECTION N/A 9/22/2023    Procedure: L5-S1 LUMBAR epidural steroid injection ([id number]);  Surgeon: Chandra Serra MD;  Location: [facility] MAIN OR;  Service: Pain Management     MS ARTHRS KNE SURG W/MENISCECTOMY MED/LAT W/SHVG Right 07/09/2018    Procedure: MENISCECTOMY LATERAL /MEDIAL;  Surgeon: David Sloan MD;  Location: [facility] MAIN OR;  Service: Orthopedics    MS CORRECTION HAMMERTOE Bilateral 05/14/2021    Procedure: REPAIR HAMMERTOE / MALLET TOE / CLAW TOE 2ND toe bilateral;  Surgeon: Filipe Oliva DPM;  Location: [facility] MAIN OR;  Service: Podiatry    MS CORRJ HLX VLGS BNCTY SESMDC RESCJ PROX PHLX BASE Right 04/09/2021    Procedure: BUNIONECTOMY SMITH;  Surgeon: Filipe Oliva DPM;  Location: [facility] MAIN OR;  Service: Podiatry    ROTATOR CUFF REPAIR Left     WISDOM TOOTH EXTRACTION      x4    WRIST SURGERY       Family History   Problem Relation Age of Onset    Cancer Mother         passed    Hypertension Father     Heart disease Father         leaky valve    Skin cancer Father     Diabetes Father      Current Outpatient Medications on File Prior to  Visit   Medication Sig Dispense Refill    Cholecalciferol (VITAMIN D3) 1000 units CAPS Take 1,000 Units by mouth      Hydroxyurea (HYDREA PO) Take 1,000 mg by mouth       hydroxyurea (HYDREA) 500 mg capsule Take by mouth On Tues-Thurs-Sat-Sun       levothyroxine 150 mcg tablet TAKE 1 TABLET DAILY 90 tablet 3    sildenafil (VIAGRA) 100 mg tablet Take 1 tablet (100 mg total) by mouth if needed for erectile dysfunction As directed 6 tablet 12    [DISCONTINUED] gabapentin (NEURONTIN) 100 mg capsule Take 1 capsule (100 mg total) by mouth 2 (two) times a day (Patient not taking: Reported on 2024) 60 capsule 5     No current facility-administered medications on file prior to visit.   No Known Allergies   Current Outpatient Medications on File Prior to Visit   Medication Sig Dispense Refill    Cholecalciferol (VITAMIN D3) 1000 units CAPS Take 1,000 Units by mouth      Hydroxyurea (HYDREA PO) Take 1,000 mg by mouth       hydroxyurea (HYDREA) 500 mg capsule Take by mouth On Tues-Thurs-Sat-Sun       levothyroxine 150 mcg tablet TAKE 1 TABLET DAILY 90 tablet 3    sildenafil (VIAGRA) 100 mg tablet Take 1 tablet (100 mg total) by mouth if needed for erectile dysfunction As directed 6 tablet 12    [DISCONTINUED] gabapentin (NEURONTIN) 100 mg capsule Take 1 capsule (100 mg total) by mouth 2 (two) times a day (Patient not taking: Reported on 2024) 60 capsule 5     No current facility-administered medications on file prior to visit.      Social History     Tobacco Use    Smoking status: Former     Current packs/day: 0.00     Average packs/day: 1 pack/day for 25.0 years (25.0 ttl pk-yrs)     Types: Cigarettes     Start date: 1975     Quit date: 2000     Years since quittin.5     Passive exposure: Past    Smokeless tobacco: Never   Vaping Use    Vaping status: Never Used   Substance and Sexual Activity    Alcohol use: Yes     Alcohol/week: 3.0 standard drinks of alcohol     Types: 3 Cans of  beer per week     Comment: if that    Drug use: No    Sexual activity: Yes     Partners: Female       Objective     /72 (BP Location: Right arm, Patient Position: Sitting, Cuff Size: Standard)   Pulse 70   Ht 6' (1.829 m)   Wt 71.7 kg (158 lb)   SpO2 97%   BMI 21.43 kg/m²     Physical Exam  Vitals and nursing note reviewed.   Constitutional:       General: He is not in acute distress.     Appearance: He is well-developed.   HENT:      Head: Normocephalic and atraumatic.   Eyes:      Conjunctiva/sclera: Conjunctivae normal.   Cardiovascular:      Rate and Rhythm: Normal rate and regular rhythm.      Heart sounds: No murmur heard.  Pulmonary:      Effort: Pulmonary effort is normal. No respiratory distress.      Breath sounds: Normal breath sounds.   Abdominal:      Palpations: Abdomen is soft.      Tenderness: There is no abdominal tenderness.   Musculoskeletal:         General: No swelling.      Cervical back: Neck supple.   Skin:     General: Skin is warm and dry.      Capillary Refill: Capillary refill takes less than 2 seconds.   Neurological:      Mental Status: He is alert.   Psychiatric:         Mood and Affect: Mood normal.

## 2024-07-29 ENCOUNTER — OFFICE VISIT (OUTPATIENT)
Dept: FAMILY MEDICINE CLINIC | Facility: CLINIC | Age: 64
End: 2024-07-29
Payer: COMMERCIAL

## 2024-07-29 VITALS
SYSTOLIC BLOOD PRESSURE: 116 MMHG | BODY MASS INDEX: 21.4 KG/M2 | DIASTOLIC BLOOD PRESSURE: 72 MMHG | WEIGHT: 158 LBS | HEIGHT: 72 IN | OXYGEN SATURATION: 97 % | HEART RATE: 70 BPM

## 2024-07-29 DIAGNOSIS — M05.79 RHEUMATOID ARTHRITIS INVOLVING MULTIPLE SITES WITH POSITIVE RHEUMATOID FACTOR (HCC): ICD-10-CM

## 2024-07-29 DIAGNOSIS — M54.42 ACUTE LEFT-SIDED LOW BACK PAIN WITH LEFT-SIDED SCIATICA: Primary | ICD-10-CM

## 2024-07-29 DIAGNOSIS — N52.8 OTHER MALE ERECTILE DYSFUNCTION: ICD-10-CM

## 2024-07-29 DIAGNOSIS — D75.839 THROMBOCYTOSIS: ICD-10-CM

## 2024-07-29 DIAGNOSIS — E78.5 HYPERLIPIDEMIA, UNSPECIFIED HYPERLIPIDEMIA TYPE: ICD-10-CM

## 2024-07-29 DIAGNOSIS — E03.9 ACQUIRED HYPOTHYROIDISM: ICD-10-CM

## 2024-07-29 PROCEDURE — 1160F RVW MEDS BY RX/DR IN RCRD: CPT | Performed by: INTERNAL MEDICINE

## 2024-07-29 PROCEDURE — 99213 OFFICE O/P EST LOW 20 MIN: CPT | Performed by: INTERNAL MEDICINE

## 2024-07-29 PROCEDURE — 99397 PER PM REEVAL EST PAT 65+ YR: CPT | Performed by: INTERNAL MEDICINE

## 2024-07-29 PROCEDURE — 1159F MED LIST DOCD IN RCRD: CPT | Performed by: INTERNAL MEDICINE

## 2024-07-29 NOTE — ASSESSMENT & PLAN NOTE
Patient last TSH level is at 3.67 currently taking 150 mcg of levothyroxine we will continue with the same.

## 2024-07-29 NOTE — ASSESSMENT & PLAN NOTE
As mentioned in history of present illness patient with recurrent low back pain radiating to the left lower extremity up to the the buttock area and also up to the knee unable to doing his day-to-day activities.  As mentioned he had tried physical therapy epidural injections with the pain management and also chiropractor.  Triggering factor being workplace environment where he has to do bending pushing and pulling and lifting.  Recommend at present time not to do any kind of an activity which is triggering the symptoms since he is not getting good response with any kind of treatment plans we are making for him.

## 2024-07-30 ENCOUNTER — TELEPHONE (OUTPATIENT)
Dept: FAMILY MEDICINE CLINIC | Facility: CLINIC | Age: 64
End: 2024-07-30

## 2024-07-30 ENCOUNTER — DOCUMENTATION (OUTPATIENT)
Dept: FAMILY MEDICINE CLINIC | Facility: CLINIC | Age: 64
End: 2024-07-30

## 2024-08-12 DIAGNOSIS — E03.9 ACQUIRED HYPOTHYROIDISM: ICD-10-CM

## 2024-08-13 RX ORDER — LEVOTHYROXINE SODIUM 150 UG/1
150 TABLET ORAL DAILY
Qty: 90 TABLET | Refills: 1 | Status: SHIPPED | OUTPATIENT
Start: 2024-08-13

## 2024-08-22 ENCOUNTER — TELEPHONE (OUTPATIENT)
Dept: FAMILY MEDICINE CLINIC | Facility: CLINIC | Age: 64
End: 2024-08-22

## 2024-08-26 ENCOUNTER — TELEPHONE (OUTPATIENT)
Age: 64
End: 2024-08-26

## 2024-08-26 NOTE — TELEPHONE ENCOUNTER
Caller: patient    Doctor: ANA MARÍA    Reason for call: [employer] will fax over a form,   Would like patient records from 3/28 until now and latest MRI and XR Report, that was taken for his procedure      Niranjan Yonny will be faxing the form      Call back#:

## 2024-08-28 NOTE — TELEPHONE ENCOUNTER
Caller: indra Altman    Doctor: Ponce    Reason for call: pt called to about completion of the form from [facility] that was faxed on Monday.  Pt made aware that we did not receive it as of yet and [facility]    Call back#: [phone]

## 2024-08-30 ENCOUNTER — TELEPHONE (OUTPATIENT)
Dept: FAMILY MEDICINE CLINIC | Facility: CLINIC | Age: 64
End: 2024-08-30

## 2024-09-04 ENCOUNTER — TELEPHONE (OUTPATIENT)
Dept: FAMILY MEDICINE CLINIC | Facility: CLINIC | Age: 64
End: 2024-09-04

## 2024-09-18 ENCOUNTER — RA CDI HCC (OUTPATIENT)
Dept: OTHER | Facility: HOSPITAL | Age: 64
End: 2024-09-18

## 2024-09-18 NOTE — PROGRESS NOTES
HCC coding opportunities       Chart reviewed, no opportunity found: CHART REVIEWED, NO OPPORTUNITY FOUND        Patients Insurance        Commercial Insurance: [employer]     HCC coding opportunities       Chart reviewed, no opportunity found: CHART REVIEWED, NO OPPORTUNITY FOUND        Patients Insurance        Commercial Insurance: [employer]

## 2024-09-24 NOTE — PROGRESS NOTES
Office Visit Note  24     Agapito Martell 64 y.o. male MRN: [id number]  : 1960    Assessment:     1. Acute left-sided low back pain with left-sided sciatica  Assessment & Plan:  Patient with a history of recurrent low back pain radiating to the left lower extremity currently but at present time is not working he finished a physical therapy had received epidural injections with the pain management and also was seen by the chiropractor in the past at present time triggering factor if certain movements cause discomfort and pain.  Patient will also be retiring soon  2. Hyperlipidemia, unspecified hyperlipidemia type  3. Thrombocytosis  Assessment & Plan:  Last  his platelet count was 413,000 will follow-up with repeat 23 later date meanwhile patient will continue hydroxyurea 1000 mg  and Friday and that the 3 days he takes for 100 mg we will continue with the same  4. Acquired hypothyroidism  Assessment & Plan:  Patient's last TSH level was 3.67 on 150 mcg of levothyroxine we will continue with the same follow-up with repeat diabetic later date.  5. Lumbar radiculopathy  6. Other male erectile dysfunction  Assessment & Plan:  Patient takes sildenafil as needed will continue  7. Rheumatoid arthritis involving multiple sites with positive rheumatoid factor (HCC)  Assessment & Plan:  Currently patient not on any medication he is in remission he was followed by the rheumatologist in the past.        Depression Screening and Follow-up Plan: Patient was screened for depression during today's encounter. They screened negative with a PHQ-2 score of 0.          Discussion Summary and Plan:  Today's care plan and medications were reviewed with patient in detail and all their questions answered to their satisfaction.    Chief Complaint   Patient presents with    Follow-up      Subjective:  Patient is coming here for a follow-up evaluation with regards to symptoms of his low back pain which is  better compared to before but however he is not working at present time.  Certain movements make the pain worse.  Patient also with a history of thrombocytosis being followed by the hematologist.  Currently he is on hydroxyurea.  Patient also with a history of hypothyroidism.    Papers related to disability with the [employer] insurance company were filled up in detail.  Patient is going to retire soon.        The following portions of the patient's history were reviewed and updated as appropriate: allergies, current medications, past family history, past medical history, past social history, past surgical history and problem list.    Review of Systems   Constitutional:  Negative for chills and fever.   HENT:  Negative for ear pain and sore throat.    Eyes:  Negative for pain and visual disturbance.   Respiratory:  Negative for cough and shortness of breath.    Cardiovascular:  Negative for chest pain and palpitations.   Gastrointestinal:  Negative for abdominal pain and vomiting.   Genitourinary:  Negative for dysuria and hematuria.   Musculoskeletal:  Positive for back pain. Negative for arthralgias.   Skin:  Negative for color change and rash.   Neurological:  Negative for seizures and syncope.   All other systems reviewed and are negative.        Historical Information   Patient Active Problem List   Diagnosis    Hyperlipidemia    Hypothyroidism    Thrombocytosis    Rheumatoid arthritis involving multiple sites with positive rheumatoid factor (HCC)    Lipoma of torso    Other male erectile dysfunction    Acute left-sided low back pain with left-sided sciatica    Lumbar radiculopathy     Past Medical History:   Diagnosis Date    Arthritis     Disease of thyroid gland     hypo    Graves disease 1995    Hallux limitus, acquired, right     Hypothyroid     hypo    Other tear of medial meniscus, current injury, right knee, initial encounter 6/6/2018    Added automatically from request for surgery [id number]    Platelet  disorder (HCC)     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 Jc    RA (rheumatoid arthritis) (HCC)     Rheumatoid arthritis involving multiple sites ([facility]) 9/19/2022    Wears glasses      Past Surgical History:   Procedure Laterality Date    ANKLE SURGERY Right     ligament, chipped bones,dislocated    BUNIONECTOMY  03/2014    COLONOSCOPY      ELBOW SURGERY Right     tendon surgery    EPIDURAL BLOCK INJECTION Left 8/16/2023    Procedure: left L5-S1, S1 TRANSFORAMINAL epidural steroid injection ([id number], [id number]);  Surgeon: Gunner Aguayo DO;  Location: [facility] MAIN OR;  Service: Pain Management     EPIDURAL BLOCK INJECTION N/A 3/15/2024    Procedure: L5-S1 LUMBAR EPIDURAL STEROID INJECTION;  Surgeon: Chandra Serra MD;  Location: [facility] MAIN OR;  Service: Pain Management     EPIDURAL BLOCK INJECTION Left 4/26/2024    Procedure: LEFT L5-S1 TRANSFORAMINAL EPIDURAL STEROID INJECTION;  Surgeon: Chandra Serra MD;  Location: [facility] MAIN OR;  Service: Pain Management     FOOT ARTHRODESIS, MODIFIED ARELLANO Left     FRACTURE SURGERY Left     elbow    HERNIA REPAIR Right     inguinal    LUMBAR EPIDURAL INJECTION N/A 9/22/2023    Procedure: L5-S1 LUMBAR epidural steroid injection ([id number]);  Surgeon: Chandra Serra MD;  Location: [facility] MAIN OR;  Service: Pain Management     DC ARTHRS KNE SURG W/MENISCECTOMY MED/LAT W/SHVG Right 07/09/2018    Procedure: MENISCECTOMY LATERAL /MEDIAL;  Surgeon: David Sloan MD;  Location: [facility] MAIN OR;  Service: Orthopedics    DC CORRECTION HAMMERTOE Bilateral 05/14/2021    Procedure: REPAIR HAMMERTOE / MALLET TOE / CLAW TOE 2ND toe bilateral;  Surgeon: Filipe Oliva DPM;  Location: [facility] MAIN OR;  Service: Podiatry    DC CORRJ HLX VLGS BNCTY SESMDC RESCJ PROX PHLX BASE Right 04/09/2021    Procedure: BUNIONECTOMY SMITH;  Surgeon: Filipe Oliva DPM;  Location: [facility] MAIN OR;  Service: Podiatry    ROTATOR CUFF REPAIR Left     WISDOM TOOTH EXTRACTION      x4    WRIST SURGERY       Social History     Substance  and Sexual Activity   Alcohol Use Yes    Alcohol/week: 3.0 standard drinks of alcohol    Types: 3 Cans of beer per week    Comment: if that     Social History     Substance and Sexual Activity   Drug Use No     Social History     Tobacco Use   Smoking Status Former    Current packs/day: 0.00    Average packs/day: 1 pack/day for 25.0 years (25.0 ttl pk-yrs)    Types: Cigarettes    Start date: 1975    Quit date: 2000    Years since quittin.7    Passive exposure: Past   Smokeless Tobacco Never     Family History   Problem Relation Age of Onset    Cancer Mother         passed    Hypertension Father     Heart disease Father         leaky valve    Skin cancer Father     Diabetes Father      Health Maintenance Due   Topic    DTaP,Tdap,and Td Vaccines (1 - Tdap)    Pneumococcal Vaccine: 65+ Years (1 of 1 - PCV)    PT PLAN OF CARE     Colorectal Cancer Screening     Fall Risk     Influenza Vaccine (1)      Meds/Allergies       Current Outpatient Medications:     Cholecalciferol (VITAMIN D3) 1000 units CAPS, Take 1,000 Units by mouth, Disp: , Rfl:     Hydroxyurea (HYDREA PO), Take 1,000 mg by mouth Mon-Wed-Fri, Disp: , Rfl:     hydroxyurea (HYDREA) 500 mg capsule, Take by mouth On -Sat-Sun , Disp: , Rfl:     levothyroxine 150 mcg tablet, TAKE 1 TABLET DAILY, Disp: 90 tablet, Rfl: 1    sildenafil (VIAGRA) 100 mg tablet, Take 1 tablet (100 mg total) by mouth if needed for erectile dysfunction As directed, Disp: 6 tablet, Rfl: 12      Objective:    Vitals:   /78 (BP Location: Right arm, Patient Position: Sitting, Cuff Size: Standard)   Pulse 95   Ht 6' (1.829 m)   Wt 71.9 kg (158 lb 9.6 oz)   SpO2 98%   BMI 21.51 kg/m²   Body mass index is 21.51 kg/m².  Vitals:    24 0855   Weight: 71.9 kg (158 lb 9.6 oz)       Physical Exam  Vitals and nursing note reviewed.   Constitutional:       Appearance: Normal appearance.   Cardiovascular:      Rate and Rhythm: Normal rate and regular rhythm.       "Heart sounds: Normal heart sounds.   Pulmonary:      Effort: Pulmonary effort is normal.      Breath sounds: Normal breath sounds.   Abdominal:      General: Abdomen is flat.      Palpations: Abdomen is soft.   Musculoskeletal:      Right lower leg: No edema.      Left lower leg: No edema.   Skin:     General: Skin is warm and dry.      Capillary Refill: Capillary refill takes less than 2 seconds.   Neurological:      Mental Status: He is alert and oriented to person, place, and time.   Psychiatric:         Mood and Affect: Mood normal.         Behavior: Behavior normal.         Lab Review   No visits with results within 2 Month(s) from this visit.   Latest known visit with results is:   Orders Only on 05/08/2024   Component Date Value Ref Range Status    Hemoglobin A1C 05/08/2024 5.3   Final         Ronnell Coronel MD        \"This note has been constructed using a voice recognition system.Therefore there may be syntax, spelling, and/or grammatical errors. Please call if you have any questions. \"  "

## 2024-09-25 ENCOUNTER — OFFICE VISIT (OUTPATIENT)
Dept: FAMILY MEDICINE CLINIC | Facility: CLINIC | Age: 64
End: 2024-09-25
Payer: COMMERCIAL

## 2024-09-25 VITALS
SYSTOLIC BLOOD PRESSURE: 118 MMHG | WEIGHT: 158.6 LBS | HEART RATE: 95 BPM | OXYGEN SATURATION: 98 % | BODY MASS INDEX: 21.48 KG/M2 | DIASTOLIC BLOOD PRESSURE: 78 MMHG | HEIGHT: 72 IN

## 2024-09-25 DIAGNOSIS — N52.8 OTHER MALE ERECTILE DYSFUNCTION: ICD-10-CM

## 2024-09-25 DIAGNOSIS — M54.42 ACUTE LEFT-SIDED LOW BACK PAIN WITH LEFT-SIDED SCIATICA: Primary | ICD-10-CM

## 2024-09-25 DIAGNOSIS — E78.5 HYPERLIPIDEMIA, UNSPECIFIED HYPERLIPIDEMIA TYPE: ICD-10-CM

## 2024-09-25 DIAGNOSIS — M54.16 LUMBAR RADICULOPATHY: ICD-10-CM

## 2024-09-25 DIAGNOSIS — E03.9 ACQUIRED HYPOTHYROIDISM: ICD-10-CM

## 2024-09-25 DIAGNOSIS — M05.79 RHEUMATOID ARTHRITIS INVOLVING MULTIPLE SITES WITH POSITIVE RHEUMATOID FACTOR (HCC): ICD-10-CM

## 2024-09-25 DIAGNOSIS — D75.839 THROMBOCYTOSIS: ICD-10-CM

## 2024-09-25 PROCEDURE — 99214 OFFICE O/P EST MOD 30 MIN: CPT | Performed by: INTERNAL MEDICINE

## 2024-09-25 NOTE — ASSESSMENT & PLAN NOTE
Patient with a history of recurrent low back pain radiating to the left lower extremity currently but at present time is not working he finished a physical therapy had received epidural injections with the pain management and also was seen by the chiropractor in the past at present time triggering factor if certain movements cause discomfort and pain.  Patient will also be retiring soon

## 2024-09-25 NOTE — ASSESSMENT & PLAN NOTE
Patient's last TSH level was 3.67 on 150 mcg of levothyroxine we will continue with the same follow-up with repeat diabetic later date.

## 2024-09-25 NOTE — ASSESSMENT & PLAN NOTE
Currently patient not on any medication he is in remission he was followed by the rheumatologist in the past.

## 2024-09-25 NOTE — ASSESSMENT & PLAN NOTE
Last June his platelet count was 413,000 will follow-up with repeat 23 later date meanwhile patient will continue hydroxyurea 1000 mg Sunday Monday Wednesday and Friday and that the 3 days he takes for 100 mg we will continue with the same

## 2024-10-04 ENCOUNTER — TELEPHONE (OUTPATIENT)
Age: 64
End: 2024-10-04

## 2024-10-04 NOTE — TELEPHONE ENCOUNTER
Caller: Dr. Kelly Samano    Doctor: ANY    Reason for call: Called to request fax number to send referral. Provider requested we call pt for scheduling. Fax number provided.    Call back#: [phone]

## 2024-11-06 ENCOUNTER — TELEPHONE (OUTPATIENT)
Age: 64
End: 2024-11-06

## 2024-11-06 ENCOUNTER — TELEPHONE (OUTPATIENT)
Dept: FAMILY MEDICINE CLINIC | Facility: CLINIC | Age: 64
End: 2024-11-06

## 2024-11-06 NOTE — TELEPHONE ENCOUNTER
Patient stated he will be having foot surgery as early as next week and needs a pre ope clearance but does not have surgery date. Stated the office will call us to set up this appt.

## 2024-11-08 ENCOUNTER — HOSPITAL ENCOUNTER (OUTPATIENT)
Dept: RADIOLOGY | Facility: HOSPITAL | Age: 64
Discharge: HOME | End: 2024-11-08
Payer: MEDICARE

## 2024-11-08 DIAGNOSIS — L97.919 NON-HEALING ULCER OF LOWER EXTREMITY, RIGHT, WITH UNSPECIFIED SEVERITY (HCC): ICD-10-CM

## 2024-11-08 PROCEDURE — 93923 UPR/LXTR ART STDY 3+ LVLS: CPT

## 2024-11-08 PROCEDURE — 93922 UPR/L XTREMITY ART 2 LEVELS: CPT | Performed by: SURGERY

## 2024-11-08 PROCEDURE — 93925 LOWER EXTREMITY STUDY: CPT

## 2024-11-08 PROCEDURE — 93925 LOWER EXTREMITY STUDY: CPT | Performed by: SURGERY

## 2024-11-13 PROBLEM — L97.511 ULCER OF RIGHT FOOT, LIMITED TO BREAKDOWN OF SKIN (HCC): Status: ACTIVE | Noted: 2024-11-13

## 2024-11-13 NOTE — ASSESSMENT & PLAN NOTE
64 y.o. male with PMH of hypothyroidism, RA, HLD, former smoker, nonhealing right 5th toe ulceration x8 months. He was referred by podiatry for evaluation prior to toe amputation.       LEAD 11/8/24  RLE- Occlusion noted in the CFA with reconstitution at the proximal SFA and DFA bifurcation. Monophasic signals in the external iliac artery suggestive of more proximal disease. Evidence suggestive of Tibio-peroneal occlusive disease.  Ankle/Brachial index: 0.54 which is in the severe disease category.  PVR/ PPG tracings are dampened.  Metatarsal and great toe pressure could not be obtained due to flat line  waveform.     LEFT LOWER LIMB:  Diffuse disease throughout with a 50-75% stenosis noted in the common femoral artery.  Ankle/Brachial index: 0.93 which is normal.  PVR/ PPG tracings are dampened.  Metatarsal pressure of 140 mmHg  Great toe pressure of 51 mmHg, within the healing range.    Exam:   -Right foot warm down to the level of the toes with dependent rubor. Right 5th toe ulceration with sloughing. No signs of active infection. Motor and sensation intact. Nonpalpable R DP/PT pulses. Palpable B CFA pulses.     Plan:   -Educated patient on pathophysiology of peripheral arterial disease, available treatment options, and indications for treatment.   -Recommend CTA abdomen with runoff to evaluate aorto-iliac segments and extent of CFA disease.    -No hx of contrast dye allergy or CKD. Most recent labs 11/11/24 Cr 1.01. Labs scanned in media.   -Discussed risk factor modification, including adequate glycemic and lipid control, continued smoking cessation, blood pressure, and lifestyle modifications.   -Recommend medical optimization with daily ASA 81mg and statin therapy with LDL goal <100.   -F/u with podiatry as planned. Recommend revascularization prior to any podiatric interventions due to risk of poor healing.   -LWC to right 5th toe per podiatry. He is currently putting Hawaiian moon aloe on the toe.  Instructed him to keep area clean and dry. Recommend silver alginate dressing to the wound daily. Patient does not wish to use alginate dressings at this time.   -Follow up after CTA with a surgeon to review.   -Instructed patient to call the office with questions, concerns, or new symptoms.

## 2024-11-13 NOTE — PROGRESS NOTES
Pre-operative Clearance  Name: Agapito Martell      : 1960      MRN: [id number]  Encounter Provider: Ronnell Coronel MD  Encounter Date: 2024   Encounter department: [facility]    Assessment & Plan  Peripheral vascular disease (HCC)  Patient with peripheral vascular disease with the findings on the vascular studies as mentioned in the history of present illness recommend patient to go on a cholesterol-lowering agent he is going to discuss with the vascular surgeon also tomorrow and also to start the patient on clopidogrel.  Based on the recommendation from the vascular surgeon will make further decisions and follow-up.         Acute left-sided low back pain with left-sided sciatica  Patient with history of recurrent low back pain radiating to the left lower extremity received physical therapy in the past received epidural injections in the past          Hyperlipidemia, unspecified hyperlipidemia type  Last cholesterol 188 and  we will continue with diet and exercise with this current presentation with peripheral vascular disease will start the patient on cholesterol-lowering agent also like rosuvastatin 10 mg at bedtime.         Acquired hypothyroidism  Patient with hypothyroidism currently taking levothyroxine 150 mcg daily we will continue with the same dose.         Lumbar radiculopathy         Other male erectile dysfunction  Continue sildenafil as needed         Rheumatoid arthritis involving multiple sites with positive rheumatoid factor (HCC)  Stable at present time.         Thrombocytosis  Patient with history of thrombocytosis we will follow-up with repeat blood test which was done in the recent past and do not have the results.         Pre-operative Clearance:     Medication Instructions:   - Thyroid meds: Continue to take this medication on your normal schedule.       History of Present Illness     Is coming here for evaluation regarding an injury he has sustained 8  months back on the right little toe he did not think much about it he thought it would heal by itself but it was not healing.  Patient was seen by the podiatrist who has ordered vascular study of both lower extremities and it has shown diffuse atherosclerotic disease on both sides more than the right with complete occlusion of the common femoral artery with reconstitution at the proximal SFA and DFA it was also noted patient had tibioperoneal occlusive disease to on the left side patient has diffuse disease throughout the with 50 to 75% stenosis in the common femoral artery.    Patient does have a history of low back pain for which she has received treatment including injections therapy.  Pain in both lower extremities could be having a combination of the back pain and the peripheral vascular disease.  Patient with a history of smoking for 20 years but he had quit smoking about 25 years back.    Recommend patient to go on cholesterol-lowering agent and also blood thinners.  Recommend rosuvastatin 10 mg and Plavix 75 mg.  He has an appointment with the vascular surgeon tomorrow and discussed with him.     Review of Systems   Constitutional:  Negative for chills and fever.   HENT:  Negative for ear pain and sore throat.    Eyes:  Negative for pain and visual disturbance.   Respiratory:  Negative for cough and shortness of breath.    Cardiovascular:  Negative for chest pain and palpitations.   Gastrointestinal:  Negative for abdominal pain and vomiting.   Genitourinary:  Negative for dysuria and hematuria.   Musculoskeletal:  Positive for back pain. Negative for arthralgias.   Skin:  Negative for color change and rash.   Neurological:  Negative for seizures and syncope.   All other systems reviewed and are negative.    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   • RA (rheumatoid arthritis) ([facility])    • Rheumatoid arthritis involving multiple sites ([facility]) 9/19/2022   • Wears glasses      Past Surgical History:   Procedure Laterality Date   • ANKLE SURGERY Right     ligament, chipped bones,dislocated   • BUNIONECTOMY  03/2014   • COLONOSCOPY     • ELBOW SURGERY Right     tendon surgery   • EPIDURAL BLOCK INJECTION Left 8/16/2023    Procedure: left L5-S1, S1 TRANSFORAMINAL epidural steroid injection (24209, 45399);  Surgeon: Gunner Aguayo DO;  Location: [facility] MAIN OR;  Service: Pain Management    • EPIDURAL BLOCK INJECTION N/A 3/15/2024    Procedure: L5-S1 LUMBAR EPIDURAL STEROID INJECTION;  Surgeon: Chandra Serra MD;  Location: [facility] MAIN OR;  Service: Pain Management    • EPIDURAL BLOCK INJECTION Left 4/26/2024    Procedure: LEFT L5-S1 TRANSFORAMINAL EPIDURAL STEROID INJECTION;  Surgeon: Chandra Serra MD;  Location: [facility] MAIN OR;  Service: Pain Management    • FOOT ARTHRODESIS, MODIFIED ARELLANO Left    • FRACTURE SURGERY Left     elbow   • HERNIA REPAIR Right     inguinal   • LUMBAR EPIDURAL INJECTION N/A 9/22/2023    Procedure: L5-S1 LUMBAR epidural steroid injection (22240);  Surgeon: Chandra Serra MD;  Location: [facility] MAIN OR;  Service: Pain Management    • IA ARTHRS KNE SURG W/MENISCECTOMY MED/LAT W/SHVG Right 07/09/2018    Procedure: MENISCECTOMY LATERAL /MEDIAL;  Surgeon: David Sloan MD;  Location: [facility] MAIN OR;  Service: Orthopedics   • IA CORRECTION HAMMERTOE Bilateral 05/14/2021    Procedure: REPAIR HAMMERTOE / MALLET TOE / CLAW TOE 2ND toe bilateral;  Surgeon: Filipe Oliva DPM;  Location: [facility] MAIN OR;  Service: Podiatry   • IA CORRJ HLX VLGS BNCTY SESMDC RESCJ PROX PHLX BASE Right 04/09/2021    Procedure: BUNIONECTOMY SMITH;  Surgeon:  Filipe Oliva DPM;  Location: [facility] MAIN OR;  Service: Podiatry   • ROTATOR CUFF REPAIR Left    • WISDOM TOOTH EXTRACTION      x4   • WRIST SURGERY       Family History   Problem Relation Age of Onset   • Cancer Mother         passed   • Hypertension Father    • Heart disease Father         leaky valve   • Skin cancer Father    • Diabetes Father      Social History     Tobacco Use   • Smoking status: Former     Current packs/day: 0.00     Average packs/day: 1 pack/day for 25.0 years (25.0 ttl pk-yrs)     Types: Cigarettes     Start date: 1975     Quit date: 2000     Years since quittin.8     Passive exposure: Past   • Smokeless tobacco: Never   Vaping Use   • Vaping status: Never Used   Substance and Sexual Activity   • Alcohol use: Yes     Alcohol/week: 3.0 standard drinks of alcohol     Types: 3 Cans of beer per week     Comment: if that   • Drug use: No   • Sexual activity: Yes     Partners: Female     Current Outpatient Medications on File Prior to Visit   Medication Sig   • Cholecalciferol (VITAMIN D3) 1000 units CAPS Take 1,000 Units by mouth   • Hydroxyurea (HYDREA PO) Take 1,000 mg by mouth Mon-Wed-Fri   • hydroxyurea (HYDREA) 500 mg capsule Take by mouth On -Sat-Sun    • levothyroxine 150 mcg tablet TAKE 1 TABLET DAILY   • sildenafil (VIAGRA) 100 mg tablet Take 1 tablet (100 mg total) by mouth if needed for erectile dysfunction As directed     No Known Allergies  Objective     /78 (BP Location: Right arm, Patient Position: Sitting, Cuff Size: Standard)   Pulse 104   Ht 6' (1.829 m)   Wt 73.8 kg (162 lb 12.8 oz)   SpO2 98%   BMI 22.08 kg/m²     Physical Exam  Vitals and nursing note reviewed.   Constitutional:       General: He is not in acute distress.     Appearance: He is well-developed.   HENT:      Head: Normocephalic and atraumatic.   Eyes:      Conjunctiva/sclera: Conjunctivae normal.   Cardiovascular:      Rate and Rhythm: Normal rate and regular rhythm.      Heart  sounds: No murmur heard.     Comments: Poor peripheral pulses in the lower extremities  Pulmonary:      Effort: Pulmonary effort is normal. No respiratory distress.      Breath sounds: Normal breath sounds.   Abdominal:      Palpations: Abdomen is soft.      Tenderness: There is no abdominal tenderness.   Musculoskeletal:         General: No swelling.      Cervical back: Neck supple.   Skin:     General: Skin is warm and dry.      Capillary Refill: Capillary refill takes less than 2 seconds.   Neurological:      Mental Status: He is alert.   Psychiatric:         Mood and Affect: Mood normal.         Ronnell Coronel MD

## 2024-11-13 NOTE — PROGRESS NOTES
Office Visit Note  24     Agapito Martell 64 y.o. male MRN: [id number]  : 1960    Assessment:     {There are no diagnoses linked to this encounter. (Refresh or delete this SmartLink)}           Discussion Summary and Plan:  Today's care plan and medications were reviewed with patient in detail and all their questions answered to their satisfaction.    No chief complaint on file.     Subjective:  HPI    The following portions of the patient's history were reviewed and updated as appropriate: allergies, current medications, past family history, past medical history, past social history, past surgical history and problem list.    Review of Systems   Constitutional:  Negative for chills and fever.   HENT:  Negative for ear pain and sore throat.    Eyes:  Negative for pain and visual disturbance.   Respiratory:  Negative for cough and shortness of breath.    Cardiovascular:  Negative for chest pain and palpitations.   Gastrointestinal:  Negative for abdominal pain and vomiting.   Genitourinary:  Negative for dysuria and hematuria.   Musculoskeletal:  Negative for arthralgias and back pain.   Skin:  Negative for color change and rash.   Neurological:  Negative for seizures and syncope.   All other systems reviewed and are negative.      Historical Information   Patient Active Problem List   Diagnosis   • Hyperlipidemia   • Hypothyroidism   • Thrombocytosis   • Rheumatoid arthritis involving multiple sites with positive rheumatoid factor (HCC)   • Lipoma of torso   • Other male erectile dysfunction   • Acute left-sided low back pain with left-sided sciatica   • Lumbar radiculopathy     Past Medical History:   Diagnosis Date   • Arthritis    • Disease of thyroid gland     hypo   • Graves disease    • Hallux limitus, acquired, right    • Hypothyroid     hypo   • Other tear of medial meniscus, current injury, right knee, initial encounter 2018    Added automatically from request for surgery [id number]   •  Platelet disorder ([facility])     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 MAIN OR;  Service: Pain Management    • EPIDURAL BLOCK INJECTION N/A 3/15/2024    Procedure: L5-S1 LUMBAR EPIDURAL STEROID INJECTION;  Surgeon: Chandra Serra MD;  Location: [facility] MAIN OR;  Service: Pain Management    • EPIDURAL BLOCK INJECTION Left 4/26/2024    Procedure: LEFT L5-S1 TRANSFORAMINAL EPIDURAL STEROID INJECTION;  Surgeon: Chandra Serra MD;  Location: [facility] MAIN OR;  Service: Pain Management    • FOOT ARTHRODESIS, MODIFIED ARELLANO Left    • FRACTURE SURGERY Left     elbow   • HERNIA REPAIR Right     inguinal   • LUMBAR EPIDURAL INJECTION N/A 9/22/2023    Procedure: L5-S1 LUMBAR epidural steroid injection (18067);  Surgeon: Chandra Serra MD;  Location: [facility] MAIN OR;  Service: Pain Management    • FL ARTHRS KNE SURG W/MENISCECTOMY MED/LAT W/SHVG Right 07/09/2018    Procedure: MENISCECTOMY LATERAL /MEDIAL;  Surgeon: David Sloan MD;  Location: [facility] MAIN OR;  Service: Orthopedics   • FL CORRECTION HAMMERTOE Bilateral 05/14/2021    Procedure: REPAIR HAMMERTOE / MALLET TOE / CLAW TOE 2ND toe bilateral;  Surgeon: Filipe Oliva DPM;  Location: [facility] MAIN OR;  Service: Podiatry   • FL CORRJ HLX VLGS BNCTY SESMDC RESCJ PROX PHLX BASE Right 04/09/2021    Procedure: BUNIONECTOMY SMITH;  Surgeon: Filipe Oliva DPM;  Location: [facility] MAIN [facility];  Service: Podiatry   • ROTATOR CUFF REPAIR Left    • WISDOM TOOTH EXTRACTION      x4   • WRIST SURGERY    "    Social History     Substance and Sexual Activity   Alcohol Use Yes   • Alcohol/week: 3.0 standard drinks of alcohol   • Types: 3 Cans of beer per week    Comment: if that     Social History     Substance and Sexual Activity   Drug Use No     Social History     Tobacco Use   Smoking Status Former   • Current packs/day: 0.00   • Average packs/day: 1 pack/day for 25.0 years (25.0 ttl pk-yrs)   • Types: Cigarettes   • Start date: 1975   • Quit date: 2000   • Years since quittin.8   • Passive exposure: Past   Smokeless Tobacco Never     Family History   Problem Relation Age of Onset   • Cancer Mother         passed   • Hypertension Father    • Heart disease Father         leaky valve   • Skin cancer Father    • Diabetes Father      Health Maintenance Due   Topic   • Medicare Annual Wellness Visit (AWV)    • Pneumococcal Vaccine: 65+ Years (1  - PCV)   • PT PLAN OF CARE    • Colorectal Cancer Screening       Meds/Allergies       Current Outpatient Medications:   •  Cholecalciferol (VITAMIN D3) 1000 units CAPS, Take 1,000 Units by mouth, Disp: , Rfl:   •  Hydroxyurea (HYDREA PO), Take 1,000 mg by mouth Mon-Wed-Fri, Disp: , Rfl:   •  hydroxyurea (HYDREA) 500 mg capsule, Take by mouth On Tues-Thurs-Sat-Sun , Disp: , Rfl:   •  levothyroxine 150 mcg tablet, TAKE 1 TABLET DAILY, Disp: 90 tablet, Rfl: 1  •  sildenafil (VIAGRA) 100 mg tablet, Take 1 tablet (100 mg total) by mouth if needed for erectile dysfunction As directed, Disp: 6 tablet, Rfl: 12      Objective:    Vitals:   There were no vitals taken for this visit.  There is no height or weight on file to calculate BMI.  There were no vitals filed for this visit.    Physical Exam    Lab Review   No visits with results within 2 Month(s) from this visit.   Latest known visit with results is:   Orders Only on 2024   Component Date Value Ref Range Status   • Hemoglobin A1C 2024 5.3   Final         Mily Ragland MA        \"This note has been " "constructed using a voice recognition system.Therefore there may be syntax, spelling, and/or grammatical errors. Please call if you have any questions. \"  "

## 2024-11-14 ENCOUNTER — TELEPHONE (OUTPATIENT)
Dept: FAMILY MEDICINE CLINIC | Facility: CLINIC | Age: 64
End: 2024-11-14

## 2024-11-14 ENCOUNTER — CONSULT (OUTPATIENT)
Dept: FAMILY MEDICINE CLINIC | Facility: CLINIC | Age: 64
End: 2024-11-14
Payer: MEDICARE

## 2024-11-14 VITALS
BODY MASS INDEX: 22.05 KG/M2 | OXYGEN SATURATION: 98 % | WEIGHT: 162.8 LBS | HEART RATE: 104 BPM | DIASTOLIC BLOOD PRESSURE: 78 MMHG | HEIGHT: 72 IN | SYSTOLIC BLOOD PRESSURE: 118 MMHG

## 2024-11-14 DIAGNOSIS — E03.9 ACQUIRED HYPOTHYROIDISM: ICD-10-CM

## 2024-11-14 DIAGNOSIS — M54.16 LUMBAR RADICULOPATHY: ICD-10-CM

## 2024-11-14 DIAGNOSIS — I73.9 PERIPHERAL VASCULAR DISEASE (HCC): Primary | ICD-10-CM

## 2024-11-14 DIAGNOSIS — D75.839 THROMBOCYTOSIS: ICD-10-CM

## 2024-11-14 DIAGNOSIS — N52.8 OTHER MALE ERECTILE DYSFUNCTION: ICD-10-CM

## 2024-11-14 DIAGNOSIS — E78.5 HYPERLIPIDEMIA, UNSPECIFIED HYPERLIPIDEMIA TYPE: ICD-10-CM

## 2024-11-14 DIAGNOSIS — M54.42 ACUTE LEFT-SIDED LOW BACK PAIN WITH LEFT-SIDED SCIATICA: ICD-10-CM

## 2024-11-14 DIAGNOSIS — M05.79 RHEUMATOID ARTHRITIS INVOLVING MULTIPLE SITES WITH POSITIVE RHEUMATOID FACTOR (HCC): ICD-10-CM

## 2024-11-14 PROCEDURE — 99214 OFFICE O/P EST MOD 30 MIN: CPT | Performed by: INTERNAL MEDICINE

## 2024-11-14 PROCEDURE — G2211 COMPLEX E/M VISIT ADD ON: HCPCS | Performed by: INTERNAL MEDICINE

## 2024-11-14 NOTE — ASSESSMENT & PLAN NOTE
Patient with history of thrombocytosis we will follow-up with repeat blood test which was done in the recent past and do not have the results.

## 2024-11-14 NOTE — ASSESSMENT & PLAN NOTE
Patient with history of recurrent low back pain radiating to the left lower extremity received physical therapy in the past received epidural injections in the past

## 2024-11-14 NOTE — ASSESSMENT & PLAN NOTE
Patient with peripheral vascular disease with the findings on the vascular studies as mentioned in the history of present illness recommend patient to go on a cholesterol-lowering agent he is going to discuss with the vascular surgeon also tomorrow and also to start the patient on clopidogrel.  Based on the recommendation from the vascular surgeon will make further decisions and follow-up.

## 2024-11-14 NOTE — ASSESSMENT & PLAN NOTE
Patient with hypothyroidism currently taking levothyroxine 150 mcg daily we will continue with the same dose.

## 2024-11-14 NOTE — ASSESSMENT & PLAN NOTE
Last cholesterol 188 and  we will continue with diet and exercise with this current presentation with peripheral vascular disease will start the patient on cholesterol-lowering agent also like rosuvastatin 10 mg at bedtime.

## 2024-11-15 ENCOUNTER — TELEPHONE (OUTPATIENT)
Age: 64
End: 2024-11-15

## 2024-11-15 ENCOUNTER — OFFICE VISIT (OUTPATIENT)
Dept: VASCULAR SURGERY | Facility: CLINIC | Age: 64
End: 2024-11-15
Payer: MEDICARE

## 2024-11-15 ENCOUNTER — TELEPHONE (OUTPATIENT)
Dept: VASCULAR SURGERY | Facility: CLINIC | Age: 64
End: 2024-11-15

## 2024-11-15 ENCOUNTER — TELEPHONE (OUTPATIENT)
Dept: FAMILY MEDICINE CLINIC | Facility: CLINIC | Age: 64
End: 2024-11-15

## 2024-11-15 VITALS
HEIGHT: 72 IN | OXYGEN SATURATION: 99 % | BODY MASS INDEX: 22.02 KG/M2 | SYSTOLIC BLOOD PRESSURE: 150 MMHG | WEIGHT: 162.6 LBS | HEART RATE: 88 BPM | DIASTOLIC BLOOD PRESSURE: 74 MMHG | TEMPERATURE: 98 F | RESPIRATION RATE: 18 BRPM

## 2024-11-15 DIAGNOSIS — I73.9 PERIPHERAL ARTERIAL DISEASE (HCC): ICD-10-CM

## 2024-11-15 DIAGNOSIS — E78.5 DYSLIPIDEMIA: Primary | ICD-10-CM

## 2024-11-15 DIAGNOSIS — L97.511 ULCER OF RIGHT FOOT, LIMITED TO BREAKDOWN OF SKIN (HCC): Primary | ICD-10-CM

## 2024-11-15 PROCEDURE — 99203 OFFICE O/P NEW LOW 30 MIN: CPT

## 2024-11-15 RX ORDER — ROSUVASTATIN CALCIUM 10 MG/1
10 TABLET, COATED ORAL DAILY
Qty: 100 TABLET | Refills: 0 | Status: SHIPPED | OUTPATIENT
Start: 2024-11-15

## 2024-11-15 NOTE — TELEPHONE ENCOUNTER
REMINDER: Under Reason For Call, comments MUST be formatted as:   (Surgeon's Initials) / (Procedure)      Special Instructions / FYI: JANIYA Davison     Consent: I certify that patient has signed, printed, timed, and dated their surgery consent.  I certify that the patient's LEGAL NAME and DATE OF BIRTH are written in the upper left corner on BOTH sides of the consent.  I certify that BOTH sides of the completed surgery consent have been scanned into the patient's Epic chart by myself on 11/15/2024.  Yes, I have LABELED the consent in Epic as Consent for Vascular Procedure.     For Surgical Clearances     Levels   1-3   ROUTE this encounter to [facility] Clearance Pool (AND)   [facility] Surgery Coordinator Pool     Level   4   ROUTE this encounter to [facility] Surgery Coordinator Pool       HYDRATION CLEARANCES   ONLY ROUTE TO  [facility] Clearance Pool       Yes, I have ROUTED this encounter to [facility] Surgery Coordinator and/or [facility] Clearance Pool.

## 2024-11-15 NOTE — PROGRESS NOTES
Name: Agapito Martell      : 1960      MRN: [id number]  Encounter Provider: Stacie Obando PA-C  Encounter Date: 11/15/2024   Encounter department: [facility] [place]  :  Assessment & Plan  Ulcer of right foot, limited to breakdown of skin (HCC)  64 y.o. male with PMH of hypothyroidism, RA, HLD, former smoker, nonhealing right 5th toe ulceration x8 months. He was referred by podiatry for evaluation prior to toe amputation.       LEAD 24  RLE- Occlusion noted in the CFA with reconstitution at the proximal SFA and DFA bifurcation. Monophasic signals in the external iliac artery suggestive of more proximal disease. Evidence suggestive of Tibio-peroneal occlusive disease.  Ankle/Brachial index: 0.54 which is in the severe disease category.  PVR/ PPG tracings are dampened.  Metatarsal and great toe pressure could not be obtained due to flat line  waveform.     LEFT LOWER LIMB:  Diffuse disease throughout with a 50-75% stenosis noted in the common femoral artery.  Ankle/Brachial index: 0.93 which is normal.  PVR/ PPG tracings are dampened.  Metatarsal pressure of 140 mmHg  Great toe pressure of 51 mmHg, within the healing range.    Exam:   -Right foot warm down to the level of the toes with dependent rubor. Right 5th toe ulceration with sloughing. No signs of active infection. Motor and sensation intact. Nonpalpable R DP/PT pulses. Palpable B CFA pulses.     Plan:   -Educated patient on pathophysiology of peripheral arterial disease, available treatment options, and indications for treatment.   -Recommend CTA abdomen with runoff to evaluate aorto-iliac segments and extent of CFA disease.    -No hx of contrast dye allergy or CKD. Most recent labs 24 Cr 1.01. Labs scanned in media.   -Discussed risk factor modification, including adequate glycemic and lipid control, continued smoking cessation, blood pressure, and lifestyle modifications.   -Recommend medical optimization with daily  ASA 81mg and statin therapy with LDL goal <100.   -F/u with podiatry as planned. Recommend revascularization prior to any podiatric interventions due to risk of poor healing.   -LWC to right 5th toe per podiatry. He is currently putting Hawaiian moon aloe on the toe. Instructed him to keep area clean and dry. Recommend silver alginate dressing to the wound daily. Patient does not wish to use alginate dressings at this time.   -Follow up after CTA with a surgeon to review.   -Instructed patient to call the office with questions, concerns, or new symptoms.               Peripheral arterial disease (HCC)    Orders:    Protime-INR; Future        History of Present Illness     HPI  Agapito Martell is a 64 y.o. male who presents today for evaluation of a nonhealing right 5th toe ulcerations. He reports in February he was powerwashing in his driveway and spilled de-greaser on his right 5th toe ulceration and sustained a chemical injury. He did not see any one initially for his wound but noticed more pain and poor healing so he reached out to his podiatrist, Dr. Samano for evaluation. He has a hx of a crush injury to both great toes 30 years ago and had a bunionectomy on each foot. He has hammer toe defects on both 2nd toes. He reports intermittent pain (stinging) in the right toe with prolonged activity/walking. He works at [employer] as a  and spends long hours on his feet. He reports prior to this injury he would notice RLE fatigue with prolonged activity. He does have a hx of lower back pain with LLE radiculopathy. He denies true claudication, rest pain. He denies issues with wound healing in the past. He denies prior hx of vascular intervention.      History obtained from: patient    Review of Systems   Constitutional: Negative.    HENT: Negative.     Eyes: Negative.    Respiratory: Negative.     Cardiovascular: Negative.    Gastrointestinal: Negative.    Endocrine: Negative.    Genitourinary: Negative.     Musculoskeletal: Negative.    Skin:  Positive for wound.   Allergic/Immunologic: Negative.    Neurological: Negative.    Hematological: Negative.    Psychiatric/Behavioral: Negative.       Past Medical History   Past Medical History:   Diagnosis Date    Arthritis     Disease of thyroid gland     hypo    Graves disease 1995    Hallux limitus, acquired, right     Hypothyroid     hypo    Other tear of medial meniscus, current injury, right knee, initial encounter 6/6/2018    Added automatically from request for surgery [id number]    Platelet disorder ([facility])     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 Jc    RA (rheumatoid arthritis) ([facility])     Rheumatoid arthritis involving multiple sites ([facility]) 9/19/2022    Wears glasses      Past Surgical History:   Procedure Laterality Date    ANKLE SURGERY Right     ligament, chipped bones,dislocated    BUNIONECTOMY  03/2014    COLONOSCOPY      ELBOW SURGERY Right     tendon surgery    EPIDURAL BLOCK INJECTION Left 8/16/2023    Procedure: left L5-S1, S1 TRANSFORAMINAL epidural steroid injection (96867, 65020);  Surgeon: Gunner Aguayo DO;  Location: [facility] MAIN OR;  Service: Pain Management     EPIDURAL BLOCK INJECTION N/A 3/15/2024    Procedure: L5-S1 LUMBAR EPIDURAL STEROID INJECTION;  Surgeon: Chandra Serra MD;  Location: [facility] MAIN OR;  Service: Pain Management     EPIDURAL BLOCK INJECTION Left 4/26/2024    Procedure: LEFT L5-S1 TRANSFORAMINAL EPIDURAL STEROID INJECTION;  Surgeon: Chandra Serra MD;  Location: [facility] MAIN OR;  Service: Pain Management     FOOT ARTHRODESIS, MODIFIED ARELLANO Left     FRACTURE SURGERY Left     elbow    HERNIA REPAIR Right     inguinal    LUMBAR EPIDURAL INJECTION N/A 9/22/2023    Procedure: L5-S1 LUMBAR epidural steroid injection ([id number]);  Surgeon: Chandra Serra MD;  Location: [facility] MAIN OR;  Service: Pain Management     MS ARTHRS KNE SURG W/MENISCECTOMY MED/LAT W/SHVG Right 07/09/2018    Procedure: MENISCECTOMY LATERAL /MEDIAL;  Surgeon: David  MD Nathalia;  Location: [facility] MAIN OR;  Service: Orthopedics    NH CORRECTION HAMMERTOE Bilateral 05/14/2021    Procedure: REPAIR HAMMERTOE / MALLET TOE / CLAW TOE 2ND toe bilateral;  Surgeon: Filipe Oliva DPM;  Location: [facility] MAIN OR;  Service: Podiatry    NH CORRJ HLX VLGS BNCTY SESMDC RESCJ PROX PHLX BASE Right 04/09/2021    Procedure: BUNIONECTOMY SMITH;  Surgeon: Filipe Oliva DPM;  Location: [facility] MAIN OR;  Service: Podiatry    ROTATOR CUFF REPAIR Left     WISDOM TOOTH EXTRACTION      x4    WRIST SURGERY       Family History   Problem Relation Age of Onset    Cancer Mother         passed    Hypertension Father     Heart disease Father         leaky valve    Skin cancer Father     Diabetes Father       reports that he quit smoking about 24 years ago. His smoking use included cigarettes. He started smoking about 49 years ago. He has a 25 pack-year smoking history. He has been exposed to tobacco smoke. He has never used smokeless tobacco. He reports current alcohol use of about 3.0 standard drinks of alcohol per week. He reports that he does not use drugs.  Current Outpatient Medications on File Prior to Visit   Medication Sig Dispense Refill    Cholecalciferol (VITAMIN D3) 1000 units CAPS Take 1,000 Units by mouth      Hydroxyurea (HYDREA PO) Take 1,000 mg by mouth Mon-Wed-Fri      hydroxyurea (HYDREA) 500 mg capsule Take by mouth On Tues-Thurs-Sat-Sun       levothyroxine 150 mcg tablet TAKE 1 TABLET DAILY 90 tablet 1    sildenafil (VIAGRA) 100 mg tablet Take 1 tablet (100 mg total) by mouth if needed for erectile dysfunction As directed 6 tablet 12     No current facility-administered medications on file prior to visit.   No Known Allergies        Objective   /74 (BP Location: Right arm, Patient Position: Sitting, Cuff Size: Standard)   Pulse 88   Temp 98 °F (36.7 °C) (Temporal)   Resp 18   Ht 6' (1.829 m)   Wt 73.8 kg (162 lb 9.6 oz)   SpO2 99%   BMI 22.05 kg/m²      Physical Exam  Vitals and nursing  note reviewed.   Constitutional:       Appearance: Normal appearance.   HENT:      Head: Normocephalic and atraumatic.   Cardiovascular:      Rate and Rhythm: Normal rate and regular rhythm.      Pulses:           Femoral pulses are 1+ on the right side and 2+ on the left side.       Popliteal pulses are 1+ on the right side and 2+ on the left side.        Dorsalis pedis pulses are 0 on the right side and detected w/ Doppler on the left side.        Posterior tibial pulses are detected w/ Doppler on the right side and 2+ on the left side.      Heart sounds: Normal heart sounds.   Pulmonary:      Effort: Pulmonary effort is normal.   Abdominal:      General: Abdomen is flat. Bowel sounds are normal. There is no distension.      Palpations: Abdomen is soft.      Tenderness: There is no abdominal tenderness.   Musculoskeletal:         General: Swelling, deformity and signs of injury present.      Cervical back: Normal range of motion and neck supple.      Right lower le+ Edema present.   Skin:     General: Skin is warm and dry.      Comments: Right 5th toe ulceration with sloughing   Neurological:      General: No focal deficit present.      Mental Status: He is alert and oriented to person, place, and time.   Psychiatric:         Mood and Affect: Mood normal.         Behavior: Behavior normal.         Thought Content: Thought content normal.         Judgment: Judgment normal.             Right 5th toe 11/15    Administrative Statements   I have spent a total time of 40 minutes in caring for this patient on the day of the visit/encounter including Diagnostic results, Prognosis, Risks and benefits of tx options, Instructions for management, Patient and family education, Importance of tx compliance, Risk factor reductions, Impressions, Counseling / Coordination of care, Documenting in the medical record, Reviewing / ordering tests, medicine, procedures  , Obtaining or reviewing history  , and Communicating with other  healthcare professionals .

## 2024-11-15 NOTE — LETTER
November 15, 2024     Kelly Samano DPM  [address] [facility] [address]    Patient: Agapito Martell   YOB: 1960   Date of Visit: 11/15/2024       Dear Dr. Samano:    Thank you for referring Agapito Martell to me for evaluation. Below are my notes for this consultation.    If you have questions, please do not hesitate to call me. I look forward to following your patient along with you.         Sincerely,        Stacie Obando PA-C        CC: No Recipients    Stacie Obando PA-C  11/15/2024  9:24 AM  Incomplete  Name: Agapito Martell      : 1960      MRN: [id number]  Encounter Provider: Stacie Obando PA-C  Encounter Date: 11/15/2024   Encounter department: [facility] [place]  :  Assessment & Plan  Ulcer of right foot, limited to breakdown of skin (HCC)  64 y.o. male with PMH of hypothyroidism, RA, HLD, former smoker, nonhealing right 5th toe ulceration x8 months. He was referred by podiatry for evaluation prior to toe amputation.       LEAD 24  RLE- Occlusion noted in the CFA with reconstitution at the proximal SFA and DFA bifurcation. Monophasic signals in the external iliac artery suggestive of more proximal disease. Evidence suggestive of Tibio-peroneal occlusive disease.  Ankle/Brachial index: 0.54 which is in the severe disease category.  PVR/ PPG tracings are dampened.  Metatarsal and great toe pressure could not be obtained due to flat line  waveform.     LEFT LOWER LIMB:  Diffuse disease throughout with a 50-75% stenosis noted in the common femoral artery.  Ankle/Brachial index: 0.93 which is normal.  PVR/ PPG tracings are dampened.  Metatarsal pressure of 140 mmHg  Great toe pressure of 51 mmHg, within the healing range.    Exam:   -Right foot warm down to the level of the toes with dependent rubor. Right 5th toe ulceration with sloughing. No signs of active infection. Motor and sensation intact. Nonpalpable R DP/PT pulses. Palpable B CFA pulses.      Plan:   -Educated patient on pathophysiology of peripheral arterial disease, available treatment options, and indications for treatment.   -Recommend RLE angiogram with possible intervention to optimize perfusion prior to podiatric intervention.   -No hx of contrast dye allergy or CKD. Most recent labs 11/11/24 Cr 1.01. Labs scanned in media.   -Lab script for updated INR ordered.   -Discussed risk factor modification, including adequate glycemic and lipid control, continued smoking cessation, blood pressure, and lifestyle modifications.   -Recommend medical optimization with daily ASA 81mg and statin therapy with LDL goal <100.   -F/u with podiatry as planned. Recommend revascularization prior to any podiatric interventions due to risk of poor healing.   -LWC to right 5th toe per podiatry. He is currently putting Hawaiian moon aloe on the toe. Instructed him to keep area clean and dry. Recommend silver alginate dressing to the wound daily. Patient does not wish to use alginate dressings at this time.   -Follow up after angiogram.   -Instructed patient to call the office with questions, concerns, or new symptoms.           Orders:  •  IR aortagram with run-off; Future  •  Protime-INR; Future    Peripheral arterial disease (HCC)    Orders:  •  Protime-INR; Future        History of Present Illness    HPI  Agapito Martell is a 64 y.o. male who presents today for evaluation of a nonhealing right 5th toe ulcerations. He reports in February he was powerwashing in his driveway and spilled de-greaser on his right 5th toe ulceration and sustained a chemical injury. He did not see any one initially for his wound but noticed more pain and poor healing so he reached out to his podiatrist, Dr. Samano for evaluation. He has a hx of a crush injury to both great toes 30 years ago and had a bunionectomy on each foot. He has hammer toe defects on both 2nd toes. He reports intermittent pain (stinging) in the right toe with prolonged  activity/walking. He works at [employer] as a  and spends long hours on his feet. He reports prior to this injury he would notice RLE fatigue with prolonged activity. He does have a hx of lower back pain with LLE radiculopathy. He denies true claudication, rest pain. He denies issues with wound healing in the past. He denies prior hx of vascular intervention.      Operative Scheduling Information:    Hospital:  Any IR/endo suite    Physician:  Any other surgeon and Any IR doc    Surgery: Abdominal aortogram with RLE angiogram +/- intervention     Urgency:  Standard    Level:  Level 3: Outpatients to be scheduled for elective surgery than can be delayed up to 4 weeks without reasonable expectation of detriment to patient    Case Length:  Normal    Post-op Bed:  Outpatient    OR Table:  IR    Equipment Needs:  None    Medication Instructions:  Aspirin:   Continue (do not hold)    Hydration:  Per protocol. Most recent Cr 1.01/ GFR 82          History obtained from: patient    Review of Systems  Past Medical History  Past Medical History:   Diagnosis Date   • Arthritis    • Disease of thyroid gland     hypo   • Graves disease 1995   • Hallux limitus, acquired, right    • Hypothyroid     hypo   • Other tear of medial meniscus, current injury, right knee, initial encounter 6/6/2018    Added automatically from request for surgery [id number]   • Platelet disorder ([facility])     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 Jc   • RA (rheumatoid arthritis) ([facility])    • Rheumatoid arthritis involving multiple sites ([facility]) 9/19/2022   • Wears glasses      Past Surgical History:   Procedure Laterality Date   • ANKLE SURGERY Right     ligament, chipped bones,dislocated   • BUNIONECTOMY  03/2014   • COLONOSCOPY     • ELBOW SURGERY Right     tendon surgery   • EPIDURAL BLOCK INJECTION Left 8/16/2023    Procedure: left L5-S1, S1 TRANSFORAMINAL epidural steroid injection (36236, 80996);  Surgeon: Gunner Aguayo DO;  Location: [facility] MAIN OR;   Service: Pain Management    • EPIDURAL BLOCK INJECTION N/A 3/15/2024    Procedure: L5-S1 LUMBAR EPIDURAL STEROID INJECTION;  Surgeon: Chandra Serra MD;  Location: [facility] MAIN OR;  Service: Pain Management    • EPIDURAL BLOCK INJECTION Left 4/26/2024    Procedure: LEFT L5-S1 TRANSFORAMINAL EPIDURAL STEROID INJECTION;  Surgeon: Chandra Serra MD;  Location: [facility] MAIN OR;  Service: Pain Management    • FOOT ARTHRODESIS, MODIFIED ARELLANO Left    • FRACTURE SURGERY Left     elbow   • HERNIA REPAIR Right     inguinal   • LUMBAR EPIDURAL INJECTION N/A 9/22/2023    Procedure: L5-S1 LUMBAR epidural steroid injection (00195);  Surgeon: Chandra Serra MD;  Location: [facility] MAIN OR;  Service: Pain Management    • MS ARTHRS KNE SURG W/MENISCECTOMY MED/LAT W/SHVG Right 07/09/2018    Procedure: MENISCECTOMY LATERAL /MEDIAL;  Surgeon: David Sloan MD;  Location: [facility] MAIN OR;  Service: Orthopedics   • MS CORRECTION HAMMERTOE Bilateral 05/14/2021    Procedure: REPAIR HAMMERTOE / MALLET TOE / CLAW TOE 2ND toe bilateral;  Surgeon: Filipe Oliva DPM;  Location: [facility] MAIN OR;  Service: Podiatry   • MS CORRJ HLX VLGS BNCTY SESMDC RESCJ PROX PHLX BASE Right 04/09/2021    Procedure: BUNIONECTOMY SMITH;  Surgeon: Filipe Oliva DPM;  Location: [facility] MAIN OR;  Service: Podiatry   • ROTATOR CUFF REPAIR Left    • WISDOM TOOTH EXTRACTION      x4   • WRIST SURGERY       Family History   Problem Relation Age of Onset   • Cancer Mother         passed   • Hypertension Father    • Heart disease Father         leaky valve   • Skin cancer Father    • Diabetes Father       reports that he quit smoking about 24 years ago. His smoking use included cigarettes. He started smoking about 49 years ago. He has a 25 pack-year smoking history. He has been exposed to tobacco smoke. He has never used smokeless tobacco. He reports current alcohol use of about 3.0 standard drinks of alcohol per week. He reports that he does not use drugs.  Current Outpatient  Medications on File Prior to Visit   Medication Sig Dispense Refill   • Cholecalciferol (VITAMIN D3) 1000 units CAPS Take 1,000 Units by mouth     • Hydroxyurea (HYDREA PO) Take 1,000 mg by mouth Mon-Wed-Fri     • hydroxyurea (HYDREA) 500 mg capsule Take by mouth On Tues-Thurs-Sat-Sun      • levothyroxine 150 mcg tablet TAKE 1 TABLET DAILY 90 tablet 1   • sildenafil (VIAGRA) 100 mg tablet Take 1 tablet (100 mg total) by mouth if needed for erectile dysfunction As directed 6 tablet 12     No current facility-administered medications on file prior to visit.   No Known Allergies        Objective{?Quick Links Trend Vitals * Enter New Vitals * Results Review * Timeline (Adult) * Labs * Imaging * Cardiology * Procedures * Lung Cancer Screening * Surgical eConsent :95907}  /74 (BP Location: Right arm, Patient Position: Sitting, Cuff Size: Standard)   Pulse 88   Temp 98 °F (36.7 °C) (Temporal)   Resp 18   Ht 6' (1.829 m)   Wt 73.8 kg (162 lb 9.6 oz)   SpO2 99%   BMI 22.05 kg/m²      Physical Exam  Vitals and nursing note reviewed.   Constitutional:       Appearance: Normal appearance.   HENT:      Head: Normocephalic and atraumatic.   Cardiovascular:      Rate and Rhythm: Normal rate and regular rhythm.      Pulses:           Femoral pulses are 2+ on the right side and 2+ on the left side.       Popliteal pulses are 1+ on the right side and 2+ on the left side.        Dorsalis pedis pulses are 0 on the right side and detected w/ Doppler on the left side.        Posterior tibial pulses are detected w/ Doppler on the right side and 2+ on the left side.      Heart sounds: Normal heart sounds.   Pulmonary:      Effort: Pulmonary effort is normal.   Abdominal:      General: Abdomen is flat. Bowel sounds are normal. There is no distension.      Palpations: Abdomen is soft.      Tenderness: There is no abdominal tenderness.   Musculoskeletal:         General: Swelling, deformity and signs of injury present.       Cervical back: Normal range of motion and neck supple.      Right lower le+ Edema present.   Skin:     General: Skin is warm and dry.      Comments: Right 5th toe ulceration with sloughing   Neurological:      General: No focal deficit present.      Mental Status: He is alert and oriented to person, place, and time.   Psychiatric:         Mood and Affect: Mood normal.         Behavior: Behavior normal.         Thought Content: Thought content normal.         Judgment: Judgment normal.             Right 5th toe 11/15    Administrative Statements  I have spent a total time of 40 minutes in caring for this patient on the day of the visit/encounter including Diagnostic results, Prognosis, Risks and benefits of tx options, Instructions for management, Patient and family education, Importance of tx compliance, Risk factor reductions, Impressions, Counseling / Coordination of care, Documenting in the medical record, Reviewing / ordering tests, medicine, procedures  , Obtaining or reviewing history  , and Communicating with other healthcare professionals .     Stacie Obando PA-C  11/15/2024  9:10 AM  Sign when Signing Visit  Name: Agapito Martell      : 1960      MRN: [id number]  Encounter Provider: Stacie Obando PA-C  Encounter Date: 11/15/2024   Encounter department: [facility] [place]  :  Assessment & Plan  Ulcer of right foot, limited to breakdown of skin (HCC)  64 y.o. male with PMH of hypothyroidism, RA, HLD, former smoker, nonhealing right 5th toe ulceration x8 months. He was referred by podiatry for evaluation prior to toe amputation.       LEAD 24  RLE- Occlusion noted in the CFA with reconstitution at the proximal SFA and DFA bifurcation. Monophasic signals in the external iliac artery suggestive of more proximal disease. Evidence suggestive of Tibio-peroneal occlusive disease.  Ankle/Brachial index: 0.54 which is in the severe disease category.  PVR/ PPG tracings are  dampened.  Metatarsal and great toe pressure could not be obtained due to flat line  waveform.     LEFT LOWER LIMB:  Diffuse disease throughout with a 50-75% stenosis noted in the common femoral artery.  Ankle/Brachial index: 0.93 which is normal.  PVR/ PPG tracings are dampened.  Metatarsal pressure of 140 mmHg  Great toe pressure of 51 mmHg, within the healing range.    Plan:   -Educated patient on pathophysiology of peripheral arterial disease, available treatment options, and indications for treatment.   -Discussed risk factor modification, including adequate glycemic and lipid control, continued smoking cessation, blood pressure, and lifestyle modifications.   -Recommend medical optimization with daily ASA 81mg and statin therapy with LDL goal <100.   -Follow up in   -Instructed patient to call the office with questions, concerns, or new symptoms.           Orders:  •  IR aortagram with run-off; Future  •  Protime-INR; Future    Peripheral arterial disease (HCC)    Orders:  •  Protime-INR; Future        History of Present Illness{?Quick Links Encounters * My Last Note * Last Note in Specialty * Snapshot * Since Last Visit * History :72121}    HPI  Agapito Martell is a 64 y.o. male who presents ***    30 year ago hx of crush injury to both great toes and had a bunionectomy on each foot. Hammer toe defects on both 2nd toes. He reports in February he was powerwashing in his driveway and spilled de-greaser on his right 5th toe ulceration.     He reports intermittent pain (stinging) in the right toe with prolonged activity.     He reports prior to this injury he would notice RLE fatigue with prolonged activity. He does have a hx of lower back pain with LLE radiculopathy.     He denies true claudication, rest pain. He denies issues with wound healing in the past.       Operative Scheduling Information:    Hospital:  Any IR/endo suite    Physician:  Any other surgeon and Any IR doc    Surgery: Abdominal aortogram with  RLE angiogram +/- intervention     Urgency:  Standard    Level:  Level 3: Outpatients to be scheduled for elective surgery than can be delayed up to 4 weeks without reasonable expectation of detriment to patient    Case Length:  Normal    Post-op Bed:  Outpatient    OR Table:  IR    Equipment Needs:  None    Medication Instructions:  Aspirin:   Continue (do not hold)    Hydration:  Per protocol. Most recent Cr 1.01/ GFR 82          History obtained from: patient    Review of Systems  Past Medical History  Past Medical History:   Diagnosis Date   • Arthritis    • Disease of thyroid gland     hypo   • Graves disease 1995   • Hallux limitus, acquired, right    • Hypothyroid     hypo   • Other tear of medial meniscus, current injury, right knee, initial encounter 6/6/2018    Added automatically from request for surgery [id number]   • Platelet disorder ([facility])     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 Jc   • RA (rheumatoid arthritis) ([facility])    • Rheumatoid arthritis involving multiple sites ([facility]) 9/19/2022   • Wears glasses      Past Surgical History:   Procedure Laterality Date   • ANKLE SURGERY Right     ligament, chipped bones,dislocated   • BUNIONECTOMY  03/2014   • COLONOSCOPY     • ELBOW SURGERY Right     tendon surgery   • EPIDURAL BLOCK INJECTION Left 8/16/2023    Procedure: left L5-S1, S1 TRANSFORAMINAL epidural steroid injection (09670, 83316);  Surgeon: Gunner Aguayo DO;  Location: [facility] MAIN OR;  Service: Pain Management    • EPIDURAL BLOCK INJECTION N/A 3/15/2024    Procedure: L5-S1 LUMBAR EPIDURAL STEROID INJECTION;  Surgeon: Chandra Serra MD;  Location: [facility] MAIN OR;  Service: Pain Management    • EPIDURAL BLOCK INJECTION Left 4/26/2024    Procedure: LEFT L5-S1 TRANSFORAMINAL EPIDURAL STEROID INJECTION;  Surgeon: Chandra Serra MD;  Location: [facility] MAIN OR;  Service: Pain Management    • FOOT ARTHRODESIS, MODIFIED ARELLANO Left    • FRACTURE SURGERY Left     elbow   • HERNIA REPAIR Right     inguinal   •  LUMBAR EPIDURAL INJECTION N/A 9/22/2023    Procedure: L5-S1 LUMBAR epidural steroid injection (36693);  Surgeon: Chandra Serra MD;  Location: [facility] MAIN OR;  Service: Pain Management    • ID ARTHRS KNE SURG W/MENISCECTOMY MED/LAT W/SHVG Right 07/09/2018    Procedure: MENISCECTOMY LATERAL /MEDIAL;  Surgeon: David Sloan MD;  Location: [facility] MAIN [facility];  Service: Orthopedics   • ID CORRECTION HAMMERTOE Bilateral 05/14/2021    Procedure: REPAIR HAMMERTOE / MALLET TOE / CLAW TOE 2ND toe bilateral;  Surgeon: Filipe Oliva DPM;  Location: [facility] MAIN OR;  Service: Podiatry   • ID CORRJ HLX VLGS BNCTY SESMDC RESCJ PROX PHLX BASE Right 04/09/2021    Procedure: BUNIONECTOMY SMITH;  Surgeon: Filipe Oliva DPM;  Location: [facility] MAIN [facility];  Service: Podiatry   • ROTATOR CUFF REPAIR Left    • WISDOM TOOTH EXTRACTION      x4   • WRIST SURGERY       Family History   Problem Relation Age of Onset   • Cancer Mother         passed   • Hypertension Father    • Heart disease Father         leaky valve   • Skin cancer Father    • Diabetes Father       reports that he quit smoking about 24 years ago. His smoking use included cigarettes. He started smoking about 49 years ago. He has a 25 pack-year smoking history. He has been exposed to tobacco smoke. He has never used smokeless tobacco. He reports current alcohol use of about 3.0 standard drinks of alcohol per week. He reports that he does not use drugs.  Current Outpatient Medications on File Prior to Visit   Medication Sig Dispense Refill   • Cholecalciferol (VITAMIN D3) 1000 units CAPS Take 1,000 Units by mouth     • Hydroxyurea (HYDREA PO) Take 1,000 mg by mouth Mon-Wed-Fri     • hydroxyurea (HYDREA) 500 mg capsule Take by mouth On Tues-Thurs-Sat-Sun      • levothyroxine 150 mcg tablet TAKE 1 TABLET DAILY 90 tablet 1   • sildenafil (VIAGRA) 100 mg tablet Take 1 tablet (100 mg total) by mouth if needed for erectile dysfunction As directed 6 tablet 12     No current facility-administered  medications on file prior to visit.   No Known Allergies        Objective{?Quick Links Trend Vitals * Enter New Vitals * Results Review * Timeline (Adult) * Labs * Imaging * Cardiology * Procedures * Lung Cancer Screening * Surgical eConsent :43961}  /74 (BP Location: Right arm, Patient Position: Sitting, Cuff Size: Standard)   Pulse 88   Temp 98 °F (36.7 °C) (Temporal)   Resp 18   Ht 6' (1.829 m)   Wt 73.8 kg (162 lb 9.6 oz)   SpO2 99%   BMI 22.05 kg/m²      Physical Exam    Administrative Statements{?Quick Links Full Problem List * Level of Service * PCMH/PCSP:07686}  I have spent a total time of *** minutes in caring for this patient on the day of the visit/encounter including {Counseling Topics:2344781459}. {Time Spent Statements (Optional):92181}

## 2024-11-15 NOTE — TELEPHONE ENCOUNTER
Caller: Agaptio Martell    Doctor: Stacie Obando    Reason for call: Patient seen in office today with Stacie Obando. He is scheduled for an aortagram 11/18/24. An order was placed for PT/INR but the patient needs a written order for this since he has labs drawn at [facility]. He is requesting that it be mailed to his home but if he needs this test prior to the Agram he will not receive it in time. He would like to know if it can be placed in his [employer] account for him to print out. Please inform patient of the outcome.    Call back#: [phone]

## 2024-11-15 NOTE — TELEPHONE ENCOUNTER
Operative Scheduling Information:     Hospital:  Any IR/endo suite     Physician:  Any other surgeon and Any IR doc     Surgery: Abdominal aortogram with RLE angiogram +/- intervention      Urgency:  Standard     Level:  Level 3: Outpatients to be scheduled for elective surgery than can be delayed up to 4 weeks without reasonable expectation of detriment to patient     Case Length:  Normal     Post-op Bed:  Outpatient     OR Table:  IR     Equipment Needs:  None     Medication Instructions:  Aspirin:   Continue (do not hold)     Hydration:  Per protocol. Most recent Cr 1.01/ GFR 82

## 2024-11-15 NOTE — LETTER
November 15, 2024     Kelly Samano DPM  [address] [facility] [address]    Patient: Agapito Martell   YOB: 1960   Date of Visit: 11/15/2024       Dear Dr. Samano:    Thank you for referring Agapito Martell to me for evaluation. Below are my notes for this consultation.    If you have questions, please do not hesitate to call me. I look forward to following your patient along with you.         Sincerely,        Stacie Obando PA-C        CC: No Recipients    Stacie Obando PA-C  11/15/2024  9:24 AM  Sign when Signing Visit  Name: Agapito Martell      : 1960      MRN: [id number]  Encounter Provider: Stacie Obando PA-C  Encounter Date: 11/15/2024   Encounter department: [facility] [place]  :  Assessment & Plan  Ulcer of right foot, limited to breakdown of skin (HCC)  64 y.o. male with PMH of hypothyroidism, RA, HLD, former smoker, nonhealing right 5th toe ulceration x8 months. He was referred by podiatry for evaluation prior to toe amputation.       LEAD 24  RLE- Occlusion noted in the CFA with reconstitution at the proximal SFA and DFA bifurcation. Monophasic signals in the external iliac artery suggestive of more proximal disease. Evidence suggestive of Tibio-peroneal occlusive disease.  Ankle/Brachial index: 0.54 which is in the severe disease category.  PVR/ PPG tracings are dampened.  Metatarsal and great toe pressure could not be obtained due to flat line  waveform.     LEFT LOWER LIMB:  Diffuse disease throughout with a 50-75% stenosis noted in the common femoral artery.  Ankle/Brachial index: 0.93 which is normal.  PVR/ PPG tracings are dampened.  Metatarsal pressure of 140 mmHg  Great toe pressure of 51 mmHg, within the healing range.    Exam:   -Right foot warm down to the level of the toes with dependent rubor. Right 5th toe ulceration with sloughing. No signs of active infection. Motor and sensation intact. Nonpalpable R DP/PT pulses. Palpable B CFA  pulses.     Plan:   -Educated patient on pathophysiology of peripheral arterial disease, available treatment options, and indications for treatment.   -Recommend RLE angiogram with possible intervention to optimize perfusion prior to podiatric intervention.   -No hx of contrast dye allergy or CKD. Most recent labs 11/11/24 Cr 1.01. Labs scanned in media.   -Lab script for updated INR ordered.   -Discussed risk factor modification, including adequate glycemic and lipid control, continued smoking cessation, blood pressure, and lifestyle modifications.   -Recommend medical optimization with daily ASA 81mg and statin therapy with LDL goal <100.   -F/u with podiatry as planned. Recommend revascularization prior to any podiatric interventions due to risk of poor healing.   -LWC to right 5th toe per podiatry. He is currently putting Hawaiian moon aloe on the toe. Instructed him to keep area clean and dry. Recommend silver alginate dressing to the wound daily. Patient does not wish to use alginate dressings at this time.   -Follow up after angiogram.   -Instructed patient to call the office with questions, concerns, or new symptoms.           Orders:  •  IR aortagram with run-off; Future  •  Protime-INR; Future    Peripheral arterial disease (HCC)    Orders:  •  Protime-INR; Future        History of Present Illness    HPI  Agapito Martell is a 64 y.o. male who presents today for evaluation of a nonhealing right 5th toe ulcerations. He reports in February he was powerwashing in his driveway and spilled de-greaser on his right 5th toe ulceration and sustained a chemical injury. He did not see any one initially for his wound but noticed more pain and poor healing so he reached out to his podiatrist, Dr. Samano for evaluation. He has a hx of a crush injury to both great toes 30 years ago and had a bunionectomy on each foot. He has hammer toe defects on both 2nd toes. He reports intermittent pain (stinging) in the right toe with  prolonged activity/walking. He works at [employer] as a  and spends long hours on his feet. He reports prior to this injury he would notice RLE fatigue with prolonged activity. He does have a hx of lower back pain with LLE radiculopathy. He denies true claudication, rest pain. He denies issues with wound healing in the past. He denies prior hx of vascular intervention.      Operative Scheduling Information:    Hospital:  Any IR/endo suite    Physician:  Any other surgeon and Any IR doc    Surgery: Abdominal aortogram with RLE angiogram +/- intervention     Urgency:  Standard    Level:  Level 3: Outpatients to be scheduled for elective surgery than can be delayed up to 4 weeks without reasonable expectation of detriment to patient    Case Length:  Normal    Post-op Bed:  Outpatient    OR Table:  IR    Equipment Needs:  None    Medication Instructions:  Aspirin:   Continue (do not hold)    Hydration:  Per protocol. Most recent Cr 1.01/ GFR 82          History obtained from: patient    Review of Systems  Past Medical History  Past Medical History:   Diagnosis Date   • Arthritis    • Disease of thyroid gland     hypo   • Graves disease 1995   • Hallux limitus, acquired, right    • Hypothyroid     hypo   • Other tear of medial meniscus, current injury, right knee, initial encounter 6/6/2018    Added automatically from request for surgery [id number]   • Platelet disorder ([facility])     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 Jc   • RA (rheumatoid arthritis) ([facility])    • Rheumatoid arthritis involving multiple sites ([facility]) 9/19/2022   • Wears glasses      Past Surgical History:   Procedure Laterality Date   • ANKLE SURGERY Right     ligament, chipped bones,dislocated   • BUNIONECTOMY  03/2014   • COLONOSCOPY     • ELBOW SURGERY Right     tendon surgery   • EPIDURAL BLOCK INJECTION Left 8/16/2023    Procedure: left L5-S1, S1 TRANSFORAMINAL epidural steroid injection (29253, 14952);  Surgeon: Gunner Aguayo DO;  Location: [facility] MAIN  OR;  Service: Pain Management    • EPIDURAL BLOCK INJECTION N/A 3/15/2024    Procedure: L5-S1 LUMBAR EPIDURAL STEROID INJECTION;  Surgeon: Chandra Serra MD;  Location: [facility] MAIN OR;  Service: Pain Management    • EPIDURAL BLOCK INJECTION Left 4/26/2024    Procedure: LEFT L5-S1 TRANSFORAMINAL EPIDURAL STEROID INJECTION;  Surgeon: Chandra Serra MD;  Location: [facility] MAIN OR;  Service: Pain Management    • FOOT ARTHRODESIS, MODIFIED ARELLANO Left    • FRACTURE SURGERY Left     elbow   • HERNIA REPAIR Right     inguinal   • LUMBAR EPIDURAL INJECTION N/A 9/22/2023    Procedure: L5-S1 LUMBAR epidural steroid injection (40651);  Surgeon: Chandra Serra MD;  Location: [facility] MAIN OR;  Service: Pain Management    • OR ARTHRS KNE SURG W/MENISCECTOMY MED/LAT W/SHVG Right 07/09/2018    Procedure: MENISCECTOMY LATERAL /MEDIAL;  Surgeon: David Sloan MD;  Location: [facility] MAIN OR;  Service: Orthopedics   • OR CORRECTION HAMMERTOE Bilateral 05/14/2021    Procedure: REPAIR HAMMERTOE / MALLET TOE / CLAW TOE 2ND toe bilateral;  Surgeon: Filipe Oliva DPM;  Location: [facility] MAIN OR;  Service: Podiatry   • OR CORRJ HLX VLGS BNCTY SESMDC RESCJ PROX PHLX BASE Right 04/09/2021    Procedure: BUNIONECTOMY SMITH;  Surgeon: Filipe Oliva DPM;  Location: [facility] MAIN OR;  Service: Podiatry   • ROTATOR CUFF REPAIR Left    • WISDOM TOOTH EXTRACTION      x4   • WRIST SURGERY       Family History   Problem Relation Age of Onset   • Cancer Mother         passed   • Hypertension Father    • Heart disease Father         leaky valve   • Skin cancer Father    • Diabetes Father       reports that he quit smoking about 24 years ago. His smoking use included cigarettes. He started smoking about 49 years ago. He has a 25 pack-year smoking history. He has been exposed to tobacco smoke. He has never used smokeless tobacco. He reports current alcohol use of about 3.0 standard drinks of alcohol per week. He reports that he does not use drugs.  Current Outpatient  Medications on File Prior to Visit   Medication Sig Dispense Refill   • Cholecalciferol (VITAMIN D3) 1000 units CAPS Take 1,000 Units by mouth     • Hydroxyurea (HYDREA PO) Take 1,000 mg by mouth Mon-Wed-Fri     • hydroxyurea (HYDREA) 500 mg capsule Take by mouth On Tues-Thurs-Sat-Sun      • levothyroxine 150 mcg tablet TAKE 1 TABLET DAILY 90 tablet 1   • sildenafil (VIAGRA) 100 mg tablet Take 1 tablet (100 mg total) by mouth if needed for erectile dysfunction As directed 6 tablet 12     No current facility-administered medications on file prior to visit.   No Known Allergies        Objective  /74 (BP Location: Right arm, Patient Position: Sitting, Cuff Size: Standard)   Pulse 88   Temp 98 °F (36.7 °C) (Temporal)   Resp 18   Ht 6' (1.829 m)   Wt 73.8 kg (162 lb 9.6 oz)   SpO2 99%   BMI 22.05 kg/m²      Physical Exam  Vitals and nursing note reviewed.   Constitutional:       Appearance: Normal appearance.   HENT:      Head: Normocephalic and atraumatic.   Cardiovascular:      Rate and Rhythm: Normal rate and regular rhythm.      Pulses:           Femoral pulses are 2+ on the right side and 2+ on the left side.       Popliteal pulses are 1+ on the right side and 2+ on the left side.        Dorsalis pedis pulses are 0 on the right side and detected w/ Doppler on the left side.        Posterior tibial pulses are detected w/ Doppler on the right side and 2+ on the left side.      Heart sounds: Normal heart sounds.   Pulmonary:      Effort: Pulmonary effort is normal.   Abdominal:      General: Abdomen is flat. Bowel sounds are normal. There is no distension.      Palpations: Abdomen is soft.      Tenderness: There is no abdominal tenderness.   Musculoskeletal:         General: Swelling, deformity and signs of injury present.      Cervical back: Normal range of motion and neck supple.      Right lower le+ Edema present.   Skin:     General: Skin is warm and dry.      Comments: Right 5th toe ulceration  with sloughing   Neurological:      General: No focal deficit present.      Mental Status: He is alert and oriented to person, place, and time.   Psychiatric:         Mood and Affect: Mood normal.         Behavior: Behavior normal.         Thought Content: Thought content normal.         Judgment: Judgment normal.             Right 5th toe 11/15    Administrative Statements  I have spent a total time of 40 minutes in caring for this patient on the day of the visit/encounter including Diagnostic results, Prognosis, Risks and benefits of tx options, Instructions for management, Patient and family education, Importance of tx compliance, Risk factor reductions, Impressions, Counseling / Coordination of care, Documenting in the medical record, Reviewing / ordering tests, medicine, procedures  , Obtaining or reviewing history  , and Communicating with other healthcare professionals .

## 2024-11-15 NOTE — TELEPHONE ENCOUNTER
Verified patient's insurance   CONFIRMED - Patient's insurance is Medicare  Is patient requesting a call when authorization has been obtained? Patient did not request a call.    Surgery Date: 12/10/24  Primary Surgeon: CAP // Fermin Lara (NPI: [id number])  Assisting Surgeon: Not Applicable (N/A)  Facility: [facility] (Tax: [id number] / NPI: [id number])  Inpatient / Outpatient: Outpatient  Level: 3    Clearance Received: No clearance ordered.  Consent Received: Consent will be signed day of procedure.  Medication Hold / Last Dose:  No hold on ASA  IR Notified:  Emailed 11/15/24  Rep. Notified: Not Applicable (N/A)  Equipment Needs: Not Applicable (N/A)  Vas Lab Requested: Not Applicable (N/A)  Patient Contacted: 11/15/24    Diagnosis: L97.511  Procedure/ CPT Code(s): Angiogram // CPT: 52557, 60564, and 23637 // Procedure to take place in IR [Referral Based]    For varicose vein related procedures:   Last LEVDR: Not Applicable (N/A)  CEAP Classification: Not Applicable (N/A)  VCSS: Not Applicable (N/A)    Post Operative Date/ Time: 1/2/25 , 1pm Shelli with Fermin Lara (NPI: [id number]) -- Patient Aware      *Please review medication hold(s), PATs, and check H&P with patient.*  PATIENT WAS MAILED SURGERY/SHOWERING/DISCHARGE/COVID INSTRUCTIONS AFTER REVIEWING WITH THEM VIA PHONE CALL.

## 2024-11-15 NOTE — TELEPHONE ENCOUNTER
Patient is calling to make sure all his labs are ordered. I asked him to check his mychart in a little bit as the staff is currently placing the orders for his labs.

## 2024-11-15 NOTE — PROGRESS NOTES
[facility] Carotid Endarterectomy Follow-Up  * No surgery found * Procedure: Carotid Endarterectomy     General Information  Date of contact: 11/15/2024    Contact by: {[facility] CONTACT BY TYPE:06871}   Current smoking: Social History     Tobacco Use   Smoking Status Former    Current packs/day: 0.00    Average packs/day: 1 pack/day for 25.0 years (25.0 ttl pk-yrs)    Types: Cigarettes    Start date: 1975    Quit date: 2000    Years since quittin.8    Passive exposure: Past   Smokeless Tobacco Never      Current living status: {[facility] LIVING STATUS:68253}   Dialysis: {[facility] DIALYSIS STATUS:41505}     Current Medications  Is patient compliant with medications? {YES NO:18584}     Neurologic Event since DC: {[facility] NEUROLOGIC EVENT TYPE:18321}       Modified [place] Score: {DESC; MODIFIED JANKI SCORE:47032}       Myocardial Infarction since DC: {[facility] MYOCARDIAL INFARCTION SINCE DC:73416}       Cranial Nerve Injury: {[facility] CRANIAL NERVE INJURY YES/NO:43670}       Duplex CEA Site: {[facility] DUPLEX CEA SITE YES/NO:36311}   [facility] HD ACCESS EARLY FOLLOW-UP (0-6 MONTHS)  * No surgery found * Procedure: Hemodialysis Access     General Information  Date of contact: ***   Contact by: {[facility] CONTACT BY TYPE:30464}   Current smoking: Social History     Tobacco Use   Smoking Status Former    Current packs/day: 0.00    Average packs/day: 1 pack/day for 25.0 years (25.0 ttl pk-yrs)    Types: Cigarettes    Start date: 1975    Quit date: 2000    Years since quittin.8    Passive exposure: Past   Smokeless Tobacco Never      Current living status: {[facility] LIVING STATUS:11414}   Dialysis: {[facility] DIALYSIS STATUS:24145}     Current Medications  Is patient compliant with medications? {YES NO:74086}     Basilic Transposition: {VQI YES, DATE OF WILDCARD/NO:}   Access Patient: {YES NO:52107}   Patency judged by: {V PATENCY JUDGED BY:}   Access used for dialysis: {V ACCESS USED FOR DIALYSIS:}     Complications  Wound Infection: {[facility]  ACCESS WOUND INFECTION:57337}   Steal: {[facility] HD ACCESS STEAL:64992}   Swelling: {O HD ACCESS SWELLIN}   [facility] Carotid Artery Stent Follow-Up  * No surgery found * Procedure: Carotid Artery Stent     General Information  Date of contact: 11/15/2024    Contact by: {[facility] CONTACT BY TYPE:89765}   Current smoking: Social History     Tobacco Use   Smoking Status Former    Current packs/day: 0.00    Average packs/day: 1 pack/day for 25.0 years (25.0 ttl pk-yrs)    Types: Cigarettes    Start date: 1975    Quit date: 2000    Years since quittin.8    Passive exposure: Past   Smokeless Tobacco Never      Current living status: {[facility] LIVING STATUS:61706}   Dialysis: {[facility] DIALYSIS STATUS:92654}     Current Medications  Is patient compliant with medications? {YES NO:40110}     Functional Status: {[facility] ROSA POSTOP FUNCTIONAL STATUS:80070}       Neurologic Event since DC: {[facility] NEUROLOGIC EVENT TYPE:51589}       Reperfusion Symptoms: {[facility] ROSA POSTOP REPERFUSION SYMPTOMS:59683}       Cranial Nerve Injury: {[facility] CRANIAL NERVE INJURY YES/NO:87819}       Modified [place] Score: {DESC; MODIFIED JANKI SCORE:23713}       Myocardial Infarction since DC: {[facility] MYOCARDIAL INFARCTION SINCE DC:55222}       Carotid Treatment Imaging: {[facility] CAROTID TREATMENT IMAGING TYPE:67243}       ROSA Site Re-Rx: {[facility] ROSA SITE RE-RX:81229}

## 2024-11-15 NOTE — TELEPHONE ENCOUNTER
Pt called to let Dr Coronel know that he did speak with his vascular Doctor who advised her to come off the blood thinners. It is alright to send the [facility] meds to [facility]. If it will be a long term medicine [facility]. Thank you.

## 2024-11-15 NOTE — PATIENT INSTRUCTIONS
-Educated patient on pathophysiology of peripheral arterial disease, available treatment options, and indications for treatment.   -Recommend a right leg angiogram with possible intervention to improve blood flow.   -Discussed risk factor modification, including adequate glycemic and lipid control, smoking cessation, blood pressure, and lifestyle modifications.   -Recommend medical optimization with daily ASA 81mg and statin therapy with LDL goal <100.   -Continue local wound care.   -Instructed patient to call the office with questions, concerns, or new symptoms.

## 2024-11-16 LAB
BUN SERPL-MCNC: 22 MG/DL (ref 7–25)
BUN/CREAT SERPL: NORMAL (CALC) (ref 6–22)
CALCIUM SERPL-MCNC: 9.6 MG/DL (ref 8.6–10.3)
CHLORIDE SERPL-SCNC: 104 MMOL/L (ref 98–110)
CO2 SERPL-SCNC: 28 MMOL/L (ref 20–32)
CREAT SERPL-MCNC: 1.01 MG/DL (ref 0.7–1.35)
ERYTHROCYTE [DISTWIDTH] IN BLOOD BY AUTOMATED COUNT: 13 % (ref 11–15)
GFR/BSA.PRED SERPLBLD CYS-BASED-ARV: 82 ML/MIN/1.73M2
GLUCOSE SERPL-MCNC: 91 MG/DL (ref 65–139)
HCT VFR BLD AUTO: 48.6 % (ref 38.5–50)
HGB BLD-MCNC: 16.9 G/DL (ref 13.2–17.1)
INR PPP: 1
MCH RBC QN AUTO: 36.2 PG (ref 27–33)
MCHC RBC AUTO-ENTMCNC: 34.8 G/DL (ref 32–36)
MCV RBC AUTO: 104.1 FL (ref 80–100)
PLATELET # BLD AUTO: 522 THOUSAND/UL (ref 140–400)
PMV BLD REES-ECKER: 9 FL (ref 7.5–12.5)
POTASSIUM SERPL-SCNC: 4.3 MMOL/L (ref 3.5–5.3)
PROTHROMBIN TIME: 10.8 SEC (ref 9–11.5)
RBC # BLD AUTO: 4.67 MILLION/UL (ref 4.2–5.8)
SODIUM SERPL-SCNC: 141 MMOL/L (ref 135–146)
WBC # BLD AUTO: 9.1 THOUSAND/UL (ref 3.8–10.8)

## 2024-11-26 ENCOUNTER — HOSPITAL ENCOUNTER (OUTPATIENT)
Dept: RADIOLOGY | Facility: MEDICAL CENTER | Age: 64
Discharge: HOME | End: 2024-11-26
Payer: MEDICARE

## 2024-11-26 ENCOUNTER — RESULTS FOLLOW-UP (OUTPATIENT)
Dept: VASCULAR SURGERY | Facility: CLINIC | Age: 64
End: 2024-11-26

## 2024-11-26 DIAGNOSIS — L97.511 ULCER OF RIGHT FOOT, LIMITED TO BREAKDOWN OF SKIN (HCC): ICD-10-CM

## 2024-11-26 DIAGNOSIS — L97.511 ULCER OF RIGHT FOOT, LIMITED TO BREAKDOWN OF SKIN (HCC): Primary | ICD-10-CM

## 2024-11-26 DIAGNOSIS — I73.9 PERIPHERAL ARTERIAL DISEASE (HCC): ICD-10-CM

## 2024-11-26 PROCEDURE — 75635 CT ANGIO ABDOMINAL ARTERIES: CPT

## 2024-11-26 RX ADMIN — IOHEXOL 120 ML: 350 INJECTION, SOLUTION INTRAVENOUS at 10:19

## 2024-11-26 NOTE — TELEPHONE ENCOUNTER
Received a call from radiology with results. Advised provider reviewed results and trying to move up the appt.

## 2024-11-29 ENCOUNTER — TELEPHONE (OUTPATIENT)
Age: 64
End: 2024-11-29

## 2024-11-29 NOTE — TELEPHONE ENCOUNTER
Received a call from pt.  He read results of his CT angiogram 11/26.  He has concerns with occlusions and kidney cyst.  He also has a IR lower extremity angiogram scheduled on 12/10.  He is requesting Stacie Obando PA-C to call him, he would like to discuss concerns with her.

## 2024-11-29 NOTE — TELEPHONE ENCOUNTER
I called patient to discuss results and upcoming angiogram procedure 12/10. All question answered. We also discussed  incidental finding of renal cyst on CTA, per patient previously noted by Dr Jc 2 years ago and will discuss follow up with Dr Manriquez at upcoming visit.

## 2024-12-03 ENCOUNTER — TELEPHONE (OUTPATIENT)
Dept: RADIOLOGY | Facility: HOSPITAL | Age: 64
End: 2024-12-03

## 2024-12-03 RX ORDER — SODIUM CHLORIDE 9 MG/ML
75 INJECTION, SOLUTION INTRAVENOUS CONTINUOUS
Status: CANCELLED | OUTPATIENT
Start: 2024-12-03

## 2024-12-03 NOTE — PRE-PROCEDURE INSTRUCTIONS
Pre-procedure Instructions for Interventional Radiology  [address] [address]  INTERVENTIONAL RADIOLOGY [phone]    You are scheduled for a/an arteriogram.    On Tuesday 12-10-24.    Your tentative arrival time is 9am.  Short stay will notify you the day before your procedure with the exact arrival time and the location to arrive.    To prepare for your procedure:  Please arrange for someone to drive you home after the procedure and stay with you until the next morning if you are instructed to do so.  This is typically for patients receiving some type of sedative or anesthetic for the procedure.  DO NOT EAT OR DRINK ANYTHING after midnight on the evening before your procedure including candy & gum.  ONLY SIPS OF WATER with your medications are allowed on the morning of your procedure.  TAKE ALL OF YOUR REGULAR MEDICATIONS THE MORNING OF YOUR PROCEDURE with sips of water!  We may call you to stop some of your blood sugar, blood pressure and blood thinning medications depending on the procedure.  Please take all of these medications unless we instruct you to stop them.  If you have an allergy to x-ray dye, please contact Interventional Radiology for an x-ray dye preparation which usually consists of an oral steroid and Benadryl.    The day of your procedure:  Bring a list of the medications you take at home.  Bring medications you take for breathing problems (such as inhalers), medications for chest pain, or both.  Bring a case for your glasses or contacts.  Bring your insurance card and a form of photo ID.  Please leave all valuables such as credit cards and jewelry at home.  Report to the admitting office to the left of the registration desk in the main lobby at the [facility], Entrance B.  You will then be directed to the Short Stay Center.  While your procedure is being performed, your family may wait in the Radiology Waiting Room on the 1st floor in  Radiology.  if they need to leave, they may provide a number to be called following the procedure.   Be prepared to stay overnight just in case. Sometimes procedures will indicate the need for further observation or treatment.   If you are scheduled for a follow-up visit with the Interventional Radiologist after your procedure, you will be called with a date and time.    Special Instructions (Medications to stop taking before your procedure etc.):

## 2024-12-04 ENCOUNTER — TELEPHONE (OUTPATIENT)
Age: 64
End: 2024-12-04

## 2024-12-04 NOTE — TELEPHONE ENCOUNTER
Patient has been applying a cream called Hawaiian Rocha Aloe to his R 5th toe wound, stating it's the only thing that alleviates the stinging pain. He wants to know if he will be able to use it in the days leading up to his procedure with Dr. Lara scheduled 12/10/24. Please advise.

## 2024-12-10 NOTE — PROGRESS NOTES
Name: Agapito Martell      : 1960      MRN: [id number]  Encounter Provider: Wilbert Shaikh DO  Encounter Date: 2024   Encounter department: [facility] [place]  :  Assessment & Plan  PAD (peripheral artery disease) ([facility])    Orders:    Case request operating room: right CFA endarterectomy  right lower extremity angiogram with possible intervention  possible right pedal access; Standing    EKG 12 lead; Future    Type and screen; Future    Diabetes due to underlying condition w oth circulatory comp ([facility])    Lab Results   Component Value Date    HGBA1C 5.3 2024            Peripheral vascular disease ([facility])  64-year-old male referred for peripheral arterial disease.  He has a wound on his right fifth toe that is been present for 8 months.  This started as a traumatic injury from a  at work.  The wounds made no progress in terms of healing over the past 8 months.  He saw our vascular ALEXIS last month in the office with a arterial duplex which demonstrated right common femoral occlusion as well as right popliteal occlusion and NANY in the 0.4 range.  He was sent for a CTA with runoff which was reviewed by myself this demonstrates occlusion of his right common femoral artery with reconstitution of his SFA the right popliteal and tibioperoneal trunk are also occluded and there is two-vessel peroneal and anterior tibial runoff.  He does also have significant stenosis in his left common femoral however he is asymptomatic clinically in the left leg.  We then on smoker none diabetic he is not currently taking daily aspirin I did recommend that he do this.  He has not had any prior arterial procedures or vein harvest procedures.  I discussed options with him in terms of revascularization at this point he does not have rest pain in his right foot only tissue loss given the multilevel disease I discussed several different approaches.  I think the best approach would be to proceed with a right  common femoral endarterectomy and patch angioplasty and at the same time perform a right lower extremity angiogram with an endovascular intervention for his popliteal and TP trunk occlusion.  If if I am unable to be successful technically in treating his popliteal TP trunk disease I would hold off on performing the peroneal bypass at that time.  His tissue loss is quite minor and the endarterectomy may be adequate for wound healing for him should the wounds fail to heal he understands we may need to proceed with a staged distal bypass at a later date.  Even if the TP trunk intervention is technically successful he understands the primary patency is poor but this should still be adequate in terms of time length to achieve wound healing and he does not have rest pain.  Risks benefits alternative therapies were discussed with him in detail I will plan to obtain consent the day of the procedure he does not require cardiology preop evaluation as he can achieve 4 METS.             History of Present Illness     Patient presents today to discuss CTA abdomen done 11/26/24. Nonhealing R 5th toe ulceration x8 months.     HPI  Agapito Martell is a 64 y.o. male   History obtained from: patient    Review of Systems   Constitutional: Negative.    HENT: Negative.     Eyes: Negative.    Respiratory: Negative.     Cardiovascular: Negative.    Gastrointestinal: Negative.    Endocrine: Negative.    Genitourinary: Negative.    Musculoskeletal:  Positive for arthralgias and back pain.   Skin:  Positive for wound.   Allergic/Immunologic: Negative.    Neurological: Negative.    Hematological: Negative.    Psychiatric/Behavioral: Negative.       I have reviewed the ROS above and made changes as needed.         Objective   /84 (BP Location: Left arm, Patient Position: Sitting)   Pulse 88   Ht 6' (1.829 m)   Wt 73 kg (161 lb)   BMI 21.84 kg/m²      Physical Exam  General  Exam: alert, awake, oriented, no distress, consistent with  stated age    Integumentary  Exam: warm, dry, no gross lesions, no bruises and normal color    Chest and Lung  Exam: chest normal without deformity, bilaterally expansive, clear to auscultation    Cardiovascular  Exam: regular rate, regular rhythm, no murmurs, no rubs or gallops    Adbomen  Exam: soft, non-tender, non-distended, no pulsatile abdominal masses, no abdominal bruit    Peripheral Vascular  Exam: no clubbing of the digits of the upper extremity, no cyanosis, no edema, both feet are warm, radial pulses 2+ bilaterally, skin well perfused, without and no varicosities.    No widened popliteal pulse noted bilaterally    Upper Extremity:  Palpation: Radial pulse- Bilateral 2+    Lower Extremity:  Palpation: Femoral pulse- Bilateral 2+          Non-palpable distal pulses   Right 5th toe partial thickness wound  Neurologic  Exam:alert, non-focal, oriented x 3, cranial nerves II-XII grossly intact        Administrative Statements   I have spent a total time of 40 minutes in caring for this patient on the day of the visit/encounter including Diagnostic results, Prognosis, Risks and benefits of tx options, Counseling / Coordination of care, and Documenting in the medical record.       Operative Scheduling Information:    Hospital:  [facility] or [facility]    Physician:  Hawa    Surgery: right CFAE, right leg angiogram, with intervention, possible right pedal access    Urgency:  Standard    Level:  Level 3: Outpatients to be scheduled for elective surgery than can be delayed up to 4 weeks without reasonable expectation of detriment to patient    Case Length:  Normal    Post-op Bed:  Stepdown    OR Table:  IR    Equipment Needs:  Rep: CSI rep    Medication Instructions:  Aspirin:   Continue (do not hold)    Hydration:  No    Contrast Allergy:  no

## 2024-12-10 NOTE — H&P (VIEW-ONLY)
Name: Agapito Martell      : 1960      MRN: [id number]  Encounter Provider: Wilbert Shaikh DO  Encounter Date: 2024   Encounter department: [facility] [place]  :  Assessment & Plan  PAD (peripheral artery disease) ([facility])    Orders:    Case request operating room: right CFA endarterectomy  right lower extremity angiogram with possible intervention  possible right pedal access; Standing    EKG 12 lead; Future    Type and screen; Future    Diabetes due to underlying condition w oth circulatory comp ([facility])    Lab Results   Component Value Date    HGBA1C 5.3 2024            Peripheral vascular disease ([facility])  64-year-old male referred for peripheral arterial disease.  He has a wound on his right fifth toe that is been present for 8 months.  This started as a traumatic injury from a  at work.  The wounds made no progress in terms of healing over the past 8 months.  He saw our vascular ALEXIS last month in the office with a arterial duplex which demonstrated right common femoral occlusion as well as right popliteal occlusion and NANY in the 0.4 range.  He was sent for a CTA with runoff which was reviewed by myself this demonstrates occlusion of his right common femoral artery with reconstitution of his SFA the right popliteal and tibioperoneal trunk are also occluded and there is two-vessel peroneal and anterior tibial runoff.  He does also have significant stenosis in his left common femoral however he is asymptomatic clinically in the left leg.  We then on smoker none diabetic he is not currently taking daily aspirin I did recommend that he do this.  He has not had any prior arterial procedures or vein harvest procedures.  I discussed options with him in terms of revascularization at this point he does not have rest pain in his right foot only tissue loss given the multilevel disease I discussed several different approaches.  I think the best approach would be to proceed with a right  common femoral endarterectomy and patch angioplasty and at the same time perform a right lower extremity angiogram with an endovascular intervention for his popliteal and TP trunk occlusion.  If if I am unable to be successful technically in treating his popliteal TP trunk disease I would hold off on performing the peroneal bypass at that time.  His tissue loss is quite minor and the endarterectomy may be adequate for wound healing for him should the wounds fail to heal he understands we may need to proceed with a staged distal bypass at a later date.  Even if the TP trunk intervention is technically successful he understands the primary patency is poor but this should still be adequate in terms of time length to achieve wound healing and he does not have rest pain.  Risks benefits alternative therapies were discussed with him in detail I will plan to obtain consent the day of the procedure he does not require cardiology preop evaluation as he can achieve 4 METS.             History of Present Illness     Patient presents today to discuss CTA abdomen done 11/26/24. Nonhealing R 5th toe ulceration x8 months.     HPI  Agapito Martell is a 64 y.o. male   History obtained from: patient    Review of Systems   Constitutional: Negative.    HENT: Negative.     Eyes: Negative.    Respiratory: Negative.     Cardiovascular: Negative.    Gastrointestinal: Negative.    Endocrine: Negative.    Genitourinary: Negative.    Musculoskeletal:  Positive for arthralgias and back pain.   Skin:  Positive for wound.   Allergic/Immunologic: Negative.    Neurological: Negative.    Hematological: Negative.    Psychiatric/Behavioral: Negative.       I have reviewed the ROS above and made changes as needed.         Objective   /84 (BP Location: Left arm, Patient Position: Sitting)   Pulse 88   Ht 6' (1.829 m)   Wt 73 kg (161 lb)   BMI 21.84 kg/m²      Physical Exam  General  Exam: alert, awake, oriented, no distress, consistent with  stated age    Integumentary  Exam: warm, dry, no gross lesions, no bruises and normal color    Chest and Lung  Exam: chest normal without deformity, bilaterally expansive, clear to auscultation    Cardiovascular  Exam: regular rate, regular rhythm, no murmurs, no rubs or gallops    Adbomen  Exam: soft, non-tender, non-distended, no pulsatile abdominal masses, no abdominal bruit    Peripheral Vascular  Exam: no clubbing of the digits of the upper extremity, no cyanosis, no edema, both feet are warm, radial pulses 2+ bilaterally, skin well perfused, without and no varicosities.    No widened popliteal pulse noted bilaterally    Upper Extremity:  Palpation: Radial pulse- Bilateral 2+    Lower Extremity:  Palpation: Femoral pulse- Bilateral 2+          Non-palpable distal pulses   Right 5th toe partial thickness wound  Neurologic  Exam:alert, non-focal, oriented x 3, cranial nerves II-XII grossly intact        Administrative Statements   I have spent a total time of 40 minutes in caring for this patient on the day of the visit/encounter including Diagnostic results, Prognosis, Risks and benefits of tx options, Counseling / Coordination of care, and Documenting in the medical record.       Operative Scheduling Information:    Hospital:  [facility] or [facility]    Physician:  Hawa    Surgery: right CFAE, right leg angiogram, with intervention, possible right pedal access    Urgency:  Standard    Level:  Level 3: Outpatients to be scheduled for elective surgery than can be delayed up to 4 weeks without reasonable expectation of detriment to patient    Case Length:  Normal    Post-op Bed:  Stepdown    OR Table:  IR    Equipment Needs:  Rep: CSI rep    Medication Instructions:  Aspirin:   Continue (do not hold)    Hydration:  No    Contrast Allergy:  no

## 2024-12-11 ENCOUNTER — TELEPHONE (OUTPATIENT)
Dept: VASCULAR SURGERY | Facility: CLINIC | Age: 64
End: 2024-12-11

## 2024-12-11 ENCOUNTER — OFFICE VISIT (OUTPATIENT)
Dept: VASCULAR SURGERY | Facility: CLINIC | Age: 64
End: 2024-12-11
Payer: MEDICARE

## 2024-12-11 ENCOUNTER — PREP FOR PROCEDURE (OUTPATIENT)
Dept: VASCULAR SURGERY | Facility: CLINIC | Age: 64
End: 2024-12-11

## 2024-12-11 VITALS
HEART RATE: 88 BPM | BODY MASS INDEX: 21.81 KG/M2 | WEIGHT: 161 LBS | SYSTOLIC BLOOD PRESSURE: 126 MMHG | HEIGHT: 72 IN | DIASTOLIC BLOOD PRESSURE: 84 MMHG

## 2024-12-11 DIAGNOSIS — I73.9 PERIPHERAL VASCULAR DISEASE (HCC): ICD-10-CM

## 2024-12-11 DIAGNOSIS — E08.59 DIABETES DUE TO UNDERLYING CONDITION W OTH CIRCULATORY COMP (HCC): ICD-10-CM

## 2024-12-11 DIAGNOSIS — I73.9 PAD (PERIPHERAL ARTERY DISEASE) (HCC): Primary | ICD-10-CM

## 2024-12-11 PROCEDURE — 99215 OFFICE O/P EST HI 40 MIN: CPT | Performed by: SURGERY

## 2024-12-11 RX ORDER — CEFAZOLIN SODIUM 2 G/50ML
2000 SOLUTION INTRAVENOUS ONCE
OUTPATIENT
Start: 2024-12-11 | End: 2024-12-11

## 2024-12-11 RX ORDER — CHLORHEXIDINE GLUCONATE ORAL RINSE 1.2 MG/ML
15 SOLUTION DENTAL ONCE
OUTPATIENT
Start: 2024-12-11 | End: 2024-12-11

## 2024-12-11 NOTE — TELEPHONE ENCOUNTER
Verified patient's insurance   CONFIRMED - Patient's insurance is Medicare  Is patient requesting a call when authorization has been obtained? Patient did not request a call.    Surgery Date: 12-26-24  Primary Surgeon: MACHELLE // Wilbert Shaikh (NPI: [id number])  Assisting Surgeon: Not Applicable (N/A)  Facility: [facility] (Tax: [id number] / NPI: [id number])  Inpatient / Outpatient: Inpatient  Level: 3    Clearance Received: No clearance ordered.  Consent Received: Consent will be signed day of procedure.  Medication Hold / Last Dose:  No Hold on ASA  IR Notified:  emailed 12-11-24  Rep. Notified:  Emaied CSI 12-11-24  Equipment Needs: Not Applicable (N/A)  Vas Lab Requested: Not Applicable (N/A)  Patient Contacted: 12-11-24    Diagnosis: I73.9  Procedure/ CPT Code(s): Common Femoral Endartectomy // CPT: 08[address] [facility] [id number]    For varicose vein related procedures:   Last LEVDR: Not Applicable (N/A)  CEAP Classification: Not Applicable (N/A)  VCSS: Not Applicable (N/A)    Post Operative Date/ Time: TBD   *Please review medication hold(s), PATs, and check H&P with patient.*  PATIENT WAS MAILED SURGERY/SHOWERING/DISCHARGE/COVID INSTRUCTIONS AFTER REVIEWING WITH THEM VIA PHONE CALL.      Spoke to patient to schedule surgery LL

## 2024-12-11 NOTE — ASSESSMENT & PLAN NOTE
64-year-old male referred for peripheral arterial disease.  He has a wound on his right fifth toe that is been present for 8 months.  This started as a traumatic injury from a  at work.  The wounds made no progress in terms of healing over the past 8 months.  He saw our vascular ALEXIS last month in the office with a arterial duplex which demonstrated right common femoral occlusion as well as right popliteal occlusion and NANY in the 0.4 range.  He was sent for a CTA with runoff which was reviewed by myself this demonstrates occlusion of his right common femoral artery with reconstitution of his SFA the right popliteal and tibioperoneal trunk are also occluded and there is two-vessel peroneal and anterior tibial runoff.  He does also have significant stenosis in his left common femoral however he is asymptomatic clinically in the left leg.  We then on smoker none diabetic he is not currently taking daily aspirin I did recommend that he do this.  He has not had any prior arterial procedures or vein harvest procedures.  I discussed options with him in terms of revascularization at this point he does not have rest pain in his right foot only tissue loss given the multilevel disease I discussed several different approaches.  I think the best approach would be to proceed with a right common femoral endarterectomy and patch angioplasty and at the same time perform a right lower extremity angiogram with an endovascular intervention for his popliteal and TP trunk occlusion.  If if I am unable to be successful technically in treating his popliteal TP trunk disease I would hold off on performing the peroneal bypass at that time.  His tissue loss is quite minor and the endarterectomy may be adequate for wound healing for him should the wounds fail to heal he understands we may need to proceed with a staged distal bypass at a later date.  Even if the TP trunk intervention is technically successful he understands the  primary patency is poor but this should still be adequate in terms of time length to achieve wound healing and he does not have rest pain.  Risks benefits alternative therapies were discussed with him in detail I will plan to obtain consent the day of the procedure he does not require cardiology preop evaluation as he can achieve 4 METS.

## 2024-12-11 NOTE — TELEPHONE ENCOUNTER
REMINDER: Under Reason For Call, comments MUST be formatted as:   FEYKO/RT CFA ENDARTERECTOMY, RT LLE AGRAM W/ POSS INTERVENTION, POSS RT PEDAL ACCESS    Special Instructions / FYI: LEVEL 3     Consent: Consent will be signed day of procedure.    For Surgical Clearances     Levels   1-3   ROUTE this encounter to [facility] Clearance Pool (AND)   [facility] Surgery Coordinator Pool     Level   4   ROUTE this encounter to [facility] Surgery Coordinator Pool       HYDRATION CLEARANCES   ONLY ROUTE TO  [facility] Clearance Pool       Yes, I have ROUTED this encounter to [facility] Surgery Coordinator and/or [facility] Clearance Pool.

## 2024-12-13 ENCOUNTER — APPOINTMENT (OUTPATIENT)
Dept: LAB | Facility: HOSPITAL | Age: 64
End: 2024-12-13
Attending: SURGERY
Payer: MEDICARE

## 2024-12-13 ENCOUNTER — APPOINTMENT (OUTPATIENT)
Dept: LAB | Facility: HOSPITAL | Age: 64
End: 2024-12-13
Payer: MEDICARE

## 2024-12-13 DIAGNOSIS — I73.9 PAD (PERIPHERAL ARTERY DISEASE) (HCC): ICD-10-CM

## 2024-12-13 PROCEDURE — 86900 BLOOD TYPING SEROLOGIC ABO: CPT | Performed by: SURGERY

## 2024-12-13 PROCEDURE — 36415 COLL VENOUS BLD VENIPUNCTURE: CPT

## 2024-12-13 PROCEDURE — 86901 BLOOD TYPING SEROLOGIC RH(D): CPT | Performed by: SURGERY

## 2024-12-13 PROCEDURE — 86850 RBC ANTIBODY SCREEN: CPT | Performed by: SURGERY

## 2024-12-14 ENCOUNTER — LAB REQUISITION (OUTPATIENT)
Dept: LAB | Facility: HOSPITAL | Age: 64
End: 2024-12-14
Payer: MEDICARE

## 2024-12-14 DIAGNOSIS — I73.9 PERIPHERAL VASCULAR DISEASE, UNSPECIFIED (HCC): ICD-10-CM

## 2024-12-14 LAB
ABO GROUP BLD: NORMAL
BLD GP AB SCN SERPL QL: NEGATIVE
RH BLD: POSITIVE
SPECIMEN EXPIRATION DATE: NORMAL

## 2024-12-16 ENCOUNTER — ANESTHESIA EVENT (OUTPATIENT)
Dept: PERIOP | Facility: HOSPITAL | Age: 64
DRG: 254 | End: 2024-12-16
Payer: MEDICARE

## 2024-12-16 ENCOUNTER — RA CDI HCC (OUTPATIENT)
Dept: OTHER | Facility: HOSPITAL | Age: 64
End: 2024-12-16

## 2024-12-16 LAB
ATRIAL RATE: 68 BPM
P AXIS: 80 DEGREES
PR INTERVAL: 196 MS
QRS AXIS: -74 DEGREES
QRSD INTERVAL: 134 MS
QT INTERVAL: 390 MS
QTC INTERVAL: 415 MS
T WAVE AXIS: 64 DEGREES
VENTRICULAR RATE: 68 BPM

## 2024-12-16 PROCEDURE — 93010 ELECTROCARDIOGRAM REPORT: CPT | Performed by: INTERNAL MEDICINE

## 2024-12-16 NOTE — PROGRESS NOTES
HCC coding opportunities       Chart reviewed, no opportunity found: CHART REVIEWED, NO OPPORTUNITY FOUND        Patients Insurance   DM???  Medicare Insurance: Medicare

## 2024-12-17 RX ORDER — ASPIRIN 81 MG/1
81 TABLET, CHEWABLE ORAL DAILY
COMMUNITY

## 2024-12-17 NOTE — PRE-PROCEDURE INSTRUCTIONS
Pre-Surgery Instructions:   Medication Instructions    aspirin 81 mg chewable tablet Take day of surgery.    Cholecalciferol (VITAMIN D3) 1000 units CAPS Stop taking 7 days prior to surgery.    Hydroxyurea (HYDREA PO) Take day of surgery.    hydroxyurea (HYDREA) 500 mg capsule Take day of surgery.    levothyroxine 150 mcg tablet Take day of surgery.    rosuvastatin (CRESTOR) 10 MG tablet Take day of surgery.    sildenafil (VIAGRA) 100 mg tablet Hold day of surgery.   Medication instructions for day surgery reviewed. Please use only a sip of water to take your instructed medications. Avoid all over the counter vitamins, supplements and NSAIDS for one week prior to surgery per anesthesia guidelines. Tylenol is ok to take as needed.     You will receive a call one business day prior to surgery with an arrival time and hospital directions. If your surgery is scheduled on a Monday, the hospital will be calling you on the Friday prior to your surgery. If you have not heard from anyone by 8pm, please call the hospital supervisor through the hospital  at [phone].  or [place] [phone]).    Do not eat or drink anything after midnight the night before your surgery, including candy, mints, lifesavers, or chewing gum. Do not drink alcohol 24hrs before your surgery. Try not to smoke at least 24hrs before your surgery.       Follow the pre surgery showering instructions as listed in the “My Surgical Experience Booklet” or otherwise provided by your surgeon's office. Do not use a blade to shave the surgical area 1 week before surgery. It is okay to use a clean electric clippers up to 24 hours before surgery. Do not apply any lotions, creams, including makeup, cologne, deodorant, or perfumes after showering on the day of your surgery. Do not use dry shampoo, hair spray, hair gel, or any type of hair products.     No contact lenses, eye make-up, or artificial eyelashes. Remove nail polish, including gel polish, and  any artificial, gel, or acrylic nails if possible. Remove all jewelry including rings and body piercing jewelry.     Wear causal clothing that is easy to take on and off. Consider your type of surgery.    Keep any valuables, jewelry, piercings at home. Please bring any specially ordered equipment (sling, braces) if indicated.    Arrange for a responsible person to drive you to and from the hospital on the day of your surgery. Please confirm the visitor policy for the day of your procedure when you receive your phone call with an arrival time.     Call the surgeon's office with any new illnesses, exposures, or additional questions prior to surgery.    Please reference your “My Surgical Experience Booklet” for additional information to prepare for your upcoming surgery.

## 2024-12-20 ENCOUNTER — OFFICE VISIT (OUTPATIENT)
Dept: FAMILY MEDICINE CLINIC | Facility: CLINIC | Age: 64
End: 2024-12-20
Payer: MEDICARE

## 2024-12-20 VITALS
SYSTOLIC BLOOD PRESSURE: 125 MMHG | WEIGHT: 160.6 LBS | HEART RATE: 76 BPM | HEIGHT: 72 IN | OXYGEN SATURATION: 97 % | DIASTOLIC BLOOD PRESSURE: 74 MMHG | BODY MASS INDEX: 21.75 KG/M2

## 2024-12-20 DIAGNOSIS — R94.31 ABNORMAL EKG: Primary | ICD-10-CM

## 2024-12-20 DIAGNOSIS — R73.03 PRE-DIABETES: ICD-10-CM

## 2024-12-20 DIAGNOSIS — E08.59 DIABETES DUE TO UNDERLYING CONDITION W OTH CIRCULATORY COMP (HCC): ICD-10-CM

## 2024-12-20 DIAGNOSIS — I73.9 PERIPHERAL VASCULAR DISEASE (HCC): ICD-10-CM

## 2024-12-20 DIAGNOSIS — Z12.5 SCREENING PSA (PROSTATE SPECIFIC ANTIGEN): ICD-10-CM

## 2024-12-20 DIAGNOSIS — Z00.00 MEDICARE ANNUAL WELLNESS VISIT, SUBSEQUENT: ICD-10-CM

## 2024-12-20 DIAGNOSIS — N52.8 OTHER MALE ERECTILE DYSFUNCTION: ICD-10-CM

## 2024-12-20 DIAGNOSIS — R73.03 PREDIABETES: ICD-10-CM

## 2024-12-20 DIAGNOSIS — N52.9 ERECTILE DYSFUNCTION, UNSPECIFIED ERECTILE DYSFUNCTION TYPE: ICD-10-CM

## 2024-12-20 DIAGNOSIS — M05.79 RHEUMATOID ARTHRITIS INVOLVING MULTIPLE SITES WITH POSITIVE RHEUMATOID FACTOR (HCC): ICD-10-CM

## 2024-12-20 DIAGNOSIS — E78.5 HYPERLIPIDEMIA, UNSPECIFIED HYPERLIPIDEMIA TYPE: ICD-10-CM

## 2024-12-20 DIAGNOSIS — M54.42 ACUTE LEFT-SIDED LOW BACK PAIN WITH LEFT-SIDED SCIATICA: ICD-10-CM

## 2024-12-20 DIAGNOSIS — D75.839 THROMBOCYTOSIS: ICD-10-CM

## 2024-12-20 DIAGNOSIS — M54.16 LUMBAR RADICULOPATHY: ICD-10-CM

## 2024-12-20 DIAGNOSIS — L97.511 ULCER OF RIGHT FOOT, LIMITED TO BREAKDOWN OF SKIN (HCC): ICD-10-CM

## 2024-12-20 DIAGNOSIS — E03.9 ACQUIRED HYPOTHYROIDISM: ICD-10-CM

## 2024-12-20 PROCEDURE — 99214 OFFICE O/P EST MOD 30 MIN: CPT | Performed by: INTERNAL MEDICINE

## 2024-12-20 PROCEDURE — G0438 PPPS, INITIAL VISIT: HCPCS | Performed by: INTERNAL MEDICINE

## 2024-12-20 RX ORDER — SILDENAFIL 100 MG/1
100 TABLET, FILM COATED ORAL AS NEEDED
Qty: 30 TABLET | Refills: 2 | Status: SHIPPED | OUTPATIENT
Start: 2024-12-20

## 2024-12-20 NOTE — PATIENT INSTRUCTIONS
Medicare Preventive Visit Patient Instructions  Thank you for completing your Welcome to Medicare Visit or Medicare Annual Wellness Visit today. Your next wellness visit will be due in one year (12/21/2025).  The screening/preventive services that you may require over the next 5-10 years are detailed below. Some tests may not apply to you based off risk factors and/or age. Screening tests ordered at today's visit but not completed yet may show as past due. Also, please note that scanned in results may not display below.  Preventive Screenings:  Service Recommendations Previous Testing/Comments   Colorectal Cancer Screening  Colonoscopy    Fecal Occult Blood Test (FOBT)/Fecal Immunochemical Test (FIT)  Fecal DNA/Cologuard Test  Flexible Sigmoidoscopy Age: 45-75 years old   Colonoscopy: every 10 years (May be performed more frequently if at higher risk)  OR  FOBT/FIT: every 1 year  OR  Cologuard: every 3 years  OR  Sigmoidoscopy: every 5 years  Screening may be recommended earlier than age 45 if at higher risk for colorectal cancer. Also, an individualized decision between you and your healthcare provider will decide whether screening between the ages of 76-85 would be appropriate. Colonoscopy: 07/10/2019  FOBT/FIT: Not on file  Cologuard: Not on file  Sigmoidoscopy: Not on file          Prostate Cancer Screening Individualized decision between patient and health care provider in men between ages of 55-69   Medicare will cover every 12 months beginning on the day after your 50th birthday PSA: 2.08 ng/mL           Hepatitis C Screening Once for adults born between 1945 and 1965  More frequently in patients at high risk for Hepatitis C Hep C Antibody: 04/28/2023        Diabetes Screening 1-2 times per year if you're at risk for diabetes or have pre-diabetes Fasting glucose: 88 mg/dL (3/30/2021)  A1C: 5.3 (5/8/2024)      Cholesterol Screening Once every 5 years if you don't have a lipid disorder. May order more often  based on risk factors. Lipid panel: 04/28/2023         Other Preventive Screenings Covered by Medicare:  Abdominal Aortic Aneurysm (AAA) Screening: covered once if your at risk. You're considered to be at risk if you have a family history of AAA or a male between the age of 65-75 who smoking at least 100 cigarettes in your lifetime.  Lung Cancer Screening: covers low dose CT scan once per year if you meet all of the following conditions: (1) Age 55-77; (2) No signs or symptoms of lung cancer; (3) Current smoker or have quit smoking within the last 15 years; (4) You have a tobacco smoking history of at least 20 pack years (packs per day x number of years you smoked); (5) You get a written order from a healthcare provider.  Glaucoma Screening: covered annually if you're considered high risk: (1) You have diabetes OR (2) Family history of glaucoma OR (3)  aged 50 and older OR (4)  American aged 65 and older  Osteoporosis Screening: covered every 2 years if you meet one of the following conditions: (1) Have a vertebral abnormality; (2) On glucocorticoid therapy for more than 3 months; (3) Have primary hyperparathyroidism; (4) On osteoporosis medications and need to assess response to drug therapy.  HIV Screening: covered annually if you're between the age of 15-65. Also covered annually if you are younger than 15 and older than 65 with risk factors for HIV infection. For pregnant patients, it is covered up to 3 times per pregnancy.    Immunizations:  Immunization Recommendations   Influenza Vaccine Annual influenza vaccination during flu season is recommended for all persons aged >= 6 months who do not have contraindications   Pneumococcal Vaccine   * Pneumococcal conjugate vaccine = PCV13 (Prevnar 13), PCV15 (Vaxneuvance), PCV20 (Prevnar 20)  * Pneumococcal polysaccharide vaccine = PPSV23 (Pneumovax) Adults 19-63 yo with certain risk factors or if 65+ yo  If never received any pneumonia vaccine:  recommend Prevnar 20 (PCV20)  Give PCV20 if previously received 1 dose of PCV13 or PPSV23   Hepatitis B Vaccine 3 dose series if at intermediate or high risk (ex: diabetes, end stage renal disease, liver disease)   Respiratory syncytial virus (RSV) Vaccine - COVERED BY MEDICARE PART D  * RSVPreF3 (Arexvy) CDC recommends that adults 60 years of age and older may receive a single dose of RSV vaccine using shared clinical decision-making (SCDM)   Tetanus (Td) Vaccine - COST NOT COVERED BY MEDICARE PART B Following completion of primary series, a booster dose should be given every 10 years to maintain immunity against tetanus. Td may also be given as tetanus wound prophylaxis.   Tdap Vaccine - COST NOT COVERED BY MEDICARE PART B Recommended at least once for all adults. For pregnant patients, recommended with each pregnancy.   Shingles Vaccine (Shingrix) - COST NOT COVERED BY MEDICARE PART B  2 shot series recommended in those 19 years and older who have or will have weakened immune systems or those 50 years and older     Health Maintenance Due:      Topic Date Due   • Colorectal Cancer Screening  07/10/2024   • Hepatitis C Screening  Completed     Immunizations Due:      Topic Date Due   • Pneumococcal Vaccine: 65+ Years (1 of 2 - PCV) 04/14/1966     Advance Directives   What are advance directives?  Advance directives are legal documents that state your wishes and plans for medical care. These plans are made ahead of time in case you lose your ability to make decisions for yourself. Advance directives can apply to any medical decision, such as the treatments you want, and if you want to donate organs.   What are the types of advance directives?  There are many types of advance directives, and each state has rules about how to use them. You may choose a combination of any of the following:  Living will:  This is a written record of the treatment you want. You can also choose which treatments you do not want, which to  limit, and which to stop at a certain time. This includes surgery, medicine, IV fluid, and tube feedings.   Durable power of  for healthcare (DPAHC):  This is a written record that states who you want to make healthcare choices for you when you are unable to make them for yourself. This person, called a proxy, is usually a family member or a friend. You may choose more than 1 proxy.  Do not resuscitate (DNR) order:  A DNR order is used in case your heart stops beating or you stop breathing. It is a request not to have certain forms of treatment, such as CPR. A DNR order may be included in other types of advance directives.  Medical directive:  This covers the care that you want if you are in a coma, near death, or unable to make decisions for yourself. You can list the treatments you want for each condition. Treatment may include pain medicine, surgery, blood transfusions, dialysis, IV or tube feedings, and a ventilator (breathing machine).  Values history:  This document has questions about your views, beliefs, and how you feel and think about life. This information can help others choose the care that you would choose.  Why are advance directives important?  An advance directive helps you control your care. Although spoken wishes may be used, it is better to have your wishes written down. Spoken wishes can be misunderstood, or not followed. Treatments may be given even if you do not want them. An advance directive may make it easier for your family to make difficult choices about your care.       © Copyright [employer] 2018 Information is for End User's use only and may not be sold, redistributed or otherwise used for commercial purposes. All illustrations and images included in CareNotes® are the copyrighted property of [employer] or [employer]    Medicare Preventive Visit Patient Instructions  Thank you for completing your Welcome to Medicare Visit or Medicare Annual Wellness Visit today.  Your next wellness visit will be due in one year (12/21/2025).  The screening/preventive services that you may require over the next 5-10 years are detailed below. Some tests may not apply to you based off risk factors and/or age. Screening tests ordered at today's visit but not completed yet may show as past due. Also, please note that scanned in results may not display below.  Preventive Screenings:  Service Recommendations Previous Testing/Comments   Colorectal Cancer Screening  Colonoscopy    Fecal Occult Blood Test (FOBT)/Fecal Immunochemical Test (FIT)  Fecal DNA/Cologuard Test  Flexible Sigmoidoscopy Age: 45-75 years old   Colonoscopy: every 10 years (May be performed more frequently if at higher risk)  OR  FOBT/FIT: every 1 year  OR  Cologuard: every 3 years  OR  Sigmoidoscopy: every 5 years  Screening may be recommended earlier than age 45 if at higher risk for colorectal cancer. Also, an individualized decision between you and your healthcare provider will decide whether screening between the ages of 76-85 would be appropriate. Colonoscopy: 07/10/2019  FOBT/FIT: Not on file  Cologuard: Not on file  Sigmoidoscopy: Not on file          Prostate Cancer Screening Individualized decision between patient and health care provider in men between ages of 55-69   Medicare will cover every 12 months beginning on the day after your 50th birthday PSA: 2.08 ng/mL           Hepatitis C Screening Once for adults born between 1945 and 1965  More frequently in patients at high risk for Hepatitis C Hep C Antibody: 04/28/2023        Diabetes Screening 1-2 times per year if you're at risk for diabetes or have pre-diabetes Fasting glucose: 88 mg/dL (3/30/2021)  A1C: 5.3 (5/8/2024)      Cholesterol Screening Once every 5 years if you don't have a lipid disorder. May order more often based on risk factors. Lipid panel: 04/28/2023         Other Preventive Screenings Covered by Medicare:  Abdominal Aortic Aneurysm (AAA) Screening:  covered once if your at risk. You're considered to be at risk if you have a family history of AAA or a male between the age of 65-75 who smoking at least 100 cigarettes in your lifetime.  Lung Cancer Screening: covers low dose CT scan once per year if you meet all of the following conditions: (1) Age 55-77; (2) No signs or symptoms of lung cancer; (3) Current smoker or have quit smoking within the last 15 years; (4) You have a tobacco smoking history of at least 20 pack years (packs per day x number of years you smoked); (5) You get a written order from a healthcare provider.  Glaucoma Screening: covered annually if you're considered high risk: (1) You have diabetes OR (2) Family history of glaucoma OR (3)  aged 50 and older OR (4)  American aged 65 and older  Osteoporosis Screening: covered every 2 years if you meet one of the following conditions: (1) Have a vertebral abnormality; (2) On glucocorticoid therapy for more than 3 months; (3) Have primary hyperparathyroidism; (4) On osteoporosis medications and need to assess response to drug therapy.  HIV Screening: covered annually if you're between the age of 15-65. Also covered annually if you are younger than 15 and older than 65 with risk factors for HIV infection. For pregnant patients, it is covered up to 3 times per pregnancy.    Immunizations:  Immunization Recommendations   Influenza Vaccine Annual influenza vaccination during flu season is recommended for all persons aged >= 6 months who do not have contraindications   Pneumococcal Vaccine   * Pneumococcal conjugate vaccine = PCV13 (Prevnar 13), PCV15 (Vaxneuvance), PCV20 (Prevnar 20)  * Pneumococcal polysaccharide vaccine = PPSV23 (Pneumovax) Adults 19-63 yo with certain risk factors or if 65+ yo  If never received any pneumonia vaccine: recommend Prevnar 20 (PCV20)  Give PCV20 if previously received 1 dose of PCV13 or PPSV23   Hepatitis B Vaccine 3 dose series if at intermediate or  high risk (ex: diabetes, end stage renal disease, liver disease)   Respiratory syncytial virus (RSV) Vaccine - COVERED BY MEDICARE PART D  * RSVPreF3 (Arexvy) CDC recommends that adults 60 years of age and older may receive a single dose of RSV vaccine using shared clinical decision-making (SCDM)   Tetanus (Td) Vaccine - COST NOT COVERED BY MEDICARE PART B Following completion of primary series, a booster dose should be given every 10 years to maintain immunity against tetanus. Td may also be given as tetanus wound prophylaxis.   Tdap Vaccine - COST NOT COVERED BY MEDICARE PART B Recommended at least once for all adults. For pregnant patients, recommended with each pregnancy.   Shingles Vaccine (Shingrix) - COST NOT COVERED BY MEDICARE PART B  2 shot series recommended in those 19 years and older who have or will have weakened immune systems or those 50 years and older     Health Maintenance Due:      Topic Date Due   • Colorectal Cancer Screening  07/10/2024   • Hepatitis C Screening  Completed     Immunizations Due:      Topic Date Due   • Pneumococcal Vaccine: 65+ Years (1 of 2 - PCV) 04/14/1966     Advance Directives   What are advance directives?  Advance directives are legal documents that state your wishes and plans for medical care. These plans are made ahead of time in case you lose your ability to make decisions for yourself. Advance directives can apply to any medical decision, such as the treatments you want, and if you want to donate organs.   What are the types of advance directives?  There are many types of advance directives, and each state has rules about how to use them. You may choose a combination of any of the following:  Living will:  This is a written record of the treatment you want. You can also choose which treatments you do not want, which to limit, and which to stop at a certain time. This includes surgery, medicine, IV fluid, and tube feedings.   Durable power of  for healthcare  ([facility]):  This is a written record that states who you want to make healthcare choices for you when you are unable to make them for yourself. This person, called a proxy, is usually a family member or a friend. You may choose more than 1 proxy.  Do not resuscitate (DNR) order:  A DNR order is used in case your heart stops beating or you stop breathing. It is a request not to have certain forms of treatment, such as CPR. A DNR order may be included in other types of advance directives.  Medical directive:  This covers the care that you want if you are in a coma, near death, or unable to make decisions for yourself. You can list the treatments you want for each condition. Treatment may include pain medicine, surgery, blood transfusions, dialysis, IV or tube feedings, and a ventilator (breathing machine).  Values history:  This document has questions about your views, beliefs, and how you feel and think about life. This information can help others choose the care that you would choose.  Why are advance directives important?  An advance directive helps you control your care. Although spoken wishes may be used, it is better to have your wishes written down. Spoken wishes can be misunderstood, or not followed. Treatments may be given even if you do not want them. An advance directive may make it easier for your family to make difficult choices about your care.       © Copyright [employer] 2018 Information is for End User's use only and may not be sold, redistributed or otherwise used for commercial purposes. All illustrations and images included in CareNotes® are the copyrighted property of [employer] or [employer]

## 2024-12-20 NOTE — PROGRESS NOTES
Name: Agapito Martell      : 1960      MRN: [id number]  Encounter Provider: Ronnell Coronel MD  Encounter Date: 2024   Encounter department: [facility] [place]    Assessment & Plan  Abnormal EKG  EKG showing normal sinus rhythm rate of about 68/min however it is also shown right bundle branch block left anterior fascicular block which appears to be new compared to the old EKG I spoke to the cardiologist regarding the same and we will try to get this patient seen on Monday with a cardiologist prior to the surgery because of the abnormal findings.    Orders:    Ambulatory referral to Cardiology; Future    Peripheral vascular disease (HCC)  Vascular surgeons note appreciated patient is scheduled to have CFA endarterectomy soon reviewed the CT angiogram report vascular surgeons report.  Will get evaluation by the cardiologist also prior to the surgery because of the nature of severe peripheral vascular disease and the EKG showing right bundle branch block         Acute left-sided low back pain with left-sided sciatica  Patient continues to experience on and off low back pain radiating down the left lower extremity status post epidural injections in the past however no lower extremity discomfort pain appears to be a combination of the back problem as well as the peripheral vascular disease         Acquired hypothyroidism  Patient is currently taking levothyroxine 150 mcg daily stable we will continue with the current dose since the last TSH level came back normal.  History of hypothyroidism for many years    Orders:    TSH, 3rd generation with Free T4 reflex; Future    Hyperlipidemia, unspecified hyperlipidemia type  Patient's last lipid panel done last year had shown cholesterol 190 HDL 67 triglycerides 54  currently patient is taking rosuvastatin 10 mg at bedtime.  Will follow-up with repeat lipid profile along with the liver enzymes.    Orders:    Lipid panel; Future    Rheumatoid  arthritis involving multiple sites with positive rheumatoid factor ([facility])  Patient is currently not on any medications we will continue to monitor.  We will get C-reactive protein    Orders:    C-reactive protein; Future    Ulcer of right foot, limited to breakdown of skin ([facility])  Patient with a nonhealing ulcer on the right little toe workup revealing peripheral vascular disease occlusion noted in the CFA with reconstitution of the proximal SFA and DFA bifurcation of the right lower extremity.  Patient scheduled to have CFA endarterectomy.  Right little toe ulceration noted patient is having increasing pain discomfort because of the ulceration and peripheral vascular disease         Erectile dysfunction, unspecified erectile dysfunction type    Orders:    sildenafil (VIAGRA) 100 mg tablet; Take 1 tablet (100 mg total) by mouth if needed for erectile dysfunction As directed    Lumbar radiculopathy         Other male erectile dysfunction  Patient is on sildenafil 100 mg as needed         Thrombocytosis  Patient with a history of thrombocytosis last CBC had shown platelet count of 522,000 currently he is taking hydroxyurea 1000 mg alternating with 500 mg daily and is being followed by the hematologist also         Prediabetes    Orders:    Comprehensive metabolic panel; Future    Screening PSA (prostate specific antigen)    Orders:    PSA, Total Screen; Future    Diabetes due to underlying condition w oth circulatory comp ([facility])    Lab Results   Component Value Date    HGBA1C 5.3 05/08/2024   Patient never had the diagnosis of diabetes he never had high sugars and his hemoglobin A1c has been always normal.    Orders:    Albumin / creatinine urine ratio; Future    Pre-diabetes    Orders:    Hemoglobin A1C; Future    Hemoglobin A1C; Future    Urinalysis with microscopic; Future    Medicare annual wellness visit, subsequent            Preventive health issues were discussed with patient, and age appropriate screening tests  were ordered as noted in patient's After Visit Summary. Personalized health advice and appropriate referrals for health education or preventive services given if needed, as noted in patient's After Visit Summary.    History of Present Illness     Patient is coming here for a follow-up evaluation with a diagnosis of peripheral vascular disease with a nonhealing ulcer on the right little toe for past few weeks time workup has revealed patient with severe peripheral vascular disease he does not have diabetes.  His last hemoglobin A1c was 5.3.    I reviewed the reports from the vascular surgeon scheduled for surgery for CFA on the right side endarterectomy and right lower extremity angiogram with possible intervention possible right pedal access.    Will get lab work done including the lipid profile TSH and hemoglobin A1c    Patient is EKG showing question right bundle branch block.  Will try to see if he can have him seen by the cardiologist prior to the surgery because of the peripheral vascular disease concern is if there is a coronary artery disease.       Patient Care Team:  Ronnell Coronel MD as PCP - General (Internal Medicine)    Review of Systems  Medical History Reviewed by provider this encounter:  Tobacco  Allergies  Meds  Problems  Med Hx  Surg Hx  Fam Hx       Annual Wellness Visit Questionnaire       Health Risk Assessment:   Patient rates overall health as good. Patient feels that their physical health rating is slightly worse. Patient is very satisfied with their life. Eyesight was rated as same. Hearing was rated as same. Patient feels that their emotional and mental health rating is same. Patients states they are never, rarely angry. Patient states they are sometimes unusually tired/fatigued. Pain experienced in the last 7 days has been a lot. Patient's pain rating has been 9/10. Patient states that he has experienced no weight loss or gain in last 6 months. The pain is due to the foot issue  and improper blood flow down right leg.    Fall Risk Screening:   In the past year, patient has experienced: no history of falling in past year      Home Safety:  Patient does not have trouble with stairs inside or outside of their home. Patient has working smoke alarms and has working carbon monoxide detector. Home safety hazards include: none.     Nutrition:   Current diet is Regular and Limited junk food.     Medications:   Patient is currently taking over-the-counter supplements. OTC medications include: see medication list. Patient is able to manage medications.     Activities of Daily Living (ADLs)/Instrumental Activities of Daily Living (IADLs):   Walk and transfer into and out of bed and chair?: Yes  Dress and groom yourself?: Yes    Bathe or shower yourself?: Yes    Feed yourself? Yes  Do your laundry/housekeeping?: Yes  Manage your money, pay your bills and track your expenses?: Yes  Make your own meals?: Yes    Do your own shopping?: Yes    Durable Medical Equipment Suppliers  n/a    Previous Hospitalizations:   Any hospitalizations or ED visits within the last 12 months?: No      Advance Care Planning:   Living will: Yes    Durable POA for healthcare: Yes    Advanced directive: Yes    Advanced directive counseling given: Yes    Five wishes given: No    Patient declined ACP directive: No    End of Life Decisions reviewed with patient: Yes    Provider agrees with end of life decisions: Yes      Comments: Patient has a living will and 1 did a good deal but of her GYN but healthcare we have a copy of the same in the epic system    Cognitive Screening:   Provider or family/friend/caregiver concerned regarding cognition?: No    PREVENTIVE SCREENINGS      Cardiovascular Screening:    General: Screening Not Indicated and History Lipid Disorder      Diabetes Screening:     General: Screening Not Indicated, History Diabetes and Screening Current      Colorectal Cancer Screening:     General: Screening Current       Osteoporosis Screening:    General: Screening Not Indicated      Abdominal Aortic Aneurysm (AAA) Screening:    Risk factors include: age between 65-74 yo and tobacco use        General: Risks and Benefits Discussed and Screening Current      Lung Cancer Screening:     General: Screening Not Indicated      Hepatitis C Screening:    General: Screening Current    Screening, Brief Intervention, and Referral to Treatment (SBIRT)    Screening  Typical number of drinks in a day: 0  Typical number of drinks in a week: 3  Interpretation: Low risk drinking behavior.    AUDIT-C Screenin) How often did you have a drink containing alcohol in the past year? 2 to 4 times a month  2) How many drinks did you have on a typical day when you were drinking in the past year? 3 to 4  3) How often did you have 6 or more drinks on one occasion in the past year? never    AUDIT-C Score: 2  Interpretation: Score 0-3 (male): Negative screen for alcohol misuse    Single Item Drug Screening:  How often have you used an illegal drug (including marijuana) or a prescription medication for non-medical reasons in the past year? never    Single Item Drug Screen Score: 0  Interpretation: Negative screen for possible drug use disorder    Other Counseling Topics:   Car/seat belt/driving safety and calcium and vitamin D intake and regular weightbearing exercise.     Social Drivers of Health     Financial Resource Strain: Low Risk  (2023)    Overall Financial Resource Strain (CARDIA)     Difficulty of Paying Living Expenses: Not hard at all   Food Insecurity: No Food Insecurity (2024)    Hunger Vital Sign     Worried About Running Out of Food in the Last Year: Never true     Ran Out of Food in the Last Year: Never true   Transportation Needs: No Transportation Needs (2024)    PRAPARE - Transportation     Lack of Transportation (Medical): No     Lack of Transportation (Non-Medical): No   Housing Stability: Low Risk  (2024)     Housing Stability Vital Sign     Unable to Pay for Housing in the Last Year: No     Number of Times Moved in the Last Year: 0     Homeless in the Last Year: No   Utilities: Not At Risk (12/13/2024)    [facility] Utilities     Threatened with loss of utilities: No     No results found.    Objective   /74 (BP Location: Right arm, Patient Position: Sitting, Cuff Size: Standard)   Pulse 76   Ht 6' (1.829 m)   Wt 72.8 kg (160 lb 9.6 oz)   SpO2 97%   BMI 21.78 kg/m²     Physical Exam  Vitals and nursing note reviewed.   Constitutional:       General: He is not in acute distress.     Appearance: He is well-developed.   HENT:      Head: Normocephalic and atraumatic.   Eyes:      Conjunctiva/sclera: Conjunctivae normal.   Cardiovascular:      Rate and Rhythm: Normal rate and regular rhythm.      Heart sounds: No murmur heard.  Pulmonary:      Effort: Pulmonary effort is normal. No respiratory distress.      Breath sounds: Normal breath sounds.   Abdominal:      Palpations: Abdomen is soft.      Tenderness: There is no abdominal tenderness.   Musculoskeletal:         General: No swelling.      Cervical back: Neck supple.   Skin:     General: Skin is warm and dry.      Capillary Refill: Capillary refill takes less than 2 seconds.      Comments: Ulceration in the right little toe nonhealing   Neurological:      Mental Status: He is alert.   Psychiatric:         Mood and Affect: Mood normal.       Recent Results (from the past 8 weeks)   Basic metabolic panel    Collection Time: 11/15/24  2:06 PM   Result Value Ref Range    Glucose, Random 91 65 - 139 mg/dL    BUN 22 7 - 25 mg/dL    Creatinine 1.01 0.70 - 1.35 mg/dL    eGFR 82 > OR = 60 mL/min/1.73m2    SL AMB BUN/CREATININE RATIO SEE NOTE: 6 - 22 (calc)    Sodium 141 135 - 146 mmol/L    Potassium 4.3 3.5 - 5.3 mmol/L    Chloride 104 98 - 110 mmol/L    CO2 28 20 - 32 mmol/L    Calcium 9.6 8.6 - 10.3 mg/dL   CBC and Platelet    Collection Time: 11/15/24  2:06 PM   Result  Value Ref Range    White Blood Cell Count 9.1 3.8 - 10.8 Thousand/uL    Red Blood Cell Count 4.67 4.20 - 5.80 Million/uL    Hemoglobin 16.9 13.2 - 17.1 g/dL    HCT 48.6 38.5 - 50.0 %    .1 (H) 80.0 - 100.0 fL    MCH 36.2 (H) 27.0 - 33.0 pg    MCHC 34.8 32.0 - 36.0 g/dL    RDW 13.0 11.0 - 15.0 %    Platelet Count 522 (H) 140 - 400 Thousand/uL    SL AMB MPV 9.0 7.5 - 12.5 fL   Protime-INR    Collection Time: 11/15/24  2:08 PM   Result Value Ref Range    INR 1.0     Prothrombin Time 10.8 9.0 - 11.5 sec   Type and screen    Collection Time: 12/13/24 10:07 AM   Result Value Ref Range    ABO Grouping AB     Rh Factor Positive     Antibody Screen Negative     Specimen Expiration Date 20250110    EKG 12 lead    Collection Time: 12/13/24 10:13 AM   Result Value Ref Range    Ventricular Rate 68 BPM    Atrial Rate 68 BPM    TN Interval 196 ms    QRSD Interval 134 ms    QT Interval 390 ms    QTC Interval 415 ms    P Axis 80 degrees    QRS Axis -74 degrees    T Wave Axis 64 degrees

## 2024-12-21 PROBLEM — R94.31 ABNORMAL EKG: Status: ACTIVE | Noted: 2024-12-21

## 2024-12-21 NOTE — ASSESSMENT & PLAN NOTE
Patient with a history of thrombocytosis last CBC had shown platelet count of 522,000 currently he is taking hydroxyurea 1000 mg alternating with 500 mg daily and is being followed by the hematologist also

## 2024-12-21 NOTE — ASSESSMENT & PLAN NOTE
Patient is currently not on any medications we will continue to monitor.  We will get C-reactive protein    Orders:    C-reactive protein; Future

## 2024-12-21 NOTE — ASSESSMENT & PLAN NOTE
Patient's last lipid panel done last year had shown cholesterol 190 HDL 67 triglycerides 54  currently patient is taking rosuvastatin 10 mg at bedtime.  Will follow-up with repeat lipid profile along with the liver enzymes.    Orders:    Lipid panel; Future

## 2024-12-21 NOTE — ASSESSMENT & PLAN NOTE
Patient with a nonhealing ulcer on the right little toe workup revealing peripheral vascular disease occlusion noted in the CFA with reconstitution of the proximal SFA and DFA bifurcation of the right lower extremity.  Patient scheduled to have CFA endarterectomy.  Right little toe ulceration noted patient is having increasing pain discomfort because of the ulceration and peripheral vascular disease

## 2024-12-21 NOTE — ASSESSMENT & PLAN NOTE
Lab Results   Component Value Date    HGBA1C 5.3 05/08/2024   Patient never had the diagnosis of diabetes he never had high sugars and his hemoglobin A1c has been always normal.    Orders:    Albumin / creatinine urine ratio; Future

## 2024-12-21 NOTE — ASSESSMENT & PLAN NOTE
EKG showing normal sinus rhythm rate of about 68/min however it is also shown right bundle branch block left anterior fascicular block which appears to be new compared to the old EKG I spoke to the cardiologist regarding the same and we will try to get this patient seen on Monday with a cardiologist prior to the surgery because of the abnormal findings.    Orders:    Ambulatory referral to Cardiology; Future

## 2024-12-21 NOTE — ASSESSMENT & PLAN NOTE
Vascular surgeons note appreciated patient is scheduled to have CFA endarterectomy soon reviewed the CT angiogram report vascular surgeons report.  Will get evaluation by the cardiologist also prior to the surgery because of the nature of severe peripheral vascular disease and the EKG showing right bundle branch block

## 2024-12-21 NOTE — ASSESSMENT & PLAN NOTE
Patient continues to experience on and off low back pain radiating down the left lower extremity status post epidural injections in the past however no lower extremity discomfort pain appears to be a combination of the back problem as well as the peripheral vascular disease

## 2024-12-21 NOTE — ASSESSMENT & PLAN NOTE
Patient is currently taking levothyroxine 150 mcg daily stable we will continue with the current dose since the last TSH level came back normal.  History of hypothyroidism for many years    Orders:    TSH, 3rd generation with Free T4 reflex; Future

## 2024-12-23 ENCOUNTER — TELEPHONE (OUTPATIENT)
Age: 64
End: 2024-12-23

## 2024-12-23 NOTE — TELEPHONE ENCOUNTER
Caller: Agapito Martell    Doctor: Dr. Leo/NP, but Dr. Ramos read EKG results    Call back #: [phone]    Reason for call: Patient recently had an EKG completed on 12/13/24. The results were signed by Dr. Ramos. Patient was recently referred to cardiology due to an abnormal EKG by his PCP. Patient stated that his PCP is awaiting the results from Dr. Ramos. Patient stated that he would like a call back from cardiology regarding his results. Please advise. Thank you.

## 2024-12-24 ENCOUNTER — OFFICE VISIT (OUTPATIENT)
Dept: CARDIOLOGY CLINIC | Facility: CLINIC | Age: 64
End: 2024-12-24
Payer: MEDICARE

## 2024-12-24 VITALS
DIASTOLIC BLOOD PRESSURE: 82 MMHG | HEIGHT: 72 IN | RESPIRATION RATE: 16 BRPM | HEART RATE: 90 BPM | OXYGEN SATURATION: 96 % | WEIGHT: 159 LBS | BODY MASS INDEX: 21.54 KG/M2 | SYSTOLIC BLOOD PRESSURE: 132 MMHG

## 2024-12-24 DIAGNOSIS — R94.31 ABNORMAL EKG: ICD-10-CM

## 2024-12-24 DIAGNOSIS — Z01.810 PRE-OPERATIVE CARDIOVASCULAR EXAMINATION: Primary | ICD-10-CM

## 2024-12-24 PROCEDURE — 99204 OFFICE O/P NEW MOD 45 MIN: CPT | Performed by: STUDENT IN AN ORGANIZED HEALTH CARE EDUCATION/TRAINING PROGRAM

## 2024-12-24 NOTE — Clinical Note
December 24, 2024     Ronnell Coronel MD  [address] [facility] [address]    Patient: Agapito Martell   YOB: 1960   Date of Visit: 12/24/2024       Dear Dr. Coronel:    Thank you for referring Agapito Martell to me for evaluation. Below are my notes for this consultation.    If you have questions, please do not hesitate to call me. I look forward to following your patient along with you.         Sincerely,        Jose Leo MD        CC: Agapito Martell  Jose Leo MD  12/24/2024  3:16 PM  Incomplete  [facility] Cardiology  Office Consultation  Agapito Martell 64 y.o. male MRN: [id number]        1. Pre-operative cardiovascular examination  2. Abnormal EKG  -     Ambulatory referral to Cardiology      Assessment & Plan  Abnormal EKG  Patient with findings of new right bundle branch block/left anterior fascicular block on EKG from 2021.  Patient currently asymptomatic with excellent exercise tolerance, with only limitation at this time being his left foot injury.  No further workup required prior to surgery.  Pre-operative cardiovascular examination  NSQIP risk score for vascular procedure is 0.4% for cardiac complications, patient's METs are greater than 4, DASI score of 50. the patient denies any signs or symptoms of ACS, heart failure, unstable arrhythmia, or decompensated heart failure which would be a contraindication for vascular surgery.  The patient requires no further cardiac testing prior to his surgery.  He requires no immediate medication changes prior to upcoming procedure.  We will do a postoperative follow-up for further risk factor stratification and lipid management       Reason for Consult / Principal Problem: Preoperative risk assessment/EKG abnormalities     HPI: Agapito Martell is a 64 y.o. year old male with history of hypothyroidism, hyperlipidemia, rheumatoid arthritis, thrombocytosis    Referred for EKG abnormalities, findings of right bundle branch block and left anterior  fascicular block for preoperative risk assessment prior to vascular procedure.    Patient currently denies any signs or symptoms of ACS, heart failure, unstable arrhythmia, or severe valvular disease.  Patient's only complaint is nonhealing right foot wound.  Patient underwent [facility] activity index questionnaire with a score of 50.7, with METs greater than 4, NSQIP cardiac risk calculator places the patient's risk of 0.4%.  With this finding of new right bundle branch block and left anterior fascicular block the patient reports no dizziness, lightheadedness, fatigue, decreased exertional tolerance.    Currently denies any fever, chills, fatigue, new visual changes, lightheadedness, syncope, chest pain, palpitations, shortness of breath at rest or with exertion, orthopnea, PND, nausea, vomiting, diarrhea, dark or bright red blood in stools, lower extremity swelling,     ROS: Pertinent positives and negatives as described in History of Present Illness.    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   • RA (rheumatoid arthritis) ([facility])    • Rheumatoid arthritis involving multiple sites ([facility]) 9/19/2022   • Wears glasses      Past Surgical History:   Procedure Laterality Date   • ANKLE SURGERY Right     ligament, chipped bones,dislocated   • BUNIONECTOMY  03/2014   • COLONOSCOPY     • ELBOW SURGERY Right     tendon surgery   • EPIDURAL BLOCK INJECTION Left 08/16/2023    Procedure: left L5-S1, S1 TRANSFORAMINAL epidural steroid injection (96691, [id number]);  Surgeon: Gunner Aguayo DO;  Location: [facility] MAIN OR;  Service: Pain Management    • EPIDURAL BLOCK  INJECTION N/A 03/15/2024    Procedure: L5-S1 LUMBAR EPIDURAL STEROID INJECTION;  Surgeon: Chandra Serra MD;  Location: [facility] MAIN OR;  Service: Pain Management    • EPIDURAL BLOCK INJECTION Left 2024    Procedure: LEFT L5-S1 TRANSFORAMINAL EPIDURAL STEROID INJECTION;  Surgeon: Chandra Serra MD;  Location: [facility] MAIN OR;  Service: Pain Management    • FOOT ARTHRODESIS, MODIFIED ARELLANO Left    • FRACTURE SURGERY Left     elbow   • HERNIA REPAIR Right     inguinal   • LUMBAR EPIDURAL INJECTION N/A 2023    Procedure: L5-S1 LUMBAR epidural steroid injection (99793);  Surgeon: Chandra Serra MD;  Location: [facility] MAIN OR;  Service: Pain Management    • CT ARTHRS KNE SURG W/MENISCECTOMY MED/LAT W/SHVG Right 2018    Procedure: MENISCECTOMY LATERAL /MEDIAL;  Surgeon: David Sloan MD;  Location: [facility] MAIN OR;  Service: Orthopedics   • CT CORRECTION HAMMERTOE Bilateral 2021    Procedure: REPAIR HAMMERTOE / MALLET TOE / CLAW TOE 2ND toe bilateral;  Surgeon: Filipe Oliva DPM;  Location: [facility] MAIN OR;  Service: Podiatry   • CT CORRJ HLX VLGS BNCTY SESMDC RESCJ PROX PHLX BASE Right 2021    Procedure: BUNIONECTOMY SMITH;  Surgeon: Filipe Oliva DPM;  Location: [facility] MAIN OR;  Service: Podiatry   • ROTATOR CUFF REPAIR Left    • WISDOM TOOTH EXTRACTION      x4   • WRIST SURGERY Bilateral     Bilateral cyct removals     Social History     Substance and Sexual Activity   Alcohol Use Yes   • Alcohol/week: 3.0 standard drinks of alcohol   • Types: 3 Cans of beer per week    Comment: 3 beers a week     Social History     Substance and Sexual Activity   Drug Use No     Social History     Tobacco Use   Smoking Status Former   • Current packs/day: 0.00   • Average packs/day: 1 pack/day for 25.0 years (25.0 ttl pk-yrs)   • Types: Cigarettes   • Start date: 1975   • Quit date: 2000   • Years since quittin.9   • Passive exposure: Past   Smokeless Tobacco Never     Family History   Problem  Relation Age of Onset   • Cancer Mother         passed   • Hypertension Father    • Heart disease Father         leaky valve   • Skin cancer Father    • Diabetes Father        Allergies:  No Known Allergies    Medications:     Current Outpatient Medications:   •  aspirin 81 mg chewable tablet, Chew 81 mg daily, Disp: , Rfl:   •  Hydroxyurea (HYDREA PO), Take 1,000 mg by mouth Mon-Wed-Fri, Disp: , Rfl:   •  hydroxyurea (HYDREA) 500 mg capsule, Take by mouth On Tues-Thurs-Sat-Sun , Disp: , Rfl:   •  levothyroxine 150 mcg tablet, TAKE 1 TABLET DAILY, Disp: 90 tablet, Rfl: 1  •  rosuvastatin (CRESTOR) 10 MG tablet, Take 1 tablet (10 mg total) by mouth daily, Disp: 100 tablet, Rfl: 0  •  sildenafil (VIAGRA) 100 mg tablet, Take 1 tablet (100 mg total) by mouth if needed for erectile dysfunction As directed, Disp: 30 tablet, Rfl: 2  •  Cholecalciferol (VITAMIN D3) 1000 units CAPS, Take 1,000 Units by mouth (Patient not taking: Reported on 12/24/2024), Disp: , Rfl:       Vitals:    12/24/24 1337   BP: 132/82   Pulse: 90   Resp: 16   SpO2: 96%     Weight (last 2 days)       Date/Time Weight    12/24/24 1337 72.1 (159)          Physical Exam  Vitals and nursing note reviewed.   Constitutional:       General: He is not in acute distress.     Appearance: He is well-developed.   HENT:      Head: Normocephalic and atraumatic.   Eyes:      Conjunctiva/sclera: Conjunctivae normal.   Cardiovascular:      Rate and Rhythm: Normal rate and regular rhythm.      Heart sounds: No murmur heard.  Pulmonary:      Effort: Pulmonary effort is normal. No respiratory distress.      Breath sounds: Normal breath sounds.   Abdominal:      Palpations: Abdomen is soft.      Tenderness: There is no abdominal tenderness.   Musculoskeletal:         General: No swelling.      Cervical back: Neck supple.   Skin:     General: Skin is warm and dry.      Capillary Refill: Capillary refill takes less than 2 seconds.   Neurological:      Mental Status: He is  "alert.   Psychiatric:         Mood and Affect: Mood normal.           Laboratory Studies:    Lab Results   Component Value Date    SODIUM 141 11/15/2024    K 4.3 11/15/2024     11/15/2024    CO2 28 11/15/2024    BUN 22 11/15/2024    CREATININE 1.01 11/15/2024    GLUC 91 11/15/2024    CALCIUM 9.6 11/15/2024     Lab Results   Component Value Date    WBC 9.1 11/15/2024    HGB 16.9 11/15/2024    HCT 48.6 11/15/2024    .1 (H) 11/15/2024     (H) 11/15/2024     Lab Results   Component Value Date    HGBA1C 5.3 05/08/2024     No results found for: \"[id number]\", \"TSH\"  Lab Results   Component Value Date    CHOLESTEROL 190 04/28/2023     Lab Results   Component Value Date    HDL 67 04/28/2023     Lab Results   Component Value Date    TRIG 54 04/28/2023     No results found for: \"NONHDLC\"      Cardiac testing:     EKG reviewed personally:   12/13/2024: NSR, RBBB, LAFB        Jose Leo MD    Portions of the record may have been created with voice recognition software.  Occasional wrong word or \"sound a like\" substitutions may have occurred due to the inherent limitations of voice recognition software.  Read the chart carefully and recognize, using context, where substitutions have occurred.     Jose Leo MD  12/24/2024  2:27 PM  Sign when Signing Visit  [facility] Cardiology  Office Consultation  Agapito Martell 64 y.o. male MRN: [id number]        1. Abnormal EKG  -     Ambulatory referral to Cardiology      Assessment & Plan  Abnormal EKG         Plan    ***      Reason for Consult / Principal Problem: ***    HPI: Agapito Martell is a 64 y.o. year old male with history of hypothyroidism, hyperlipidemia, rheumatoid arthritis, thrombocytosis    Referred for EKG abnormalities, findings of right bundle branch block and left anterior fascicular block for preoperative risk assessment prior to vascular procedure.    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    Patient here for right SFA with possible intervention with possible right pedal access        Currently denies any fever, chills, fatigue, new visual changes, lightheadedness, syncope, chest pain, palpitations, shortness of breath at rest or with exertion, orthopnea, PND, nausea, vomiting, diarrhea, dark or bright red blood in stools, lower extremity swelling, leg claudication.     ROS: Pertinent positives and negatives as described in History of Present Illness. Remainder of a 14 point review of systems was negative.     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   • RA (rheumatoid arthritis) ([facility])    • Rheumatoid arthritis involving multiple sites ([facility]) 9/19/2022   • Wears glasses      Past Surgical History:   Procedure Laterality Date   • ANKLE SURGERY Right     ligament, chipped bones,dislocated   • BUNIONECTOMY  03/2014   • COLONOSCOPY     • ELBOW SURGERY Right     tendon surgery   • EPIDURAL BLOCK INJECTION Left 08/16/2023    Procedure: left L5-S1, S1 TRANSFORAMINAL epidural steroid injection ([id number], [id number]);  Surgeon: Gunner Aguayo DO;  Location: [facility] MAIN OR;  Service: Pain Management    • EPIDURAL BLOCK INJECTION N/A 03/15/2024    Procedure: L5-S1 LUMBAR EPIDURAL STEROID INJECTION;  Surgeon: Chandra Serra MD;  Location: [facility] MAIN OR;  Service: Pain Management    • EPIDURAL BLOCK INJECTION Left 04/26/2024    Procedure: LEFT L5-S1 TRANSFORAMINAL EPIDURAL STEROID INJECTION;  Surgeon: Chandra Serra MD;  Location: [facility] MAIN OR;  Service: Pain Management    • FOOT ARTHRODESIS, MODIFIED ARELLANO Left     • FRACTURE SURGERY Left     elbow   • HERNIA REPAIR Right     inguinal   • LUMBAR EPIDURAL INJECTION N/A 2023    Procedure: L5-S1 LUMBAR epidural steroid injection ([id number]);  Surgeon: Chandra Serra MD;  Location: [facility] MAIN OR;  Service: Pain Management    • VT ARTHRS KNE SURG W/MENISCECTOMY MED/LAT W/SHVG Right 2018    Procedure: MENISCECTOMY LATERAL /MEDIAL;  Surgeon: David Sloan MD;  Location: [facility] MAIN OR;  Service: Orthopedics   • VT CORRECTION HAMMERTOE Bilateral 2021    Procedure: REPAIR HAMMERTOE / MALLET TOE / CLAW TOE 2ND toe bilateral;  Surgeon: Filipe Oliva DPM;  Location: [facility] MAIN OR;  Service: Podiatry   • VT CORRJ HLX VLGS BNCTY SESMDC RESCJ PROX PHLX BASE Right 2021    Procedure: BUNIONECTOMY MSITH;  Surgeon: Filipe Oliva DPM;  Location: [facility] MAIN OR;  Service: Podiatry   • ROTATOR CUFF REPAIR Left    • WISDOM TOOTH EXTRACTION      x4   • WRIST SURGERY Bilateral     Bilateral cyct removals     Social History     Substance and Sexual Activity   Alcohol Use Yes   • Alcohol/week: 3.0 standard drinks of alcohol   • Types: 3 Cans of beer per week    Comment: 3 beers a week     Social History     Substance and Sexual Activity   Drug Use No     Social History     Tobacco Use   Smoking Status Former   • Current packs/day: 0.00   • Average packs/day: 1 pack/day for 25.0 years (25.0 ttl pk-yrs)   • Types: Cigarettes   • Start date: 1975   • Quit date: 2000   • Years since quittin.9   • Passive exposure: Past   Smokeless Tobacco Never     Family History   Problem Relation Age of Onset   • Cancer Mother         passed   • Hypertension Father    • Heart disease Father         leaky valve   • Skin cancer Father    • Diabetes Father        Allergies:  No Known Allergies    Medications:     Current Outpatient Medications:   •  aspirin 81 mg chewable tablet, Chew 81 mg daily, Disp: , Rfl:   •  Hydroxyurea (HYDREA PO), Take 1,000 mg by mouth Mon-Wed-Fri, Disp: , Rfl:   •   "hydroxyurea (HYDREA) 500 mg capsule, Take by mouth On Tues-Thurs-Sat-Sun , Disp: , Rfl:   •  levothyroxine 150 mcg tablet, TAKE 1 TABLET DAILY, Disp: 90 tablet, Rfl: 1  •  rosuvastatin (CRESTOR) 10 MG tablet, Take 1 tablet (10 mg total) by mouth daily, Disp: 100 tablet, Rfl: 0  •  sildenafil (VIAGRA) 100 mg tablet, Take 1 tablet (100 mg total) by mouth if needed for erectile dysfunction As directed, Disp: 30 tablet, Rfl: 2  •  Cholecalciferol (VITAMIN D3) 1000 units CAPS, Take 1,000 Units by mouth (Patient not taking: Reported on 12/24/2024), Disp: , Rfl:       Vitals:    12/24/24 1337   BP: 132/82   Pulse: 90   Resp: 16   SpO2: 96%     Weight (last 2 days)       Date/Time Weight    12/24/24 1337 72.1 (159)          Physical Exam      Laboratory Studies:    Laboratory studies personally reviewed    Cardiac testing:     EKG reviewed personally:   No results found for this visit on 12/24/24.    Echocardiogram:      Stress test:      Holter:      Cardiac catheterization:        Jose Leo MD    Portions of the record may have been created with voice recognition software.  Occasional wrong word or \"sound a like\" substitutions may have occurred due to the inherent limitations of voice recognition software.  Read the chart carefully and recognize, using context, where substitutions have occurred.   "

## 2024-12-24 NOTE — TELEPHONE ENCOUNTER
Such tasks should not be sent to reading doctor this should go to the patient's medical doctor who ordered those test.

## 2024-12-24 NOTE — Clinical Note
December 24, 2024     Ronnell Coronel MD  [address] [facility] [address]    Patient: Agapito Martell   YOB: 1960   Date of Visit: 12/24/2024       Dear Dr. Coronel:    Thank you for referring Agapito Martell to me for evaluation. Below are my notes for this consultation.    If you have questions, please do not hesitate to call me. I look forward to following your patient along with you.         Sincerely,        Jose Leo MD        CC: Agapito Martell  Jose Leo MD  12/24/2024  3:16 PM  Incomplete  [facility] Cardiology  Office Consultation  Agapito Martell 64 y.o. male MRN: [id number]        1. Pre-operative cardiovascular examination  2. Abnormal EKG  -     Ambulatory referral to Cardiology      Assessment & Plan  Abnormal EKG  Patient with findings of new right bundle branch block/left anterior fascicular block on EKG from 2021.  Patient currently asymptomatic with excellent exercise tolerance, with only limitation at this time being his left foot injury.  No further workup required prior to surgery.  Pre-operative cardiovascular examination  NSQIP risk score for vascular procedure is 0.4% for cardiac complications, patient's METs are greater than 4, DASI score of 50. the patient denies any signs or symptoms of ACS, heart failure, unstable arrhythmia, or decompensated heart failure which would be a contraindication for vascular surgery.  The patient requires no further cardiac testing prior to his surgery.  He requires no immediate medication changes prior to upcoming procedure.  We will do a postoperative follow-up for further risk factor stratification and lipid management       Reason for Consult / Principal Problem: Preoperative risk assessment/EKG abnormalities     HPI: Agapito Martell is a 64 y.o. year old male with history of hypothyroidism, hyperlipidemia, rheumatoid arthritis, thrombocytosis    Referred for EKG abnormalities, findings of right bundle branch block and left anterior  fascicular block for preoperative risk assessment prior to vascular procedure.    Patient currently denies any signs or symptoms of ACS, heart failure, unstable arrhythmia, or severe valvular disease.  Patient's only complaint is nonhealing right foot wound.  Patient underwent [facility] activity index questionnaire with a score of 50.7, with METs greater than 4, NSQIP cardiac risk calculator places the patient's risk of 0.4%.  With this finding of new right bundle branch block and left anterior fascicular block the patient reports no dizziness, lightheadedness, fatigue, decreased exertional tolerance.    Currently denies any fever, chills, fatigue, new visual changes, lightheadedness, syncope, chest pain, palpitations, shortness of breath at rest or with exertion, orthopnea, PND, nausea, vomiting, diarrhea, dark or bright red blood in stools, lower extremity swelling,     ROS: Pertinent positives and negatives as described in History of Present Illness.    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   • RA (rheumatoid arthritis) ([facility])    • Rheumatoid arthritis involving multiple sites ([facility]) 9/19/2022   • Wears glasses      Past Surgical History:   Procedure Laterality Date   • ANKLE SURGERY Right     ligament, chipped bones,dislocated   • BUNIONECTOMY  03/2014   • COLONOSCOPY     • ELBOW SURGERY Right     tendon surgery   • EPIDURAL BLOCK INJECTION Left 08/16/2023    Procedure: left L5-S1, S1 TRANSFORAMINAL epidural steroid injection (61915, [id number]);  Surgeon: Gunner Aguayo DO;  Location: [facility] MAIN OR;  Service: Pain Management    • EPIDURAL BLOCK  INJECTION N/A 03/15/2024    Procedure: L5-S1 LUMBAR EPIDURAL STEROID INJECTION;  Surgeon: Chandra Serra MD;  Location: [facility] MAIN OR;  Service: Pain Management    • EPIDURAL BLOCK INJECTION Left 2024    Procedure: LEFT L5-S1 TRANSFORAMINAL EPIDURAL STEROID INJECTION;  Surgeon: Chandra Serra MD;  Location: [facility] MAIN OR;  Service: Pain Management    • FOOT ARTHRODESIS, MODIFIED ARELLANO Left    • FRACTURE SURGERY Left     elbow   • HERNIA REPAIR Right     inguinal   • LUMBAR EPIDURAL INJECTION N/A 2023    Procedure: L5-S1 LUMBAR epidural steroid injection (06408);  Surgeon: Chandra Serra MD;  Location: [facility] MAIN OR;  Service: Pain Management    • ND ARTHRS KNE SURG W/MENISCECTOMY MED/LAT W/SHVG Right 2018    Procedure: MENISCECTOMY LATERAL /MEDIAL;  Surgeon: David Sloan MD;  Location: [facility] MAIN OR;  Service: Orthopedics   • ND CORRECTION HAMMERTOE Bilateral 2021    Procedure: REPAIR HAMMERTOE / MALLET TOE / CLAW TOE 2ND toe bilateral;  Surgeon: Filipe Oliva DPM;  Location: [facility] MAIN OR;  Service: Podiatry   • ND CORRJ HLX VLGS BNCTY SESMDC RESCJ PROX PHLX BASE Right 2021    Procedure: BUNIONECTOMY SMITH;  Surgeon: Filipe Oliva DPM;  Location: [facility] MAIN OR;  Service: Podiatry   • ROTATOR CUFF REPAIR Left    • WISDOM TOOTH EXTRACTION      x4   • WRIST SURGERY Bilateral     Bilateral cyct removals     Social History     Substance and Sexual Activity   Alcohol Use Yes   • Alcohol/week: 3.0 standard drinks of alcohol   • Types: 3 Cans of beer per week    Comment: 3 beers a week     Social History     Substance and Sexual Activity   Drug Use No     Social History     Tobacco Use   Smoking Status Former   • Current packs/day: 0.00   • Average packs/day: 1 pack/day for 25.0 years (25.0 ttl pk-yrs)   • Types: Cigarettes   • Start date: 1975   • Quit date: 2000   • Years since quittin.9   • Passive exposure: Past   Smokeless Tobacco Never     Family History   Problem  Relation Age of Onset   • Cancer Mother         passed   • Hypertension Father    • Heart disease Father         leaky valve   • Skin cancer Father    • Diabetes Father        Allergies:  No Known Allergies    Medications:     Current Outpatient Medications:   •  aspirin 81 mg chewable tablet, Chew 81 mg daily, Disp: , Rfl:   •  Hydroxyurea (HYDREA PO), Take 1,000 mg by mouth Mon-Wed-Fri, Disp: , Rfl:   •  hydroxyurea (HYDREA) 500 mg capsule, Take by mouth On Tues-Thurs-Sat-Sun , Disp: , Rfl:   •  levothyroxine 150 mcg tablet, TAKE 1 TABLET DAILY, Disp: 90 tablet, Rfl: 1  •  rosuvastatin (CRESTOR) 10 MG tablet, Take 1 tablet (10 mg total) by mouth daily, Disp: 100 tablet, Rfl: 0  •  sildenafil (VIAGRA) 100 mg tablet, Take 1 tablet (100 mg total) by mouth if needed for erectile dysfunction As directed, Disp: 30 tablet, Rfl: 2  •  Cholecalciferol (VITAMIN D3) 1000 units CAPS, Take 1,000 Units by mouth (Patient not taking: Reported on 12/24/2024), Disp: , Rfl:       Vitals:    12/24/24 1337   BP: 132/82   Pulse: 90   Resp: 16   SpO2: 96%     Weight (last 2 days)       Date/Time Weight    12/24/24 1337 72.1 (159)          Physical Exam  Vitals and nursing note reviewed.   Constitutional:       General: He is not in acute distress.     Appearance: He is well-developed.   HENT:      Head: Normocephalic and atraumatic.   Eyes:      Conjunctiva/sclera: Conjunctivae normal.   Cardiovascular:      Rate and Rhythm: Normal rate and regular rhythm.      Heart sounds: No murmur heard.  Pulmonary:      Effort: Pulmonary effort is normal. No respiratory distress.      Breath sounds: Normal breath sounds.   Abdominal:      Palpations: Abdomen is soft.      Tenderness: There is no abdominal tenderness.   Musculoskeletal:         General: No swelling.      Cervical back: Neck supple.   Skin:     General: Skin is warm and dry.      Capillary Refill: Capillary refill takes less than 2 seconds.   Neurological:      Mental Status: He is  "alert.   Psychiatric:         Mood and Affect: Mood normal.           Laboratory Studies:    Lab Results   Component Value Date    SODIUM 141 11/15/2024    K 4.3 11/15/2024     11/15/2024    CO2 28 11/15/2024    BUN 22 11/15/2024    CREATININE 1.01 11/15/2024    GLUC 91 11/15/2024    CALCIUM 9.6 11/15/2024     Lab Results   Component Value Date    WBC 9.1 11/15/2024    HGB 16.9 11/15/2024    HCT 48.6 11/15/2024    .1 (H) 11/15/2024     (H) 11/15/2024     Lab Results   Component Value Date    HGBA1C 5.3 05/08/2024     No results found for: \"EFS5DDIHRMDB\", \"TSH\"  Lab Results   Component Value Date    CHOLESTEROL 190 04/28/2023     Lab Results   Component Value Date    HDL 67 04/28/2023     Lab Results   Component Value Date    TRIG 54 04/28/2023     No results found for: \"NONHDLC\"      Cardiac testing:     EKG reviewed personally:   12/13/2024: NSR, RBBB, LAFB        Jose Leo MD    Portions of the record may have been created with voice recognition software.  Occasional wrong word or \"sound a like\" substitutions may have occurred due to the inherent limitations of voice recognition software.  Read the chart carefully and recognize, using context, where substitutions have occurred.     Jose Leo MD  12/24/2024  2:27 PM  Sign when Signing Visit  [facility] Cardiology  Office Consultation  Agapito Martell 64 y.o. male MRN: [id number]        1. Abnormal EKG  -     Ambulatory referral to Cardiology      Assessment & Plan  Abnormal EKG         Plan    ***      Reason for Consult / Principal Problem: ***    HPI: Agapito Martell is a 64 y.o. year old male with history of hypothyroidism, hyperlipidemia, rheumatoid arthritis, thrombocytosis    Referred for EKG abnormalities, findings of right bundle branch block and left anterior fascicular block for preoperative risk assessment prior to vascular procedure.    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    Patient here for right SFA with possible intervention with possible right pedal access        Currently denies any fever, chills, fatigue, new visual changes, lightheadedness, syncope, chest pain, palpitations, shortness of breath at rest or with exertion, orthopnea, PND, nausea, vomiting, diarrhea, dark or bright red blood in stools, lower extremity swelling, leg claudication.     ROS: Pertinent positives and negatives as described in History of Present Illness. Remainder of a 14 point review of systems was negative.     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   • RA (rheumatoid arthritis) ([facility])    • Rheumatoid arthritis involving multiple sites ([facility]) 9/19/2022   • Wears glasses      Past Surgical History:   Procedure Laterality Date   • ANKLE SURGERY Right     ligament, chipped bones,dislocated   • BUNIONECTOMY  03/2014   • COLONOSCOPY     • ELBOW SURGERY Right     tendon surgery   • EPIDURAL BLOCK INJECTION Left 08/16/2023    Procedure: left L5-S1, S1 TRANSFORAMINAL epidural steroid injection ([id number], 90039);  Surgeon: Gunner Aguayo DO;  Location: [facility] MAIN OR;  Service: Pain Management    • EPIDURAL BLOCK INJECTION N/A 03/15/2024    Procedure: L5-S1 LUMBAR EPIDURAL STEROID INJECTION;  Surgeon: Chandra Serra MD;  Location: [facility] MAIN OR;  Service: Pain Management    • EPIDURAL BLOCK INJECTION Left 04/26/2024    Procedure: LEFT L5-S1 TRANSFORAMINAL EPIDURAL STEROID INJECTION;  Surgeon: Chandra Serra MD;  Location: [facility] MAIN OR;  Service: Pain Management    • FOOT ARTHRODESIS, MODIFIED ARELLANO Left     • FRACTURE SURGERY Left     elbow   • HERNIA REPAIR Right     inguinal   • LUMBAR EPIDURAL INJECTION N/A 2023    Procedure: L5-S1 LUMBAR epidural steroid injection ([id number]);  Surgeon: Chandra Serra MD;  Location: [facility] MAIN OR;  Service: Pain Management    • OR ARTHRS KNE SURG W/MENISCECTOMY MED/LAT W/SHVG Right 2018    Procedure: MENISCECTOMY LATERAL /MEDIAL;  Surgeon: David Sloan MD;  Location: [facility] MAIN OR;  Service: Orthopedics   • OR CORRECTION HAMMERTOE Bilateral 2021    Procedure: REPAIR HAMMERTOE / MALLET TOE / CLAW TOE 2ND toe bilateral;  Surgeon: Filipe Oliva DPM;  Location: [facility] MAIN OR;  Service: Podiatry   • OR CORRJ HLX VLGS BNCTY SESMDC RESCJ PROX PHLX BASE Right 2021    Procedure: BUNIONECTOMY SMITH;  Surgeon: Filipe Oliva DPM;  Location: [facility] MAIN OR;  Service: Podiatry   • ROTATOR CUFF REPAIR Left    • WISDOM TOOTH EXTRACTION      x4   • WRIST SURGERY Bilateral     Bilateral cyct removals     Social History     Substance and Sexual Activity   Alcohol Use Yes   • Alcohol/week: 3.0 standard drinks of alcohol   • Types: 3 Cans of beer per week    Comment: 3 beers a week     Social History     Substance and Sexual Activity   Drug Use No     Social History     Tobacco Use   Smoking Status Former   • Current packs/day: 0.00   • Average packs/day: 1 pack/day for 25.0 years (25.0 ttl pk-yrs)   • Types: Cigarettes   • Start date: 1975   • Quit date: 2000   • Years since quittin.9   • Passive exposure: Past   Smokeless Tobacco Never     Family History   Problem Relation Age of Onset   • Cancer Mother         passed   • Hypertension Father    • Heart disease Father         leaky valve   • Skin cancer Father    • Diabetes Father        Allergies:  No Known Allergies    Medications:     Current Outpatient Medications:   •  aspirin 81 mg chewable tablet, Chew 81 mg daily, Disp: , Rfl:   •  Hydroxyurea (HYDREA PO), Take 1,000 mg by mouth Mon-Wed-Fri, Disp: , Rfl:   •   "hydroxyurea (HYDREA) 500 mg capsule, Take by mouth On Tues-Thurs-Sat-Sun , Disp: , Rfl:   •  levothyroxine 150 mcg tablet, TAKE 1 TABLET DAILY, Disp: 90 tablet, Rfl: 1  •  rosuvastatin (CRESTOR) 10 MG tablet, Take 1 tablet (10 mg total) by mouth daily, Disp: 100 tablet, Rfl: 0  •  sildenafil (VIAGRA) 100 mg tablet, Take 1 tablet (100 mg total) by mouth if needed for erectile dysfunction As directed, Disp: 30 tablet, Rfl: 2  •  Cholecalciferol (VITAMIN D3) 1000 units CAPS, Take 1,000 Units by mouth (Patient not taking: Reported on 12/24/2024), Disp: , Rfl:       Vitals:    12/24/24 1337   BP: 132/82   Pulse: 90   Resp: 16   SpO2: 96%     Weight (last 2 days)       Date/Time Weight    12/24/24 1337 72.1 (159)          Physical Exam      Laboratory Studies:    Laboratory studies personally reviewed    Cardiac testing:     EKG reviewed personally:   No results found for this visit on 12/24/24.    Echocardiogram:      Stress test:      Holter:      Cardiac catheterization:        Jose Leo MD    Portions of the record may have been created with voice recognition software.  Occasional wrong word or \"sound a like\" substitutions may have occurred due to the inherent limitations of voice recognition software.  Read the chart carefully and recognize, using context, where substitutions have occurred.   "

## 2024-12-24 NOTE — ASSESSMENT & PLAN NOTE
Patient with findings of new right bundle branch block/left anterior fascicular block on EKG from 2021.  Patient currently asymptomatic with excellent exercise tolerance, with only limitation at this time being hisfoot injury.  No further workup required prior to surgery

## 2024-12-24 NOTE — PROGRESS NOTES
[facility] Cardiology  Office Consultation  Agapito Martell 64 y.o. male MRN: [id number]        1. Pre-operative cardiovascular examination  2. Abnormal EKG  -     Ambulatory referral to Cardiology      Assessment & Plan  Abnormal EKG  Patient with findings of new right bundle branch block/left anterior fascicular block on EKG from 2021.  Patient currently asymptomatic with excellent exercise tolerance, with only limitation at this time being hisfoot injury.  No further workup required prior to surgery  Pre-operative cardiovascular examination  NSQIP risk score for vascular procedure is 0.4% for cardiac complications, patient's METs are greater than 4, DASI score of 50. the patient denies any signs or symptoms of ACS, heart failure, unstable arrhythmia, or decompensated heart failure which would be a contraindication for vascular surgery.  The patient requires no further cardiac testing prior to his surgery.  He requires no immediate medication changes prior to upcoming procedure.  We will do a postoperative follow-up for further risk factor stratification and lipid management       Reason for Consult / Principal Problem: Preoperative risk assessment/EKG abnormalities     HPI: Agapito Martell is a 64 y.o. year old male with history of hypothyroidism, hyperlipidemia, rheumatoid arthritis, thrombocytosis, former smoker, social ETOH user    Referred for EKG abnormalities, findings of right bundle branch block and left anterior fascicular block for preoperative risk assessment prior to vascular procedure.    Patient currently denies any signs or symptoms of ACS, heart failure, unstable arrhythmia, or severe valvular disease.  Patient's only complaint is nonhealing right foot wound.  Patient underwent Duke activity index questionnaire with a score of 50.7, with METs greater than 4, NSQIP cardiac risk calculator places the patient's risk of 0.4%.  With this finding of new right bundle branch block and left anterior fascicular  block the patient reports no dizziness, lightheadedness, fatigue, decreased exertional tolerance.    Currently denies any fever, chills, fatigue, new visual changes, lightheadedness, syncope, chest pain, palpitations, shortness of breath at rest or with exertion, orthopnea, PND, nausea, vomiting, diarrhea, dark or bright red blood in stools, lower extremity swelling,     ROS: Pertinent positives and negatives as described in History of Present Illness.    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    RA (rheumatoid arthritis) ([facility])     Rheumatoid arthritis involving multiple sites ([facility]) 9/19/2022    Wears glasses      Past Surgical History:   Procedure Laterality Date    ANKLE SURGERY Right     ligament, chipped bones,dislocated    BUNIONECTOMY  03/2014    COLONOSCOPY      ELBOW SURGERY Right     tendon surgery    EPIDURAL BLOCK INJECTION Left 08/16/2023    Procedure: left L5-S1, S1 TRANSFORAMINAL epidural steroid injection (66755, [id number]);  Surgeon: Gunner Aguayo DO;  Location: [facility] MAIN OR;  Service: Pain Management     EPIDURAL BLOCK INJECTION N/A 03/15/2024    Procedure: L5-S1 LUMBAR EPIDURAL STEROID INJECTION;  Surgeon: Chandra Serra MD;  Location: [facility] MAIN OR;  Service: Pain Management     EPIDURAL BLOCK INJECTION Left 04/26/2024    Procedure: LEFT L5-S1 TRANSFORAMINAL EPIDURAL STEROID INJECTION;  Surgeon: Chandra Serra MD;  Location: [facility] MAIN OR;  Service: Pain Management     FOOT ARTHRODESIS, MODIFIED ARELLANO Left     FRACTURE SURGERY Left     elbow    HERNIA REPAIR Right     inguinal    LUMBAR EPIDURAL INJECTION N/A 09/22/2023     Procedure: L5-S1 LUMBAR epidural steroid injection ([id number]);  Surgeon: Chandra Serra MD;  Location: [facility] MAIN OR;  Service: Pain Management     NC ARTHRS KNE SURG W/MENISCECTOMY MED/LAT W/SHVG Right 2018    Procedure: MENISCECTOMY LATERAL /MEDIAL;  Surgeon: David Sloan MD;  Location: [facility] MAIN OR;  Service: Orthopedics    NC CORRECTION HAMMERTOE Bilateral 2021    Procedure: REPAIR HAMMERTOE / MALLET TOE / CLAW TOE 2ND toe bilateral;  Surgeon: Filipe Oliva DPM;  Location: [facility] MAIN OR;  Service: Podiatry    NC CORRJ HLX VLGS BNCTY SESMDC RESCJ PROX PHLX BASE Right 2021    Procedure: BUNIONECTOMY SMITH;  Surgeon: Filipe Oliva DPM;  Location: [facility] MAIN OR;  Service: Podiatry    ROTATOR CUFF REPAIR Left     WISDOM TOOTH EXTRACTION      x4    WRIST SURGERY Bilateral     Bilateral cyct removals     Social History     Substance and Sexual Activity   Alcohol Use Yes    Alcohol/week: 3.0 standard drinks of alcohol    Types: 3 Cans of beer per week    Comment: 3 beers a week     Social History     Substance and Sexual Activity   Drug Use No     Social History     Tobacco Use   Smoking Status Former    Current packs/day: 0.00    Average packs/day: 1 pack/day for 25.0 years (25.0 ttl pk-yrs)    Types: Cigarettes    Start date: 1975    Quit date: 2000    Years since quittin.9    Passive exposure: Past   Smokeless Tobacco Never     Family History   Problem Relation Age of Onset    Cancer Mother         passed    Hypertension Father     Heart disease Father         leaky valve    Skin cancer Father     Diabetes Father        Allergies:  No Known Allergies    Medications:     Current Outpatient Medications:     aspirin 81 mg chewable tablet, Chew 81 mg daily, Disp: , Rfl:     Hydroxyurea (HYDREA PO), Take 1,000 mg by mouth Mon-Wed-Fri, Disp: , Rfl:     hydroxyurea (HYDREA) 500 mg capsule, Take by mouth On Tues-Thurs-Sat-Sun , Disp: , Rfl:     levothyroxine 150 mcg tablet, TAKE 1 TABLET DAILY,  Disp: 90 tablet, Rfl: 1    rosuvastatin (CRESTOR) 10 MG tablet, Take 1 tablet (10 mg total) by mouth daily, Disp: 100 tablet, Rfl: 0    sildenafil (VIAGRA) 100 mg tablet, Take 1 tablet (100 mg total) by mouth if needed for erectile dysfunction As directed, Disp: 30 tablet, Rfl: 2    Cholecalciferol (VITAMIN D3) 1000 units CAPS, Take 1,000 Units by mouth (Patient not taking: Reported on 12/24/2024), Disp: , Rfl:       Vitals:    12/24/24 1337   BP: 132/82   Pulse: 90   Resp: 16   SpO2: 96%     Weight (last 2 days)       Date/Time Weight    12/24/24 1337 72.1 (159)          Physical Exam  Vitals and nursing note reviewed.   Constitutional:       General: He is not in acute distress.     Appearance: He is well-developed.   HENT:      Head: Normocephalic and atraumatic.   Eyes:      Conjunctiva/sclera: Conjunctivae normal.   Cardiovascular:      Rate and Rhythm: Normal rate and regular rhythm.      Heart sounds: No murmur heard.  Pulmonary:      Effort: Pulmonary effort is normal. No respiratory distress.      Breath sounds: Normal breath sounds.   Abdominal:      Palpations: Abdomen is soft.      Tenderness: There is no abdominal tenderness.   Musculoskeletal:         General: No swelling.      Cervical back: Neck supple.   Skin:     General: Skin is warm and dry.      Capillary Refill: Capillary refill takes less than 2 seconds.   Neurological:      Mental Status: He is alert.   Psychiatric:         Mood and Affect: Mood normal.           Laboratory Studies:    Lab Results   Component Value Date    SODIUM 141 11/15/2024    K 4.3 11/15/2024     11/15/2024    CO2 28 11/15/2024    BUN 22 11/15/2024    CREATININE 1.01 11/15/2024    GLUC 91 11/15/2024    CALCIUM 9.6 11/15/2024     Lab Results   Component Value Date    WBC 9.1 11/15/2024    HGB 16.9 11/15/2024    HCT 48.6 11/15/2024    .1 (H) 11/15/2024     (H) 11/15/2024     Lab Results   Component Value Date    HGBA1C 5.3 05/08/2024     No results  "found for: \"[id number]\", \"TSH\"  Lab Results   Component Value Date    CHOLESTEROL 190 04/28/2023     Lab Results   Component Value Date    HDL 67 04/28/2023     Lab Results   Component Value Date    TRIG 54 04/28/2023     No results found for: \"NONHDLC\"      Cardiac testing:     EKG reviewed personally:   12/13/2024: NSR, RBITZ, LAFB        Jose Leo MD    Portions of the record may have been created with voice recognition software.  Occasional wrong word or \"sound a like\" substitutions may have occurred due to the inherent limitations of voice recognition software.  Read the chart carefully and recognize, using context, where substitutions have occurred.   "

## 2024-12-24 NOTE — LETTER
December 24, 2024     Ronnell Coronel MD  [address] [facility] [address]    Patient: Agapito Martell   YOB: 1960   Date of Visit: 12/24/2024       Dear Dr. Coronel:    Thank you for referring Agapito Martell to me for evaluation. Below are my notes for this consultation.    If you have questions, please do not hesitate to call me. I look forward to following your patient along with you.         Sincerely,        Jose Leo MD        CC: DO Jose Parker MD  12/24/2024  3:19 PM  Sign when Signing Visit  [facility] Cardiology  Office Consultation  Agapito Martell 64 y.o. male MRN: [id number]        1. Pre-operative cardiovascular examination  2. Abnormal EKG  -     Ambulatory referral to Cardiology      Assessment & Plan  Abnormal EKG  Patient with findings of new right bundle branch block/left anterior fascicular block on EKG from 2021.  Patient currently asymptomatic with excellent exercise tolerance, with only limitation at this time being hisfoot injury.  No further workup required prior to surgery  Pre-operative cardiovascular examination  NSQIP risk score for vascular procedure is 0.4% for cardiac complications, patient's METs are greater than 4, DASI score of 50. the patient denies any signs or symptoms of ACS, heart failure, unstable arrhythmia, or decompensated heart failure which would be a contraindication for vascular surgery.  The patient requires no further cardiac testing prior to his surgery.  He requires no immediate medication changes prior to upcoming procedure.  We will do a postoperative follow-up for further risk factor stratification and lipid management       Reason for Consult / Principal Problem: Preoperative risk assessment/EKG abnormalities     HPI: Agapito Martell is a 64 y.o. year old male with history of hypothyroidism, hyperlipidemia, rheumatoid arthritis, thrombocytosis, former smoker, social ETOH user    Referred for EKG abnormalities, findings of  right bundle branch block and left anterior fascicular block for preoperative risk assessment prior to vascular procedure.    Patient currently denies any signs or symptoms of ACS, heart failure, unstable arrhythmia, or severe valvular disease.  Patient's only complaint is nonhealing right foot wound.  Patient underwent Duke activity index questionnaire with a score of 50.7, with METs greater than 4, [facility] cardiac risk calculator places the patient's risk of 0.4%.  With this finding of new right bundle branch block and left anterior fascicular block the patient reports no dizziness, lightheadedness, fatigue, decreased exertional tolerance.    Currently denies any fever, chills, fatigue, new visual changes, lightheadedness, syncope, chest pain, palpitations, shortness of breath at rest or with exertion, orthopnea, PND, nausea, vomiting, diarrhea, dark or bright red blood in stools, lower extremity swelling,     ROS: Pertinent positives and negatives as described in History of Present Illness.    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   • RA (rheumatoid arthritis) ([facility])    • Rheumatoid arthritis involving multiple sites ([facility]) 9/19/2022   • Wears glasses      Past Surgical History:   Procedure Laterality Date   • ANKLE SURGERY Right     ligament, chipped bones,dislocated   • BUNIONECTOMY  03/2014   • COLONOSCOPY     • ELBOW SURGERY Right     tendon surgery   • EPIDURAL BLOCK INJECTION Left 08/16/2023    Procedure: left L5-S1, S1 TRANSFORAMINAL epidural steroid injection ([id number], [id number]);  Surgeon: Gunner Aguayo DO;  Location: [facility] MAIN OR;   Service: Pain Management    • EPIDURAL BLOCK INJECTION N/A 03/15/2024    Procedure: L5-S1 LUMBAR EPIDURAL STEROID INJECTION;  Surgeon: Chandra Serra MD;  Location: [facility] MAIN OR;  Service: Pain Management    • EPIDURAL BLOCK INJECTION Left 2024    Procedure: LEFT L5-S1 TRANSFORAMINAL EPIDURAL STEROID INJECTION;  Surgeon: Chandra Serra MD;  Location: [facility] MAIN OR;  Service: Pain Management    • FOOT ARTHRODESIS, MODIFIED ARELLANO Left    • FRACTURE SURGERY Left     elbow   • HERNIA REPAIR Right     inguinal   • LUMBAR EPIDURAL INJECTION N/A 2023    Procedure: L5-S1 LUMBAR epidural steroid injection ([id number]);  Surgeon: Chandra Serra MD;  Location: [facility] MAIN OR;  Service: Pain Management    • DE ARTHRS KNE SURG W/MENISCECTOMY MED/LAT W/SHVG Right 2018    Procedure: MENISCECTOMY LATERAL /MEDIAL;  Surgeon: David Sloan MD;  Location: [facility] MAIN OR;  Service: Orthopedics   • DE CORRECTION HAMMERTOE Bilateral 2021    Procedure: REPAIR HAMMERTOE / MALLET TOE / CLAW TOE 2ND toe bilateral;  Surgeon: Filipe Oliva DPM;  Location: [facility] MAIN OR;  Service: Podiatry   • DE CORRJ HLX VLGS BNCTY SESMDC RESCJ PROX PHLX BASE Right 2021    Procedure: BUNIONECTOMY SMITH;  Surgeon: Filipe Oliva DPM;  Location: [facility] MAIN OR;  Service: Podiatry   • ROTATOR CUFF REPAIR Left    • WISDOM TOOTH EXTRACTION      x4   • WRIST SURGERY Bilateral     Bilateral cyct removals     Social History     Substance and Sexual Activity   Alcohol Use Yes   • Alcohol/week: 3.0 standard drinks of alcohol   • Types: 3 Cans of beer per week    Comment: 3 beers a week     Social History     Substance and Sexual Activity   Drug Use No     Social History     Tobacco Use   Smoking Status Former   • Current packs/day: 0.00   • Average packs/day: 1 pack/day for 25.0 years (25.0 ttl pk-yrs)   • Types: Cigarettes   • Start date: 1975   • Quit date: 2000   • Years since quittin.9   • Passive exposure: Past   Smokeless  Tobacco Never     Family History   Problem Relation Age of Onset   • Cancer Mother         passed   • Hypertension Father    • Heart disease Father         leaky valve   • Skin cancer Father    • Diabetes Father        Allergies:  No Known Allergies    Medications:     Current Outpatient Medications:   •  aspirin 81 mg chewable tablet, Chew 81 mg daily, Disp: , Rfl:   •  Hydroxyurea (HYDREA PO), Take 1,000 mg by mouth Mon-Wed-Fri, Disp: , Rfl:   •  hydroxyurea (HYDREA) 500 mg capsule, Take by mouth On Tues-Thurs-Sat-Sun , Disp: , Rfl:   •  levothyroxine 150 mcg tablet, TAKE 1 TABLET DAILY, Disp: 90 tablet, Rfl: 1  •  rosuvastatin (CRESTOR) 10 MG tablet, Take 1 tablet (10 mg total) by mouth daily, Disp: 100 tablet, Rfl: 0  •  sildenafil (VIAGRA) 100 mg tablet, Take 1 tablet (100 mg total) by mouth if needed for erectile dysfunction As directed, Disp: 30 tablet, Rfl: 2  •  Cholecalciferol (VITAMIN D3) 1000 units CAPS, Take 1,000 Units by mouth (Patient not taking: Reported on 12/24/2024), Disp: , Rfl:       Vitals:    12/24/24 1337   BP: 132/82   Pulse: 90   Resp: 16   SpO2: 96%     Weight (last 2 days)       Date/Time Weight    12/24/24 1337 72.1 (159)          Physical Exam  Vitals and nursing note reviewed.   Constitutional:       General: He is not in acute distress.     Appearance: He is well-developed.   HENT:      Head: Normocephalic and atraumatic.   Eyes:      Conjunctiva/sclera: Conjunctivae normal.   Cardiovascular:      Rate and Rhythm: Normal rate and regular rhythm.      Heart sounds: No murmur heard.  Pulmonary:      Effort: Pulmonary effort is normal. No respiratory distress.      Breath sounds: Normal breath sounds.   Abdominal:      Palpations: Abdomen is soft.      Tenderness: There is no abdominal tenderness.   Musculoskeletal:         General: No swelling.      Cervical back: Neck supple.   Skin:     General: Skin is warm and dry.      Capillary Refill: Capillary refill takes less than 2 seconds.  "  Neurological:      Mental Status: He is alert.   Psychiatric:         Mood and Affect: Mood normal.           Laboratory Studies:    Lab Results   Component Value Date    SODIUM 141 11/15/2024    K 4.3 11/15/2024     11/15/2024    CO2 28 11/15/2024    BUN 22 11/15/2024    CREATININE 1.01 11/15/2024    GLUC 91 11/15/2024    CALCIUM 9.6 11/15/2024     Lab Results   Component Value Date    WBC 9.1 11/15/2024    HGB 16.9 11/15/2024    HCT 48.6 11/15/2024    .1 (H) 11/15/2024     (H) 11/15/2024     Lab Results   Component Value Date    HGBA1C 5.3 05/08/2024     No results found for: \"[id number]\", \"TSH\"  Lab Results   Component Value Date    CHOLESTEROL 190 04/28/2023     Lab Results   Component Value Date    HDL 67 04/28/2023     Lab Results   Component Value Date    TRIG 54 04/28/2023     No results found for: \"NONHDLC\"      Cardiac testing:     EKG reviewed personally:   12/13/2024: NSR, RBBB, LAFB        Jose Leo MD    Portions of the record may have been created with voice recognition software.  Occasional wrong word or \"sound a like\" substitutions may have occurred due to the inherent limitations of voice recognition software.  Read the chart carefully and recognize, using context, where substitutions have occurred.     "

## 2024-12-24 NOTE — LETTER
December 24, 2024     Ronnell Coronel MD  [address] [facility] [address]    Patient: Agapito Martell   YOB: 1960   Date of Visit: 12/24/2024       Dear Dr. Coronel:    Thank you for referring Agapito Martell to me for evaluation. Below are my notes for this consultation.    If you have questions, please do not hesitate to call me. I look forward to following your patient along with you.         Sincerely,        Jose Leo MD        CC: Agapito Martell    Jose Leo MD  12/24/2024  3:16 PM  Incomplete  [facility]  Office Consultation  Agapito Martell 64 y.o. male MRN: [id number]        1. Pre-operative cardiovascular examination  2. Abnormal EKG  -     Ambulatory referral to Cardiology      Assessment & Plan  Abnormal EKG  Patient with findings of new right bundle branch block/left anterior fascicular block on EKG from 2021.  Patient currently asymptomatic with excellent exercise tolerance, with only limitation at this time being his left foot injury.  No further workup required prior to surgery.  Pre-operative cardiovascular examination  NSQIP risk score for vascular procedure is 0.4% for cardiac complications, patient's METs are greater than 4, DASI score of 50. the patient denies any signs or symptoms of ACS, heart failure, unstable arrhythmia, or decompensated heart failure which would be a contraindication for vascular surgery.  The patient requires no further cardiac testing prior to his surgery.  He requires no immediate medication changes prior to upcoming procedure.  We will do a postoperative follow-up for further risk factor stratification and lipid management       Reason for Consult / Principal Problem: Preoperative risk assessment/EKG abnormalities     HPI: Agapito Martell is a 64 y.o. year old male with history of hypothyroidism, hyperlipidemia, rheumatoid arthritis, thrombocytosis    Referred for EKG abnormalities, findings of right bundle branch block and left anterior  fascicular block for preoperative risk assessment prior to vascular procedure.    Patient currently denies any signs or symptoms of ACS, heart failure, unstable arrhythmia, or severe valvular disease.  Patient's only complaint is nonhealing right foot wound.  Patient underwent Duke activity index questionnaire with a score of 50.7, with METs greater than 4, NSQIP cardiac risk calculator places the patient's risk of 0.4%.  With this finding of new right bundle branch block and left anterior fascicular block the patient reports no dizziness, lightheadedness, fatigue, decreased exertional tolerance.    Currently denies any fever, chills, fatigue, new visual changes, lightheadedness, syncope, chest pain, palpitations, shortness of breath at rest or with exertion, orthopnea, PND, nausea, vomiting, diarrhea, dark or bright red blood in stools, lower extremity swelling,     ROS: Pertinent positives and negatives as described in History of Present Illness.    Review of Systems      Past Medical History:   Diagnosis Date   • Arthritis    • Disease of thyroid gland     hypo   • Graves disease 1995   • Hallux limitus, acquired, right    • Hypothyroid     hypo   • Other tear of medial meniscus, current injury, right knee, initial encounter 6/6/2018    Added automatically from request for surgery [id number]   • Platelet disorder ([facility])     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 Jc   • RA (rheumatoid arthritis) ([facility])    • Rheumatoid arthritis involving multiple sites ([facility]) 9/19/2022   • Wears glasses      Past Surgical History:   Procedure Laterality Date   • ANKLE SURGERY Right     ligament, chipped bones,dislocated   • BUNIONECTOMY  03/2014   • COLONOSCOPY     • ELBOW SURGERY Right     tendon surgery   • EPIDURAL BLOCK INJECTION Left 08/16/2023    Procedure: left L5-S1, S1 TRANSFORAMINAL epidural steroid injection (87784, [id number]);  Surgeon: Gunner Aguayo DO;  Location: [facility] MAIN OR;  Service: Pain Management    • EPIDURAL BLOCK  INJECTION N/A 03/15/2024    Procedure: L5-S1 LUMBAR EPIDURAL STEROID INJECTION;  Surgeon: Chandra Serra MD;  Location: [facility] MAIN OR;  Service: Pain Management    • EPIDURAL BLOCK INJECTION Left 2024    Procedure: LEFT L5-S1 TRANSFORAMINAL EPIDURAL STEROID INJECTION;  Surgeon: Chandra Serra MD;  Location: [facility] MAIN OR;  Service: Pain Management    • FOOT ARTHRODESIS, MODIFIED ARELLANO Left    • FRACTURE SURGERY Left     elbow   • HERNIA REPAIR Right     inguinal   • LUMBAR EPIDURAL INJECTION N/A 2023    Procedure: L5-S1 LUMBAR epidural steroid injection (58855);  Surgeon: Chandra Serra MD;  Location: [facility] MAIN OR;  Service: Pain Management    • MI ARTHRS KNE SURG W/MENISCECTOMY MED/LAT W/SHVG Right 2018    Procedure: MENISCECTOMY LATERAL /MEDIAL;  Surgeon: David Sloan MD;  Location: [facility] MAIN OR;  Service: Orthopedics   • MI CORRECTION HAMMERTOE Bilateral 2021    Procedure: REPAIR HAMMERTOE / MALLET TOE / CLAW TOE 2ND toe bilateral;  Surgeon: Filipe Oliva DPM;  Location: [facility] MAIN OR;  Service: Podiatry   • MI CORRJ HLX VLGS BNCTY SESMDC RESCJ PROX PHLX BASE Right 2021    Procedure: BUNIONECTOMY SMITH;  Surgeon: Filipe Oliva DPM;  Location: [facility] MAIN OR;  Service: Podiatry   • ROTATOR CUFF REPAIR Left    • WISDOM TOOTH EXTRACTION      x4   • WRIST SURGERY Bilateral     Bilateral cyct removals     Social History     Substance and Sexual Activity   Alcohol Use Yes   • Alcohol/week: 3.0 standard drinks of alcohol   • Types: 3 Cans of beer per week    Comment: 3 beers a week     Social History     Substance and Sexual Activity   Drug Use No     Social History     Tobacco Use   Smoking Status Former   • Current packs/day: 0.00   • Average packs/day: 1 pack/day for 25.0 years (25.0 ttl pk-yrs)   • Types: Cigarettes   • Start date: 1975   • Quit date: 2000   • Years since quittin.9   • Passive exposure: Past   Smokeless Tobacco Never     Family History   Problem  Relation Age of Onset   • Cancer Mother         passed   • Hypertension Father    • Heart disease Father         leaky valve   • Skin cancer Father    • Diabetes Father        Allergies:  No Known Allergies    Medications:     Current Outpatient Medications:   •  aspirin 81 mg chewable tablet, Chew 81 mg daily, Disp: , Rfl:   •  Hydroxyurea (HYDREA PO), Take 1,000 mg by mouth Mon-Wed-Fri, Disp: , Rfl:   •  hydroxyurea (HYDREA) 500 mg capsule, Take by mouth On Tues-Thurs-Sat-Sun , Disp: , Rfl:   •  levothyroxine 150 mcg tablet, TAKE 1 TABLET DAILY, Disp: 90 tablet, Rfl: 1  •  rosuvastatin (CRESTOR) 10 MG tablet, Take 1 tablet (10 mg total) by mouth daily, Disp: 100 tablet, Rfl: 0  •  sildenafil (VIAGRA) 100 mg tablet, Take 1 tablet (100 mg total) by mouth if needed for erectile dysfunction As directed, Disp: 30 tablet, Rfl: 2  •  Cholecalciferol (VITAMIN D3) 1000 units CAPS, Take 1,000 Units by mouth (Patient not taking: Reported on 12/24/2024), Disp: , Rfl:       Vitals:    12/24/24 1337   BP: 132/82   Pulse: 90   Resp: 16   SpO2: 96%     Weight (last 2 days)       Date/Time Weight    12/24/24 1337 72.1 (159)          Physical Exam  Vitals and nursing note reviewed.   Constitutional:       General: He is not in acute distress.     Appearance: He is well-developed.   HENT:      Head: Normocephalic and atraumatic.   Eyes:      Conjunctiva/sclera: Conjunctivae normal.   Cardiovascular:      Rate and Rhythm: Normal rate and regular rhythm.      Heart sounds: No murmur heard.  Pulmonary:      Effort: Pulmonary effort is normal. No respiratory distress.      Breath sounds: Normal breath sounds.   Abdominal:      Palpations: Abdomen is soft.      Tenderness: There is no abdominal tenderness.   Musculoskeletal:         General: No swelling.      Cervical back: Neck supple.   Skin:     General: Skin is warm and dry.      Capillary Refill: Capillary refill takes less than 2 seconds.   Neurological:      Mental Status: He is  "alert.   Psychiatric:         Mood and Affect: Mood normal.           Laboratory Studies:    Lab Results   Component Value Date    SODIUM 141 11/15/2024    K 4.3 11/15/2024     11/15/2024    CO2 28 11/15/2024    BUN 22 11/15/2024    CREATININE 1.01 11/15/2024    GLUC 91 11/15/2024    CALCIUM 9.6 11/15/2024     Lab Results   Component Value Date    WBC 9.1 11/15/2024    HGB 16.9 11/15/2024    HCT 48.6 11/15/2024    .1 (H) 11/15/2024     (H) 11/15/2024     Lab Results   Component Value Date    HGBA1C 5.3 05/08/2024     No results found for: \"HRE3XOWMIUBP\", \"TSH\"  Lab Results   Component Value Date    CHOLESTEROL 190 04/28/2023     Lab Results   Component Value Date    HDL 67 04/28/2023     Lab Results   Component Value Date    TRIG 54 04/28/2023     No results found for: \"NONHDLC\"      Cardiac testing:     EKG reviewed personally:   12/13/2024: NSR, RBBB, LAFB        Jose Leo MD    Portions of the record may have been created with voice recognition software.  Occasional wrong word or \"sound a like\" substitutions may have occurred due to the inherent limitations of voice recognition software.  Read the chart carefully and recognize, using context, where substitutions have occurred.     Jose Leo MD  12/24/2024  2:27 PM  Sign when Signing Visit  [facility]  Office Consultation  Agapito Martell 64 y.o. male MRN: [id number]        1. Abnormal EKG  -     Ambulatory referral to Cardiology      Assessment & Plan  Abnormal EKG         Plan    ***      Reason for Consult / Principal Problem: ***    HPI: Agapito Martell is a 64 y.o. year old male with history of hypothyroidism, hyperlipidemia, rheumatoid arthritis, thrombocytosis    Referred for EKG abnormalities, findings of right bundle branch block and left anterior fascicular block for preoperative risk assessment prior to vascular procedure.    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    Patient here for right SFA with possible intervention with possible right pedal access        Currently denies any fever, chills, fatigue, new visual changes, lightheadedness, syncope, chest pain, palpitations, shortness of breath at rest or with exertion, orthopnea, PND, nausea, vomiting, diarrhea, dark or bright red blood in stools, lower extremity swelling, leg claudication.     ROS: Pertinent positives and negatives as described in History of Present Illness. Remainder of a 14 point review of systems was negative.     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 MAIN OR;  Service: Pain Management    • EPIDURAL BLOCK INJECTION N/A 03/15/2024    Procedure: L5-S1 LUMBAR EPIDURAL STEROID INJECTION;  Surgeon: Chandra Serra MD;  Location: [facility] MAIN OR;  Service: Pain Management    • EPIDURAL BLOCK INJECTION Left 04/26/2024    Procedure: LEFT L5-S1 TRANSFORAMINAL EPIDURAL STEROID INJECTION;  Surgeon: Chandra Serra MD;  Location: [facility] MAIN OR;  Service: Pain Management    • FOOT ARTHRODESIS, MODIFIED ARELLANO Left     • FRACTURE SURGERY Left     elbow   • HERNIA REPAIR Right     inguinal   • LUMBAR EPIDURAL INJECTION N/A 2023    Procedure: L5-S1 LUMBAR epidural steroid injection ([id number]);  Surgeon: Chandra Serra MD;  Location: [facility] MAIN OR;  Service: Pain Management    • CA ARTHRS KNE SURG W/MENISCECTOMY MED/LAT W/SHVG Right 2018    Procedure: MENISCECTOMY LATERAL /MEDIAL;  Surgeon: David Sloan MD;  Location: [facility] MAIN OR;  Service: Orthopedics   • CA CORRECTION HAMMERTOE Bilateral 2021    Procedure: REPAIR HAMMERTOE / MALLET TOE / CLAW TOE 2ND toe bilateral;  Surgeon: Filipe Oliva DPM;  Location: [facility] MAIN OR;  Service: Podiatry   • CA CORRJ HLX VLGS BNCTY SESMDC RESCJ PROX PHLX BASE Right 2021    Procedure: BUNIONECTOMY SMITH;  Surgeon: Filipe Oliva DPM;  Location: [facility] MAIN OR;  Service: Podiatry   • ROTATOR CUFF REPAIR Left    • WISDOM TOOTH EXTRACTION      x4   • WRIST SURGERY Bilateral     Bilateral cyct removals     Social History     Substance and Sexual Activity   Alcohol Use Yes   • Alcohol/week: 3.0 standard drinks of alcohol   • Types: 3 Cans of beer per week    Comment: 3 beers a week     Social History     Substance and Sexual Activity   Drug Use No     Social History     Tobacco Use   Smoking Status Former   • Current packs/day: 0.00   • Average packs/day: 1 pack/day for 25.0 years (25.0 ttl pk-yrs)   • Types: Cigarettes   • Start date: 1975   • Quit date: 2000   • Years since quittin.9   • Passive exposure: Past   Smokeless Tobacco Never     Family History   Problem Relation Age of Onset   • Cancer Mother         passed   • Hypertension Father    • Heart disease Father         leaky valve   • Skin cancer Father    • Diabetes Father        Allergies:  No Known Allergies    Medications:     Current Outpatient Medications:   •  aspirin 81 mg chewable tablet, Chew 81 mg daily, Disp: , Rfl:   •  Hydroxyurea (HYDREA PO), Take 1,000 mg by mouth Mon-Wed-Fri, Disp: , Rfl:   •   "hydroxyurea (HYDREA) 500 mg capsule, Take by mouth On Tues-Thurs-Sat-Sun , Disp: , Rfl:   •  levothyroxine 150 mcg tablet, TAKE 1 TABLET DAILY, Disp: 90 tablet, Rfl: 1  •  rosuvastatin (CRESTOR) 10 MG tablet, Take 1 tablet (10 mg total) by mouth daily, Disp: 100 tablet, Rfl: 0  •  sildenafil (VIAGRA) 100 mg tablet, Take 1 tablet (100 mg total) by mouth if needed for erectile dysfunction As directed, Disp: 30 tablet, Rfl: 2  •  Cholecalciferol (VITAMIN D3) 1000 units CAPS, Take 1,000 Units by mouth (Patient not taking: Reported on 12/24/2024), Disp: , Rfl:       Vitals:    12/24/24 1337   BP: 132/82   Pulse: 90   Resp: 16   SpO2: 96%     Weight (last 2 days)       Date/Time Weight    12/24/24 1337 72.1 (159)          Physical Exam      Laboratory Studies:    Laboratory studies personally reviewed    Cardiac testing:     EKG reviewed personally:   No results found for this visit on 12/24/24.    Echocardiogram:      Stress test:      Holter:      Cardiac catheterization:        Jose Leo MD    Portions of the record may have been created with voice recognition software.  Occasional wrong word or \"sound a like\" substitutions may have occurred due to the inherent limitations of voice recognition software.  Read the chart carefully and recognize, using context, where substitutions have occurred.     "

## 2024-12-26 ENCOUNTER — APPOINTMENT (OUTPATIENT)
Dept: RADIOLOGY | Facility: HOSPITAL | Age: 64
DRG: 254 | End: 2024-12-26
Payer: MEDICARE

## 2024-12-26 ENCOUNTER — ANESTHESIA (OUTPATIENT)
Dept: PERIOP | Facility: HOSPITAL | Age: 64
DRG: 254 | End: 2024-12-26
Payer: MEDICARE

## 2024-12-26 ENCOUNTER — HOSPITAL ENCOUNTER (INPATIENT)
Facility: HOSPITAL | Age: 64
LOS: 1 days | Discharge: HOME | DRG: 254 | End: 2024-12-27
Attending: SURGERY | Admitting: SURGERY
Payer: MEDICARE

## 2024-12-26 DIAGNOSIS — I73.9 PAD (PERIPHERAL ARTERY DISEASE) (HCC): ICD-10-CM

## 2024-12-26 DIAGNOSIS — L97.511 ULCER OF RIGHT FOOT, LIMITED TO BREAKDOWN OF SKIN (HCC): ICD-10-CM

## 2024-12-26 DIAGNOSIS — I73.9 PERIPHERAL ARTERIAL DISEASE (HCC): ICD-10-CM

## 2024-12-26 PROBLEM — Z87.891 FORMER SMOKER: Status: ACTIVE | Noted: 2024-12-26

## 2024-12-26 PROBLEM — M06.9 RHEUMATOID ARTHRITIS (HCC): Status: ACTIVE | Noted: 2018-09-13

## 2024-12-26 PROBLEM — I99.8 ISCHEMIC PAIN OF FOOT: Status: ACTIVE | Noted: 2024-12-26

## 2024-12-26 PROBLEM — M79.673 ISCHEMIC PAIN OF FOOT: Status: ACTIVE | Noted: 2024-12-26

## 2024-12-26 LAB
GLUCOSE SERPL-MCNC: 95 MG/DL (ref 65–140)
PLATELET # BLD AUTO: 420 THOUSANDS/UL (ref 149–390)
PMV BLD AUTO: 8.7 FL (ref 8.9–12.7)

## 2024-12-26 PROCEDURE — C1887 CATHETER, GUIDING: HCPCS | Performed by: SURGERY

## 2024-12-26 PROCEDURE — 37229 PR REVSC OPN/PRQ TIB/PERO W/ATHRC/ANGIOP SM VSL: CPT | Performed by: SURGERY

## 2024-12-26 PROCEDURE — C1769 GUIDE WIRE: HCPCS | Performed by: SURGERY

## 2024-12-26 PROCEDURE — C1769 GUIDE WIRE: HCPCS

## 2024-12-26 PROCEDURE — C1725 CATH, TRANSLUMIN NON-LASER: HCPCS | Performed by: SURGERY

## 2024-12-26 PROCEDURE — C1894 INTRO/SHEATH, NON-LASER: HCPCS | Performed by: SURGERY

## 2024-12-26 PROCEDURE — 37223 PR REVSC OPN/PRQ ILIAC ART W/STNT & ANGIOP IPSILATL: CPT | Performed by: SURGERY

## 2024-12-26 PROCEDURE — C2623 CATH, TRANSLUMIN, DRUG-COAT: HCPCS | Performed by: SURGERY

## 2024-12-26 PROCEDURE — C1876 STENT, NON-COA/NON-COV W/DEL: HCPCS | Performed by: SURGERY

## 2024-12-26 PROCEDURE — 35371 RECHANNELING OF ARTERY: CPT | Performed by: SURGERY

## 2024-12-26 PROCEDURE — 047M0D1 DILATION OF RIGHT POPLITEAL ARTERY WITH INTRALUMINAL DEVICE, USING DRUG-COATED BALLOON, OPEN APPROACH: ICD-10-PCS | Performed by: SURGERY

## 2024-12-26 PROCEDURE — 04UK0KZ SUPPLEMENT RIGHT FEMORAL ARTERY WITH NONAUTOLOGOUS TISSUE SUBSTITUTE, OPEN APPROACH: ICD-10-PCS | Performed by: SURGERY

## 2024-12-26 PROCEDURE — 37226 PR REVSC OPN/PRQ FEM/POP W/STNT/ANGIOP SM VSL: CPT | Performed by: SURGERY

## 2024-12-26 PROCEDURE — 88304 TISSUE EXAM BY PATHOLOGIST: CPT | Performed by: PATHOLOGY

## 2024-12-26 PROCEDURE — B41FYZZ FLUOROSCOPY OF RIGHT LOWER EXTREMITY ARTERIES USING OTHER CONTRAST: ICD-10-PCS | Performed by: SURGERY

## 2024-12-26 PROCEDURE — 88311 DECALCIFY TISSUE: CPT | Performed by: PATHOLOGY

## 2024-12-26 PROCEDURE — 76000 FLUOROSCOPY <1 HR PHYS/QHP: CPT

## 2024-12-26 PROCEDURE — 04CP0ZZ EXTIRPATION OF MATTER FROM RIGHT ANTERIOR TIBIAL ARTERY, OPEN APPROACH: ICD-10-PCS | Performed by: SURGERY

## 2024-12-26 PROCEDURE — 82948 REAGENT STRIP/BLOOD GLUCOSE: CPT

## 2024-12-26 PROCEDURE — 04CK0ZZ EXTIRPATION OF MATTER FROM RIGHT FEMORAL ARTERY, OPEN APPROACH: ICD-10-PCS | Performed by: SURGERY

## 2024-12-26 PROCEDURE — 85049 AUTOMATED PLATELET COUNT: CPT | Performed by: STUDENT IN AN ORGANIZED HEALTH CARE EDUCATION/TRAINING PROGRAM

## 2024-12-26 PROCEDURE — C1724 CATH, TRANS ATHEREC,ROTATION: HCPCS | Performed by: SURGERY

## 2024-12-26 PROCEDURE — C1781 MESH (IMPLANTABLE): HCPCS | Performed by: SURGERY

## 2024-12-26 PROCEDURE — C1887 CATHETER, GUIDING: HCPCS

## 2024-12-26 DEVICE — SUPERA PERIPHERAL STENT SYSTEM 5 MM X 120 MM X 120 CM 6 F
Type: IMPLANTABLE DEVICE | Site: GROIN | Status: FUNCTIONAL
Brand: SUPERA

## 2024-12-26 DEVICE — XENOSURE BIOLOGIC PATCH, 0.8CM X 8CM, EIFU
Type: IMPLANTABLE DEVICE | Site: GROIN | Status: FUNCTIONAL
Brand: XENOSURE BIOLOGIC PATCH

## 2024-12-26 RX ORDER — ATORVASTATIN CALCIUM 40 MG/1
40 TABLET, FILM COATED ORAL
Status: DISCONTINUED | OUTPATIENT
Start: 2024-12-26 | End: 2024-12-27 | Stop reason: HOSPADM

## 2024-12-26 RX ORDER — MEPERIDINE HYDROCHLORIDE 25 MG/ML
12.5 INJECTION INTRAMUSCULAR; INTRAVENOUS; SUBCUTANEOUS ONCE AS NEEDED
Status: COMPLETED | OUTPATIENT
Start: 2024-12-26 | End: 2024-12-26

## 2024-12-26 RX ORDER — HEPARIN SODIUM 5000 [USP'U]/ML
5000 INJECTION, SOLUTION INTRAVENOUS; SUBCUTANEOUS EVERY 8 HOURS SCHEDULED
Status: DISCONTINUED | OUTPATIENT
Start: 2024-12-26 | End: 2024-12-27 | Stop reason: HOSPADM

## 2024-12-26 RX ORDER — CEFAZOLIN SODIUM 1 G/50ML
SOLUTION INTRAVENOUS AS NEEDED
Status: DISCONTINUED | OUTPATIENT
Start: 2024-12-26 | End: 2024-12-26

## 2024-12-26 RX ORDER — COVID-19 ANTIGEN TEST
220 KIT MISCELLANEOUS DAILY PRN
COMMUNITY

## 2024-12-26 RX ORDER — PROPOFOL 10 MG/ML
INJECTION, EMULSION INTRAVENOUS AS NEEDED
Status: DISCONTINUED | OUTPATIENT
Start: 2024-12-26 | End: 2024-12-26

## 2024-12-26 RX ORDER — NITROGLYCERIN 5 MG/ML
INJECTION, SOLUTION INTRAVENOUS AS NEEDED
Status: DISCONTINUED | OUTPATIENT
Start: 2024-12-26 | End: 2024-12-26 | Stop reason: HOSPADM

## 2024-12-26 RX ORDER — SODIUM CHLORIDE 9 MG/ML
INJECTION, SOLUTION INTRAVENOUS CONTINUOUS PRN
Status: DISCONTINUED | OUTPATIENT
Start: 2024-12-26 | End: 2024-12-26

## 2024-12-26 RX ORDER — ONDANSETRON 2 MG/ML
INJECTION INTRAMUSCULAR; INTRAVENOUS AS NEEDED
Status: DISCONTINUED | OUTPATIENT
Start: 2024-12-26 | End: 2024-12-26

## 2024-12-26 RX ORDER — LORAZEPAM 2 MG/ML
0.5 INJECTION INTRAMUSCULAR ONCE
Status: DISCONTINUED | OUTPATIENT
Start: 2024-12-26 | End: 2024-12-26

## 2024-12-26 RX ORDER — SENNOSIDES 8.6 MG
1 TABLET ORAL DAILY
Status: DISCONTINUED | OUTPATIENT
Start: 2024-12-27 | End: 2024-12-27 | Stop reason: HOSPADM

## 2024-12-26 RX ORDER — FENTANYL CITRATE 50 UG/ML
INJECTION, SOLUTION INTRAMUSCULAR; INTRAVENOUS AS NEEDED
Status: DISCONTINUED | OUTPATIENT
Start: 2024-12-26 | End: 2024-12-26

## 2024-12-26 RX ORDER — OXYCODONE HYDROCHLORIDE 5 MG/1
5 TABLET ORAL EVERY 4 HOURS PRN
Refills: 0 | Status: DISCONTINUED | OUTPATIENT
Start: 2024-12-26 | End: 2024-12-27 | Stop reason: HOSPADM

## 2024-12-26 RX ORDER — CLOPIDOGREL BISULFATE 75 MG/1
150 TABLET ORAL ONCE
Status: COMPLETED | OUTPATIENT
Start: 2024-12-26 | End: 2024-12-26

## 2024-12-26 RX ORDER — IODIXANOL 320 MG/ML
INJECTION, SOLUTION INTRAVASCULAR AS NEEDED
Status: DISCONTINUED | OUTPATIENT
Start: 2024-12-26 | End: 2024-12-26 | Stop reason: HOSPADM

## 2024-12-26 RX ORDER — ONDANSETRON 2 MG/ML
4 INJECTION INTRAMUSCULAR; INTRAVENOUS EVERY 6 HOURS PRN
Status: DISCONTINUED | OUTPATIENT
Start: 2024-12-26 | End: 2024-12-27 | Stop reason: HOSPADM

## 2024-12-26 RX ORDER — DEXAMETHASONE SODIUM PHOSPHATE 10 MG/ML
INJECTION, SOLUTION INTRAMUSCULAR; INTRAVENOUS AS NEEDED
Status: DISCONTINUED | OUTPATIENT
Start: 2024-12-26 | End: 2024-12-26

## 2024-12-26 RX ORDER — SODIUM CHLORIDE, SODIUM LACTATE, POTASSIUM CHLORIDE, CALCIUM CHLORIDE 600; 310; 30; 20 MG/100ML; MG/100ML; MG/100ML; MG/100ML
INJECTION, SOLUTION INTRAVENOUS CONTINUOUS PRN
Status: DISCONTINUED | OUTPATIENT
Start: 2024-12-26 | End: 2024-12-26

## 2024-12-26 RX ORDER — CEFAZOLIN SODIUM 2 G/50ML
2000 SOLUTION INTRAVENOUS ONCE
Status: COMPLETED | OUTPATIENT
Start: 2024-12-26 | End: 2024-12-26

## 2024-12-26 RX ORDER — MAGNESIUM HYDROXIDE 1200 MG/15ML
LIQUID ORAL AS NEEDED
Status: DISCONTINUED | OUTPATIENT
Start: 2024-12-26 | End: 2024-12-26 | Stop reason: HOSPADM

## 2024-12-26 RX ORDER — CHLORHEXIDINE GLUCONATE ORAL RINSE 1.2 MG/ML
15 SOLUTION DENTAL ONCE
Status: COMPLETED | OUTPATIENT
Start: 2024-12-26 | End: 2024-12-26

## 2024-12-26 RX ORDER — LEVOTHYROXINE SODIUM 75 UG/1
150 TABLET ORAL
Status: DISCONTINUED | OUTPATIENT
Start: 2024-12-27 | End: 2024-12-27 | Stop reason: HOSPADM

## 2024-12-26 RX ORDER — HYDROMORPHONE HCL/PF 1 MG/ML
SYRINGE (ML) INJECTION AS NEEDED
Status: DISCONTINUED | OUTPATIENT
Start: 2024-12-26 | End: 2024-12-26

## 2024-12-26 RX ORDER — ACETAMINOPHEN 325 MG/1
975 TABLET ORAL EVERY 8 HOURS SCHEDULED
Status: DISCONTINUED | OUTPATIENT
Start: 2024-12-26 | End: 2024-12-27 | Stop reason: HOSPADM

## 2024-12-26 RX ORDER — ONDANSETRON 2 MG/ML
4 INJECTION INTRAMUSCULAR; INTRAVENOUS ONCE AS NEEDED
Status: COMPLETED | OUTPATIENT
Start: 2024-12-26 | End: 2024-12-26

## 2024-12-26 RX ORDER — SODIUM CHLORIDE, SODIUM LACTATE, POTASSIUM CHLORIDE, CALCIUM CHLORIDE 600; 310; 30; 20 MG/100ML; MG/100ML; MG/100ML; MG/100ML
75 INJECTION, SOLUTION INTRAVENOUS CONTINUOUS
Status: DISCONTINUED | OUTPATIENT
Start: 2024-12-26 | End: 2024-12-27

## 2024-12-26 RX ORDER — SODIUM CHLORIDE 9 MG/ML
125 INJECTION, SOLUTION INTRAVENOUS CONTINUOUS
Status: DISCONTINUED | OUTPATIENT
Start: 2024-12-26 | End: 2024-12-26

## 2024-12-26 RX ORDER — HEPARIN SODIUM 200 [USP'U]/100ML
INJECTION, SOLUTION INTRAVENOUS
Status: COMPLETED | OUTPATIENT
Start: 2024-12-26 | End: 2024-12-26

## 2024-12-26 RX ORDER — ROCURONIUM BROMIDE 10 MG/ML
INJECTION, SOLUTION INTRAVENOUS AS NEEDED
Status: DISCONTINUED | OUTPATIENT
Start: 2024-12-26 | End: 2024-12-26

## 2024-12-26 RX ORDER — OXYCODONE HYDROCHLORIDE 10 MG/1
10 TABLET ORAL EVERY 4 HOURS PRN
Refills: 0 | Status: DISCONTINUED | OUTPATIENT
Start: 2024-12-26 | End: 2024-12-27 | Stop reason: HOSPADM

## 2024-12-26 RX ORDER — FENTANYL CITRATE/PF 50 MCG/ML
25 SYRINGE (ML) INJECTION
Status: DISCONTINUED | OUTPATIENT
Start: 2024-12-26 | End: 2024-12-26 | Stop reason: HOSPADM

## 2024-12-26 RX ORDER — HEPARIN SODIUM 1000 [USP'U]/ML
INJECTION, SOLUTION INTRAVENOUS; SUBCUTANEOUS AS NEEDED
Status: DISCONTINUED | OUTPATIENT
Start: 2024-12-26 | End: 2024-12-26

## 2024-12-26 RX ORDER — LIDOCAINE HYDROCHLORIDE 10 MG/ML
INJECTION, SOLUTION EPIDURAL; INFILTRATION; INTRACAUDAL; PERINEURAL AS NEEDED
Status: DISCONTINUED | OUTPATIENT
Start: 2024-12-26 | End: 2024-12-26

## 2024-12-26 RX ORDER — ASPIRIN 81 MG/1
81 TABLET, CHEWABLE ORAL DAILY
Status: DISCONTINUED | OUTPATIENT
Start: 2024-12-27 | End: 2024-12-27 | Stop reason: HOSPADM

## 2024-12-26 RX ORDER — LORAZEPAM 2 MG/ML
0.5 INJECTION INTRAMUSCULAR ONCE AS NEEDED
Status: COMPLETED | OUTPATIENT
Start: 2024-12-26 | End: 2024-12-26

## 2024-12-26 RX ADMIN — HEPARIN SODIUM 5000 UNITS: 5000 INJECTION INTRAVENOUS; SUBCUTANEOUS at 21:01

## 2024-12-26 RX ADMIN — NOREPINEPHRINE BITARTRATE 1 MCG/MIN: 1 INJECTION, SOLUTION, CONCENTRATE INTRAVENOUS at 12:45

## 2024-12-26 RX ADMIN — SUGAMMADEX 200 MG: 100 INJECTION, SOLUTION INTRAVENOUS at 17:23

## 2024-12-26 RX ADMIN — HEPARIN SODIUM 2000 UNITS: 1000 INJECTION, SOLUTION INTRAVENOUS; SUBCUTANEOUS at 12:55

## 2024-12-26 RX ADMIN — ONDANSETRON 4 MG: 2 INJECTION INTRAMUSCULAR; INTRAVENOUS at 16:44

## 2024-12-26 RX ADMIN — ROCURONIUM 50 MG: 50 INJECTION, SOLUTION INTRAVENOUS at 11:15

## 2024-12-26 RX ADMIN — LIDOCAINE HYDROCHLORIDE 50 MG: 10 INJECTION, SOLUTION EPIDURAL; INFILTRATION; INTRACAUDAL; PERINEURAL at 11:15

## 2024-12-26 RX ADMIN — HYDROMORPHONE HYDROCHLORIDE 1 MG: 1 INJECTION, SOLUTION INTRAMUSCULAR; INTRAVENOUS; SUBCUTANEOUS at 12:15

## 2024-12-26 RX ADMIN — HEPARIN SODIUM 6000 UNITS: 1000 INJECTION, SOLUTION INTRAVENOUS; SUBCUTANEOUS at 12:20

## 2024-12-26 RX ADMIN — LORAZEPAM 0.5 MG: 2 INJECTION INTRAMUSCULAR; INTRAVENOUS at 18:11

## 2024-12-26 RX ADMIN — PROPOFOL 40 MG: 10 INJECTION, EMULSION INTRAVENOUS at 11:18

## 2024-12-26 RX ADMIN — SODIUM CHLORIDE, SODIUM LACTATE, POTASSIUM CHLORIDE, AND CALCIUM CHLORIDE: .6; .31; .03; .02 INJECTION, SOLUTION INTRAVENOUS at 16:38

## 2024-12-26 RX ADMIN — SODIUM CHLORIDE, SODIUM LACTATE, POTASSIUM CHLORIDE, AND CALCIUM CHLORIDE: .6; .31; .03; .02 INJECTION, SOLUTION INTRAVENOUS at 12:18

## 2024-12-26 RX ADMIN — HEPARIN SODIUM 1500 UNITS: 1000 INJECTION, SOLUTION INTRAVENOUS; SUBCUTANEOUS at 14:07

## 2024-12-26 RX ADMIN — CHLORHEXIDINE GLUCONATE 15 ML: 1.2 RINSE ORAL at 07:56

## 2024-12-26 RX ADMIN — CEFAZOLIN SODIUM 2000 MG: 2 SOLUTION INTRAVENOUS at 11:30

## 2024-12-26 RX ADMIN — FENTANYL CITRATE 50 MCG: 50 INJECTION INTRAMUSCULAR; INTRAVENOUS at 16:57

## 2024-12-26 RX ADMIN — MEPERIDINE HYDROCHLORIDE 12.5 MG: 25 INJECTION INTRAMUSCULAR; INTRAVENOUS; SUBCUTANEOUS at 18:03

## 2024-12-26 RX ADMIN — SODIUM CHLORIDE 125 ML/HR: 0.9 INJECTION, SOLUTION INTRAVENOUS at 07:56

## 2024-12-26 RX ADMIN — HEPARIN SODIUM 2500 UNITS: 1000 INJECTION, SOLUTION INTRAVENOUS; SUBCUTANEOUS at 15:38

## 2024-12-26 RX ADMIN — CLOPIDOGREL BISULFATE 150 MG: 75 TABLET ORAL at 19:59

## 2024-12-26 RX ADMIN — ONDANSETRON 4 MG: 2 INJECTION INTRAMUSCULAR; INTRAVENOUS at 18:09

## 2024-12-26 RX ADMIN — PROPOFOL 140 MG: 10 INJECTION, EMULSION INTRAVENOUS at 11:15

## 2024-12-26 RX ADMIN — OXYCODONE HYDROCHLORIDE 10 MG: 10 TABLET ORAL at 19:58

## 2024-12-26 RX ADMIN — ACETAMINOPHEN 975 MG: 325 TABLET, FILM COATED ORAL at 21:01

## 2024-12-26 RX ADMIN — DEXAMETHASONE SODIUM PHOSPHATE 5 MG: 10 INJECTION INTRAMUSCULAR; INTRAVENOUS at 11:33

## 2024-12-26 RX ADMIN — FENTANYL CITRATE 50 MCG: 50 INJECTION INTRAMUSCULAR; INTRAVENOUS at 17:06

## 2024-12-26 RX ADMIN — FENTANYL CITRATE 100 MCG: 50 INJECTION INTRAMUSCULAR; INTRAVENOUS at 11:15

## 2024-12-26 RX ADMIN — CEFAZOLIN SODIUM 1000 MG: 1 SOLUTION INTRAVENOUS at 15:30

## 2024-12-26 RX ADMIN — SODIUM CHLORIDE: 0.9 INJECTION, SOLUTION INTRAVENOUS at 11:23

## 2024-12-26 RX ADMIN — SODIUM CHLORIDE, SODIUM LACTATE, POTASSIUM CHLORIDE, AND CALCIUM CHLORIDE 75 ML/HR: .6; .31; .03; .02 INJECTION, SOLUTION INTRAVENOUS at 19:06

## 2024-12-26 NOTE — ANESTHESIA PROCEDURE NOTES
Arterial Line Insertion    Performed by: Kwabena Haywood MD  Authorized by: Kwabena Haywood MD  Consent: Verbal consent obtained. Written consent obtained.  Risks and benefits: risks, benefits and alternatives were discussed  Consent given by: patient  Patient states understanding of procedure being performed: Anesthetized and intubated.  Procedure consent: procedure consent matches procedure scheduled  Relevant documents: relevant documents present and verified  Test results: test results available and properly labeled  Site marked: the operative site was not marked  Radiology Images: Radiology Images displayed and confirmed. If images not available, report reviewed  Patient identity confirmed: arm band and provided demographic data  Preparation: Patient was prepped and draped in the usual sterile fashion.  Indications: multiple ABGs and hemodynamic monitoring  Orientation:  Left  Location: radial artery  Procedure Details:  Roberto's test normal: yes  Needle gauge: 20  Seldinger technique: Seldinger technique used  Number of attempts: 3    Post-procedure:  Post-procedure: dressing applied  Waveform: good waveform  Patient tolerance: Patient tolerated the procedure well with no immediate complications  Comments: First attempt by palpation only w/out flash. Second attempt w/ ultrasound intraarterial, unable to fully pass wire so through-and-through technique performed, but unable to pass wire w/out resistance. Third attempt w/ ultrasound guidance successful w/ through-and-through technique, guidewire easily placed through catheter which was subsequently thread over wire via Seldinger technique. Positive blood flow through catheter w/ excellent arterial waveform on monitor. No evidence of hematoma at previous puncture site. Catheter secured with sterile tegaderm, plastic tape over tegaderm.

## 2024-12-26 NOTE — ANESTHESIA PREPROCEDURE EVALUATION
Procedure:  right CFA endarterectomy right lower extremity angiogram with possible intervention possible right pedal access (Right: Leg Lower)  ARTERIOGRAM (Right: Abdomen)    Relevant Problems   CARDIO   (+) Hyperlipidemia   (+) Peripheral vascular disease (HCC)      ENDO   (+) Hypothyroidism      MUSCULOSKELETAL   (+) Acute left-sided low back pain with left-sided sciatica   (+) Rheumatoid arthritis involving multiple sites with positive rheumatoid factor (HCC) (No meds, but asymptomatic)      Behavioral Health   (+) Former smoker      Blood   (+) Thrombocytosis (On hydroxyurea)      Surgery/Wound/Pain   (+) Ulcer of right foot, limited to breakdown of skin (HCC)      Orthopedic/Musculoskeletal   (+) Ischemic pain of foot   (+) Lumbar radiculopathy      Other   (+) Abnormal EKG (EKG change (RBBB with left ant, fascicular block) , was seen by cards pre-op, no additional cardiac eval indicated. )      Card note:   NSQIP risk score for vascular procedure is 0.4% for cardiac complications, patient's METs are greater than 4, DASI score of 50. the patient denies any signs or symptoms of ACS, heart failure, unstable arrhythmia, or decompensated heart failure which would be a contraindication for vascular surgery.  The patient requires no further cardiac testing prior to his surgery.  He requires no immediate medication changes prior to upcoming procedure.  We will do a postoperative follow-up for further risk factor stratification and lipid management   Physical Exam    Airway  Comment: Micrognathia, high arched palate, short chin  Mallampati score: I  TM Distance: <3 FB       Dental   No notable dental hx     Cardiovascular  Rhythm: regular, Rate: normal    Pulmonary   Breath sounds clear to auscultation    Other Findings        Anesthesia Plan  ASA Score- 3     Anesthesia Type- general with ASA Monitors.         Additional Monitors: arterial line.    Airway Plan: ETT.    Comment: 2nd PIV, art line, confirmed  transfusion OK if needed.       Plan Factors-Exercise tolerance (METS): >4 METS.    Chart reviewed.        Patient is not a current smoker.      Obstructive sleep apnea risk education given perioperatively.        Induction- intravenous.    Postoperative Plan- Plan for postoperative opioid use.         Informed Consent- Anesthetic plan and risks discussed with patient.

## 2024-12-26 NOTE — INTERVAL H&P NOTE
H&P reviewed. After examining the patient I find no changes in the patients condition since the H&P had been written.    Vitals:    12/26/24 0800   BP: 145/68   Pulse: 71   Resp: 16   Temp: 98.4 °F (36.9 °C)   SpO2: 97%
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
## 2024-12-27 ENCOUNTER — DOCUMENTATION (OUTPATIENT)
Dept: VASCULAR SURGERY | Facility: CLINIC | Age: 64
End: 2024-12-27

## 2024-12-27 VITALS
HEART RATE: 76 BPM | DIASTOLIC BLOOD PRESSURE: 79 MMHG | WEIGHT: 176.15 LBS | BODY MASS INDEX: 23.86 KG/M2 | OXYGEN SATURATION: 97 % | RESPIRATION RATE: 16 BRPM | HEIGHT: 72 IN | TEMPERATURE: 97.9 F | SYSTOLIC BLOOD PRESSURE: 154 MMHG

## 2024-12-27 LAB
ANION GAP SERPL CALCULATED.3IONS-SCNC: 7 MMOL/L (ref 4–13)
BUN SERPL-MCNC: 16 MG/DL (ref 5–25)
CALCIUM SERPL-MCNC: 8.3 MG/DL (ref 8.4–10.2)
CHLORIDE SERPL-SCNC: 106 MMOL/L (ref 96–108)
CO2 SERPL-SCNC: 24 MMOL/L (ref 21–32)
CREAT SERPL-MCNC: 0.86 MG/DL (ref 0.6–1.3)
ERYTHROCYTE [DISTWIDTH] IN BLOOD BY AUTOMATED COUNT: 12.2 % (ref 11.6–15.1)
GFR SERPL CREATININE-BSD FRML MDRD: 90 ML/MIN/1.73SQ M
GLUCOSE SERPL-MCNC: 112 MG/DL (ref 65–140)
HCT VFR BLD AUTO: 40.9 % (ref 36.5–49.3)
HGB BLD-MCNC: 14.3 G/DL (ref 12–17)
MAGNESIUM SERPL-MCNC: 1.8 MG/DL (ref 1.9–2.7)
MCH RBC QN AUTO: 36 PG (ref 26.8–34.3)
MCHC RBC AUTO-ENTMCNC: 35 G/DL (ref 31.4–37.4)
MCV RBC AUTO: 103 FL (ref 82–98)
PHOSPHATE SERPL-MCNC: 2.2 MG/DL (ref 2.3–4.1)
PLATELET # BLD AUTO: 399 THOUSANDS/UL (ref 149–390)
PMV BLD AUTO: 8.4 FL (ref 8.9–12.7)
POTASSIUM SERPL-SCNC: 3.8 MMOL/L (ref 3.5–5.3)
RBC # BLD AUTO: 3.97 MILLION/UL (ref 3.88–5.62)
SODIUM SERPL-SCNC: 137 MMOL/L (ref 135–147)
WBC # BLD AUTO: 11.62 THOUSAND/UL (ref 4.31–10.16)

## 2024-12-27 PROCEDURE — 84100 ASSAY OF PHOSPHORUS: CPT | Performed by: NURSE PRACTITIONER

## 2024-12-27 PROCEDURE — 85027 COMPLETE CBC AUTOMATED: CPT | Performed by: STUDENT IN AN ORGANIZED HEALTH CARE EDUCATION/TRAINING PROGRAM

## 2024-12-27 PROCEDURE — 99238 HOSP IP/OBS DSCHRG MGMT 30/<: CPT | Performed by: NURSE PRACTITIONER

## 2024-12-27 PROCEDURE — NC001 PR NO CHARGE: Performed by: NURSE PRACTITIONER

## 2024-12-27 PROCEDURE — 83735 ASSAY OF MAGNESIUM: CPT | Performed by: NURSE PRACTITIONER

## 2024-12-27 PROCEDURE — 80048 BASIC METABOLIC PNL TOTAL CA: CPT | Performed by: STUDENT IN AN ORGANIZED HEALTH CARE EDUCATION/TRAINING PROGRAM

## 2024-12-27 PROCEDURE — 99223 1ST HOSP IP/OBS HIGH 75: CPT | Performed by: INTERNAL MEDICINE

## 2024-12-27 RX ORDER — CLOPIDOGREL BISULFATE 75 MG/1
75 TABLET ORAL DAILY
Qty: 30 TABLET | Refills: 0 | Status: SHIPPED | OUTPATIENT
Start: 2024-12-28

## 2024-12-27 RX ORDER — OXYCODONE HYDROCHLORIDE 5 MG/1
5 TABLET ORAL EVERY 4 HOURS PRN
Qty: 10 TABLET | Refills: 0 | Status: SHIPPED | OUTPATIENT
Start: 2024-12-27 | End: 2025-01-06

## 2024-12-27 RX ORDER — CLOPIDOGREL BISULFATE 75 MG/1
75 TABLET ORAL DAILY
Status: DISCONTINUED | OUTPATIENT
Start: 2024-12-27 | End: 2024-12-27 | Stop reason: HOSPADM

## 2024-12-27 RX ORDER — MAGNESIUM HYDROXIDE/ALUMINUM HYDROXICE/SIMETHICONE 120; 1200; 1200 MG/30ML; MG/30ML; MG/30ML
30 SUSPENSION ORAL EVERY 4 HOURS PRN
Status: DISCONTINUED | OUTPATIENT
Start: 2024-12-27 | End: 2024-12-27 | Stop reason: HOSPADM

## 2024-12-27 RX ORDER — ACETAMINOPHEN 325 MG/1
650 TABLET ORAL EVERY 4 HOURS PRN
COMMUNITY
Start: 2024-12-27

## 2024-12-27 RX ORDER — MAGNESIUM SULFATE HEPTAHYDRATE 40 MG/ML
2 INJECTION, SOLUTION INTRAVENOUS ONCE
Status: COMPLETED | OUTPATIENT
Start: 2024-12-27 | End: 2024-12-27

## 2024-12-27 RX ADMIN — ALUMINUM HYDROXIDE, MAGNESIUM HYDROXIDE, DIMETHICONE 30 ML: 400; 400; 40 SUSPENSION ORAL at 01:19

## 2024-12-27 RX ADMIN — ALUMINUM HYDROXIDE, MAGNESIUM HYDROXIDE, DIMETHICONE 30 ML: 400; 400; 40 SUSPENSION ORAL at 05:09

## 2024-12-27 RX ADMIN — ACETAMINOPHEN 975 MG: 325 TABLET, FILM COATED ORAL at 05:09

## 2024-12-27 RX ADMIN — MAGNESIUM SULFATE HEPTAHYDRATE 2 G: 40 INJECTION, SOLUTION INTRAVENOUS at 09:12

## 2024-12-27 RX ADMIN — POTASSIUM PHOSPHATE 21 MMOL: 236; 224 INJECTION, SOLUTION INTRAVENOUS at 09:12

## 2024-12-27 RX ADMIN — HEPARIN SODIUM 5000 UNITS: 5000 INJECTION INTRAVENOUS; SUBCUTANEOUS at 05:09

## 2024-12-27 RX ADMIN — LEVOTHYROXINE SODIUM 150 MCG: 75 TABLET ORAL at 05:09

## 2024-12-27 RX ADMIN — CLOPIDOGREL BISULFATE 75 MG: 75 TABLET ORAL at 09:12

## 2024-12-27 RX ADMIN — ASPIRIN 81 MG 81 MG: 81 TABLET ORAL at 09:13

## 2024-12-27 NOTE — PLAN OF CARE
Problem: PAIN - ADULT  Goal: Verbalizes/displays adequate comfort level or baseline comfort level  Description: Interventions:  - Encourage patient to monitor pain and request assistance  - Assess pain using appropriate pain scale  - Administer analgesics based on type and severity of pain and evaluate response  - Implement non-pharmacological measures as appropriate and evaluate response  - Consider cultural and social influences on pain and pain management  - Notify physician/advanced practitioner if interventions unsuccessful or patient reports new pain  Outcome: Progressing     Problem: INFECTION - ADULT  Goal: Absence or prevention of progression during hospitalization  Description: INTERVENTIONS:  - Assess and monitor for signs and symptoms of infection  - Monitor lab/diagnostic results  - Monitor all insertion sites, i.e. indwelling lines, tubes, and drains  - Monitor endotracheal if appropriate and nasal secretions for changes in amount and color  - [place] appropriate cooling/warming therapies per order  - Administer medications as ordered  - Instruct and encourage patient and family to use good hand hygiene technique  - Identify and instruct in appropriate isolation precautions for identified infection/condition  Outcome: Progressing  Goal: Absence of fever/infection during neutropenic period  Description: INTERVENTIONS:  - Monitor WBC    Outcome: Progressing     Problem: SAFETY ADULT  Goal: Patient will remain free of falls  Description: INTERVENTIONS:  - Educate patient/family on patient safety including physical limitations  - Instruct patient to call for assistance with activity   - Consult OT/PT to assist with strengthening/mobility   - Keep Call bell within reach  - Keep bed low and locked with side rails adjusted as appropriate  - Keep care items and personal belongings within reach  - Initiate and maintain comfort rounds  - Make Fall Risk Sign visible to staff  - Offer Toileting every 2 Hours, in  advance of need  - Initiate/Maintain BED alarm  - Obtain necessary fall risk management equipment  - Apply yellow socks and bracelet for high fall risk patients  - Consider moving patient to room near nurses station  Outcome: Progressing  Goal: Maintain or return to baseline ADL function  Description: INTERVENTIONS:  -  Assess patient's ability to carry out ADLs; assess patient's baseline for ADL function and identify physical deficits which impact ability to perform ADLs (bathing, care of mouth/teeth, toileting, grooming, dressing, etc.)  - Assess/evaluate cause of self-care deficits   - Assess range of motion  - Assess patient's mobility; develop plan if impaired  - Assess patient's need for assistive devices and provide as appropriate  - Encourage maximum independence but intervene and supervise when necessary  - Involve family in performance of ADLs  - Assess for home care needs following discharge   - Consider OT consult to assist with ADL evaluation and planning for discharge  - Provide patient education as appropriate  Outcome: Progressing  Goal: Maintains/Returns to pre admission functional level  Description: INTERVENTIONS:  - Perform AM-PAC 6 Click Basic Mobility/ Daily Activity assessment daily.  - Set and communicate daily mobility goal to care team and patient/family/caregiver.   - Collaborate with rehabilitation services on mobility goals if consulted  - Perform Range of Motion 3 times a day.  - Reposition patient every 2 hours.  - Dangle patient 3 times a day  - Stand patient 3 times a day  - Ambulate patient 3 times a day  - Out of bed to chair 3 times a day   - Out of bed for meals 3 times a day  - Out of bed for toileting  - Record patient progress and toleration of activity level   Outcome: Progressing     Problem: DISCHARGE PLANNING  Goal: Discharge to home or other facility with appropriate resources  Description: INTERVENTIONS:  - Identify barriers to discharge w/patient and caregiver  -  Arrange for needed discharge resources and transportation as appropriate  - Identify discharge learning needs (meds, wound care, etc.)  - Arrange for interpretive services to assist at discharge as needed  - Refer to Case Management Department for coordinating discharge planning if the patient needs post-hospital services based on physician/advanced practitioner order or complex needs related to functional status, cognitive ability, or social support system  Outcome: Progressing     Problem: Knowledge Deficit  Goal: Patient/family/caregiver demonstrates understanding of disease process, treatment plan, medications, and discharge instructions  Description: Complete learning assessment and assess knowledge base.  Interventions:  - Provide teaching at level of understanding  - Provide teaching via preferred learning methods  Outcome: Progressing

## 2024-12-27 NOTE — DISCHARGE SUMMARY
Discharge Summary - Vascular Surgery   Name: Agapito Martell 64 y.o. male I MRN: [id number]  Unit/Bed#: ICU 10 I Date of Admission: 12/26/2024   Date of Service: 12/27/2024 I Hospital Day: 1        Admission Date: 12/26/2024     Discharge Date:12/27/24    Attending:Wilbert Shaikh DO     Consultants: Critical Care    Admitting Diagnosis: PAD (peripheral artery disease) ([facility]) [I73.9]    Principle/ Secondary Diagnosis:  Past Medical History:   Diagnosis Date    Arthritis     Disease of thyroid gland     hypo    Graves disease 1995    Hallux limitus, acquired, right     Hypothyroid     hypo    Other tear of medial meniscus, current injury, right knee, initial encounter 6/6/2018    Added automatically from request for surgery [id number]    Platelet disorder ([facility])     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 Jc    RA (rheumatoid arthritis) ([facility])     Rheumatoid arthritis involving multiple sites ([facility]) 9/19/2022    Wears glasses      Past Surgical History:   Procedure Laterality Date    ANKLE SURGERY Right     ligament, chipped bones,dislocated    BUNIONECTOMY  03/2014    COLONOSCOPY      ELBOW SURGERY Right     tendon surgery    EPIDURAL BLOCK INJECTION Left 08/16/2023    Procedure: left L5-S1, S1 TRANSFORAMINAL epidural steroid injection (30652, 30322);  Surgeon: Gunner Aguayo DO;  Location: [facility] MAIN OR;  Service: Pain Management     EPIDURAL BLOCK INJECTION N/A 03/15/2024    Procedure: L5-S1 LUMBAR EPIDURAL STEROID INJECTION;  Surgeon: Chandra Serra MD;  Location: [facility] MAIN OR;  Service: Pain Management     EPIDURAL BLOCK INJECTION Left 04/26/2024    Procedure: LEFT L5-S1 TRANSFORAMINAL EPIDURAL STEROID INJECTION;  Surgeon: Chandra Serra MD;  Location: [facility] MAIN OR;  Service: Pain Management     FOOT ARTHRODESIS, MODIFIED ARELLANO Left     FRACTURE SURGERY Left     elbow    HERNIA REPAIR Right     inguinal    IR LOWER EXTREMITY ANGIOGRAM  12/26/2024    LUMBAR EPIDURAL INJECTION N/A 09/22/2023    Procedure: L5-S1 LUMBAR  epidural steroid injection (42944);  Surgeon: Chandra Serra MD;  Location: [facility] MAIN OR;  Service: Pain Management     MI ARTHRS KNE SURG W/MENISCECTOMY MED/LAT W/SHVG Right 07/09/2018    Procedure: MENISCECTOMY LATERAL /MEDIAL;  Surgeon: David Sloan MD;  Location: [facility] MAIN OR;  Service: Orthopedics    MI CORRECTION HAMMERTOE Bilateral 05/14/2021    Procedure: REPAIR HAMMERTOE / MALLET TOE / CLAW TOE 2ND toe bilateral;  Surgeon: Filipe Oliva DPM;  Location: [facility] MAIN OR;  Service: Podiatry    MI CORRJ HLX VLGS BNCTY SESMDC RESCJ PROX PHLX BASE Right 04/09/2021    Procedure: BUNIONECTOMY SMITH;  Surgeon: Filipe Oliva DPM;  Location: [facility] MAIN OR;  Service: Podiatry    ROTATOR CUFF REPAIR Left     WISDOM TOOTH EXTRACTION      x4    WRIST SURGERY Bilateral     Bilateral cyct removals       Procedures Performed:   MI TEAEC W/WO PATCH GRAFT COMMON FEMORAL [79947] (right CFA endarterectomy; right lower extremity angiogram with possible intervention; possible right pedal access)  MI SLCTV CATHJ 3RD+ ORD SLCTV ABDL PEL/LXTR BRNCH [[id number]] (ARTERIOGRAM)  CHG ANGIOGRAPHY EXTREMITY UNILATERAL RS&I [77904]  CHG AORTOGRAPHY ABDOMINAL SERIALOGRAPHY RS&I [[id number]]  right CFA endarterectomy Right politeal athlerectomy, angioplasty, and stenting (Right: Leg Lower)  ARTERIOGRAM (Right: Abdomen)  Procedure(s):  right CFA endarterectomy Right politeal athlerectomy, angioplasty, and stenting  ARTERIOGRAM    Laboratory data at discharge:   Results from last 7 days   Lab Units 12/27/24  0518 12/26/24  1905   WBC Thousand/uL 11.62*  --    HEMOGLOBIN g/dL 14.3  --    HEMATOCRIT % 40.9  --    PLATELETS Thousands/uL 399* 420*     Results from last 7 days   Lab Units 12/27/24 0518   POTASSIUM mmol/L 3.8   CHLORIDE mmol/L 106   CO2 mmol/L 24   BUN mg/dL 16   CREATININE mg/dL 0.86   CALCIUM mg/dL 8.3*               Discharge instructions/Information to patient and family:   See after visit summary for information provided to patient and  family.     Discharge Medications:  See after visit summary for reconciled discharge medications provided to patient and family.      Hospital Course:   65 yo male ex-smoker w/ a hx of hypothyroidism, RA, HLD and PAD, presented to [facility] on 12/26/24 for an elective R CFA endarterectomy. Pt has a hx of PAD w/ R 5th toe tissue loss. Imaging shows R CFA, popliteal and TPT occlusions w/ NANY: 0.4 and 2-vessel runoff via peroneal and AT. Pt underwent a R CFA endarterectomy w/ R popliteal atherectomy, angio and stent on 12/26/24. Immediate post-op course was uneventful.    POD #1: Pt remains hemodynamically stable w/ Hgb 14.3 and stable VS. Plavix was started in addition to ASA for presence of new stent. Unable to use xarelto 2.5mg as co-pay was prohibitively expensive (in excess of $500 per month). He is eating, voiding and ambulating at baseline. R groin soft w/ prevena vac intact. R distal AT access site soft, no ecchymosis, swelling, or drainage. (+) R DP/PT signals. Pt reports improvement in the burning at R 5th toe wound since surgery. He reports mild R groin soreness and otherwise, denies any further complaints at this time. Pt is requesting to return home today with his wife.    Continue tylenol and prn oxycodone for incisional pain control; e-script sent to pharmacy of pt's choice. Continue ASA, plavix and crestor for medical management of PAD and new stent placement. Follow up in the vascular surgery office; appt scheduled for 1/15/25.    Hospital course was complicated by the following: None      Prior to discharge, the patient was given instructions for outpatient care and follow-up.  The patient has been instructed to call w/ any questions, changes, or concerns for the medical condition.    For further details of the hospitalization, please refer to the medical record.    Condition at Discharge: good     Provisions for Follow-Up Care:  See after visit summary for information related to follow-up care and any  pertinent home health orders.      Disposition: Home    Planned Readmission: No    Discharge Statement   I spent 28 minutes discharging the patient. This time was spent on the day of discharge. I had direct contact with the patient on the day of discharge. Additional documentation is required if more than 30 minutes were spent on discharge.     JEREMY Gamez  12/27/2024

## 2024-12-27 NOTE — ANESTHESIA POSTPROCEDURE EVALUATION
Post-Op Assessment Note    CV Status:  Stable    Pain management: adequate       Mental Status:  Alert and awake   Hydration Status:  Euvolemic   PONV Controlled:  Controlled   Airway Patency:  Patent     Post Op Vitals Reviewed: Yes    No anethesia notable event occurred.    Staff: Anesthesiologist           Last Filed PACU Vitals:  Vitals Value Taken Time   Temp 97.5 °F (36.4 °C) 12/26/24 1858   Pulse 75 12/26/24 1858   /69 12/26/24 1858   Resp 12 12/26/24 1858   SpO2 96 % 12/26/24 1858       Modified Flavio:  Activity: 2 (12/26/2024  6:27 PM)  Respiration: 2 (12/26/2024  6:27 PM)  Circulation: 2 (12/26/2024  6:27 PM)  Consciousness: 2 (12/26/2024  6:27 PM)  Oxygen Saturation: 2 (12/26/2024  6:27 PM)  Modified Flavio Score: 10 (12/26/2024  6:27 PM)

## 2024-12-27 NOTE — ASSESSMENT & PLAN NOTE
12/27 right CFA endarterectomy Right politeal athlerectomy. angioplasty. and stenting with Vascular Surgery Dr. Shaikh   12/26 L radial art line   BP goals as per Vascular Surgery -- has not yet required any intervention since admission to ICU   R groin site with c/d/I dressing   R foot with intact wound dressing     Plan:   BP goals per Vascular   Maintain art line and rosales overnight   Local wound care to R foot wound

## 2024-12-27 NOTE — PROGRESS NOTES
Progress Note - Vascular Surgery   Name: Agapito Martell 64 y.o. male I MRN: [id number]  Unit/Bed#: ICU 10 I Date of Admission: 12/26/2024   Date of Service: 12/27/2024 I Hospital Day: 1    Assessment & Plan  Peripheral vascular disease (HCC)  63 yo male ex-smoker w/ a hx of hypothyroidism, RA, HLD and PAD, presented to [facility] on 12/26/24 for an elective R CFA endarterectomy. Pt has a hx of PAD w/ R 5th toe tissue loss. Imaging shows R CFA, popliteal and TPT occlusions w/ NANY: 0.4 and 2-vessel runoff via peroneal and AT. Pt underwent a R CFA endarterectomy w/ R popliteal atherectomy, angio and stent on 12/26/24. Immediate post-op course was uneventful.    Plan:  -PAD w/ RLE claudication  -S/P R CFA endart w/ patch angioplasty on 12/26 (POD #1)  -Continue frequent neurovascular checks  -D/C rosales  -D/C garry  -Diet as tolerated  -OOB as tolerated  -Continue ASA and crestor for medical management of PAD  -Begin plavix daily for new stent placement  -Tentative plan for D/C to home today  -Will discuss w/ Dr. Perez    Subjective : Pt reports improvement in the burning at R 5th toe wound since surgery. He reports mild R groin soreness and otherwise, denies any further complaints at this time.    Objective :  Temp:  [97.5 °F (36.4 °C)-98.2 °F (36.8 °C)] 97.8 °F (36.6 °C)  HR:  [62-86] 74  BP: (122-144)/(66-82) 144/82  Resp:  [12-27] 27  SpO2:  [94 %-99 %] 97 %  O2 Device: None (Room air)     I/O         12/25 0701  12/26 0700 12/26 0701 12/27 0700 12/27 0701 12/28 0700    I.V. (mL/kg)  4197.5 (52.5)     IV Piggyback  50     Total Intake(mL/kg)  4247.5 (53.2)     Urine (mL/kg/hr)  1025     Blood  250     Total Output  1275     Net  +2972.5                  Lines/Drains/Airways       Active Status       Name Placement date Placement time Site Days    Arterial Line 12/26/24 Left Radial 12/26/24  1128  Radial  less than 1                  Physical Exam  Vitals and nursing note reviewed.   Constitutional:       General: He is  awake. He is not in acute distress.     Appearance: Normal appearance. He is well-developed.   HENT:      Head: Normocephalic and atraumatic.   Cardiovascular:      Rate and Rhythm: Normal rate and regular rhythm.      Pulses:           Dorsalis pedis pulses are detected w/ Doppler on the right side and 1+ on the left side.        Posterior tibial pulses are detected w/ Doppler on the right side and detected w/ Doppler on the left side.      Heart sounds: Normal heart sounds.   Pulmonary:      Effort: Pulmonary effort is normal. No respiratory distress.      Breath sounds: Normal breath sounds.   Abdominal:      General: Bowel sounds are normal. There is no distension.      Palpations: Abdomen is soft.      Tenderness: There is no abdominal tenderness.   Musculoskeletal:      Cervical back: Neck supple.   Skin:     General: Skin is warm and dry.      Capillary Refill: Capillary refill takes less than 2 seconds.      Findings: Wound present.      Comments: R 5th toe wound. R groin incision. R distal AT procedure access site.   Neurological:      General: No focal deficit present.      Mental Status: He is alert and oriented to person, place, and time.   Psychiatric:         Mood and Affect: Mood and affect normal.         Speech: Speech normal.         Behavior: Behavior normal. Behavior is cooperative.       Wound/Incision:  R 5th toe wound dry, no drainage, dressing applied. R groin incision site soft w/ prevena vac intact w/ adequate suction; no drainage in collection canister. Dressing removed from R distal AT access site. R AT site soft, no swelling, ecchymosis or drainage; site left open to air.      R foot    R foot      Lab Results: I have reviewed the following results:  CBC with diff:   Lab Results   Component Value Date    WBC 11.62 (H) 12/27/2024    HGB 14.3 12/27/2024    HCT 40.9 12/27/2024     (H) 12/27/2024     (H) 12/27/2024    RBC 3.97 12/27/2024    MCH 36.0 (H) 12/27/2024    MCHC 35.0  12/27/2024    RDW 12.2 12/27/2024    MPV 8.4 (L) 12/27/2024    NRBC 0 03/30/2021     BMP/CMP:  Lab Results   Component Value Date    SODIUM 137 12/27/2024    SODIUM 141 11/15/2024    SODIUM 141 02/25/2019    K 3.8 12/27/2024    K 4.3 11/15/2024    K 5.2 02/25/2019     12/27/2024     11/15/2024     02/25/2019    CO2 24 12/27/2024    CO2 28 11/15/2024    CO2 28 02/25/2019    BUN 16 12/27/2024    BUN 22 11/15/2024    BUN 14 02/25/2019    CREATININE 0.86 12/27/2024    CREATININE 1.04 02/25/2019    CALCIUM 8.3 (L) 12/27/2024    CALCIUM 9.6 11/15/2024    CALCIUM 9.5 02/25/2019    AST 15 04/28/2023    AST 14 02/25/2019    ALT 13 04/28/2023    ALT 18 02/25/2019    ALKPHOS 68 04/28/2023    ALKPHOS 78 02/25/2019    EGFR 90 12/27/2024    EGFR 78 02/25/2019

## 2024-12-27 NOTE — PLAN OF CARE
Problem: PAIN - ADULT  Goal: Verbalizes/displays adequate comfort level or baseline comfort level  Description: Interventions:  - Encourage patient to monitor pain and request assistance  - Assess pain using appropriate pain scale  - Administer analgesics based on type and severity of pain and evaluate response  - Implement non-pharmacological measures as appropriate and evaluate response  - Consider cultural and social influences on pain and pain management  - Notify physician/advanced practitioner if interventions unsuccessful or patient reports new pain  Outcome: Progressing     Problem: INFECTION - ADULT  Goal: Absence or prevention of progression during hospitalization  Description: INTERVENTIONS:  - Assess and monitor for signs and symptoms of infection  - Monitor lab/diagnostic results  - Monitor all insertion sites, i.e. indwelling lines, tubes, and drains  - Monitor endotracheal if appropriate and nasal secretions for changes in amount and color  - [place] appropriate cooling/warming therapies per order  - Administer medications as ordered  - Instruct and encourage patient and family to use good hand hygiene technique  - Identify and instruct in appropriate isolation precautions for identified infection/condition  Outcome: Progressing  Goal: Absence of fever/infection during neutropenic period  Description: INTERVENTIONS:  - Monitor WBC    Outcome: Progressing

## 2024-12-27 NOTE — NURSING NOTE
Discharged patient to home. VSS. AVS reviewed. All questions answered.   Removed lines. No signs of hematoma or bleeding @ sites.

## 2024-12-27 NOTE — DISCHARGE INSTR - OTHER ORDERS
DISCHARGE INSTRUCTIONS   PREVENA INCISION THERAPY NEGATIVE PRESSURE VAC    ACTIVITY:   Limit your physical activity with the limb with the Prevena VAC therapy.  Walking up steps and normal activities may be resumed as you feel ready.  Avoid strenuous activity such as vigorous exercise.  Avoid heavy lifting (do not lift more than 15 pounds) while you have the Prevena VAC in place.  You should not drive a car while you have the Prevena VAC in place.  Your provider will instruct you when it is safe to drive.  You may ride in a car.    DIET:  Resume your normal diet.  Good nutrition is important for healing of your incision.  If you are discharged on narcotics for pain control, continue taking your stool softeners until you are having regular bowel movements.    Prevena Vac: Keep vac in place for a total of 7 days from your day of surgery.  You may NOT shower or bathe while wound VAC is in place.  On Thursday, 1/2/25, remove Prevena vac using the following instructions:  1. Hold power button down for approximately 5 seconds until vac turns off. You will hear an audible beep as the vac turns off. Once vac is turned off, the purple sponge will expand.  2. Disconnect tubing.  3. Gently remove clear tape and purple sponge.  4. Dispose of all parts of the vac in a regular garbage can.  INCISION:  Once Prevena Vac removed, you should shower daily.  Wash incision daily with soap and water, but do not rub or scrub the incision; rinse thoroughly and pat dry.  You may have stitches to close your incision and it is okay for these to get wet.  Do not bathe in a tub or swim for the first 4 week following surgery or if you have any open wounds.  It is normal to have swelling or discoloration around the incision.   If increasing redness or pain develops, call our office immediately.  Numbness in the region of the incision may occur following the surgery.  This normally improves over six to twelve months.    You may have surgical glue  over your incisions. There are stitches present under the skin which will absorb on their own.   The glue is used to cover the access, assist in closure, and prevent contamination. This adhesive will darken and peel away on its own within one to two weeks. Do not pick at it.    You have a groin incision. Place a clean dry piece of gauze to cover your groin incision to keep incision clean and dry and prevent your skin from sticking together.  Change gauze daily.  You may have stitches at your incisions.  These will be removed at your follow-up appointment when they are ready to come out.    You have foot wounds. Please follow your podiatrist/wound care doctor's instructions for care.  If any of your incisions are open and require dressing changes, you will be given instructions for your daily incision care. If you are not able to change the dressings, a visiting nurse will be arranged.    DO NOT put any powders, creams, ointments, or lotions on your incision.    SWELLING: Most patients have noticeable swelling after leg surgery. This usually improves within a few weeks. If swelling is present, elevate the affected limb whenever possible.     FOLLOW UP APPOINTMENTS:  Making and keeping follow up appointments and ultrasound tests are important to your recovery.  If you have difficulty making it to or keeping your follow up appointments, call the office.    If you have increased pain, fever >101.5, nausea, vomiting, increased drainage, redness, warmth, swelling, change in color of drainage/pus, or a bad smell at your Wound VAC site, new coldness/numbness of your arm or leg, or become dizzy or confused please call us immediately and GO directly to the ER.    PLEASE CALL THE OFFICE IF YOU HAVE ANY QUESTIONS  [phone]  [phone] [address], [address], [place] [address]  [address] [place] [address]  [address] [place] [address]  360 [facility], [address] [place] [address]  [address]  [address] [facility], [address], [place] [address]  [address] [facility], [address], [place] [address]  [address] [facility], Entr[address] A, 2nd Floor, [place] [address]  [address] [facility], 1st Floor, [address], [place] [address]  [address], [facility] [address], [place] [address]  [address] [facility], [address], [place] [address]

## 2024-12-27 NOTE — CASE MANAGEMENT
Case Management Assessment & Discharge Planning Note    Patient name Agapito Martell  Location ICU 10/ICU 10 MRN [id number]  : 1960 Date 2024       Current Admission Date: 2024  Current Admission Diagnosis:Ischemic pain of foot   Patient Active Problem List    Diagnosis Date Noted Date Diagnosed    Ischemic pain of foot 2024     Former smoker 2024     Pre-operative cardiovascular examination 2024     Abnormal EKG 2024     Peripheral vascular disease (HCC) 2024     Ulcer of right foot, limited to breakdown of skin (HCC) 2024     Lumbar radiculopathy 03/15/2024     Acute left-sided low back pain with left-sided sciatica 2023     Other male erectile dysfunction 2023     Thrombocytosis 2022     Rheumatoid arthritis involving multiple sites with positive rheumatoid factor (HCC) 2022     Lipoma of torso 2022     Hyperlipidemia 2021     Hypothyroidism 2021     Rheumatoid arthritis (HCC) 2018       LOS (days): 1  Geometric Mean LOS (GMLOS) (days): 1.9  Days to GMLOS:1.1     OBJECTIVE:    Risk of Unplanned Readmission Score: 8.72         Current admission status: Inpatient       Preferred Pharmacy:   [facility]/pharmacy #[id number] - [place] [address] [address]  Phone: [phone] Fax: [phone]    [facility] [place] [address] [facility]  [address] [address]  Phone: [phone] Fax: [phone]    [facility] [address]  [facility],  [address]  [facility],  First Floor [facility] [address]  Phone: [phone] Fax: [phone]    Primary Care Provider: Ronnell Coronel MD    Primary Insurance: MEDICARE  Secondary Insurance: HUMANA    ASSESSMENT:  Active Health Care Proxies       Kimmy Martell Health Care Agent - Spouse   Primary Phone: [phone] (Mobile)  Home Phone: [phone]                 Advance  Directives  Does patient have a Health Care POA?: Yes  Does patient have Advance Directives?: Yes  Advance Directives: Power of  for health care, Living will  Primary Contact: Kimmy Martell ( spouse) [phone]     Readmission Root Cause  30 Day Readmission: No    Patient Information  Admitted from:: Home  Mental Status: Alert  During Assessment patient was accompanied by: Not accompanied during assessment  Assessment information provided by:: Patient  Primary Caregiver: Self  Support Systems: Self, Spouse/significant other, Family members  County of Residence: [place]  What city do you live in?: [place]  Home entry access options. Select all that apply.: Stairs  Number of steps to enter home.: 2  Do the steps have railings?: Yes  Type of Current Residence: 2 story home  Upon entering residence, is there a bedroom on the main floor (no further steps)?: No  A bedroom is located on the following floor levels of residence (select all that apply):: 2nd Floor  Upon entering residence, is there a bathroom on the main floor (no further steps)?: Yes  Number of steps to 2nd floor from main floor: One Flight  Living Arrangements: Lives w/ Spouse/significant other  Is patient a ?: No    Activities of Daily Living Prior to Admission  Functional Status: Independent  Completes ADLs independently?: Yes  Ambulates independently?: Yes  Does patient use assisted devices?: No  Does patient currently own DME?: No  Does patient have a history of Outpatient Therapy (PT/OT)?: Yes ([facility])  Does the patient have a history of Short-Term Rehab?: No  Does patient have a history of HHC?: No  Does patient currently have HHC?: No       Patient Information Continued  Income Source: Pension/USP  Does patient have prescription coverage?: Yes  Does patient receive dialysis treatments?: No  Does patient have a history of substance abuse?: No  Does patient have a history of Mental Health Diagnosis?: No       Means of  Transportation  Means of Transport to \[facility] [place] [facility]\"":: Drives Self (Spouse will transport until the patient is able to drive.)      Social Determinants of Health (SDOH)      Flowsheet Row Most Recent Value   Housing Stability    In the last 12 months, was there a time when you were not able to pay the mortgage or rent on time? N   In the past 12 months, how many times have you moved where you were living? 0   At any time in the past 12 months, were you homeless or living in a shelter (including now)? N   Transportation Needs    In the past 12 months, has lack of transportation kept you from medical appointments or from getting medications? no   In the past 12 months, has lack of transportation kept you from meetings, work, or from getting things needed for daily living? No   Food Insecurity    Within the past 12 months, you worried that your food would run out before you got the money to buy more. Never true   Within the past 12 months, the food you bought just didn't last and you didn't have money to get more. Never true   Utilities    In the past 12 months has the electric, gas, oil, or water company threatened to shut off services in your home? No            DISCHARGE DETAILS:    Discharge planning discussed with:: patient  Freedom of Choice: Yes  Comments - Freedom of Choice: Home with out patient follow up  CM contacted family/caregiver?: Yes  Were Treatment Team discharge recommendations reviewed with patient/caregiver?: Yes  Did patient/caregiver verbalize understanding of patient care needs?: Yes  Were patient/caregiver advised of the risks associated with not following Treatment Team discharge recommendations?: Yes    Contacts  Patient Contacts: Kimmy Martell ( spouse) [phone]  Relationship to Patient:: Family  Contact Method: Phone  Phone Number: [phone]  Reason/Outcome: Emergency Contact, Discharge Planning, Continuity of Care    Requested Home Health Care         Is the patient interested in [facility] at  discharge?: No    DME Referral Provided  Referral made for DME?: No       Would you like to participate in our [facility] service program?  : No - Declined    Treatment Team Recommendation: Home  Discharge Destination Plan:: Home  Transport at Discharge : Family         Additional Comments: CM met with the patient at the bedside for intake assessment and discharge planning. CM introduced self and reviewed role. The patient confirms he will be discharged today with outpatient follow up. Patient has no care management needs and will be transported home by his wife.

## 2024-12-27 NOTE — ASSESSMENT & PLAN NOTE
63 yo male ex-smoker w/ a hx of hypothyroidism, RA, HLD and PAD, presented to [facility] on 12/26/24 for an elective R CFA endarterectomy. Pt has a hx of PAD w/ R 5th toe tissue loss. Imaging shows R CFA, popliteal and TPT occlusions w/ NANY: 0.4 and 2-vessel runoff via peroneal and AT. Pt underwent a R CFA endarterectomy w/ R popliteal atherectomy, angio and stent on 12/26/24. Immediate post-op course was uneventful.    POD #1: Pt remains hemodynamically stable w/ Hgb 14.3 and stable VS. He is eating, voiding and ambulating at baseline. R groin soft w/ prevena vac intact. R distal AT access site soft, no ecchymosis, swelling, or drainage. R DP/PT signals. Pt reports improvement in the burning at R 5th toe wound since surgery. He reports mild R groin soreness and otherwise, denies any further complaints at this time.    Plan:  -PAD w/ RLE claudication  -S/P R CFA endart w/ patch angioplasty on 12/26 (POD #1)  -Continue frequent neurovascular checks  -D/C rosales  -D/C garry  -Diet as tolerated  -OOB as tolerated  -Continue ASA and crestor for medical management of PAD  -Begin plavix daily for new stent placement  -Tentative plan for D/C to home today  -Will discuss w/ Dr. Perez

## 2024-12-27 NOTE — PROGRESS NOTES
Vascular Nurse Navigator Post Op Education    Met with patient at bedside to introduce myself as Vascular Nurse Navigator and explained my role.  Patient is appropriate and accepting to education. Patient was educated with Review of written materials provided, Teachback, Explanation, Demonstration, and Question & Answer on expectations of post op care and recovery on right CFA endarterectomy Right politeal athlerectomy. angioplasty. and stenting. Patient is a former smoker, quit 25 years ago. Education provided to patient on infection prevention, activity limitations, when to call the office, importance of follow up, and incisional care.  Discharge instruction handout provided to patient to review.  Provided patient with a pack of disposable washcloths, a pack of guaze, and a pack of chlorhexidine wipes for incision care upon discharge.

## 2024-12-27 NOTE — DISCHARGE INSTR - AVS FIRST PAGE
DISCHARGE INSTRUCTIONS  LEG/BYPASS SURGERY    ACTIVITY:   Limit your physical activity to walking for the first week and then increase your activity as tolerated.  If you become short of breath or tired, stop and rest.  You may require help with walking or feel more secure with something to lean on.  Walking up steps and normal activities may be resumed as you feel ready.  Most people tire easily for the first few weeks following leg surgery.  This improves as conditioning returns.  Avoid strenuous activity such as vigorous exercise.  Avoid heavy lifting (do not lift more than 15 pounds) for the first four weeks after surgery.  You should not drive a car for at least two weeks following discharge from the hospital and you are off all narcotic pain medication.  You may ride in a car.    DIET:  Resume your normal diet.  Good nutrition is important for healing of your incision.  If you are discharged on narcotics for pain control, continue taking your stool softeners until you are having regular bowel movements.    INCISION:  After the Prevena VAC is removed, you should shower daily.  Wash incision daily with soap and water, but do not rub or scrub the incision; rinse thoroughly and pat dry.  You may have stitches to close your incision and it is okay for these to get wet.  Do not bathe in a tub or swim for the first 4 week following surgery or if you have any open wounds.  It is normal to have swelling or discoloration around the incision.   If increasing redness or pain develops, call our office immediately.  Numbness in the region of the incision may occur following the surgery.  This normally improves over six to twelve months.    You may have surgical glue over your incisions. There are stitches present under the skin which will absorb on their own.   The glue is used to cover the access, assist in closure, and prevent contamination. This adhesive will darken and peel away on its own within one to two weeks. Do  not pick at it.    You have a groin incision. Once Prevena VAC is removed, place a clean dry piece of gauze to cover your groin incision to keep incision clean and dry and prevent your skin from sticking together.  Change gauze daily.  You may have stitches at your incisions.  These will be removed at your follow-up appointment or when they are ready to come out.    You have foot wounds. Please follow your podiatrist/wound care doctor's instructions for care.  If any of your incisions are open and require dressing changes, you will be given instructions for your daily incision care. If you are not able to change the dressings, a visiting nurse will be arranged.    DO NOT put any powders, creams, ointments, or lotions on your incision.    LEG SWELLING: Most patients have noticeable leg swelling after leg surgery. This usually improves within a few weeks. If swelling is present, elevate the leg whenever possible. Avoid sitting with the leg hanging down for prolonged periods of time.  Walking is beneficial.  An ACE bandage or support stocking may be helpful, but this should be discussed with your physician prior to use if you have a bypass.    FOLLOW UP STUDIES:  Doppler ultrasound studies are very important for long-term management.  Your surgeon will arrange this at your first postoperative visit.  Repeat studies are then scheduled every three months for the first year and periodically after this.    FOLLOW UP APPOINTMENTS:  Making and keeping follow up appointments and ultrasound tests are important to your recovery.  If you have difficulty making it to or keeping your follow up appointments, call the office.    If you have increased pain, fever >101.5, increased drainage, redness or a bad smell at your surgery site, new coldness/numbness of your arm or leg, please call us immediately and GO directly to the ER.    PLEASE CALL THE OFFICE IF YOU HAVE ANY QUESTIONS  [phone]  [phone] [address],  [address], [place] [address]  [address] [facility], [place] [address]  [address], [place] [address]  [address] [facility], 1st Floor, [place] [address]  [address], [address], [place] [address]  [address] [facility], [address], [place] [address]  [address] [facility], [address], 2nd Floor, [place] [address]  [address] [facility], 1st Floor, [address], [place] [address]  [address], [place] [address]  [address] [facility], [address], [place] [address]

## 2024-12-27 NOTE — ASSESSMENT & PLAN NOTE
65 yo male ex-smoker w/ a hx of hypothyroidism, RA, HLD and PAD, presented to [facility] on 12/26/24 for an elective R CFA endarterectomy. Pt has a hx of PAD w/ R 5th toe tissue loss. Imaging shows R CFA, popliteal and TPT occlusions w/ NANY: 0.4 and 2-vessel runoff via peroneal and AT. Pt underwent a R CFA endarterectomy w/ R popliteal atherectomy, angio and stent on 12/26/24. Immediate post-op course was uneventful.    Plan:  -PAD w/ RLE claudication  -S/P R CFA endart w/ patch angioplasty on 12/26 (POD #1)  -Continue frequent neurovascular checks  -D/C bobby  -D/C garry  -Diet as tolerated  -OOB as tolerated  -Continue ASA and crestor for medical management of PAD  -Begin plavix daily for new stent placement  -Tentative plan for D/C to home today  -Will discuss w/ Dr. Perez

## 2024-12-27 NOTE — CONSULTS
Consultation - Critical Care/ICU   Name: Agapito Martell 64 y.o. male I MRN: [id number]  Unit/Bed#: ICU 10 I Date of Admission: 12/26/2024   Date of Service: 12/27/2024 I Hospital Day: 1   Inpatient consult to Medical Critical Care  Consult performed by: JEREMY Oliver  Consult ordered by: Fabian Ramos DO        Physician Requesting Evaluation: Wilbert Shaikh DO   Reason for Evaluation / Principal Problem: Postop CFA     I have discussed the above management plan in detail with the primary service.     Assessment & Plan  Ischemic pain of foot  12/27 right CFA endarterectomy Right politeal athlerectomy. angioplasty. and stenting with Vascular Surgery Dr. Shaikh   12/26 L radial art line   BP goals as per Vascular Surgery -- has not yet required any intervention since admission to ICU   R groin site with c/d/I dressing   R foot with intact wound dressing     Plan:   BP goals per Vascular   Maintain art line and rosales overnight   Local wound care to R foot wound   Hyperlipidemia  Cont home statin   Hypothyroidism  Cont home synthroid   Peripheral vascular disease (HCC)    Former smoker  Quit 20+ years ago   Disposition: Critical care    History of Present Illness   Agapito Martell is a 64 y.o. with a past medical history of hyperlipidemia, hypothyroidism, ischemic pain of right foot secondary to peripheral vascular disease, former smoker who presents postop R CFA.  On exam he is awake alert and oriented, in no acute distress.  His only complaint is mild discomfort at his right groin site, moderate to severe pain of his right foot.  He presents to the ICU with Rosales and left radial arterial line in place.  His right groin surgical site is clean, dry, intact.  Critical care please to follow.    History obtained from chart review and the patient.  Review of Systems: See HPI for Review of Systems    Historical Information   Past Medical History:  No date: Arthritis  No date: Disease of thyroid gland      Comment:   hypo  1995: Graves disease  No date: Hallux limitus, acquired, right  No date: Hypothyroid      Comment:  hypo  6/6/2018: Other tear of medial meniscus, current injury, right knee,   initial encounter      Comment:  Added automatically from request for surgery [id number]  No date: Platelet disorder ([facility])      Comment:  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 Jc  No date: RA (rheumatoid arthritis) ([facility])  9/19/2022: Rheumatoid arthritis involving multiple sites ([facility])  No date: Wears glasses Past Surgical History:  No date: ANKLE SURGERY; Right      Comment:  ligament, chipped bones,dislocated  03/2014: BUNIONECTOMY  No date: COLONOSCOPY  No date: ELBOW SURGERY; Right      Comment:  tendon surgery  08/16/2023: EPIDURAL BLOCK INJECTION; Left      Comment:  Procedure: left L5-S1, S1 TRANSFORAMINAL epidural                steroid injection (02591, 51569);  Surgeon: Gunner Aguayo DO;  Location: [facility] MAIN OR;  Service: Pain                Management   03/15/2024: EPIDURAL BLOCK INJECTION; N/A      Comment:  Procedure: L5-S1 LUMBAR EPIDURAL STEROID INJECTION;                 Surgeon: Chandra Serra MD;  Location: [facility] MAIN OR;                 Service: Pain Management   04/26/2024: EPIDURAL BLOCK INJECTION; Left      Comment:  Procedure: LEFT L5-S1 TRANSFORAMINAL EPIDURAL STEROID                INJECTION;  Surgeon: Chandra Serra MD;  Location: [facility] MAIN OR;  Service: Pain Management   No date: FOOT ARTHRODESIS, MODIFIED ARELLANO; Left  No date: FRACTURE SURGERY; Left      Comment:  elbow  No date: HERNIA REPAIR; Right      Comment:  inguinal  12/26/2024: IR LOWER EXTREMITY ANGIOGRAM  09/22/2023: LUMBAR EPIDURAL INJECTION; N/A      Comment:  Procedure: L5-S1 LUMBAR epidural steroid injection                (64015);  Surgeon: Chandra Serra MD;  Location: [facility]                MAIN OR;  Service: Pain Management   07/09/2018: IN ARTHRS KNE SURG W/MENISCECTOMY MED/LAT W/SHVG; Right      Comment:   Procedure: MENISCECTOMY LATERAL /MEDIAL;  Surgeon:                David Sloan MD;  Location: [facility] MAIN OR;  Service:                Orthopedics  2021: NM CORRECTION HAMMERTOE; Bilateral      Comment:  Procedure: REPAIR HAMMERTOE / MALLET TOE / CLAW TOE 2ND                toe bilateral;  Surgeon: Filipe Oliva DPM;  Location: [facility]                MAIN OR;  Service: Podiatry  2021: NM CORRJ HLX VLGS BNCTY SESMDC RESCJ PROX PHLX BASE; Right      Comment:  Procedure: BUNIONECTOMY SMITH;  Surgeon: Filipe Oliva DPM;  Location: [facility] MAIN OR;  Service: Podiatry  No date: ROTATOR CUFF REPAIR; Left  No date: WISDOM TOOTH EXTRACTION      Comment:  x4  No date: WRIST SURGERY; Bilateral      Comment:  Bilateral cyct removals   Current Outpatient Medications   Medication Instructions    aspirin 81 mg, Oral, Daily    Hydroxyurea (HYDREA PO) 1,000 mg    hydroxyurea (HYDREA) 500 mg capsule Take by mouth On Tues-Thurs-Sat-Sun     levothyroxine 150 mcg, Oral, Daily    Naproxen Sodium (ALEVE) 220 mg, Daily PRN    rosuvastatin (CRESTOR) 10 mg, Oral, Daily    sildenafil (VIAGRA) 100 mg, Oral, As needed, As directed    Vitamin D3 1,000 Units, Oral    No Known Allergies   Social History     Tobacco Use    Smoking status: Former     Current packs/day: 0.00     Average packs/day: 1 pack/day for 25.0 years (25.0 ttl pk-yrs)     Types: Cigarettes     Start date: 1975     Quit date: 2000     Years since quittin.0     Passive exposure: Past    Smokeless tobacco: Never   Vaping Use    Vaping status: Never Used   Substance Use Topics    Alcohol use: Yes     Alcohol/week: 3.0 standard drinks of alcohol     Types: 3 Cans of beer per week     Comment: 3 beers a week    Drug use: No    Family History   Problem Relation Age of Onset    Cancer Mother         passed    Hypertension Father     Heart disease Father         leaky valve    Skin cancer Father     Diabetes Father           Objective :                    Vitals I/O      Most Recent Min/Max in 24hrs   Temp 97.8 °F (36.6 °C) Temp  Min: 97.5 °F (36.4 °C)  Max: 98.4 °F (36.9 °C)   Pulse 62 Pulse  Min: 62  Max: 84   Resp 14 Resp  Min: 12  Max: 20   /82 BP  Min: 122/69  Max: 145/68   O2 Sat 95 % SpO2  Min: 94 %  Max: 99 %      Intake/Output Summary (Last 24 hours) at 12/27/2024 0425  Last data filed at 12/27/2024 0400  Gross per 24 hour   Intake 4117.5 ml   Output 1075 ml   Net 3042.5 ml       Diet Regular; Regular House    Invasive Monitoring   Arterial Line  Crissy /66  Arterial Line BP  Min: 136/62  Max: 184/86   MAP 94 mmHg  Arterial Line MAP (mmHg)  Min: 84 mmHg  Max: 118 mmHg           Physical Exam   Physical Exam  Vitals and nursing note reviewed.   Eyes:      General: Lids are normal.   Skin:     General: Skin is warm.      Capillary Refill: Capillary refill takes less than 2 seconds.   HENT:      Head: Normocephalic and atraumatic.      Mouth/Throat:      Lips: Pink.      Mouth: Mucous membranes are moist.   Cardiovascular:      Rate and Rhythm: Normal rate and regular rhythm.   Musculoskeletal:      Right lower leg: No edema.      Left lower leg: No edema.      Comments: R groin with c/d/I dressing  R foot with c/d/I dressing    Constitutional:       General: He is awake. He is not in acute distress.     Appearance: He is well-developed. He is not ill-appearing.   Pulmonary:      Effort: Pulmonary effort is normal.      Breath sounds: Normal breath sounds.   Psychiatric:         Behavior: Behavior is cooperative.   Neurological:      Mental Status: He is alert and oriented to person, place, and time.      GCS: GCS eye subscore is 4. GCS verbal subscore is 5. GCS motor subscore is 6.          Diagnostic Studies        Lab Results: I have reviewed the following results:     Medications:  Scheduled PRN   acetaminophen, 975 mg, Q8H ROSALIE  aspirin, 81 mg, Daily  atorvastatin, 40 mg, Daily With Dinner  heparin (porcine), 5,000 Units, Q8H  ROSALIE  levothyroxine, 150 mcg, Early Morning  senna, 1 tablet, Daily      aluminum-magnesium hydroxide-simethicone, 30 mL, Q4H PRN  lactated ringers, 500 mL, Once PRN   And  lactated ringers, 500 mL, Once PRN  ondansetron, 4 mg, Q6H PRN  oxyCODONE, 10 mg, Q4H PRN  oxyCODONE, 5 mg, Q4H PRN  sodium chloride, 500 mL, Once PRN   And  sodium chloride, 500 mL, Once PRN       Continuous    lactated ringers, 75 mL/hr, Last Rate: 75 mL/hr (12/26/24 1906)         Labs:   CBC    Recent Labs     12/26/24 1905   *     BMP    No recent results    Coags    No recent results     Additional Electrolytes  No recent results       Blood Gas    No recent results  No recent results LFTs  No recent results    Infectious  No recent results  Glucose  No recent results

## 2024-12-30 ENCOUNTER — TELEPHONE (OUTPATIENT)
Dept: VASCULAR SURGERY | Facility: CLINIC | Age: 64
End: 2024-12-30

## 2024-12-30 DIAGNOSIS — I73.9 PAD (PERIPHERAL ARTERY DISEASE) (HCC): ICD-10-CM

## 2024-12-30 PROCEDURE — 88311 DECALCIFY TISSUE: CPT | Performed by: PATHOLOGY

## 2024-12-30 PROCEDURE — 88304 TISSUE EXAM BY PATHOLOGIST: CPT | Performed by: PATHOLOGY

## 2024-12-30 RX ORDER — OXYCODONE HYDROCHLORIDE 5 MG/1
5 TABLET ORAL EVERY 4 HOURS PRN
Qty: 12 TABLET | Refills: 0 | Status: SHIPPED | OUTPATIENT
Start: 2024-12-30 | End: 2025-01-01

## 2024-12-30 NOTE — TELEPHONE ENCOUNTER
Reviewed. Script appropriate for post op pain. New script sent to pharmacy. He should use sparingly and utilize tylenol only when able.

## 2024-12-30 NOTE — TELEPHONE ENCOUNTER
Contacted patient to inform him of information and recommendations from Stacie Obando PA-C.  Verbal understanding received.

## 2024-12-30 NOTE — TELEPHONE ENCOUNTER
Vascular Nurse Navigator Post Op Call    Procedure:   Right common femoral endarterectomy with bovine pericardial patch angioplasty  Ultrasound-guided right DP retrograde access   Right lower extremity angiogram with third order selective catheterization  Right P3 segment popliteal artery, tibioperoneal trunk, and anterior tibial subintimal arterial flossing with antegrade recannulization  Tibioperoneal trunk Diamondback 1.25 x 145 mm orbital atherectomy  Anterior tibial Diamondback 1.25 x 145 mm orbital atherectomy  Tibial peroneal and anterior tibial POBA with Aminah cross 3-2.5 x 210 mm tapered balloon and 2 x 220 mm coyote  Popliteal artery plain balloon angioplasty with 5 x 20 mm Timothy balloon and DCB with 40 x 100 mm Marcella   Popliteal artery self-expanding stent placement with 5 x 120 mm Supera stent    Tibioperoneal trunk and anterior tibial serration angioplasty with 2.5 x 120 mm Serranator  Intra-arterial nitroglycerin administration  Radiographic supervision interpretation of all imaging    Date of Procedure: 12/26/24    Surgeon:   * Wilbert Shaikh DO - Primary     * Fabian Ramos DO - Fellow    Discharge Date: 12/27/24    Discharge Disposition: Home    Leg Weakness?: No    Leg Swelling?: No    Leg Numbness?: No    Chest Pain?: No    Shortness of Breath?: No    Orthopnea?: No    Anticoagulation pt was discharged on post op?: Aspirin and Clopidogrel (Plavix)    Statin pt was discharged on post op?:  Rosuvastatin (Crestor)    Bleeding?: No    Uncontrolled Pain?: See below    Incision Concerns?: No    Fever or Chills?: No      Reviewed discharge instructions and incision care with patient.      NEXT OFFICE VISIT SCHEDULED:  1/15/25 at 1 pm with JEREMY Lambert [facility] Confirmed?: Yes      Any further questions/concerns?  Patient stated that he is doing good since discharge.  He stated that he is having pain in his toe and behind his knee.  He stated that the pain  behind his knee was worse when he woke up this morning and has eased a little.  He would rate the pain at this time at a 4, but this morning it was a 6 or 7 out of 10 on the pain scale. He stated as the day goes on the pain in his toe gets worse.  He stated that he has been taking the oxycodone for the pain and has 1 left and is trying not to take it unless his pain is severe.  He stated that he tried Tylenol and it did not help.  He stated that he continues with the Prevena vac on and denies any drainage in the tubing or canister.  Reviewed care and removal of Prevena vac.  He stated that he has soreness in his groin.  Reviewed incision care with him once Prevena vac removed - wash daily with soap and water.  Reviewed discharge medications - Aspirin and Plavix.  He is requesting a refill of the oxycodone for pain in his leg and toe.  He would like it to be sent to [facility] in [place] - pharmacy on file in patient's chart.  Informed him that I would send this information to our triage provider and contact him back with recommendation.  He was agreeable to same.

## 2025-01-03 NOTE — TELEPHONE ENCOUNTER
Contacted patient to follow up with him on removal of Prevena vac.  He stated that he removed it yesterday without any issues.  He stated that there is no redness, drainage, or open  areas on incision. Reviewed incision care with him - wash daily with soap and water.  Reminded him of post op appointment on 1/15/25 at 10 am with JEREMY Lambert at [facility].

## 2025-01-13 ENCOUNTER — TELEPHONE (OUTPATIENT)
Age: 65
End: 2025-01-13

## 2025-01-13 NOTE — TELEPHONE ENCOUNTER
Patient called and stated he received a bill from his supplemental plan, [employer], stating that his Annual Wellness Visit, on 12/20/24 was denied, as it was also denied by Medicare.  Patient stated when he checked his Medicare portal he did not see the visit listed there and wanted to verify that the claim was submitted to Medicare.  Spoke with  Billing and confirmed that the claim was submitted to Medicare, however it appears to have been coded as an initial visit, , when it should have been coded as a subsequent visit, .  Billing is requesting the visit to be re-coded for  for the claim to be resubmitted.

## 2025-01-15 ENCOUNTER — OFFICE VISIT (OUTPATIENT)
Dept: VASCULAR SURGERY | Facility: CLINIC | Age: 65
End: 2025-01-15

## 2025-01-15 VITALS
DIASTOLIC BLOOD PRESSURE: 80 MMHG | HEIGHT: 72 IN | WEIGHT: 155 LBS | BODY MASS INDEX: 20.99 KG/M2 | HEART RATE: 75 BPM | SYSTOLIC BLOOD PRESSURE: 142 MMHG

## 2025-01-15 DIAGNOSIS — I73.9 PERIPHERAL ARTERIAL DISEASE (HCC): Primary | ICD-10-CM

## 2025-01-15 DIAGNOSIS — L97.511 ULCER OF RIGHT FOOT, LIMITED TO BREAKDOWN OF SKIN (HCC): ICD-10-CM

## 2025-01-15 DIAGNOSIS — I73.9 PERIPHERAL VASCULAR DISEASE (HCC): ICD-10-CM

## 2025-01-15 PROCEDURE — 99024 POSTOP FOLLOW-UP VISIT: CPT | Performed by: NURSE PRACTITIONER

## 2025-01-15 NOTE — PROGRESS NOTES
Name: Agapito Martell      : 1960      MRN: [id number]  Encounter Provider: JEREMY Wang  Encounter Date: 1/15/2025   Enc[facility]  :  Assessment & Plan  Peripheral arterial disease (HCC)  64-year-old male former smoker with HLD, hypothyroid, RA, PAD with right fifth toe tissue loss now s/p R CFAE, R popliteal atherectomy, PTA and stenting 2024 ([place]) presents for postop follow-up.    - Patient doing well postoperatively. POD#[id number]  - Presents without complaints other than intermittent right fifth toe pain   - R 5th toe dry wound.  No erythema, drainage, or signs of infection.  - M/S intact. Doppler DP signal  - Right groin incision CDI with surgical glue.  No dehiscence, drainage, bleeding, erythema or signs of infection.  He has expected  - No edema  -Patient has not seen and is hesitant to follow-up with podiatry.  Would like to give more time for healing.  I explained it would just be to establish care.  From what I can see in chart, no x-ray or imaging of bone.  Does not appear infected.    Plan:  -Incision care reviewed.  Wash daily soap and water, pat dry.  No soaking or submerging.  Monitor for dehiscence, erythema, drainage or signs of infection  -LEAD in 3 months with return office visit with Dr. Shaikh  -Continue aspirin and Plavix  -Continue statin  -Follow-up with podiatry, referral placed  -Call with any new or worsening symptoms, changes to incision, questions or concerns    Orders:    VAS ARTERIAL DUPLEX- LOWER LIMB BILATERAL; Future    Ambulatory Referral to Podiatry; Future    Ulcer of right foot, limited to breakdown of skin (HCC)  -Intermittent pain.  Dry, no erythema, drainage, malodor or obvious signs of infection.  -Patient reports improved pain since vascularization  -Media pic below  Orders:    VAS ARTERIAL DUPLEX- LOWER LIMB BILATERAL; Future    Ambulatory Referral to Podiatry; Future        History of Present Illness   Cc: Patient is  s/p CFAE Right Leg AGRAM 12-26-24  Patient stated he is having pain right foot from wound. Patient can put weight on foot.   HPI  Agapito Martell is a 64 y.o. male who presents for postop follow-up s/p R CFA E, RLE angiogram, atherectomy, PTA and stenting    .  See above assessment and plan      History obtained from: patient    Review of Systems   Constitutional: Negative.    HENT: Negative.     Eyes: Negative.    Respiratory: Negative.     Cardiovascular: Negative.    Gastrointestinal: Negative.    Endocrine: Negative.    Genitourinary: Negative.    Musculoskeletal:  Positive for gait problem.   Skin: Negative.    Allergic/Immunologic: Negative.    Hematological: Negative.    Psychiatric/Behavioral: Negative.     I have reviewed and made appropriate changes to the review of systems input by the medical assistant.    Medical History Reviewed by provider this encounter:     .  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 Jc    RA (rheumatoid arthritis) ([facility])     Rheumatoid arthritis involving multiple sites ([facility]) 9/19/2022    Wears glasses      Past Surgical History:   Procedure Laterality Date    ANKLE SURGERY Right     ligament, chipped bones,dislocated    BUNIONECTOMY  03/2014    COLONOSCOPY      ELBOW SURGERY Right     tendon surgery    EPIDURAL BLOCK INJECTION Left 08/16/2023    Procedure: left L5-S1, S1 TRANSFORAMINAL epidural steroid injection (56581, 24347);  Surgeon: Gunner Aguayo DO;  Location: [facility] MAIN OR;  Service: Pain Management     EPIDURAL BLOCK INJECTION N/A 03/15/2024    Procedure: L5-S1 LUMBAR EPIDURAL STEROID INJECTION;  Surgeon: Chandra  MD Ponce;  Location: [facility] MAIN OR;  Service: Pain Management     EPIDURAL BLOCK INJECTION Left 04/26/2024    Procedure: LEFT L5-S1 TRANSFORAMINAL EPIDURAL STEROID INJECTION;  Surgeon: Chandra Serra MD;  Location: [facility] MAIN OR;  Service: Pain Management     FOOT ARTHRODESIS, MODIFIED ARELLANO Left     FRACTURE SURGERY Left     elbow    HERNIA REPAIR Right     inguinal    IR LOWER EXTREMITY ANGIOGRAM  12/26/2024    LUMBAR EPIDURAL INJECTION N/A 09/22/2023    Procedure: L5-S1 LUMBAR epidural steroid injection ([id number]);  Surgeon: Chandra Serra MD;  Location: [facility] MAIN OR;  Service: Pain Management     HI ARTHRS KNE SURG W/MENISCECTOMY MED/LAT W/SHVG Right 07/09/2018    Procedure: MENISCECTOMY LATERAL /MEDIAL;  Surgeon: David Sloan MD;  Location: [facility] MAIN OR;  Service: Orthopedics    HI CORRECTION HAMMERTOE Bilateral 05/14/2021    Procedure: REPAIR HAMMERTOE / MALLET TOE / CLAW TOE 2ND toe bilateral;  Surgeon: Filipe Oliva DPM;  Location: [facility] MAIN OR;  Service: Podiatry    HI CORRJ HLX VLGS BNCTY SESMDC RESCJ PROX PHLX BASE Right 04/09/2021    Procedure: BUNIONECTOMY SMITH;  Surgeon: Filipe Oliva DPM;  Location: [facility] MAIN OR;  Service: Podiatry    HI SLCTV CATHJ 3RD+ ORD SLCTV ABDL PEL/LXTR BRNCH Right 12/26/2024    Procedure: ARTERIOGRAM;  Surgeon: Wilbert Shaikh DO;  Location: [facility] Main OR;  Service: Vascular    HI TEAEC W/WO PATCH GRAFT COMMON FEMORAL Right 12/26/2024    Procedure: right CFA endarterectomy Right politeal athlerectomy, angioplasty, and stenting;  Surgeon: Wilbert Shaikh DO;  Location: [facility];  Service: Vascular    ROTATOR CUFF REPAIR Left     WISDOM TOOTH EXTRACTION      x4    WRIST SURGERY Bilateral     Bilateral cyct removals     Family History   Problem Relation Age of Onset    Cancer Mother         passed    Hypertension Father     Heart disease Father         leaky valve    Skin cancer Father     Diabetes Father       reports that he quit smoking about 25 years ago. His smoking  use included cigarettes. He started smoking about 50 years ago. He has a 25 pack-year smoking history. He has been exposed to tobacco smoke. He has never used smokeless tobacco. He reports current alcohol use of about 3.0 standard drinks of alcohol per week. He reports that he does not use drugs.  Current Outpatient Medications on File Prior to Visit   Medication Sig Dispense Refill    acetaminophen (TYLENOL) 325 mg tablet Take 2 tablets (650 mg total) by mouth every 4 (four) hours as needed for mild pain or moderate pain      aspirin 81 mg chewable tablet Chew 81 mg daily      Cholecalciferol (VITAMIN D3) 1000 units CAPS Take 1,000 Units by mouth      clopidogrel (PLAVIX) 75 mg tablet Take 1 tablet (75 mg total) by mouth daily 30 tablet 0    Hydroxyurea (HYDREA PO) Take 1,000 mg by mouth Mon-Wed-Fri      hydroxyurea (HYDREA) 500 mg capsule Take by mouth On Tues-Thurs-Sat-Sun       levothyroxine 150 mcg tablet TAKE 1 TABLET DAILY 90 tablet 1    Naproxen Sodium (Aleve) 220 MG CAPS Take 220 mg by mouth daily as needed      rosuvastatin (CRESTOR) 10 MG tablet Take 1 tablet (10 mg total) by mouth daily 100 tablet 0    sildenafil (VIAGRA) 100 mg tablet Take 1 tablet (100 mg total) by mouth if needed for erectile dysfunction As directed 30 tablet 2     No current facility-administered medications on file prior to visit.   No Known Allergies   Current Outpatient Medications on File Prior to Visit   Medication Sig Dispense Refill    acetaminophen (TYLENOL) 325 mg tablet Take 2 tablets (650 mg total) by mouth every 4 (four) hours as needed for mild pain or moderate pain      aspirin 81 mg chewable tablet Chew 81 mg daily      Cholecalciferol (VITAMIN D3) 1000 units CAPS Take 1,000 Units by mouth      clopidogrel (PLAVIX) 75 mg tablet Take 1 tablet (75 mg total) by mouth daily 30 tablet 0    Hydroxyurea (HYDREA PO) Take 1,000 mg by mouth Mon-Wed-Fri      hydroxyurea (HYDREA) 500 mg capsule Take by mouth On Tues-Thurs-Sat-Sun        levothyroxine 150 mcg tablet TAKE 1 TABLET DAILY 90 tablet 1    Naproxen Sodium (Aleve) 220 MG CAPS Take 220 mg by mouth daily as needed      rosuvastatin (CRESTOR) 10 MG tablet Take 1 tablet (10 mg total) by mouth daily 100 tablet 0    sildenafil (VIAGRA) 100 mg tablet Take 1 tablet (100 mg total) by mouth if needed for erectile dysfunction As directed 30 tablet 2     No current facility-administered medications on file prior to visit.      Social History     Tobacco Use    Smoking status: Former     Current packs/day: 0.00     Average packs/day: 1 pack/day for 25.0 years (25.0 ttl pk-yrs)     Types: Cigarettes     Start date: 1975     Quit date: 2000     Years since quittin.0     Passive exposure: Past    Smokeless tobacco: Never   Vaping Use    Vaping status: Never Used   Substance and Sexual Activity    Alcohol use: Yes     Alcohol/week: 3.0 standard drinks of alcohol     Types: 3 Cans of beer per week     Comment: 3 beers a week    Drug use: No    Sexual activity: Yes     Partners: Female        Objective   /80 (BP Location: Right arm, Patient Position: Sitting, Cuff Size: Adult)   Pulse 75   Ht 6' (1.829 m)   Wt 70.3 kg (155 lb)   BMI 21.02 kg/m²      Physical Exam  Vitals reviewed.   Constitutional:       General: He is not in acute distress.     Appearance: Normal appearance. He is normal weight.   HENT:      Head: Normocephalic and atraumatic.   Cardiovascular:      Rate and Rhythm: Normal rate.      Pulses: Normal pulses.           Dorsalis pedis pulses are 2+ on the right side.   Pulmonary:      Effort: Pulmonary effort is normal. No respiratory distress.   Musculoskeletal:         General: Normal range of motion.      Right lower leg: No edema.   Skin:     General: Skin is warm and dry.      Comments: Right fifth toe dry wound  Right groin incision CDI with surgical glue.  No dehiscence, drainage, bleeding, erythema, or signs infection.   Neurological:      Mental Status: He  is alert and oriented to person, place, and time.      Sensory: No sensory deficit.      Motor: No weakness.   Psychiatric:         Mood and Affect: Mood normal.         Behavior: Behavior normal.         RIGHT     Right   Administrative Statements   I have spent a total time of 20 minutes in caring for this patient on the day of the visit/encounter including Instructions for management, Patient and family education, Importance of tx compliance, Impressions, Documenting in the medical record, Reviewing / ordering tests, medicine, procedures  , and Obtaining or reviewing history  .

## 2025-01-15 NOTE — ASSESSMENT & PLAN NOTE
-Intermittent pain.  Dry, no erythema, drainage, malodor or obvious signs of infection.  -Patient reports improved pain since vascularization  -Media pic below  Orders:    VAS ARTERIAL DUPLEX- LOWER LIMB BILATERAL; Future    Ambulatory Referral to Podiatry; Future

## 2025-01-15 NOTE — PATIENT INSTRUCTIONS
-continue postoperative incisional care.  Clean incisions daily with soap and water.  No lotions, ointments or creams to incision.  No soaking or submerging incision x4 weeks.  No swimming x4 weeks.  -elevate leg for symptomatic relief of postoperative swelling.  -continue aspirin, plavix and statin therapy  -we will schedule routine lower extremity ultrasound per postoperative protocol in 3 months.   -Return to office with Dr Shaikh after imaging  -please contact the office with new symptoms, concerns, new right leg pain or changes in incision.  -Follow up with Podiatry , referral placed  -Call with any new or worsening symptoms, changes to incision, separation, redness, drainage, fever or chills.

## 2025-01-15 NOTE — ASSESSMENT & PLAN NOTE
64-year-old male former smoker with HLD, hypothyroid, RA, PAD with right fifth toe tissue loss now s/p R CFA E, R popliteal atherectomy, PTA and stenting 12/26/2024 ([facility]) presents for postop follow-up.

## 2025-01-17 DIAGNOSIS — I73.9 PAD (PERIPHERAL ARTERY DISEASE) (HCC): Primary | ICD-10-CM

## 2025-01-17 DIAGNOSIS — I73.9 PAD (PERIPHERAL ARTERY DISEASE) (HCC): ICD-10-CM

## 2025-01-17 DIAGNOSIS — E03.9 ACQUIRED HYPOTHYROIDISM: ICD-10-CM

## 2025-01-17 RX ORDER — LEVOTHYROXINE SODIUM 150 UG/1
150 TABLET ORAL DAILY
Qty: 90 TABLET | Refills: 0 | Status: SHIPPED | OUTPATIENT
Start: 2025-01-17

## 2025-01-17 RX ORDER — CLOPIDOGREL BISULFATE 75 MG/1
75 TABLET ORAL DAILY
Qty: 90 TABLET | Refills: 1 | Status: SHIPPED | OUTPATIENT
Start: 2025-01-17

## 2025-01-17 NOTE — TELEPHONE ENCOUNTER
Medication was given in the hospital    Reason for call:   [x] Refill   [] Prior Auth  [] Other:     Office:   [] PCP/Provider -   [x] Specialty/Provider - [facility]    Medication:   - Clopidogrel 75mg- take 1 tablet by mouth daily      Pharmacy: [facility]    Does the patient have enough for 3 days?   [x] Yes   [] No - Send as HP to POD

## 2025-01-23 LAB
ALBUMIN SERPL-MCNC: 4 G/DL (ref 3.6–5.1)
ALBUMIN/GLOB SERPL: 1.6 (CALC) (ref 1–2.5)
ALP SERPL-CCNC: 71 U/L (ref 35–144)
ALT SERPL-CCNC: 14 U/L (ref 9–46)
AST SERPL-CCNC: 13 U/L (ref 10–35)
BILIRUB DIRECT SERPL-MCNC: 0.1 MG/DL
BILIRUB INDIRECT SERPL-MCNC: 0.4 MG/DL (CALC) (ref 0.2–1.2)
BILIRUB SERPL-MCNC: 0.5 MG/DL (ref 0.2–1.2)
GLOBULIN SER CALC-MCNC: 2.5 G/DL (CALC) (ref 1.9–3.7)
PROT SERPL-MCNC: 6.5 G/DL (ref 6.1–8.1)

## 2025-01-27 DIAGNOSIS — E78.5 DYSLIPIDEMIA: ICD-10-CM

## 2025-01-27 RX ORDER — ROSUVASTATIN CALCIUM 10 MG/1
10 TABLET, COATED ORAL DAILY
Qty: 90 TABLET | Refills: 1 | Status: SHIPPED | OUTPATIENT
Start: 2025-01-27

## 2025-02-07 ENCOUNTER — RA CDI HCC (OUTPATIENT)
Dept: OTHER | Facility: HOSPITAL | Age: 65
End: 2025-02-07

## 2025-02-13 ENCOUNTER — OFFICE VISIT (OUTPATIENT)
Dept: FAMILY MEDICINE CLINIC | Facility: CLINIC | Age: 65
End: 2025-02-13
Payer: MEDICARE

## 2025-02-13 VITALS
HEART RATE: 94 BPM | SYSTOLIC BLOOD PRESSURE: 132 MMHG | WEIGHT: 159.8 LBS | OXYGEN SATURATION: 98 % | HEIGHT: 72 IN | BODY MASS INDEX: 21.64 KG/M2 | DIASTOLIC BLOOD PRESSURE: 80 MMHG

## 2025-02-13 DIAGNOSIS — I73.9 PERIPHERAL VASCULAR DISEASE (HCC): Primary | ICD-10-CM

## 2025-02-13 DIAGNOSIS — M06.9 RHEUMATOID ARTHRITIS, INVOLVING UNSPECIFIED SITE, UNSPECIFIED WHETHER RHEUMATOID FACTOR PRESENT (HCC): ICD-10-CM

## 2025-02-13 DIAGNOSIS — E78.5 HYPERLIPIDEMIA, UNSPECIFIED HYPERLIPIDEMIA TYPE: ICD-10-CM

## 2025-02-13 DIAGNOSIS — N52.8 OTHER MALE ERECTILE DYSFUNCTION: ICD-10-CM

## 2025-02-13 DIAGNOSIS — M54.42 ACUTE LEFT-SIDED LOW BACK PAIN WITH LEFT-SIDED SCIATICA: ICD-10-CM

## 2025-02-13 DIAGNOSIS — E03.9 ACQUIRED HYPOTHYROIDISM: ICD-10-CM

## 2025-02-13 DIAGNOSIS — D75.839 THROMBOCYTOSIS: ICD-10-CM

## 2025-02-13 DIAGNOSIS — M05.79 RHEUMATOID ARTHRITIS INVOLVING MULTIPLE SITES WITH POSITIVE RHEUMATOID FACTOR (HCC): ICD-10-CM

## 2025-02-13 DIAGNOSIS — L97.511 ULCER OF RIGHT FOOT, LIMITED TO BREAKDOWN OF SKIN (HCC): ICD-10-CM

## 2025-02-13 PROCEDURE — 99214 OFFICE O/P EST MOD 30 MIN: CPT | Performed by: INTERNAL MEDICINE

## 2025-02-13 PROCEDURE — G2211 COMPLEX E/M VISIT ADD ON: HCPCS | Performed by: INTERNAL MEDICINE

## 2025-02-13 NOTE — PROGRESS NOTES
Office Visit Note  25     Agapito Martell 64 y.o. male MRN: [id number]  : 1960    Assessment:     1. Peripheral vascular disease (HCC)  Assessment & Plan:  I reviewed the report of the surgery by the vascular surgeon patient status post right CFA endarterectomy with patch angioplasty on  stable at present time.  He would be having a repeat Doppler test done in the near future and follow-up with the vascular surgery  2. Acute left-sided low back pain with left-sided sciatica  Assessment & Plan:  Patient still experiences pain in the low back area but much better compared to before pain is more s0    With activity while at rest and not much of an activity patient does not have pain  3. Ulcer of right foot, limited to breakdown of skin (HCC)  Assessment & Plan:  Right foot ulcer there is some scab formation on the right little toe at this time being followed by the podiatrist as well as the vascular surgeon patient status post angioplasty  4. Thrombocytosis  Assessment & Plan:  Patient is currently taking hydroxyurea 1000 mg alternating with 500 last CBC showed platelet count of 522,000 will follow-up with the pediatric labs  5. Hyperlipidemia, unspecified hyperlipidemia type  Assessment & Plan:  Currently patient is on statin Crestor 10 mg daily continue with the same follow-up with repeat lipid profile at a later date.  6. Acquired hypothyroidism  Assessment & Plan:  Currently patient is taking levothyroxine 150 mcg daily continue with the same follow-up with repeat TSH later  7. Other male erectile dysfunction  8. Rheumatoid arthritis involving multiple sites with positive rheumatoid factor (HCC)  Assessment & Plan:  As mentioned above stable at present time not on any medication  9. Rheumatoid arthritis, involving unspecified site, unspecified whether rheumatoid factor present (HCC)  Assessment & Plan:  Patient currently not on any medications stable at present time             Discussion  Summary and Plan:  Today's care plan and medications were reviewed with patient in detail and all their questions answered to their satisfaction.    Chief Complaint   Patient presents with   • Follow-up      Subjective:  Patient is coming here for evaluation regarding symptoms of his right lower extremity ulcer, peripheral vascular disease, hypothyroidism, history of chronic low back pain.  Patient also has got history of thrombocytosis history of rheumatoid arthritis not on any medications.    I reviewed the reports of the surgery he had to the vascular surgery in the recent past also reviewed the reports of the vascular studies.        The following portions of the patient's history were reviewed and updated as appropriate: allergies, current medications, past family history, past medical history, past social history, past surgical history and problem list.    Review of Systems   Constitutional:  Negative for chills and fever.   HENT:  Negative for ear pain and sore throat.    Eyes:  Negative for pain and visual disturbance.   Respiratory:  Negative for cough and shortness of breath.    Cardiovascular:  Negative for chest pain and palpitations.   Gastrointestinal:  Negative for abdominal pain and vomiting.   Genitourinary:  Negative for dysuria and hematuria.   Musculoskeletal:  Positive for back pain. Negative for arthralgias.   Skin:  Negative for color change and rash.   Neurological:  Negative for seizures and syncope.   All other systems reviewed and are negative.        Historical Information   Patient Active Problem List   Diagnosis   • Hyperlipidemia   • Hypothyroidism   • Thrombocytosis   • Rheumatoid arthritis involving multiple sites with positive rheumatoid factor (HCC)   • Lipoma of torso   • Other male erectile dysfunction   • Acute left-sided low back pain with left-sided sciatica   • Lumbar radiculopathy   • Ulcer of right foot, limited to breakdown of skin (HCC)   • Peripheral vascular disease  ([facility])   • Abnormal EKG   • Pre-operative cardiovascular examination   • Ischemic pain of foot   • Former smoker   • Rheumatoid arthritis ([facility])     Past Medical History:   Diagnosis Date   • Arthritis    • Disease of thyroid gland     hypo   • Graves disease 1995   • Hallux limitus, acquired, right    • Hypothyroid     hypo   • Other tear of medial meniscus, current injury, right knee, initial encounter 6/6/2018    Added automatically from request for surgery [id number]   • Platelet disorder ([facility])     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 Jc   • RA (rheumatoid arthritis) ([facility])    • Rheumatoid arthritis involving multiple sites ([facility]) 9/19/2022   • Wears glasses      Past Surgical History:   Procedure Laterality Date   • ANKLE SURGERY Right     ligament, chipped bones,dislocated   • BUNIONECTOMY  03/2014   • COLONOSCOPY     • ELBOW SURGERY Right     tendon surgery   • EPIDURAL BLOCK INJECTION Left 08/16/2023    Procedure: left L5-S1, S1 TRANSFORAMINAL epidural steroid injection (35027, 05896);  Surgeon: Gunner Aguayo DO;  Location: [facility] MAIN OR;  Service: Pain Management    • EPIDURAL BLOCK INJECTION N/A 03/15/2024    Procedure: L5-S1 LUMBAR EPIDURAL STEROID INJECTION;  Surgeon: Chandra Serra MD;  Location: [facility] MAIN OR;  Service: Pain Management    • EPIDURAL BLOCK INJECTION Left 04/26/2024    Procedure: LEFT L5-S1 TRANSFORAMINAL EPIDURAL STEROID INJECTION;  Surgeon: Chandra Serra MD;  Location: [facility] MAIN OR;  Service: Pain Management    • FOOT ARTHRODESIS, MODIFIED ARELLANO Left    • FRACTURE SURGERY Left     elbow   • HERNIA REPAIR Right     inguinal   • IR LOWER EXTREMITY ANGIOGRAM  12/26/2024   • LUMBAR EPIDURAL INJECTION N/A 09/22/2023    Procedure: L5-S1 LUMBAR epidural steroid injection (80011);  Surgeon: Chandra Serra MD;  Location: [facility] MAIN OR;  Service: Pain Management    • SD ARTHRS KNE SURG W/MENISCECTOMY MED/LAT W/SHVG Right 07/09/2018    Procedure: MENISCECTOMY LATERAL /MEDIAL;  Surgeon:  David Sloan MD;  Location: [facility] MAIN OR;  Service: Orthopedics   • IL CORRECTION HAMMERTOE Bilateral 2021    Procedure: REPAIR HAMMERTOE / MALLET TOE / CLAW TOE 2ND toe bilateral;  Surgeon: Filipe Oliva DPM;  Location: [facility] MAIN OR;  Service: Podiatry   • IL CORRJ HLX VLGS BNCTY SESMDC RESCJ PROX PHLX BASE Right 2021    Procedure: BUNIONECTOMY SMITH;  Surgeon: Filipe Oliva DPM;  Location: [facility] MAIN OR;  Service: Podiatry   • IL SLCTV CATHJ 3RD+ ORD SLCTV ABDL PEL/LXTR BRNCH Right 2024    Procedure: ARTERIOGRAM;  Surgeon: Wilbert Shaikh DO;  Location: [facility] Main OR;  Service: Vascular   • IL TEAEC W/WO PATCH GRAFT COMMON FEMORAL Right 2024    Procedure: right CFA endarterectomy Right politeal athlerectomy, angioplasty, and stenting;  Surgeon: Wilbert Shaikh DO;  Location: [facility];  Service: Vascular   • ROTATOR CUFF REPAIR Left    • WISDOM TOOTH EXTRACTION      x4   • WRIST SURGERY Bilateral     Bilateral cyct removals     Social History     Substance and Sexual Activity   Alcohol Use Yes   • Alcohol/week: 3.0 standard drinks of alcohol   • Types: 3 Cans of beer per week    Comment: 3 beers a week     Social History     Substance and Sexual Activity   Drug Use No     Social History     Tobacco Use   Smoking Status Former   • Current packs/day: 0.00   • Average packs/day: 1 pack/day for 25.0 years (25.0 ttl pk-yrs)   • Types: Cigarettes   • Start date: 1975   • Quit date: 2000   • Years since quittin.1   • Passive exposure: Past   Smokeless Tobacco Never     Family History   Problem Relation Age of Onset   • Cancer Mother         passed   • Hypertension Father    • Heart disease Father         leaky valve   • Skin cancer Father    • Diabetes Father      Health Maintenance Due   Topic   • Pneumococcal Vaccine: 65+ Years (1 of 2 - PCV)   • PT PLAN OF CARE    • Colorectal Cancer Screening       Meds/Allergies       Current Outpatient Medications:   •  acetaminophen (TYLENOL) 325 mg  tablet, Take 2 tablets (650 mg total) by mouth every 4 (four) hours as needed for mild pain or moderate pain, Disp: , Rfl:   •  aspirin 81 mg chewable tablet, Chew 81 mg daily, Disp: , Rfl:   •  Cholecalciferol (VITAMIN D3) 1000 units CAPS, Take 1,000 Units by mouth, Disp: , Rfl:   •  clopidogrel (PLAVIX) 75 mg tablet, Take 1 tablet (75 mg total) by mouth daily, Disp: 90 tablet, Rfl: 1  •  Hydroxyurea (HYDREA PO), Take 1,000 mg by mouth Mon-Wed-Fri, Disp: , Rfl:   •  hydroxyurea (HYDREA) 500 mg capsule, Take by mouth On Tues-Thurs-Sat-Sun , Disp: , Rfl:   •  levothyroxine 150 mcg tablet, TAKE 1 TABLET DAILY, Disp: 90 tablet, Rfl: 0  •  Naproxen Sodium (Aleve) 220 MG CAPS, Take 220 mg by mouth daily as needed, Disp: , Rfl:   •  rosuvastatin (CRESTOR) 10 MG tablet, TAKE 1 TABLET DAILY, Disp: 90 tablet, Rfl: 1  •  sildenafil (VIAGRA) 100 mg tablet, Take 1 tablet (100 mg total) by mouth if needed for erectile dysfunction As directed, Disp: 30 tablet, Rfl: 2      Objective:    Vitals:   /80 (BP Location: Right arm, Patient Position: Sitting, Cuff Size: Standard)   Pulse 94   Ht 6' (1.829 m)   Wt 72.5 kg (159 lb 12.8 oz)   SpO2 98%   BMI 21.67 kg/m²   Body mass index is 21.67 kg/m².  Vitals:    02/13/25 0930   Weight: 72.5 kg (159 lb 12.8 oz)       Physical Exam  Vitals and nursing note reviewed.   Constitutional:       Appearance: Normal appearance.   Cardiovascular:      Rate and Rhythm: Normal rate and regular rhythm.      Heart sounds: Normal heart sounds.      Comments: Poor peripheral pulses in the lower extremities  Pulmonary:      Effort: Pulmonary effort is normal.      Breath sounds: Normal breath sounds.   Abdominal:      General: Abdomen is flat.      Palpations: Abdomen is soft.      Tenderness: There is no abdominal tenderness.   Musculoskeletal:      Right lower leg: No edema.      Left lower leg: No edema.      Comments: Lumbar spine movements causing discomfort and pain   Skin:     General:  Skin is warm and dry.   Neurological:      Mental Status: He is alert and oriented to person, place, and time.   Psychiatric:         Mood and Affect: Mood normal.         Behavior: Behavior normal.         Lab Review   Orders Only on 01/22/2025   Component Date Value Ref Range Status   • Protein, Total 01/22/2025 6.5  6.1 - 8.1 g/dL Final   • Albumin 01/22/2025 4.0  3.6 - 5.1 g/dL Final   • Globulin 01/22/2025 2.5  1.9 - 3.7 g/dL (calc) Final   • Albumin/Globulin Ratio 01/22/2025 1.6  1.0 - 2.5 (calc) Final   • TOTAL BILIRUBIN 01/22/2025 0.5  0.2 - 1.2 mg/dL Final   • Bilirubin, Direct 01/22/2025 0.1  < OR = 0.2 mg/dL Final   • Bilirubin, Indirect 01/22/2025 0.4  0.2 - 1.2 mg/dL (calc) Final   • Alkaline Phosphatase 01/22/2025 71  35 - 144 U/L Final   • AST 01/22/2025 13  10 - 35 U/L Final   • ALT 01/22/2025 14  9 - 46 U/L Final   Admission on 12/26/2024, Discharged on 12/27/2024   Component Date Value Ref Range Status   • POC Glucose 12/26/2024 95  65 - 140 mg/dl Final   • Case Report 12/26/2024    Final                    Value:Surgical Pathology Report                         Case: [id number]                                  Authorizing Provider:  Wilbert Shaikh DO          Collected:           12/26/2024 1226              [facility]     [facility]        Received:            12/26/2024 [address] [place] [address] Operating Room                                                     Pathologist:           Miguel Steward MD                                                            Specimen:    Artery, Right Femoral Plaque                                                              • Final Diagnosis 12/26/2024    Final                    Value:A. Artery, Right Femoral Plaque:  Portions of artery wall with severe atherosclerotic changes including focal calcifications         • Note 12/26/2024    Final                    Value:Interpretation performed at [facility], [facility]  "[facility] [id number]       • Additional Information 12/26/2024    Final                    Value:All reported additional testing was performed with appropriately reactive controls.  These tests were developed and their performance characteristics determined by [facility] or appropriate performing facility, though some tests may be performed on tissues which have not been validated for performance characteristics (such as staining performed on alcohol exposed cell blocks and decalcified tissues).  Results should be interpreted with caution and in the context of the patients’ clinical condition. These tests may not be cleared or approved by the U.S. Food and Drug Administration, though the FDA has determined that such clearance or approval is not necessary. These tests are used for clinical purposes and they should not be regarded as investigational or for research. This laboratory has been approved by CLIA 88, designated as a high-complexity laboratory and is qualified to perform these tests.  .     • Gross Description 12/26/2024    Final                    Value:A. The specimen is received in formalin, labeled with the patient's name and hospital number, and is designated \" right femoral plaque.\"  Specimen consists of 5 pieces of tan to brown tubular firmer tissue measuring 6.4 cm long, and up to 0.9 cm wide after reconstruction.  The specimen is serially section revealing thickened darker yellow brittle areas of possible calcification.  Representative sections are submitted for decalcification (decal 2).  Total of 1 cassette.  Note: The estimated total formalin fixation time based upon information provided by the submitting clinician and the standard processing schedule is under 72 hours.  TStevens     • Clinical Information 12/26/2024    Final                    Value:PAD (peripheral artery disease)   • Platelets 12/26/2024 420 (H)  149 - 390 Thousands/uL Final   • MPV " "12/26/2024 8.7 (L)  8.9 - 12.7 fL Final   • Sodium 12/27/2024 137  135 - 147 mmol/L Final   • Potassium 12/27/2024 3.8  3.5 - 5.3 mmol/L Final   • Chloride 12/27/2024 106  96 - 108 mmol/L Final   • CO2 12/27/2024 24  21 - 32 mmol/L Final   • ANION GAP 12/27/2024 7  4 - 13 mmol/L Final   • BUN 12/27/2024 16  5 - 25 mg/dL Final   • Creatinine 12/27/2024 0.86  0.60 - 1.30 mg/dL Final    Standardized to IDMS reference method   • Glucose 12/27/2024 112  65 - 140 mg/dL Final    If the patient is fasting, the ADA then defines impaired fasting glucose as > 100 mg/dL and diabetes as > or equal to 123 mg/dL.   • Calcium 12/27/2024 8.3 (L)  8.4 - 10.2 mg/dL Final   • eGFR 12/27/2024 90  ml/min/1.73sq m Final   • WBC 12/27/2024 11.62 (H)  4.31 - 10.16 Thousand/uL Final   • RBC 12/27/2024 3.97  3.88 - 5.62 Million/uL Final   • Hemoglobin 12/27/2024 14.3  12.0 - 17.0 g/dL Final   • Hematocrit 12/27/2024 40.9  36.5 - 49.3 % Final   • MCV 12/27/2024 103 (H)  82 - 98 fL Final   • MCH 12/27/2024 36.0 (H)  26.8 - 34.3 pg Final   • MCHC 12/27/2024 35.0  31.4 - 37.4 g/dL Final   • RDW 12/27/2024 12.2  11.6 - 15.1 % Final   • Platelets 12/27/2024 399 (H)  149 - 390 Thousands/uL Final   • MPV 12/27/2024 8.4 (L)  8.9 - 12.7 fL Final   • Magnesium 12/27/2024 1.8 (L)  1.9 - 2.7 mg/dL Final   • Phosphorus 12/27/2024 2.2 (L)  2.3 - 4.1 mg/dL Final   Lab Requisition on 12/13/2024   Component Date Value Ref Range Status   • ABO Grouping 12/13/2024 AB   Final   • Rh Factor 12/13/2024 Positive   Final   • Antibody Screen 12/13/2024 Negative   Final   • Specimen Expiration Date 12/13/2024 20250110   Final         Ronnell Coronel MD        \"This note has been constructed using a voice recognition system.Therefore there may be syntax, spelling, and/or grammatical errors. Please call if you have any questions. \"  "

## 2025-02-13 NOTE — ASSESSMENT & PLAN NOTE
I reviewed the report of the surgery by the vascular surgeon patient status post right CFA endarterectomy with patch angioplasty on December 26 stable at present time.  He would be having a repeat Doppler test done in the near future and follow-up with the vascular surgery

## 2025-02-13 NOTE — ASSESSMENT & PLAN NOTE
Currently patient is on statin Crestor 10 mg daily continue with the same follow-up with repeat lipid profile at a later date.

## 2025-02-13 NOTE — ASSESSMENT & PLAN NOTE
Right foot ulcer there is some scab formation on the right little toe at this time being followed by the podiatrist as well as the vascular surgeon patient status post angioplasty

## 2025-02-13 NOTE — ASSESSMENT & PLAN NOTE
Patient still experiences pain in the low back area but much better compared to before pain is more s0    With activity while at rest and not much of an activity patient does not have pain

## 2025-02-13 NOTE — ASSESSMENT & PLAN NOTE
Currently patient is taking levothyroxine 150 mcg daily continue with the same follow-up with repeat TSH later

## 2025-02-13 NOTE — ASSESSMENT & PLAN NOTE
Patient is currently taking hydroxyurea 1000 mg alternating with 500 last CBC showed platelet count of 522,000 will follow-up with the pediatric labs

## 2025-02-26 ENCOUNTER — TELEPHONE (OUTPATIENT)
Age: 65
End: 2025-02-26

## 2025-02-26 ENCOUNTER — OFFICE VISIT (OUTPATIENT)
Dept: CARDIOLOGY CLINIC | Facility: CLINIC | Age: 65
End: 2025-02-26
Payer: MEDICARE

## 2025-02-26 VITALS
BODY MASS INDEX: 21.54 KG/M2 | WEIGHT: 159 LBS | OXYGEN SATURATION: 98 % | SYSTOLIC BLOOD PRESSURE: 168 MMHG | DIASTOLIC BLOOD PRESSURE: 84 MMHG | HEART RATE: 89 BPM | HEIGHT: 72 IN | RESPIRATION RATE: 16 BRPM

## 2025-02-26 DIAGNOSIS — E78.2 MIXED HYPERLIPIDEMIA: Primary | ICD-10-CM

## 2025-02-26 DIAGNOSIS — R03.0 ELEVATED BP WITHOUT DIAGNOSIS OF HYPERTENSION: ICD-10-CM

## 2025-02-26 DIAGNOSIS — I73.9 PERIPHERAL VASCULAR DISEASE (HCC): ICD-10-CM

## 2025-02-26 PROCEDURE — 99214 OFFICE O/P EST MOD 30 MIN: CPT | Performed by: STUDENT IN AN ORGANIZED HEALTH CARE EDUCATION/TRAINING PROGRAM

## 2025-02-26 NOTE — ASSESSMENT & PLAN NOTE
Patient with elevated blood pressure today's visit, of note he was very uncomfortable with his recent surgery and walking around our exam room.  The patient check his blood pressures at home 3 times a week and send us a BP log to see if we need to start antihypertensive regiment.  Given information on DASH diet and lifestyle changes

## 2025-02-26 NOTE — PATIENT INSTRUCTIONS
1) Check BP 3x a week in AM as discussed  2) DASH diet   3) Recommend increasing rosuvastatin 40 mg   4) Lipid panel in 1 month

## 2025-02-26 NOTE — TELEPHONE ENCOUNTER
"Pt was seen in office today by Dr. Leo. Received call from pt asking if he should get the ordered blood work (lipid panel) now or not. Advised pt per office visit note, \"Given the patient's recent peripheral vascular intervention I would recommend he be on high intensity statin, the patient does not want to increase this medication at this time. Would want to repeat his lipid panel and see what his LDL is and then decide if he wants to start a cholesterol medication at that time. \"    He voiced understanding. No further questions.   "

## 2025-02-26 NOTE — ASSESSMENT & PLAN NOTE
S/p right CFA endarterectomy, right popliteal arthrectomy, and stenting and 12/2024.  Currently on DAPT with plan for surveillance duplex.  Recommend increasing rosuvastatin, patient would like to hold off at this time.  Require aggressive risk factor modification to prevent future ASCVD events

## 2025-02-26 NOTE — PROGRESS NOTES
[facility] Cardiology  Follow up note  Agapito Martell 64 y.o. male MRN: [id number]        1. Mixed hyperlipidemia  -     Lipid Panel with Direct LDL reflex; Future  -     Lipid Panel with Direct LDL reflex  2. Elevated BP without diagnosis of hypertension  3. Peripheral vascular disease (HCC)      Assessment & Plan  Mixed hyperlipidemia  Given the patient's recent peripheral vascular intervention I would recommend he be on high intensity statin, the patient does not want to increase this medication at this time.  Would want to repeat his lipid panel and see what his LDL is and then decide if he wants to start a cholesterol medication at that time.  Given the fact that he does have peripheral vascular disease he likely has arterial disease in multiple vascular beds and would benefit from a higher dose.  He was aware of this and would still like to follow-up his lipid panel.  Elevated BP without diagnosis of hypertension  Patient with elevated blood pressure today's visit, of note he was very uncomfortable with his recent surgery and walking around our exam room.  The patient check his blood pressures at home 3 times a week and send us a BP log to see if we need to start antihypertensive regiment.  Given information on DASH diet and lifestyle changes  Peripheral vascular disease (HCC)  S/p right CFA endarterectomy, right popliteal arthrectomy, and stenting and 12/2024.  Currently on DAPT with plan for surveillance duplex.  Recommend increasing rosuvastatin, patient would like to hold off at this time.  Require aggressive risk factor modification to prevent future ASCVD events        HPI:   Agapito Martell is a 64 y.o. year old male history of hypothyroidism, hyperlipidemia, rheumatoid arthritis, thrombocytosis, former smoker, social ETOH user     BP is high but discomfort in leg.Patient was last seen in my clinic 12/2024 for preoperative risk assessment prior to peripheral vascular disease with right CFA endarterectomy,  right popliteal arthrectomy, and stenting.  Currently on DAPT with plan for surveillance duplex    Currently denies any fever, chills, fatigue, new visual changes, lightheadedness, syncope, chest pain, palpitations, shortness of breath at rest or with exertion, orthopnea, PND, nausea, vomiting, diarrhea, dark or bright red blood in stools, lower extremity swelling.    Reports lower right extremity is healing but is associated with some pain.    Review of Systems    Past Medical History:   Diagnosis Date    Arthritis     Disease of thyroid gland     hypo    Graves disease     Hallux limitus, acquired, right     Hypothyroid     hypo    Other tear of medial meniscus, current injury, right knee, initial encounter 2018    Added automatically from request for surgery [id number]    Platelet disorder ([facility])     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 Jc    RA (rheumatoid arthritis) ([facility])     Rheumatoid arthritis involving multiple sites ([facility]) 2022    Wears glasses      Social History     Substance and Sexual Activity   Alcohol Use Yes    Alcohol/week: 3.0 standard drinks of alcohol    Types: 3 Cans of beer per week    Comment: 3 beers a week     Social History     Substance and Sexual Activity   Drug Use No     Social History     Tobacco Use   Smoking Status Former    Current packs/day: 0.00    Average packs/day: 1 pack/day for 25.0 years (25.0 ttl pk-yrs)    Types: Cigarettes    Start date: 1975    Quit date: 2000    Years since quittin.1    Passive exposure: Past   Smokeless Tobacco Never       Allergies:  No Known Allergies    Medications:     Current Outpatient Medications:     acetaminophen (TYLENOL) 325 mg tablet, Take 2 tablets (650 mg total) by mouth every 4 (four) hours as needed for mild pain or moderate pain, Disp: , Rfl:     aspirin 81 mg chewable tablet, Chew 81 mg daily, Disp: , Rfl:     Cholecalciferol (VITAMIN D3) 1000 units CAPS, Take 1,000 Units by mouth, Disp: , Rfl:     clopidogrel (PLAVIX)  75 mg tablet, Take 1 tablet (75 mg total) by mouth daily, Disp: 90 tablet, Rfl: 1    Hydroxyurea (HYDREA PO), Take 1,000 mg by mouth Mon-Wed-Fri, Disp: , Rfl:     hydroxyurea (HYDREA) 500 mg capsule, Take by mouth On Tues-Thurs-Sat-Sun , Disp: , Rfl:     levothyroxine 150 mcg tablet, TAKE 1 TABLET DAILY, Disp: 90 tablet, Rfl: 0    Naproxen Sodium (Aleve) 220 MG CAPS, Take 220 mg by mouth daily as needed, Disp: , Rfl:     rosuvastatin (CRESTOR) 10 MG tablet, TAKE 1 TABLET DAILY, Disp: 90 tablet, Rfl: 1    sildenafil (VIAGRA) 100 mg tablet, Take 1 tablet (100 mg total) by mouth if needed for erectile dysfunction As directed, Disp: 30 tablet, Rfl: 2      Vitals:    02/26/25 1127   BP: 168/84   Pulse: 89   Resp: 16   SpO2: 98%     Weight (last 2 days)       Date/Time Weight    02/26/25 1127 72.1 (159)          Physical Exam  Vitals and nursing note reviewed.   Constitutional:       General: He is not in acute distress.     Appearance: He is well-developed.   HENT:      Head: Normocephalic and atraumatic.   Eyes:      Conjunctiva/sclera: Conjunctivae normal.   Cardiovascular:      Rate and Rhythm: Normal rate and regular rhythm.      Heart sounds: No murmur heard.  Pulmonary:      Effort: Pulmonary effort is normal. No respiratory distress.      Breath sounds: Normal breath sounds.   Abdominal:      Palpations: Abdomen is soft.      Tenderness: There is no abdominal tenderness.   Musculoskeletal:      Cervical back: Neck supple.   Skin:     General: Skin is warm and dry.      Capillary Refill: Capillary refill takes less than 2 seconds.   Neurological:      Mental Status: He is alert.   Psychiatric:         Mood and Affect: Mood normal.         Laboratory Studies:    Laboratory studies personally reviewed      Lab Results   Component Value Date    SODIUM 137 12/27/2024    K 3.8 12/27/2024     12/27/2024    CO2 24 12/27/2024    BUN 16 12/27/2024    CREATININE 0.86 12/27/2024    GLUC 112 12/27/2024    CALCIUM 8.3 (L)  "12/27/2024     Lab Results   Component Value Date    HGBA1C 5.3 05/08/2024     Lab Results   Component Value Date    WBC 11.62 (H) 12/27/2024    HGB 14.3 12/27/2024    HCT 40.9 12/27/2024     (H) 12/27/2024     (H) 12/27/2024     Lab Results   Component Value Date    LDLCALC 109 (H) 04/28/2023     Lab Results   Component Value Date    CHOLESTEROL 190 04/28/2023     Lab Results   Component Value Date    HDL 67 04/28/2023     Lab Results   Component Value Date    TRIG 54 04/28/2023     No results found for: \"NONHDLC\"      Cardiac testing:     EKG reviewed personally:   12/13/2024: NSR, RBITZ, ANNITA Leo MD    Portions of the record may have been created with voice recognition software.  Occasional wrong word or \"sound a like\" substitutions may have occurred due to the inherent limitations of voice recognition software.  Read the chart carefully and recognize, using context, where substitutions have occurred.   "

## 2025-02-26 NOTE — ASSESSMENT & PLAN NOTE
Given the patient's recent peripheral vascular intervention I would recommend he be on high intensity statin, the patient does not want to increase this medication at this time.  Would want to repeat his lipid panel and see what his LDL is and then decide if he wants to start a cholesterol medication at that time.  Given the fact that he does have peripheral vascular disease he likely has arterial disease in multiple vascular beds and would benefit from a higher dose.  He was aware of this and would still like to follow-up his lipid panel.

## 2025-03-05 ENCOUNTER — RESULTS FOLLOW-UP (OUTPATIENT)
Dept: CARDIOLOGY CLINIC | Facility: CLINIC | Age: 65
End: 2025-03-05

## 2025-03-05 LAB
CHOLEST SERPL-MCNC: 137 MG/DL
CHOLEST/HDLC SERPL: 2.4 (CALC)
HDLC SERPL-MCNC: 56 MG/DL
LDLC SERPL CALC-MCNC: 59 MG/DL (CALC)
NONHDLC SERPL-MCNC: 81 MG/DL (CALC)
TRIGL SERPL-MCNC: 137 MG/DL

## 2025-03-10 NOTE — TELEPHONE ENCOUNTER
Pt called again to say he is still getting a bill for this appt from [employer]. I called [facility] [employer] and spoke Kassi who advised me on approx 1/15/25 the new code was resubmitted to insurance and is still pending. Pt aware and understands.

## 2025-03-11 ENCOUNTER — TELEPHONE (OUTPATIENT)
Age: 65
End: 2025-03-11

## 2025-03-11 NOTE — TELEPHONE ENCOUNTER
Pt got a letter from his prior insurance, [facility], dated 3/5, asking Dr. Coronel to provide notes, med records 12/15/23 to 2/15/2024 that would show evidence of chronic liver disease. Pt said it is in regards to an u/s from 2/15/2024 from Dr. Lopez, not at . The letter does not have info on how to get it to them. Member services number is [phone], [facility], [address] [id number]. He also said the letter said Dr. Coronel got a copy of this letter. Call pt if you need a copy of the letter. Pt said he does not think he has chronic liver disease.

## 2025-03-28 ENCOUNTER — HOSPITAL ENCOUNTER (OUTPATIENT)
Dept: RADIOLOGY | Facility: HOSPITAL | Age: 65
Discharge: HOME | End: 2025-03-28
Payer: MEDICARE

## 2025-03-28 DIAGNOSIS — I73.9 PERIPHERAL ARTERIAL DISEASE (HCC): ICD-10-CM

## 2025-03-28 DIAGNOSIS — L97.511 ULCER OF RIGHT FOOT, LIMITED TO BREAKDOWN OF SKIN (HCC): ICD-10-CM

## 2025-03-28 PROCEDURE — 93923 UPR/LXTR ART STDY 3+ LVLS: CPT

## 2025-03-28 PROCEDURE — 93925 LOWER EXTREMITY STUDY: CPT | Performed by: SURGERY

## 2025-03-28 PROCEDURE — 93922 UPR/L XTREMITY ART 2 LEVELS: CPT | Performed by: SURGERY

## 2025-03-28 PROCEDURE — 93925 LOWER EXTREMITY STUDY: CPT

## 2025-03-31 ENCOUNTER — RESULTS FOLLOW-UP (OUTPATIENT)
Dept: VASCULAR SURGERY | Facility: CLINIC | Age: 65
End: 2025-03-31

## 2025-04-10 NOTE — PROGRESS NOTES
Name: Agapito Martell      : 1960      MRN: [id number]  Encounter Provider: Wilbert Shaikh DO  Encounter Date: 2025   Enc[facility]: [facility] [place]  :  Assessment & Plan  Peripheral vascular disease (HCC)  64-year-old male known to me presents for follow-up for PAD.  I saw him at the end of last year he had minor tissue loss on the right fifth toe.  He subsequently had a right common femoral endarterectomy by myself right after [place] of last year with a retrograde recanalization of his occluded right popliteal artery.  This was treated ultimately with orbital atherectomy drug-coated balloon angioplasty and superior stent.  This was complicated by significant vasospasm during the procedure ultimately this intervention was technically successful he recovered very well from his endarterectomy with no wound complications.  He is compliant with his aspirin Plavix and statin and is not smoking.  Since that time his wound has not completely healed it has been 3-1/2 months it is making slow progress but not completely healed yet he sees Dr. Samano for podiatry for wound care.  He had surveillance duplex imaging that was reviewed with him that demonstrates his popliteal stent is occluded his NANY is 0.65 and his toe pressure is greater than 50.  On exam he has brisk multiphasic posterior tibial and dorsalis pedis Doppler signals he denies any ischemic rest pain.  I reviewed this testing with him and explained the outcome of his popliteal intervention at this time since his wound is still making progress albeit slowly I do not think proceeding with further revascularization is indicated at this time.  He would require a distal bypass and I do not know the status of his great saphenous vein.  At this time we will continue local wound care repeat a lower extremity arterial duplex in 6 months and he will see me back in 6 months.  Will also plan for carotid stenosis screening.    Orders:    VAS  carotid complete study; Future    VAS ARTERIAL DUPLEX- LOWER LIMB BILATERAL; Future    Cerebral atherosclerosis    Orders:    VAS carotid complete study; Future        History of Present Illness     Patient presents today to review WILBER done 3/28/25. He is s/p R CFA endarterectomy and popliteal stent 12/26/24. Reports improvement in R leg and R 5th toe wound.     HPI  Agapito Martell is a 64 y.o. male  History obtained from: patient    Review of Systems   Constitutional: Negative.    HENT: Negative.     Eyes: Negative.    Respiratory: Negative.     Cardiovascular: Negative.    Gastrointestinal: Negative.    Endocrine: Negative.    Genitourinary: Negative.    Musculoskeletal:  Positive for arthralgias and back pain.   Skin:  Positive for wound.   Allergic/Immunologic: Negative.    Neurological: Negative.    Hematological: Negative.    Psychiatric/Behavioral: Negative.       I have reviewed the ROS above and made changes as needed.         Objective   /80 (BP Location: Right arm, Patient Position: Sitting)   Pulse 64   Ht 6' (1.829 m)   Wt 73 kg (161 lb)   BMI 21.84 kg/m²      Physical Exam    General  Exam: alert, awake, oriented, no distress, consistent with stated age    Head and Neck  Exam: supple, right carotid bruit    Chest and Lung  Exam: chest normal without deformity, bilaterally expansive, clear to auscultation    Cardiovascular  Exam: regular rate, regular rhythm, no murmurs, no rubs or gallops    Adbomen  Exam: soft, non-tender, non-distended, no pulsatile abdominal masses, no abdominal bruit      Upper Extremity:  Palpation: Radial pulse- Bilateral 2+    Lower Extremity:  Palpation: Femoral pulse- Bilateral 2+          Right DP +ve multiphasic DP and PT   Right foot ruborous, right 5th toe wound, clean appearing    Neurologic  Exam:alert, non-focal        Administrative Statements   I have spent a total time of 30 minutes in caring for this patient on the day of the visit/encounter including  Counseling / Coordination of care, Documenting in the medical record, and Reviewing/placing orders in the medical record (including tests, medications, and/or procedures).

## 2025-04-11 ENCOUNTER — OFFICE VISIT (OUTPATIENT)
Dept: VASCULAR SURGERY | Facility: CLINIC | Age: 65
End: 2025-04-11
Payer: MEDICARE

## 2025-04-11 ENCOUNTER — TELEPHONE (OUTPATIENT)
Dept: VASCULAR SURGERY | Facility: CLINIC | Age: 65
End: 2025-04-11

## 2025-04-11 VITALS
WEIGHT: 161 LBS | BODY MASS INDEX: 21.81 KG/M2 | HEART RATE: 64 BPM | HEIGHT: 72 IN | SYSTOLIC BLOOD PRESSURE: 136 MMHG | DIASTOLIC BLOOD PRESSURE: 80 MMHG

## 2025-04-11 DIAGNOSIS — I67.2 CEREBRAL ATHEROSCLEROSIS: ICD-10-CM

## 2025-04-11 DIAGNOSIS — I73.9 PERIPHERAL VASCULAR DISEASE (HCC): Primary | ICD-10-CM

## 2025-04-11 PROBLEM — M79.673 ISCHEMIC PAIN OF FOOT: Status: RESOLVED | Noted: 2024-12-26 | Resolved: 2025-04-11

## 2025-04-11 PROBLEM — I99.8 ISCHEMIC PAIN OF FOOT: Status: RESOLVED | Noted: 2024-12-26 | Resolved: 2025-04-11

## 2025-04-11 PROCEDURE — 99215 OFFICE O/P EST HI 40 MIN: CPT | Performed by: SURGERY

## 2025-04-11 NOTE — ASSESSMENT & PLAN NOTE
64-year-old male known to me presents for follow-up for PAD.  I saw him at the end of last year he had minor tissue loss on the right fifth toe.  He subsequently had a right common femoral endarterectomy by myself right after [place] of last year with a retrograde recanalization of his occluded right popliteal artery.  This was treated ultimately with orbital atherectomy drug-coated balloon angioplasty and superior stent.  This was complicated by significant vasospasm during the procedure ultimately this intervention was technically successful he recovered very well from his endarterectomy with no wound complications.  He is compliant with his aspirin Plavix and statin and is not smoking.  Since that time his wound has not completely healed it has been 3-1/2 months it is making slow progress but not completely healed yet he sees Dr. Samano for podiatry for wound care.  He had surveillance duplex imaging that was reviewed with him that demonstrates his popliteal stent is occluded his NANY is 0.65 and his toe pressure is greater than 50.  On exam he has brisk multiphasic posterior tibial and dorsalis pedis Doppler signals he denies any ischemic rest pain.  I reviewed this testing with him and explained the outcome of his popliteal intervention at this time since his wound is still making progress albeit slowly I do not think proceeding with further revascularization is indicated at this time.  He would require a distal bypass and I do not know the status of his great saphenous vein.  At this time we will continue local wound care repeat a lower extremity arterial duplex in 6 months and he will see me back in 6 months.  Will also plan for carotid stenosis screening.    Orders:    VAS carotid complete study; Future    VAS ARTERIAL DUPLEX- LOWER LIMB BILATERAL; Future

## 2025-04-11 NOTE — TELEPHONE ENCOUNTER
This is a reminder; patient is due for per Dr. Shaikh 6 MONTH OFFICE VISIT . Please call patient and schedule the following by the dates provided.    Patient's appointment(s) are due on or after 10/15/25.    Dopplers  [] Abdominal Aorta Iliac (AOIL)  [x] Carotid (CV) scheduled for 10/15/25  [] Celiac and/or Mesenteric  [] Endovascular Aortic Repair (EVAR)   [] Hemodialysis Access (HD)   [x] Lower Limb Arterial (WILBER) scheduled for 10/15/25  [] Lower Limb Venous (LEV)  [] Lower Limb Venous Duplex with Reflux (LEVDR)  [] Renal Artery  [] Upper Limb Arterial (UEA)    [] Upper Limb Venous (UEV)              [] NANY and Waveform analysis     Advanced Imaging   [] CTA head/neck    [] CTA abdomen    [] CTA abdomen & pelvis    [] CT abdomen with/ without contrast  [] CT abdomen with contrast  [] CT abdomen without contrast    [] CT abdomen & pelvis with/ without contrast  [] CT abdomen & pelvis with contrast  [] CT abdomen & pelvis without contrast    Office Visit   [] New patient, patient last seen over 3 years ago  [] Risk factor modification (RFM)   [x] Follow up last seen 4/11/25  [] Lost to follow up (LTFU)

## 2025-04-16 ENCOUNTER — TELEPHONE (OUTPATIENT)
Age: 65
End: 2025-04-16

## 2025-04-16 ENCOUNTER — OFFICE VISIT (OUTPATIENT)
Age: 65
End: 2025-04-16

## 2025-04-16 VITALS
HEART RATE: 71 BPM | BODY MASS INDEX: 21.67 KG/M2 | DIASTOLIC BLOOD PRESSURE: 84 MMHG | WEIGHT: 160 LBS | SYSTOLIC BLOOD PRESSURE: 142 MMHG | HEIGHT: 72 IN

## 2025-04-16 DIAGNOSIS — Z12.11 SCREENING FOR COLON CANCER: Primary | ICD-10-CM

## 2025-04-16 DIAGNOSIS — Z12.11 COLON CANCER SCREENING: Primary | ICD-10-CM

## 2025-04-16 RX ORDER — SODIUM CHLORIDE, SODIUM LACTATE, POTASSIUM CHLORIDE, CALCIUM CHLORIDE 600; 310; 30; 20 MG/100ML; MG/100ML; MG/100ML; MG/100ML
125 INJECTION, SOLUTION INTRAVENOUS CONTINUOUS
OUTPATIENT
Start: 2025-04-16

## 2025-04-16 NOTE — TELEPHONE ENCOUNTER
Sent clearance to [facility] Sol[facility] to get permission to hold Plavix 5 days prior to his colonoscopy on 5/6 with Dr. Carpenter. Will follow up if not returned in about a week.   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

## 2025-04-16 NOTE — PATIENT INSTRUCTIONS
Scheduled date of colonoscopy (as of today):05/06/25  Physician performing colonoscopy:Dr. Carpenter  Location of colonoscopy:[facility]  Bowel prep reviewed with patient:Sutab  Instructions reviewed with patient by:harish  Clearances:  Dr. Shaikh to hold Plavix 5 days

## 2025-04-16 NOTE — TELEPHONE ENCOUNTER
Called and informed patient he may hold Plavix 5 days prior and I will be sending him the Golytely instructions and have them send into the pharmacy as well for him.

## 2025-04-16 NOTE — TELEPHONE ENCOUNTER
Dr. Shaikh    Our mutual patient is scheduled for procedure: Colonoscopy    On: __5__/_ 6   _/_ 25   _      With: Dr. Carpenter_________    He/She is taking the following blood thinner:   Plavix       Can this be stopped ___5___ days prior to the procedure?      Physician Approving clearance: ________________________    Thank you!

## 2025-04-16 NOTE — TELEPHONE ENCOUNTER
Patient called in to make the office aware Sutab is not covered will need New prep Rx sent to [facility] on file and [facility]

## 2025-04-16 NOTE — PROGRESS NOTES
Name: Agapito Martell      : 1960      MRN: [id number]  Encounter Provider: Cheryl Alejandra PA-C  Encounter Date: 2025   Encounter department: [facility] [place]  :  Assessment & Plan  Screening for colon cancer  Last colonoscopy in 2019 as below. Repeat colonoscopy was recommended in 5 years so patient is overdue. Patient is on Plavix which is prescribed by his vascular surgeon so will need to obtain clearance prior to procedure. Patient requesting Sutab prep as he would rather take pills than drink prep. Orders placed.    Orders:    Colonoscopy; Future    Sodium Sulfate-Mag Sulfate-KCl 9398-565-808 MG TABS; Take 188 mg by mouth see administration instructions Take as directed by office prior to procedure.        History of Present Illness   Agapito Martell is a 65 y.o. male who presents to the office for evaluation for colonoscopy. Last colonoscopy in 2019 with findings of diverticulosis scattered throughout the colon and 3 small polyps were removed which we found to be hyperplastic on pathology. Colonoscopy was recommended again in 5 years. Patient had recent surgery in 2024, underwent right CFA endarterectomy due to PAD. He reports that he is doing well after surgery. He denies any GI complaints including nausea, vomiting, heartburn, reflux, dysphagia, odynophagia, abdominal pain, change in bowel habits, constipation, diarrhea, blood in stools, black stools, unintentional weight loss. Reports that he moves his bowels regularly every day. No known family history of colon cancer. He reports that he quit smoking about 25 years ago, denies recreational drug use, and drinks 2-3 beers socially about once per week when he goes out to dinner.       HPI    Review of Systems A complete review of systems is negative other than that noted above in the HPI.         Objective   There were no vitals taken for this visit.    Physical Exam  Vitals reviewed.    Constitutional:       General: He is not in acute distress.     Appearance: Normal appearance.   Eyes:      Conjunctiva/sclera: Conjunctivae normal.   Cardiovascular:      Rate and Rhythm: Normal rate and regular rhythm.   Pulmonary:      Effort: Pulmonary effort is normal. No respiratory distress.      Breath sounds: Normal breath sounds.   Abdominal:      General: Bowel sounds are normal.      Palpations: Abdomen is soft.      Tenderness: There is no abdominal tenderness. There is no guarding or rebound.   Skin:     General: Skin is warm and dry.   Neurological:      General: No focal deficit present.      Mental Status: He is alert.   Psychiatric:         Mood and Affect: Mood normal.         Behavior: Behavior normal.          Lab Results: I personally reviewed relevant lab results.

## 2025-04-17 DIAGNOSIS — E03.9 ACQUIRED HYPOTHYROIDISM: ICD-10-CM

## 2025-04-18 RX ORDER — LEVOTHYROXINE SODIUM 150 UG/1
150 TABLET ORAL DAILY
Qty: 90 TABLET | Refills: 1 | Status: SHIPPED | OUTPATIENT
Start: 2025-04-18

## 2025-04-22 ENCOUNTER — ANESTHESIA (OUTPATIENT)
Dept: ANESTHESIOLOGY | Facility: HOSPITAL | Age: 65
End: 2025-04-22

## 2025-04-22 ENCOUNTER — ANESTHESIA EVENT (OUTPATIENT)
Dept: ANESTHESIOLOGY | Facility: HOSPITAL | Age: 65
End: 2025-04-22

## 2025-04-30 ENCOUNTER — OFFICE VISIT (OUTPATIENT)
Dept: FAMILY MEDICINE CLINIC | Facility: CLINIC | Age: 65
End: 2025-04-30
Payer: MEDICARE

## 2025-04-30 VITALS
WEIGHT: 159.6 LBS | SYSTOLIC BLOOD PRESSURE: 135 MMHG | OXYGEN SATURATION: 99 % | HEIGHT: 72 IN | BODY MASS INDEX: 21.62 KG/M2 | DIASTOLIC BLOOD PRESSURE: 80 MMHG | HEART RATE: 91 BPM

## 2025-04-30 DIAGNOSIS — R73.03 PREDIABETES: ICD-10-CM

## 2025-04-30 DIAGNOSIS — Z13.0 SCREENING FOR DEFICIENCY ANEMIA: ICD-10-CM

## 2025-04-30 DIAGNOSIS — D75.839 THROMBOCYTOSIS: ICD-10-CM

## 2025-04-30 DIAGNOSIS — M06.9 RHEUMATOID ARTHRITIS, INVOLVING UNSPECIFIED SITE, UNSPECIFIED WHETHER RHEUMATOID FACTOR PRESENT (HCC): ICD-10-CM

## 2025-04-30 DIAGNOSIS — Z87.891 FORMER SMOKER: ICD-10-CM

## 2025-04-30 DIAGNOSIS — M54.16 LUMBAR RADICULOPATHY: ICD-10-CM

## 2025-04-30 DIAGNOSIS — L97.511 ULCER OF RIGHT FOOT, LIMITED TO BREAKDOWN OF SKIN (HCC): ICD-10-CM

## 2025-04-30 DIAGNOSIS — I73.9 PERIPHERAL VASCULAR DISEASE (HCC): Primary | ICD-10-CM

## 2025-04-30 DIAGNOSIS — R03.0 ELEVATED BP WITHOUT DIAGNOSIS OF HYPERTENSION: ICD-10-CM

## 2025-04-30 DIAGNOSIS — E03.9 ACQUIRED HYPOTHYROIDISM: ICD-10-CM

## 2025-04-30 DIAGNOSIS — N52.8 OTHER MALE ERECTILE DYSFUNCTION: ICD-10-CM

## 2025-04-30 DIAGNOSIS — E78.5 HYPERLIPIDEMIA, UNSPECIFIED HYPERLIPIDEMIA TYPE: ICD-10-CM

## 2025-04-30 PROCEDURE — G2211 COMPLEX E/M VISIT ADD ON: HCPCS | Performed by: INTERNAL MEDICINE

## 2025-04-30 PROCEDURE — 99214 OFFICE O/P EST MOD 30 MIN: CPT | Performed by: INTERNAL MEDICINE

## 2025-04-30 NOTE — ASSESSMENT & PLAN NOTE
Currently patient will consider is gradually healing the right knee lead to especially after recanalization in the right lower extremity arteries.

## 2025-04-30 NOTE — ASSESSMENT & PLAN NOTE
Currently patient is taking hydroxyurea 1000 mg alternating with 5 mg follow-up with repeat CBC

## 2025-04-30 NOTE — ASSESSMENT & PLAN NOTE
Patient is currently taking levothyroxine 150 mcg daily we will continue with the same TSH level in couple of months.  Orders:  •  TSH, 3rd generation with Free T4 reflex; Future

## 2025-04-30 NOTE — ASSESSMENT & PLAN NOTE
Patient blood pressure today is 134/80 patient has been however taking Aleve for pain discomfort in the right foot which may be to some extent causing elevation in the blood pressure readings.  Recommend patient to check the blood pressures at home and bring the readings and also to cut back on salt intake and lifestyle modification.

## 2025-04-30 NOTE — PROGRESS NOTES
Name: Agapito Martell      : 1960      MRN: [id number]  Encounter Provider: Ronnell Coronel MD  Encounter Date: 2025   Encounter department: [facility]  :  Assessment & Plan  Peripheral vascular disease (HCC)  Vascular surgeon note appreciated patient with history of PAD status post common femoral endarterectomy on the right side with retrograde recanalization of his occluded right popliteal artery subsequently had atherectomy and drug-coated balloon angioplasty and superior stent placement.  Currently on aspirin rosuvastatin and Plavix however Plavix will be on hold because of the colonoscopy on the fifth of this month.       Elevated BP without diagnosis of hypertension  Patient blood pressure today is 134/80 patient has been however taking Aleve for pain discomfort in the right foot which may be to some extent causing elevation in the blood pressure readings.  Recommend patient to check the blood pressures at home and bring the readings and also to cut back on salt intake and lifestyle modification.       Former smoker  Patient had quit smoking 25 years ago       Hyperlipidemia, unspecified hyperlipidemia type  Patient is currently taking Crestor 10 mg at bedtime lipid profile done on  shows cholesterol 137 HDL 56 triglycerides 137 and LDL at 59 will continue with the same dose for now follow-up with repeat lipid profile in couple of months and reevaluate.  Orders:  •  Lipid panel; Future    Acquired hypothyroidism  Patient is currently taking levothyroxine 150 mcg daily we will continue with the same TSH level in couple of months.  Orders:  •  TSH, 3rd generation with Free T4 reflex; Future    Lumbar radiculopathy  On and off patient still gets low back discomfort feeling but much better compared to before we will monitor       Thrombocytosis  Currently patient is taking hydroxyurea 1000 mg alternating with 5 mg follow-up with repeat CBC       Ulcer of right foot, limited  to breakdown of skin (HCC)  Currently patient will consider is gradually healing the right knee lead to especially after recanalization in the right lower extremity arteries.       Other male erectile dysfunction  On sildenafil 100 mg as needed       Rheumatoid arthritis, involving unspecified site, unspecified whether rheumatoid factor present (HCC)         Screening for deficiency anemia    Orders:  •  CBC and differential; Future    Prediabetes    Orders:  •  Comprehensive metabolic panel; Future  •  Hemoglobin A1C; Future          Depression Screening and Follow-up Plan: Patient was screened for depression during today's encounter. They screened negative with a PHQ-2 score of 0.        History of Present Illness   Patient is coming here for a follow-up evaluation with regards to the symptoms of his peripheral vascular disease, hypothyroidism, thrombocytosis.  Patient had an ulcer of the right foot limited to the breakdown of the skin which has been gradually healing ever since he had a stent placement and endarterectomy done.    I reviewed the reports from the cardiologist with regards to the cholesterol LDL came down to 59 on rosuvastatin.  Patient was also seen by the vascular surgeon in the recent past I reviewed all the reports.  Patient to have a repeat Doppler studies done in the near future but is also going to get carotid Doppler studies done to make sure there is no carotid artery stenosis developing.    Medications reviewed labs reviewed.  Patient is also scheduled for colonoscopy on May 5.      Review of Systems   Constitutional:  Negative for chills and fever.   HENT:  Negative for ear pain and sore throat.    Eyes:  Negative for pain and visual disturbance.   Respiratory:  Negative for cough and shortness of breath.    Cardiovascular:  Negative for chest pain and palpitations.   Gastrointestinal:  Negative for abdominal pain and vomiting.   Genitourinary:  Negative for dysuria and hematuria.    Musculoskeletal:  Positive for arthralgias and back pain.   Skin:  Negative for color change and rash.   Neurological:  Negative for seizures and syncope.   All other systems reviewed and are negative.      Objective   /80 (BP Location: Right arm, Patient Position: Sitting, Cuff Size: Standard)   Pulse 91   Ht 6' (1.829 m)   Wt 72.4 kg (159 lb 9.6 oz)   SpO2 99%   BMI 21.65 kg/m²      Physical Exam  Vitals and nursing note reviewed.   Constitutional:       General: He is not in acute distress.     Appearance: He is well-developed.   HENT:      Head: Normocephalic and atraumatic.   Eyes:      Conjunctiva/sclera: Conjunctivae normal.   Cardiovascular:      Rate and Rhythm: Normal rate and regular rhythm.      Heart sounds: No murmur heard.     Comments: Poor peripheral pulses  Pulmonary:      Effort: Pulmonary effort is normal. No respiratory distress.      Breath sounds: Normal breath sounds.   Abdominal:      Palpations: Abdomen is soft.      Tenderness: There is no abdominal tenderness.   Musculoskeletal:         General: No swelling.      Cervical back: Neck supple.   Skin:     General: Skin is warm and dry.      Capillary Refill: Capillary refill takes less than 2 seconds.   Neurological:      Mental Status: He is alert.   Psychiatric:         Mood and Affect: Mood normal.     No results found for this or any previous visit (from the past 8 weeks).

## 2025-04-30 NOTE — ASSESSMENT & PLAN NOTE
On and off patient still gets low back discomfort feeling but much better compared to before we will monitor

## 2025-04-30 NOTE — ASSESSMENT & PLAN NOTE
Vascular surgeon note appreciated patient with history of PAD status post common femoral endarterectomy on the right side with retrograde recanalization of his occluded right popliteal artery subsequently had atherectomy and drug-coated balloon angioplasty and superior stent placement.  Currently on aspirin rosuvastatin and Plavix however Plavix will be on hold because of the colonoscopy on the fifth of this month.

## 2025-04-30 NOTE — ASSESSMENT & PLAN NOTE
Patient is currently taking Crestor 10 mg at bedtime lipid profile done on March 4 shows cholesterol 137 HDL 56 triglycerides 137 and LDL at 59 will continue with the same dose for now follow-up with repeat lipid profile in couple of months and reevaluate.  Orders:  •  Lipid panel; Future

## 2025-05-05 RX ORDER — SODIUM CHLORIDE, SODIUM LACTATE, POTASSIUM CHLORIDE, CALCIUM CHLORIDE 600; 310; 30; 20 MG/100ML; MG/100ML; MG/100ML; MG/100ML
125 INJECTION, SOLUTION INTRAVENOUS CONTINUOUS
Status: CANCELLED | OUTPATIENT
Start: 2025-05-05

## 2025-05-06 ENCOUNTER — ANESTHESIA EVENT (OUTPATIENT)
Dept: GASTROENTEROLOGY | Facility: AMBULARY SURGERY CENTER | Age: 65
End: 2025-05-06
Payer: MEDICARE

## 2025-05-06 ENCOUNTER — HOSPITAL ENCOUNTER (OUTPATIENT)
Dept: GASTROENTEROLOGY | Facility: AMBULARY SURGERY CENTER | Age: 65
Setting detail: OUTPATIENT SURGERY
Discharge: HOME | End: 2025-05-06
Payer: MEDICARE

## 2025-05-06 VITALS
RESPIRATION RATE: 20 BRPM | SYSTOLIC BLOOD PRESSURE: 126 MMHG | OXYGEN SATURATION: 94 % | HEART RATE: 69 BPM | DIASTOLIC BLOOD PRESSURE: 67 MMHG | TEMPERATURE: 97.8 F

## 2025-05-06 DIAGNOSIS — Z12.11 SCREENING FOR COLON CANCER: ICD-10-CM

## 2025-05-06 PROCEDURE — G0121 COLON CA SCRN NOT HI RSK IND: HCPCS | Performed by: INTERNAL MEDICINE

## 2025-05-06 RX ORDER — LIDOCAINE HYDROCHLORIDE 10 MG/ML
INJECTION, SOLUTION EPIDURAL; INFILTRATION; INTRACAUDAL; PERINEURAL AS NEEDED
Status: DISCONTINUED | OUTPATIENT
Start: 2025-05-06 | End: 2025-05-06

## 2025-05-06 RX ORDER — PROPOFOL 10 MG/ML
INJECTION, EMULSION INTRAVENOUS AS NEEDED
Status: DISCONTINUED | OUTPATIENT
Start: 2025-05-06 | End: 2025-05-06

## 2025-05-06 RX ORDER — SODIUM CHLORIDE, SODIUM LACTATE, POTASSIUM CHLORIDE, CALCIUM CHLORIDE 600; 310; 30; 20 MG/100ML; MG/100ML; MG/100ML; MG/100ML
125 INJECTION, SOLUTION INTRAVENOUS CONTINUOUS
Status: DISCONTINUED | OUTPATIENT
Start: 2025-05-06 | End: 2025-05-10 | Stop reason: HOSPADM

## 2025-05-06 RX ORDER — PROPOFOL 10 MG/ML
INJECTION, EMULSION INTRAVENOUS CONTINUOUS PRN
Status: DISCONTINUED | OUTPATIENT
Start: 2025-05-06 | End: 2025-05-06

## 2025-05-06 RX ORDER — ONDANSETRON 2 MG/ML
4 INJECTION INTRAMUSCULAR; INTRAVENOUS ONCE AS NEEDED
Status: CANCELLED | OUTPATIENT
Start: 2025-05-06

## 2025-05-06 RX ORDER — SODIUM CHLORIDE, SODIUM LACTATE, POTASSIUM CHLORIDE, CALCIUM CHLORIDE 600; 310; 30; 20 MG/100ML; MG/100ML; MG/100ML; MG/100ML
125 INJECTION, SOLUTION INTRAVENOUS CONTINUOUS
Status: DISCONTINUED | OUTPATIENT
Start: 2025-05-06 | End: 2025-05-06 | Stop reason: SDUPTHER

## 2025-05-06 RX ADMIN — PROPOFOL 130 MG: 10 INJECTION, EMULSION INTRAVENOUS at 08:41

## 2025-05-06 RX ADMIN — SODIUM CHLORIDE, SODIUM LACTATE, POTASSIUM CHLORIDE, AND CALCIUM CHLORIDE: .6; .31; .03; .02 INJECTION, SOLUTION INTRAVENOUS at 08:34

## 2025-05-06 RX ADMIN — LIDOCAINE HYDROCHLORIDE 50 MG: 10 INJECTION, SOLUTION EPIDURAL; INFILTRATION; INTRACAUDAL; PERINEURAL at 08:41

## 2025-05-06 RX ADMIN — PROPOFOL 100 MCG/KG/MIN: 10 INJECTION, EMULSION INTRAVENOUS at 08:41

## 2025-05-06 NOTE — PERIOPERATIVE NURSING NOTE
Patient received into GI room via stretcher from GI procedural room. Report received from RN and CRNA at bedside. Patient is drowsy, coughing up copious amount of clear phlegm. Stretcher locked and in the lowest position. Side rails up on the both sides, call bell within patient's reach.

## 2025-05-06 NOTE — ANESTHESIA PREPROCEDURE EVALUATION
Procedure:  COLONOSCOPY    Relevant Problems   ANESTHESIA (within normal limits)      CARDIO   (+) Hyperlipidemia   (+) Peripheral vascular disease (HCC)      ENDO   (+) Hypothyroidism      MUSCULOSKELETAL   (+) Acute left-sided low back pain with left-sided sciatica   (+) Rheumatoid arthritis (HCC)   (+) Rheumatoid arthritis involving multiple sites with positive rheumatoid factor (HCC)      PULMONARY (within normal limits)        Physical Exam    Airway    Mallampati score: II  TM Distance: >3 FB  Neck ROM: full     Dental   No notable dental hx     Cardiovascular  Rhythm: regular, Rate: normal    Pulmonary   Breath sounds clear to auscultation    Other Findings        Anesthesia Plan  ASA Score- 2     Anesthesia Type- IV sedation with anesthesia with ASA Monitors.         Additional Monitors:     Airway Plan:            Plan Factors-Exercise tolerance (METS): >4 METS.    Chart reviewed. EKG reviewed.  Existing labs reviewed. Patient summary reviewed.    Patient is not a current smoker.              Induction- intravenous.    Postoperative Plan-         Informed Consent- Anesthetic plan and risks discussed with patient.  I personally reviewed this patient with the CRNA. Discussed and agreed on the Anesthesia Plan with the CRNA..

## 2025-05-06 NOTE — PERIOPERATIVE NURSING NOTE
Patient continues with loose productive cough, instructed by anesthesia if becomes short of breath, chest pain or increasing difficulty to report to ED for evaluation and treatment. Receptive and agrees.

## 2025-05-06 NOTE — H&P
History and Physical -  [facility]  Agapito Martell 65 y.o. male MRN: [id number]                  HPI: Agapito Martell is a 65 y.o. year old male who presents for history of problems      REVIEW OF SYSTEMS: Per the HPI, and otherwise unremarkable.    Historical Information   Past Medical History:   Diagnosis Date    Arthritis     Disease of thyroid gland     hypo    Graves disease 1995    Hallux limitus, acquired, right     Hypothyroid     hypo    Other tear of medial meniscus, current injury, right knee, initial encounter 6/6/2018    Added automatically from request for surgery [id number]    Platelet disorder ([facility])     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 Jc    RA (rheumatoid arthritis) ([facility])     Rheumatoid arthritis involving multiple sites ([facility]) 9/19/2022    Wears glasses      Past Surgical History:   Procedure Laterality Date    ANKLE SURGERY Right     ligament, chipped bones,dislocated    BUNIONECTOMY  03/2014    COLONOSCOPY      ELBOW SURGERY Right     tendon surgery    EPIDURAL BLOCK INJECTION Left 08/16/2023    Procedure: left L5-S1, S1 TRANSFORAMINAL epidural steroid injection (03691, 43670);  Surgeon: Gunner Aguayo DO;  Location: [facility] MAIN OR;  Service: Pain Management     EPIDURAL BLOCK INJECTION N/A 03/15/2024    Procedure: L5-S1 LUMBAR EPIDURAL STEROID INJECTION;  Surgeon: Chandra Serra MD;  Location: [facility] MAIN OR;  Service: Pain Management     EPIDURAL BLOCK INJECTION Left 04/26/2024    Procedure: LEFT L5-S1 TRANSFORAMINAL EPIDURAL STEROID INJECTION;  Surgeon: Chandra Serra MD;  Location: [facility] MAIN OR;  Service: Pain Management     FOOT ARTHRODESIS, MODIFIED ARELLANO Left     FRACTURE SURGERY Left     elbow    HERNIA REPAIR Right     inguinal    IR LOWER EXTREMITY ANGIOGRAM  12/26/2024    LUMBAR EPIDURAL INJECTION N/A 09/22/2023    Procedure: L5-S1 LUMBAR epidural steroid injection (30737);  Surgeon: Chandra Serra MD;  Location: [facility] MAIN OR;  Service: Pain Management     IA  ARTHRS KNE SURG W/MENISCECTOMY MED/LAT W/SHVG Right 2018    Procedure: MENISCECTOMY LATERAL /MEDIAL;  Surgeon: David Sloan MD;  Location: [facility];  Service: Orthopedics    SC CORRECTION HAMMERTOE Bilateral 2021    Procedure: REPAIR HAMMERTOE / MALLET TOE / CLAW TOE 2ND toe bilateral;  Surgeon: Filipe Oliva DPM;  Location: [facility];  Service: Podiatry    SC CORRJ HLX VLGS BNCTY SESMDC RESCJ PROX PHLX BASE Right 2021    Procedure: BUNIONECTOMY SMITH;  Surgeon: Filipe Oliva DPM;  Location: [facility];  Service: Podiatry    SC SLCTV CATHJ 3RD+ ORD SLCTV ABDL PEL/LXTR BRNCH Right 2024    Procedure: ARTERIOGRAM;  Surgeon: Wilbert Shaikh DO;  Location: [facility];  Service: Vascular    SC TEAEC W/WO PATCH GRAFT COMMON FEMORAL Right 2024    Procedure: right CFA endarterectomy Right politeal athlerectomy, angioplasty, and stenting;  Surgeon: Wilbert Shaikh DO;  Location: [facility];  Service: Vascular    ROTATOR CUFF REPAIR Left     WISDOM TOOTH EXTRACTION      x4    WRIST SURGERY Bilateral     Bilateral cyct removals     Social History   Social History     Substance and Sexual Activity   Alcohol Use Yes    Alcohol/week: 3.0 standard drinks of alcohol    Types: 3 Cans of beer per week    Comment: 3 beers a week     Social History     Substance and Sexual Activity   Drug Use No     Social History     Tobacco Use   Smoking Status Former    Current packs/day: 0.00    Average packs/day: 1 pack/day for 25.0 years (25.0 ttl pk-yrs)    Types: Cigarettes    Start date: 1975    Quit date: 2000    Years since quittin.3    Passive exposure: Past   Smokeless Tobacco Never     Family History   Problem Relation Age of Onset    Cancer Mother         passed    Hypertension Father     Heart disease Father         leaky valve    Skin cancer Father     Diabetes Father        Meds/Allergies       Current Outpatient Medications:     Hydroxyurea (HYDREA PO)    hydroxyurea (HYDREA) 500 mg  capsule    levothyroxine 150 mcg tablet    Naproxen Sodium (Aleve) 220 MG CAPS    rosuvastatin (CRESTOR) 10 MG tablet    aspirin 81 mg chewable tablet    Cholecalciferol (VITAMIN D3) 1000 units CAPS    clopidogrel (PLAVIX) 75 mg tablet    sildenafil (VIAGRA) 100 mg tablet    Sodium Sulfate-Mag Sulfate-KCl [id number] MG TABS    Current Facility-Administered Medications:     lactated ringers infusion, 125 mL/hr, Intravenous, Continuous    No Known Allergies    Objective     /69   Pulse 69   Temp 97.8 °F (36.6 °C) (Skin)   Resp 18   SpO2 98%       PHYSICAL EXAM    Gen: NAD  Head: NCAT  CV: RRR  CHEST: Clear  ABD: soft, NT/ND  EXT: no edema      ASSESSMENT/PLAN:  This is a 65 y.o. year old male here for colonoscopy, and he is stable and optimized for his procedure.

## 2025-05-29 DIAGNOSIS — N52.9 ERECTILE DYSFUNCTION, UNSPECIFIED ERECTILE DYSFUNCTION TYPE: ICD-10-CM

## 2025-05-29 RX ORDER — SILDENAFIL 100 MG/1
100 TABLET, FILM COATED ORAL AS NEEDED
Qty: 30 TABLET | Refills: 2 | Status: SHIPPED | OUTPATIENT
Start: 2025-05-29

## 2025-06-17 LAB
ALBUMIN SERPL-MCNC: 4.2 G/DL (ref 3.6–5.1)
ALBUMIN/GLOB SERPL: 1.7 (CALC) (ref 1–2.5)
ALP SERPL-CCNC: 79 U/L (ref 35–144)
ALT SERPL-CCNC: 23 U/L (ref 9–46)
AST SERPL-CCNC: 17 U/L (ref 10–35)
BASOPHILS # BLD AUTO: 61 CELLS/UL (ref 0–200)
BASOPHILS NFR BLD AUTO: 0.8 %
BILIRUB SERPL-MCNC: 0.3 MG/DL (ref 0.2–1.2)
BUN SERPL-MCNC: 19 MG/DL (ref 7–25)
BUN/CREAT SERPL: NORMAL (CALC) (ref 6–22)
CALCIUM SERPL-MCNC: 9.5 MG/DL (ref 8.6–10.3)
CHLORIDE SERPL-SCNC: 104 MMOL/L (ref 98–110)
CHOLEST SERPL-MCNC: 134 MG/DL
CHOLEST/HDLC SERPL: 2.1 (CALC)
CO2 SERPL-SCNC: 28 MMOL/L (ref 20–32)
CREAT SERPL-MCNC: 1 MG/DL (ref 0.7–1.35)
EOSINOPHIL # BLD AUTO: 258 CELLS/UL (ref 15–500)
EOSINOPHIL NFR BLD AUTO: 3.4 %
ERYTHROCYTE [DISTWIDTH] IN BLOOD BY AUTOMATED COUNT: 13.2 % (ref 11–15)
GFR/BSA.PRED SERPLBLD CYS-BASED-ARV: 82 ML/MIN/1.73M2
GLOBULIN SER CALC-MCNC: 2.5 G/DL (CALC) (ref 1.9–3.7)
GLUCOSE SERPL-MCNC: 95 MG/DL (ref 65–99)
HBA1C MFR BLD: 5.3 %
HCT VFR BLD AUTO: 49.6 % (ref 38.5–50)
HDLC SERPL-MCNC: 64 MG/DL
HGB BLD-MCNC: 17.2 G/DL (ref 13.2–17.1)
LDLC SERPL CALC-MCNC: 54 MG/DL (CALC)
LYMPHOCYTES # BLD AUTO: 996 CELLS/UL (ref 850–3900)
LYMPHOCYTES NFR BLD AUTO: 13.1 %
MCH RBC QN AUTO: 36.7 PG (ref 27–33)
MCHC RBC AUTO-ENTMCNC: 34.7 G/DL (ref 32–36)
MCV RBC AUTO: 105.8 FL (ref 80–100)
MONOCYTES # BLD AUTO: 502 CELLS/UL (ref 200–950)
MONOCYTES NFR BLD AUTO: 6.6 %
NEUTROPHILS # BLD AUTO: 5784 CELLS/UL (ref 1500–7800)
NEUTROPHILS NFR BLD AUTO: 76.1 %
NONHDLC SERPL-MCNC: 70 MG/DL (CALC)
PLATELET # BLD AUTO: 490 THOUSAND/UL (ref 140–400)
PMV BLD REES-ECKER: 8.7 FL (ref 7.5–12.5)
POTASSIUM SERPL-SCNC: 4.2 MMOL/L (ref 3.5–5.3)
PROT SERPL-MCNC: 6.7 G/DL (ref 6.1–8.1)
RBC # BLD AUTO: 4.69 MILLION/UL (ref 4.2–5.8)
SODIUM SERPL-SCNC: 140 MMOL/L (ref 135–146)
TRIGL SERPL-MCNC: 82 MG/DL
TSH SERPL-ACNC: 3.99 MIU/L (ref 0.4–4.5)
WBC # BLD AUTO: 7.6 THOUSAND/UL (ref 3.8–10.8)

## 2025-06-22 ENCOUNTER — RESULTS FOLLOW-UP (OUTPATIENT)
Dept: FAMILY MEDICINE CLINIC | Facility: CLINIC | Age: 65
End: 2025-06-22

## 2025-06-25 NOTE — PROGRESS NOTES
Name: Agapito Martell      : 1960      MRN: [id number]  Encounter Provider: Ronnell Coronel MD  Encounter Date: 2025   Encounter department: [facility]  :  Assessment & Plan  Hyperlipidemia, unspecified hyperlipidemia type  Lipid profile shows cholesterol at 134 HDL 64 triglycerides 82 LDL at 54 currently patient is taking rosuvastatin 10 mg at bedtime we will continue with the same.       Dyslipidemia  On rosuvastatin  Orders:  •  rosuvastatin (CRESTOR) 10 MG tablet; Take 1 tablet (10 mg total) by mouth daily    Peripheral vascular disease (HCC)  Patient with peripheral vascular disease status post common femoral endarterectomy on the right side with retrograde recanalization of his occluded right popliteal artery subsequently had arthrectomy and drug coated balloon angioplasty and superior stent placement.  Patient is currently taking rosuvastatin, aspirin, Plavix.  Peripheral pulses are poor with ulcer on the right small toe is getting better gradually closing       Acquired hypothyroidism  Patient's TSH level came back at 3.99 currently he is taking levothyroxine 150 mcg Monday through Saturday he is skipping the dose on  we will follow-up with repeat 1 also prior to this his TSH level was 0.28 in the month of 2023  Orders:  •  levothyroxine 150 mcg tablet; Patient is taking 150 mcg of levothyroxine Monday through Saturday he is not taking the medication on     Elevated BP without diagnosis of hypertension  Patient blood pressure today is 126/70 when it was running on the higher side when he was taking naproxen.  Recommend he regularly which has stopped taking it now.  Will continue to monitor.       Lumbar radiculopathy  Stable at present time       Other male erectile dysfunction  On sildenafil 100 mg as needed       Thrombocytosis  Patient is on hydroxyurea 1000 mg alternating with 500 followed by the hematology last platelet count was 490.       Ulcer of  right foot, limited to breakdown of skin (HCC)  Ulcer right little toe gradually healing as mentioned above patient with peripheral vascular disease peripheral pulses are poor       Age-related osteoporosis without current pathological fracture  Patient with osteoporosis in the lumbar spine area especially according to the report most than what it was before being followed by the oncologist who has ordered it and was planning to give him Prolia injection       Rheumatoid arthritis involving multiple sites with positive rheumatoid factor (HCC)  Currently patient not on any medication.              History of Present Illness   Patient is coming here for a follow-up evaluation with a history of hypothyroidism, peripheral vascular disease, lumbar radiculopathy, thrombocytosis, dyslipidemia, elevated blood pressure readings but getting back to normal.  No chest pain palpitations or shortness of breath.    Medications reviewed labs reviewed unremarkable.  Patient had a colonoscopy done extensive diverticulosis but no polyps next colonoscopy in 5 years.  Patient also had a DEXA scan done which has shown evidence of osteoporosis slightly worse according to the report compared to before      Review of Systems   Constitutional:  Negative for chills and fever.   HENT:  Negative for ear pain and sore throat.    Eyes:  Negative for pain and visual disturbance.   Respiratory:  Negative for cough and shortness of breath.    Cardiovascular:  Negative for chest pain and palpitations.   Gastrointestinal:  Negative for abdominal pain and vomiting.   Genitourinary:  Negative for dysuria and hematuria.   Musculoskeletal:  Positive for arthralgias. Negative for back pain.   Skin:  Negative for color change and rash.   Neurological:  Negative for seizures and syncope.   All other systems reviewed and are negative.      Objective   /71 (BP Location: Right arm, Patient Position: Sitting, Cuff Size: Standard)   Pulse 81   Ht 6'  (1.829 m)   Wt 73.1 kg (161 lb 3.2 oz)   SpO2 96%   BMI 21.86 kg/m²      Physical Exam  Vitals and nursing note reviewed.   Constitutional:       General: He is not in acute distress.     Appearance: He is well-developed.   HENT:      Head: Normocephalic and atraumatic.     Eyes:      Conjunctiva/sclera: Conjunctivae normal.       Cardiovascular:      Rate and Rhythm: Normal rate and regular rhythm.      Heart sounds: No murmur heard.  Pulmonary:      Effort: Pulmonary effort is normal. No respiratory distress.      Breath sounds: Normal breath sounds.   Abdominal:      Palpations: Abdomen is soft.      Tenderness: There is no abdominal tenderness.     Musculoskeletal:         General: No swelling.      Cervical back: Neck supple.     Skin:     General: Skin is warm and dry.      Capillary Refill: Capillary refill takes less than 2 seconds.     Neurological:      Mental Status: He is alert and oriented to person, place, and time.     Psychiatric:         Mood and Affect: Mood normal.         Behavior: Behavior normal.       Recent Results (from the past 8 weeks)   Comprehensive metabolic panel    Collection Time: 06/17/25  7:13 AM   Result Value Ref Range    Glucose, Random 95 65 - 99 mg/dL    BUN 19 7 - 25 mg/dL    Creatinine 1.00 0.70 - 1.35 mg/dL    eGFR 82 > OR = 60 mL/min/1.73m2    SL AMB BUN/CREATININE RATIO SEE NOTE: 6 - 22 (calc)    Sodium 140 135 - 146 mmol/L    Potassium 4.2 3.5 - 5.3 mmol/L    Chloride 104 98 - 110 mmol/L    CO2 28 20 - 32 mmol/L    Calcium 9.5 8.6 - 10.3 mg/dL    Protein, Total 6.7 6.1 - 8.1 g/dL    Albumin 4.2 3.6 - 5.1 g/dL    Globulin 2.5 1.9 - 3.7 g/dL (calc)    Albumin/Globulin Ratio 1.7 1.0 - 2.5 (calc)    TOTAL BILIRUBIN 0.3 0.2 - 1.2 mg/dL    Alkaline Phosphatase 79 35 - 144 U/L    AST 17 10 - 35 U/L    ALT 23 9 - 46 U/L   Lipid panel    Collection Time: 06/17/25  7:13 AM   Result Value Ref Range    Total Cholesterol 134 <200 mg/dL    HDL 64 > OR = 40 mg/dL    Triglycerides 82  <150 mg/dL    LDL Calculated 54 mg/dL (calc)    Chol HDLC Ratio 2.1 <5.0 (calc)    Non-HDL Cholesterol 70 <130 mg/dL (calc)   TSH, 3rd generation with Free T4 reflex    Collection Time: 06/17/25  7:13 AM   Result Value Ref Range    TSH W/RFX TO FREE T4 3.99 0.40 - 4.50 mIU/L   CBC and differential    Collection Time: 06/17/25  7:13 AM   Result Value Ref Range    White Blood Cell Count 7.6 3.8 - 10.8 Thousand/uL    Red Blood Cell Count 4.69 4.20 - 5.80 Million/uL    Hemoglobin 17.2 (H) 13.2 - 17.1 g/dL    HCT 49.6 38.5 - 50.0 %    .8 (H) 80.0 - 100.0 fL    MCH 36.7 (H) 27.0 - 33.0 pg    MCHC 34.7 32.0 - 36.0 g/dL    RDW 13.2 11.0 - 15.0 %    Platelet Count 490 (H) 140 - 400 Thousand/uL    SL AMB MPV 8.7 7.5 - 12.5 fL    Neutrophils (Absolute) 5,784 1,500 - 7,800 cells/uL    Lymphocytes (Absolute) 996 850 - 3,900 cells/uL    Monocytes (Absolute) 502 200 - 950 cells/uL    Eosinophils (Absolute) 258 15 - 500 cells/uL    Basophils ABS 61 0 - 200 cells/uL    Neutrophils 76.1 %    Lymphocytes 13.1 %    Monocytes 6.6 %    Eosinophils 3.4 %    Basophils PCT 0.8 %   Hemoglobin A1c (w/out EAG)    Collection Time: 06/17/25  7:13 AM   Result Value Ref Range    Hemoglobin A1C 5.3 <5.7 %

## 2025-06-26 ENCOUNTER — OFFICE VISIT (OUTPATIENT)
Dept: FAMILY MEDICINE CLINIC | Facility: CLINIC | Age: 65
End: 2025-06-26
Payer: MEDICARE

## 2025-06-26 VITALS
HEIGHT: 72 IN | OXYGEN SATURATION: 96 % | WEIGHT: 161.2 LBS | SYSTOLIC BLOOD PRESSURE: 126 MMHG | HEART RATE: 81 BPM | DIASTOLIC BLOOD PRESSURE: 71 MMHG | BODY MASS INDEX: 21.83 KG/M2

## 2025-06-26 DIAGNOSIS — D75.839 THROMBOCYTOSIS: ICD-10-CM

## 2025-06-26 DIAGNOSIS — M54.16 LUMBAR RADICULOPATHY: ICD-10-CM

## 2025-06-26 DIAGNOSIS — M81.0 AGE-RELATED OSTEOPOROSIS WITHOUT CURRENT PATHOLOGICAL FRACTURE: ICD-10-CM

## 2025-06-26 DIAGNOSIS — E03.9 ACQUIRED HYPOTHYROIDISM: ICD-10-CM

## 2025-06-26 DIAGNOSIS — I73.9 PERIPHERAL VASCULAR DISEASE (HCC): ICD-10-CM

## 2025-06-26 DIAGNOSIS — E78.5 HYPERLIPIDEMIA, UNSPECIFIED HYPERLIPIDEMIA TYPE: Primary | ICD-10-CM

## 2025-06-26 DIAGNOSIS — R03.0 ELEVATED BP WITHOUT DIAGNOSIS OF HYPERTENSION: ICD-10-CM

## 2025-06-26 DIAGNOSIS — M05.79 RHEUMATOID ARTHRITIS INVOLVING MULTIPLE SITES WITH POSITIVE RHEUMATOID FACTOR (HCC): ICD-10-CM

## 2025-06-26 DIAGNOSIS — E78.5 DYSLIPIDEMIA: ICD-10-CM

## 2025-06-26 DIAGNOSIS — L97.511 ULCER OF RIGHT FOOT, LIMITED TO BREAKDOWN OF SKIN (HCC): ICD-10-CM

## 2025-06-26 DIAGNOSIS — N52.8 OTHER MALE ERECTILE DYSFUNCTION: ICD-10-CM

## 2025-06-26 PROCEDURE — 99214 OFFICE O/P EST MOD 30 MIN: CPT | Performed by: INTERNAL MEDICINE

## 2025-06-26 PROCEDURE — G2211 COMPLEX E/M VISIT ADD ON: HCPCS | Performed by: INTERNAL MEDICINE

## 2025-06-26 RX ORDER — ROSUVASTATIN CALCIUM 10 MG/1
10 TABLET, COATED ORAL DAILY
Qty: 90 TABLET | Refills: 1 | Status: SHIPPED | OUTPATIENT
Start: 2025-06-26

## 2025-06-26 RX ORDER — LEVOTHYROXINE SODIUM 150 UG/1
TABLET ORAL
Qty: 90 TABLET | Refills: 1 | Status: SHIPPED | OUTPATIENT
Start: 2025-06-26

## 2025-06-26 NOTE — ASSESSMENT & PLAN NOTE
Patient with peripheral vascular disease status post common femoral endarterectomy on the right side with retrograde recanalization of his occluded right popliteal artery subsequently had arthrectomy and drug coated balloon angioplasty and superior stent placement.  Patient is currently taking rosuvastatin, aspirin, Plavix.  Peripheral pulses are poor with ulcer on the right small toe is getting better gradually closing

## 2025-06-26 NOTE — ASSESSMENT & PLAN NOTE
Patient is on hydroxyurea 1000 mg alternating with 500 followed by the hematology last platelet count was 490.

## 2025-06-26 NOTE — ASSESSMENT & PLAN NOTE
Lipid profile shows cholesterol at 134 HDL 64 triglycerides 82 LDL at 54 currently patient is taking rosuvastatin 10 mg at bedtime we will continue with the same.

## 2025-06-26 NOTE — ASSESSMENT & PLAN NOTE
Patient's TSH level came back at 3.99 currently he is taking levothyroxine 150 mcg Monday through Saturday he is skipping the dose on Sunday we will follow-up with repeat 1 also prior to this his TSH level was 0.28 in the month of November 2023  Orders:  •  levothyroxine 150 mcg tablet; Patient is taking 150 mcg of levothyroxine Monday through Saturday he is not taking the medication on Sunday

## 2025-06-26 NOTE — ASSESSMENT & PLAN NOTE
Ulcer right little toe gradually healing as mentioned above patient with peripheral vascular disease peripheral pulses are poor

## 2025-06-26 NOTE — ASSESSMENT & PLAN NOTE
Patient blood pressure today is 126/70 when it was running on the higher side when he was taking naproxen.  Recommend he regularly which has stopped taking it now.  Will continue to monitor.

## 2025-06-26 NOTE — ASSESSMENT & PLAN NOTE
Patient with osteoporosis in the lumbar spine area especially according to the report most than what it was before being followed by the oncologist who has ordered it and was planning to give him Prolia injection

## 2025-07-03 DIAGNOSIS — I73.9 PAD (PERIPHERAL ARTERY DISEASE) (HCC): ICD-10-CM

## 2025-07-03 RX ORDER — CLOPIDOGREL BISULFATE 75 MG/1
75 TABLET ORAL DAILY
Qty: 90 TABLET | Refills: 1 | Status: SHIPPED | OUTPATIENT
Start: 2025-07-03

## 2025-07-03 NOTE — TELEPHONE ENCOUNTER
Reason for call:   [x] Refill   [] Prior Auth  [] Other:     Office:   [] PCP/Provider -   [x] Specialty/Provider - Heart & Vascular     Medication: clopidogrel (PLAVIX) 75 mg tablet - Take 1 tablet (75 mg total) by mouth daily       Pharmacy: [facility]     Local Pharmacy   Does the patient have enough for 3 days?   [] Yes   [] No - Send as HP to POD    Mail Away Pharmacy   Does the patient have enough for 10 days?   [x] Yes   [] No - Send as HP to POD

## 2025-07-09 ENCOUNTER — TELEPHONE (OUTPATIENT)
Age: 65
End: 2025-07-09

## 2025-07-09 NOTE — TELEPHONE ENCOUNTER
Received fax from [facility] regarding refill of plavix. Per chart review, a refill was sent to [facility] pharmacy on 7/3. Called pt to see if he would like the script re-sent to [facility] of [facility]. Pt stated he picked up the refill from [facility] already and does not need it sent to [facility]. No further questions.

## (undated) DEVICE — Device

## (undated) DEVICE — PROBE COVER: Brand: STERILE PROBE COVER

## (undated) DEVICE — PLASTIC ADHESIVE BANDAGE: Brand: CURITY

## (undated) DEVICE — RADIFOCUS TORQUE DEVICE MULTI-TORQUE VISE: Brand: RADIFOCUS TORQUE DEVICE

## (undated) DEVICE — SYRINGE 5ML LL

## (undated) DEVICE — SPONGE GAUZE 4 X 8 12 PLY STRL LF

## (undated) DEVICE — RADIOLOGY STERILE LABELS: Brand: CENTURION

## (undated) DEVICE — GROUNDING PAD UNIVERSAL SLW

## (undated) DEVICE — SUT ETHILON 4-0 PS-2 18 IN 1667G

## (undated) DEVICE — PETRI DISH STERILE

## (undated) DEVICE — BANDAGE, ESMARK LF STR 4"X9'(20/CS): Brand: CYPRESS

## (undated) DEVICE — NEEDLE SPINAL 22G X 3.5 IN PLST HUB

## (undated) DEVICE — ANTIBACTERIAL UNDYED BRAIDED (POLYGLACTIN 910), SYNTHETIC ABSORBABLE SUTURE: Brand: COATED VICRYL

## (undated) DEVICE — PINNACLE R/O II INTRODUCER SHEATH WITH RADIOPAQUE MARKER: Brand: PINNACLE

## (undated) DEVICE — FABRIC REINFORCED, SURGICAL GOWN, XL: Brand: CONVERTORS

## (undated) DEVICE — SUT SILK 4-0 30 IN A303H

## (undated) DEVICE — ACE WRAP 3 IN UNSTERILE

## (undated) DEVICE — HEMOCLIP CARTRIDGE SM

## (undated) DEVICE — LABEL STERILE (RSC) (2-SENSOR CAINE 2-LIDOCAINE 2-HEPARIN)

## (undated) DEVICE — ASTOUND STANDARD SURGICAL GOWN, XL: Brand: CONVERTORS

## (undated) DEVICE — BLADE SHAVER EXCALIBUR 4MM 13CM COOLCUT

## (undated) DEVICE — TIBURON EXTREMITY SHEET: Brand: CONVERTORS

## (undated) DEVICE — PACLITAXEL-COATED PTA BALLOON CATHETER: Brand: RANGER™

## (undated) DEVICE — PACK CUSTOM CARDIOVASCULAR

## (undated) DEVICE — TIBURON SPLIT SHEET: Brand: CONVERTORS

## (undated) DEVICE — STRETCH BANDAGE: Brand: CURITY

## (undated) DEVICE — CURITY NON-ADHERENT STRIPS: Brand: CURITY

## (undated) DEVICE — SKIN MARKER DUAL TIP WITH RULER CAP, FLEXIBLE RULER AND LABELS: Brand: DEVON

## (undated) DEVICE — SYRINGE EPI 8ML LUER SLIP LOSS OF RESISTANCE PLASTIC PERFIX

## (undated) DEVICE — TOWEL SET X-RAY

## (undated) DEVICE — NEEDLE SPINAL 22G X 5IN QUINCKE

## (undated) DEVICE — WIPES BABY PAMPERS SENSITIVE 36/PK

## (undated) DEVICE — PREVENA PEEL & PLACE SYSTEM KIT- 13 CM: Brand: PREVENA™ PEEL & PLACE™

## (undated) DEVICE — PACK GENERAL LF

## (undated) DEVICE — GUIDEWIRE VASC .018 150CM 8CM TAP V-18

## (undated) DEVICE — CATH GUIDE RUBICON 18 X 90

## (undated) DEVICE — ELECTRODE BLADE MOD E-Z CLEAN  2.75IN 7CM -0012AM

## (undated) DEVICE — SUT VICRYL 2-0 CT-1 36 IN J945H

## (undated) DEVICE — GLOVE SRG BIOGEL 7.5

## (undated) DEVICE — PRECISION THIN (7.0 X 0.38 X 15.0MM)

## (undated) DEVICE — SYRINGE 10ML YELLOW MEDALLION

## (undated) DEVICE — STERILE BETHLEHEM FEM POP PACK: Brand: CARDINAL HEALTH

## (undated) DEVICE — CHLORAPREP APPLICATOR TINTED 10.5ML ONE-STEP

## (undated) DEVICE — CATH SUPPORT RUBICON 5FR 0.035IN 65CM STR

## (undated) DEVICE — HALF SHEET: Brand: CONVERTORS

## (undated) DEVICE — SYRINGE 3ML LL

## (undated) DEVICE — FLUID MANAGEMENT KIT - IR

## (undated) DEVICE — INSTRUMENT POUCH: Brand: CONVERTORS

## (undated) DEVICE — NEEDLE 21 G X 1 1/2 SAFETY

## (undated) DEVICE — CATH SUPPORT RUBICON 4FR 0.018IN 150CM STR

## (undated) DEVICE — SUT CHROMIC 4-0 SH-1 27 IN G181H

## (undated) DEVICE — TUBING ARTHROSCOPIC WAVE  MAIN PUMP

## (undated) DEVICE — GLOVE SRG BIOGEL 6.5

## (undated) DEVICE — NEEDLE 25G X 1 1/2

## (undated) DEVICE — GLOVE INDICATOR PI UNDERGLOVE SZ 8 BLUE

## (undated) DEVICE — PAD GROUNDING DUAL ADULT

## (undated) DEVICE — OCCLUSIVE GAUZE STRIP,3% BISMUTH TRIBROMOPHENATE IN PETROLATUM BLEND: Brand: XEROFORM

## (undated) DEVICE — 40529 DERMAPROX PAD 11'' X 15'' X 1'': Brand: 40529 DERMAPROX PAD 11'' X 15'' X 1''

## (undated) DEVICE — ACE WRAP 6 IN STERILE

## (undated) DEVICE — ABDOMINAL PAD: Brand: DERMACEA

## (undated) DEVICE — SUT PROLENE 5-0 C-1/C-1 24IN 8325H

## (undated) DEVICE — SYRINGE 10ML LL CONTROL TOP

## (undated) DEVICE — VAPR PREMIERE50 ELECTRODE WITH HAND CONTROLS 3.0MM, 50 DEGREES SUCTION WITH INTEGRATED HANDPIECE: Brand: VAPR

## (undated) DEVICE — FLEXCIL HIGH PRESSURE CONTRAST INJECTION LINE: Brand: NAMIC

## (undated) DEVICE — MICROPUNCTURE 501

## (undated) DEVICE — ACE WRAP 3 IN VELCRO LATEX FREE

## (undated) DEVICE — CUFF TOURNIQUET 18 X 4 IN QUICK CONNECT DISP 1 BLADDER

## (undated) DEVICE — 3M™ STERI-STRIP™ REINFORCED ADHESIVE SKIN CLOSURES, R1547, 1/2 IN X 4 IN (12 MM X 100 MM), 6 STRIPS/ENVELOPE: Brand: 3M™ STERI-STRIP™

## (undated) DEVICE — TRAY PAIN SUPPORT

## (undated) DEVICE — Device: Brand: GRAND SLAM

## (undated) DEVICE — NEEDLE 18 G X 1 1/2

## (undated) DEVICE — VESSEL CANNULA

## (undated) DEVICE — GLOVE SRG LF STRL BGL SKNSNS 7.5 PF

## (undated) DEVICE — SYRINGE 10ML LL

## (undated) DEVICE — GLOVE SRG BIOGEL ECLIPSE 7.5

## (undated) DEVICE — SUT SILK 2-0 30 IN A305H

## (undated) DEVICE — DISPOSABLE OR TOWEL: Brand: CARDINAL HEALTH

## (undated) DEVICE — HEMOSTATIC MATRIX SURGIFLO 8ML W/THROMBIN

## (undated) DEVICE — VIPERSLIDE, 100ML BAG, 10 PK: Brand: VIPERSLIDE

## (undated) DEVICE — TRAY EPIDURAL PERIFIX 20GA X 3.5IN TUOHY 8ML

## (undated) DEVICE — RADIFOCUS GLIDECATH: Brand: GLIDECATH

## (undated) DEVICE — GAUZE SPONGES,16 PLY: Brand: CURITY

## (undated) DEVICE — INTENDED FOR TISSUE SEPARATION, AND OTHER PROCEDURES THAT REQUIRE A SHARP SURGICAL BLADE TO PUNCTURE OR CUT.: Brand: BARD-PARKER ® CARBON RIB-BACK BLADES

## (undated) DEVICE — DECANTER: Brand: UNBRANDED

## (undated) DEVICE — PACK ARTHROSCOPY

## (undated) DEVICE — IV SET EXT SM BORE CARESITE 8IN

## (undated) DEVICE — INTENDED FOR TISSUE SEPARATION, AND OTHER PROCEDURES THAT REQUIRE A SHARP SURGICAL BLADE TO PUNCTURE OR CUT.: Brand: BARD-PARKER SAFETY BLADES SIZE 11, STERILE

## (undated) DEVICE — PACK UNIVERSAL DRAPES SUB-Q ICD

## (undated) DEVICE — PRECISION THIN (9.0 X 0.38 X 25.0MM)

## (undated) DEVICE — MICROPUNCTURE INTRODUCER SET SILHOUETTE TRANSITIONLESS PUSH-PLUS DESIGN - STIFFENED CANNULA WITH STAINLESS STEEL WIRE GUIDE: Brand: MICROPUNCTURE

## (undated) DEVICE — GLOVE INDICATOR PI UNDERGLOVE SZ 6.5 BLUE

## (undated) DEVICE — SURGIFOAM 8.5 X 12.5

## (undated) DEVICE — SUT CHROMIC 3-0 PS-2 27 1638H

## (undated) DEVICE — 3000CC GUARDIAN II: Brand: GUARDIAN

## (undated) DEVICE — CHLORAPREP HI-LITE 26ML ORANGE

## (undated) DEVICE — SUT PROLENE 6-0 BV-1/BV-1 24 IN 8305H

## (undated) DEVICE — STOCKINETTE,IMPERVIOUS,12X48,STERILE: Brand: MEDLINE

## (undated) DEVICE — HEMOCLIP CARTRIDGE MED

## (undated) DEVICE — EXOFIN PRECISION PEN HIGH VISCOSITY TOPICAL SKIN ADHESIVE: Brand: EXOFIN PRECISION PEN, 1G

## (undated) DEVICE — COPE MANDRIL WIRE GUIDE NITINOL: Brand: COPE

## (undated) DEVICE — NEEDLE BLUNT 18 G X 1 1/2IN

## (undated) DEVICE — SCD SEQUENTIAL COMPRESSION COMFORT SLEEVE MEDIUM KNEE LENGTH: Brand: KENDALL SCD

## (undated) DEVICE — BASIC DOUBLE BASIN 2-LF: Brand: MEDLINE INDUSTRIES, INC.

## (undated) DEVICE — SUT MONOCRYL 4-0 PS-2 27 IN Y426H

## (undated) DEVICE — CATH BAL COYOTE OTW 2 X 220MM X 150CM

## (undated) DEVICE — CATH BAL STERLING OTW 5 X 20MM X 135CM

## (undated) DEVICE — SYRINGE 1ML SLIP TIP

## (undated) DEVICE — SURGICEL 4 X 8IN

## (undated) DEVICE — GLOVE INDICATOR PI UNDERGLOVE SZ 7.5 BLUE

## (undated) DEVICE — SYRINGE 20ML LL

## (undated) DEVICE — SPONGE SCRUB 4 PCT CHLORHEXIDINE

## (undated) DEVICE — RADIFOCUS GLIDEWIRE: Brand: GLIDEWIRE

## (undated) DEVICE — GUIDEWIRE WITH ICE™ HYDROPHILIC COATING: Brand: V-18™ CONTROL WIRE™

## (undated) DEVICE — COBAN 4 IN STERILE